# Patient Record
Sex: FEMALE | Race: BLACK OR AFRICAN AMERICAN | NOT HISPANIC OR LATINO | Employment: FULL TIME | URBAN - METROPOLITAN AREA
[De-identification: names, ages, dates, MRNs, and addresses within clinical notes are randomized per-mention and may not be internally consistent; named-entity substitution may affect disease eponyms.]

---

## 2017-01-09 ENCOUNTER — TELEPHONE (OUTPATIENT)
Dept: SLEEP MEDICINE | Facility: OTHER | Age: 52
End: 2017-01-09

## 2017-01-12 ENCOUNTER — TELEPHONE (OUTPATIENT)
Dept: SLEEP MEDICINE | Facility: OTHER | Age: 52
End: 2017-01-12

## 2017-01-16 ENCOUNTER — TELEPHONE (OUTPATIENT)
Dept: SLEEP MEDICINE | Facility: OTHER | Age: 52
End: 2017-01-16

## 2017-01-19 ENCOUNTER — TELEPHONE (OUTPATIENT)
Dept: SLEEP MEDICINE | Facility: OTHER | Age: 52
End: 2017-01-19

## 2017-01-23 ENCOUNTER — TELEPHONE (OUTPATIENT)
Dept: SLEEP MEDICINE | Facility: OTHER | Age: 52
End: 2017-01-23

## 2017-01-30 ENCOUNTER — TELEPHONE (OUTPATIENT)
Dept: SLEEP MEDICINE | Facility: OTHER | Age: 52
End: 2017-01-30

## 2017-02-02 ENCOUNTER — TELEPHONE (OUTPATIENT)
Dept: SLEEP MEDICINE | Facility: OTHER | Age: 52
End: 2017-02-02

## 2017-02-06 ENCOUNTER — TELEPHONE (OUTPATIENT)
Dept: SLEEP MEDICINE | Facility: OTHER | Age: 52
End: 2017-02-06

## 2017-02-06 NOTE — TELEPHONE ENCOUNTER
Left several messages to reschedule her sleep study.  No response,sending out a letter to schedule.

## 2017-03-28 ENCOUNTER — HOSPITAL ENCOUNTER (EMERGENCY)
Facility: HOSPITAL | Age: 52
Discharge: HOME | End: 2017-03-28
Attending: EMERGENCY MEDICINE
Payer: COMMERCIAL

## 2017-03-28 VITALS
TEMPERATURE: 99 F | OXYGEN SATURATION: 98 % | BODY MASS INDEX: 51.91 KG/M2 | SYSTOLIC BLOOD PRESSURE: 114 MMHG | HEART RATE: 89 BPM | HEIGHT: 63 IN | WEIGHT: 293 LBS | RESPIRATION RATE: 20 BRPM | DIASTOLIC BLOOD PRESSURE: 66 MMHG

## 2017-03-28 DIAGNOSIS — J06.9 VIRAL UPPER RESPIRATORY TRACT INFECTION: Primary | ICD-10-CM

## 2017-03-28 LAB
ALBUMIN SERPL BCP-MCNC: 4.1 G/DL
ALP SERPL-CCNC: 111 IU/L
ALT SERPL W/O P-5'-P-CCNC: 32 IU/L
ANION GAP SERPL CALC-SCNC: 12 MMOL/L
AST SERPL-CCNC: 41 IU/L
BASOPHILS # BLD AUTO: 0.04 K/UL
BASOPHILS NFR BLD: 0.4 %
BILIRUB SERPL-MCNC: 0.4 MG/DL
BUN SERPL-MCNC: 9 MG/DL
CALCIUM SERPL-MCNC: 8.8 MG/DL
CHLORIDE SERPL-SCNC: 102 MMOL/L
CO2 SERPL-SCNC: 28 MMOL/L
CREAT SERPL-MCNC: 0.46 MG/DL
DIFFERENTIAL METHOD: ABNORMAL
EOSINOPHIL # BLD AUTO: 0.4 K/UL
EOSINOPHIL NFR BLD: 3.9 %
ERYTHROCYTE [DISTWIDTH] IN BLOOD BY AUTOMATED COUNT: 13.8 %
EST. GFR  (AFRICAN AMERICAN): >60 ML/MIN/1.73 M^2
EST. GFR  (NON AFRICAN AMERICAN): >60 ML/MIN/1.73 M^2
GLUCOSE SERPL-MCNC: 101 MG/DL
HCT VFR BLD AUTO: 33.9 %
HGB BLD-MCNC: 10.9 G/DL
INR PPP: 1.1
LYMPHOCYTES # BLD AUTO: 4 K/UL
LYMPHOCYTES NFR BLD: 36.5 %
MCH RBC QN AUTO: 26.8 PG
MCHC RBC AUTO-ENTMCNC: 32.2 %
MCV RBC AUTO: 84 FL
MONOCYTES # BLD AUTO: 0.8 K/UL
MONOCYTES NFR BLD: 7.3 %
NEUTROPHILS # BLD AUTO: 5.6 K/UL
NEUTROPHILS NFR BLD: 51.7 %
NT-PROBNP: 27 PG/ML
PLATELET # BLD AUTO: 266 K/UL
PMV BLD AUTO: 10.8 FL
POTASSIUM SERPL-SCNC: 3.5 MMOL/L
PROT SERPL-MCNC: 7.8 G/DL
PROTHROMBIN TIME: 12.4 SEC
RBC # BLD AUTO: 4.06 M/UL
SODIUM SERPL-SCNC: 142 MMOL/L
TROPONIN I SERPL DL<=0.01 NG/ML-MCNC: <0.012 NG/ML
WBC # BLD AUTO: 10.89 K/UL

## 2017-03-28 PROCEDURE — 84484 ASSAY OF TROPONIN QUANT: CPT

## 2017-03-28 PROCEDURE — 85025 COMPLETE CBC W/AUTO DIFF WBC: CPT

## 2017-03-28 PROCEDURE — 63600175 PHARM REV CODE 636 W HCPCS: Performed by: EMERGENCY MEDICINE

## 2017-03-28 PROCEDURE — 96372 THER/PROPH/DIAG INJ SC/IM: CPT

## 2017-03-28 PROCEDURE — 85610 PROTHROMBIN TIME: CPT

## 2017-03-28 PROCEDURE — 27000221 HC OXYGEN, UP TO 24 HOURS

## 2017-03-28 PROCEDURE — 83880 ASSAY OF NATRIURETIC PEPTIDE: CPT

## 2017-03-28 PROCEDURE — 93005 ELECTROCARDIOGRAM TRACING: CPT

## 2017-03-28 PROCEDURE — 99284 EMERGENCY DEPT VISIT MOD MDM: CPT | Mod: 25

## 2017-03-28 PROCEDURE — 80053 COMPREHEN METABOLIC PANEL: CPT

## 2017-03-28 RX ORDER — HYDROCHLOROTHIAZIDE 25 MG/1
25 TABLET ORAL DAILY
Qty: 30 TABLET | Refills: 0 | Status: SHIPPED | OUTPATIENT
Start: 2017-03-28 | End: 2022-12-30

## 2017-03-28 RX ORDER — DEXAMETHASONE SODIUM PHOSPHATE 4 MG/ML
8 INJECTION, SOLUTION INTRA-ARTICULAR; INTRALESIONAL; INTRAMUSCULAR; INTRAVENOUS; SOFT TISSUE
Status: COMPLETED | OUTPATIENT
Start: 2017-03-28 | End: 2017-03-28

## 2017-03-28 RX ORDER — HYDROCODONE BITARTRATE AND HOMATROPINE METHYLBROMIDE ORAL SOLUTION 5; 1.5 MG/5ML; MG/5ML
5 LIQUID ORAL EVERY 4 HOURS PRN
Qty: 120 ML | Refills: 0 | Status: ON HOLD | OUTPATIENT
Start: 2017-03-28 | End: 2017-10-18 | Stop reason: ALTCHOICE

## 2017-03-28 RX ORDER — QUETIAPINE FUMARATE 100 MG/1
TABLET, FILM COATED ORAL
COMMUNITY
End: 2022-12-30

## 2017-03-28 RX ORDER — CLOPIDOGREL BISULFATE 75 MG/1
75 TABLET ORAL DAILY
COMMUNITY
End: 2020-11-21 | Stop reason: CLARIF

## 2017-03-28 RX ADMIN — DEXAMETHASONE SODIUM PHOSPHATE 8 MG: 4 INJECTION, SOLUTION INTRAMUSCULAR; INTRAVENOUS at 09:03

## 2017-03-29 NOTE — ED PROVIDER NOTES
"Encounter Date: 3/28/2017       History     Chief Complaint   Patient presents with    Shortness of Breath     Pt states has had SOB and chest pain x 1 hour.  States has had "fluid in legs" 3 days.  Called for EKG.     Review of patient's allergies indicates:  No Known Allergies  Patient is a 51 y.o. female presenting with the following complaint: shortness of breath. The history is provided by the patient.   Shortness of Breath   This is a new problem. The average episode lasts 1 hour. The problem occurs continuously.The current episode started 1 to 2 hours ago. Associated symptoms include cough, sputum production (white) and leg swelling. Pertinent negatives include no fever, no headaches, no rhinorrhea, no sore throat, no hemoptysis, no wheezing, no orthopnea, no chest pain, no abdominal pain and no rash. It is unknown what precipitated the problem. She has tried nothing for the symptoms. She has had no prior hospitalizations. She has had prior ED visits. She has had no prior ICU admissions.     Past Medical History:   Diagnosis Date    Depression     Diabetes mellitus     High cholesterol     Hypertension     TIA (transient ischemic attack)      Past Surgical History:   Procedure Laterality Date    CHOLECYSTECTOMY      KNEE SURGERY      TUBAL LIGATION      WRIST SURGERY       History reviewed. No pertinent family history.  Social History   Substance Use Topics    Smoking status: Former Smoker    Smokeless tobacco: None    Alcohol use No     Review of Systems   Constitutional: Negative for fever.   HENT: Negative for rhinorrhea and sore throat.    Respiratory: Positive for cough, sputum production (white) and shortness of breath. Negative for hemoptysis and wheezing.    Cardiovascular: Positive for leg swelling. Negative for chest pain and orthopnea.   Gastrointestinal: Negative for abdominal pain.   Skin: Negative for rash.   Neurological: Negative for headaches.   All other systems reviewed and are " negative.      Physical Exam   Initial Vitals   BP Pulse Resp Temp SpO2   03/28/17 1813 03/28/17 1813 03/28/17 1813 03/28/17 1813 03/28/17 1813   177/93 108 26 99 °F (37.2 °C) 98 %     Physical Exam    Nursing note and vitals reviewed.  Constitutional: She appears well-developed and well-nourished.   HENT:   Head: Normocephalic and atraumatic.   Eyes: EOM are normal.   Neck: Normal range of motion. Neck supple.   Cardiovascular: Normal rate, regular rhythm, normal heart sounds and intact distal pulses.   Pulmonary/Chest: Breath sounds normal.   Abdominal: Soft.   Musculoskeletal: Normal range of motion.   Neurological: She is alert and oriented to person, place, and time.   Skin: Skin is warm and dry.   Psychiatric: She has a normal mood and affect. Her behavior is normal. Judgment and thought content normal.         ED Course   Procedures  Labs Reviewed   CBC W/ AUTO DIFFERENTIAL - Abnormal; Notable for the following:        Result Value    Hemoglobin 10.9 (*)     Hematocrit 33.9 (*)     MCH 26.8 (*)     All other components within normal limits   COMPREHENSIVE METABOLIC PANEL - Abnormal; Notable for the following:     Creatinine 0.46 (*)     All other components within normal limits   TROPONIN I   PROTIME-INR   NT-PRO NATRIURETIC PEPTIDE     EKG Readings: (Independently Interpreted)   Rhythm: Normal Sinus Rhythm. Heart Rate: 100. Ectopy: No Ectopy. Conduction: Normal. ST Segments: Normal ST Segments. T Waves: Normal. Clinical Impression: Normal Sinus Rhythm       X-Rays:   Independently Interpreted Readings:   Chest X-Ray: Normal heart size.  No infiltrates.  No acute abnormalities.     Medical Decision Making:   Clinical Tests:   Lab Tests: Ordered and Reviewed  Radiological Study: Ordered and Reviewed  Medical Tests: Ordered and Reviewed                   ED Course     Clinical Impression:   The encounter diagnosis was Viral upper respiratory tract infection.    Disposition:   Disposition: Discharged  Condition:  Stable       Shayla Angulo MD  03/28/17 4219

## 2017-03-29 NOTE — ED NOTES
Pt updated on POC. Pt given pillwo for comfort. Rates chest pain 7/10 from 10/10. No other complaints.

## 2017-03-29 NOTE — ED TRIAGE NOTES
"Pt presents to ED c/o chest pain radiating to upper back x 1 day with cough that has been intermittent for a few months. Pt reports bilateral lower leg swelling x 2 days. Mild swelling noted. Pt denies pain to feet or calves. Pt states PTA she coughed up foamy white sputum. Pt able to complete full sentences. No resp distress noted. Appears slightly uncomfortable. Breath sounds clear. States she was taken off of her fluid pills a few months ago because it was making her cough. She went get OTC "water pills" on Sunday to attempt to decrease the swelling.   "

## 2017-03-29 NOTE — DISCHARGE INSTRUCTIONS
Viral Upper Respiratory Illness (Adult)  You have a viral upper respiratory illness (URI), which is another term for the common cold. This illness is contagious during the first few days. It is spread through the air by coughing and sneezing. It may also be spread by direct contact (touching the sick person and then touching your own eyes, nose, or mouth). Frequent handwashing will decrease risk of spread. Most viral illnesses go away within 7 to 10 days with rest and simple home remedies. Sometimes the illness may last for several weeks. Antibiotics will not kill a virus, and they are generally not prescribed for this condition.    Home care  · If symptoms are severe, rest at home for the first 2 to 3 days. When you resume activity, don't let yourself get too tired.  · Avoid being exposed to cigarette smoke (yours or others).  · You may use acetaminophen or ibuprofen to control pain and fever, unless another medicine was prescribed. (Note: If you have chronic liver or kidney disease, have ever had a stomach ulcer or gastrointestinal bleeding, or are taking blood-thinning medicines, talk with your healthcare provider before using these medicines.) Aspirin should never be given to anyone under 18 years of age who is ill with a viral infection or fever. It may cause severe liver or brain damage.  · Your appetite may be poor, so a light diet is fine. Avoid dehydration by drinking 6 to 8 glasses of fluids per day (water, soft drinks, juices, tea, or soup). Extra fluids will help loosen secretions in the nose and lungs.  · Over-the-counter cold medicines will not shorten the length of time youre sick, but they may be helpful for the following symptoms: cough, sore throat, and nasal and sinus congestion. (Note: Do not use decongestants if you have high blood pressure.)  Follow-up care  Follow up with your healthcare provider, or as advised.  When to seek medical advice  Call your healthcare provider right away if  any of these occur:  · Cough with lots of colored sputum (mucus)  · Severe headache; face, neck, or ear pain  · Difficulty swallowing due to throat pain  · Fever of 100.4°F (38°C)  Call 911, or get immediate medical care  Call emergency services right away if any of these occur:  · Chest pain, shortness of breath, wheezing, or difficulty breathing  · Coughing up blood  · Inability to swallow due to throat pain  Date Last Reviewed: 9/13/2015 © 2000-2016 [employer]. [address] [place] [address]. All rights reserved. This information is not intended as a substitute for professional medical care. Always follow your healthcare professional's instructions.

## 2017-09-28 ENCOUNTER — OFFICE VISIT (OUTPATIENT)
Dept: GASTROENTEROLOGY | Facility: CLINIC | Age: 52
End: 2017-09-28
Payer: COMMERCIAL

## 2017-09-28 VITALS
WEIGHT: 280.19 LBS | BODY MASS INDEX: 49.64 KG/M2 | SYSTOLIC BLOOD PRESSURE: 140 MMHG | HEART RATE: 91 BPM | DIASTOLIC BLOOD PRESSURE: 97 MMHG | HEIGHT: 63 IN

## 2017-09-28 DIAGNOSIS — K59.01 CONSTIPATION BY DELAYED COLONIC TRANSIT: ICD-10-CM

## 2017-09-28 DIAGNOSIS — R12 HEARTBURN SYMPTOM: ICD-10-CM

## 2017-09-28 DIAGNOSIS — K62.5 RECTAL BLEEDING: Primary | ICD-10-CM

## 2017-09-28 PROCEDURE — 99243 OFF/OP CNSLTJ NEW/EST LOW 30: CPT | Mod: S$GLB,,, | Performed by: INTERNAL MEDICINE

## 2017-09-28 PROCEDURE — 99999 PR PBB SHADOW E&M-EST. PATIENT-LVL III: CPT | Mod: PBBFAC,,, | Performed by: INTERNAL MEDICINE

## 2017-09-28 NOTE — LETTER
September 28, 2017      Court Garcia MD  [address]  [address]  [facility] [place] [address]           [place] [facility]  [address], [facility] [address],  [place] [address]  Phone: [phone]  Fax: [phone]          Patient: Barrera Siddiqui   MR Number: [id number]   YOB: 1965   Date of Visit: 9/28/2017       Dear Dr. Court Garcia:    Thank you for referring Barrera Siddiqui to me for evaluation. Attached you will find relevant portions of my assessment and plan of care.    If you have questions, please do not hesitate to call me. I look forward to following Barrera Siddiqui along with you.    Sincerely,    Donald Wright Jr., MD    Enclosure  CC:  No Recipients    If you would like to receive this communication electronically, please contact [email or web address] or [phone] to request more information on [facility] access.    For providers and/or their staff who would like to refer a patient to [facility], please contact us through our one-stop-shop provider referral line, [facility], at [phone].    If you feel you have received this communication in error or would no longer like to receive these types of communications, please e-mail [email or web address]

## 2017-09-28 NOTE — PATIENT INSTRUCTIONS
Constipation (Adult)  Constipation means that you have bowel movements that are less frequent than usual. Stools often become very hard and difficult to pass.  Constipation is very common. At some point in life it affects almost everyone. Since everyone's bowel habits are different, what is constipation to one person may not be to another. Your healthcare provider may do tests to diagnose constipation. It depends on what he or she finds when evaluating you.    Symptoms of constipation include:  · Abdominal pain  · Bloating  · Vomiting  · Painful bowel movements  · Itching, swelling, bleeding, or pain around the anus  Causes  Constipation can have many causes. These include:  · Diet low in fiber  · Too much dairy  · Not drinking enough liquids  · Lack of exercise or physical activity. This is especially true for older adults.  · Changes in lifestyle or daily routine, including pregnancy, aging, work, and travel  · Frequent use or misuse of laxatives  · Ignoring the urge to have a bowel movement or delaying it until later  · Medicines, such as certain prescription pain medicines, iron supplements, antacids, certain antidepressants, and calcium supplements  · Diseases like irritable bowel syndrome, bowel obstructions, stroke, diabetes, thyroid disease, Parkinson disease, hemorrhoids, and colon cancer  Complications  Potential complications of constipation can include:  · Hemorrhoids  · Rectal bleeding from hemorrhoids or anal fissures (skin tears)  · Hernias  · Dependency on laxatives  · Chronic constipation  · Fecal impaction  · Bowel obstruction or perforation  Home care  All treatment should be done after talking with your healthcare provider. This is especially true if you have another medical problems, are taking prescription medicines, or are an older adult. Treatment most often involves lifestyle changes. You may also need medicines. Your healthcare provider will tell you which will work best for you. Follow  the advice below to help avoid this problem in the future.  Lifestyle changes  These lifestyle changes can help prevent constipation:  · Diet. Eat a high-fiber diet, with fresh fruit and vegetables, and reduce dairy intake, meats, and processed foods  · Fluids. It's important to get enough fluids each day. Drink plenty of water when you eat more fiber. If you are on diet that limits the amount of fluid you can have, talk about this with your healthcare provider.  · Regular exercise. Check with your healthcare provider first.  Medications  Take any medicines as directed. Some laxatives are safe to use only every now and then. Others can be taken on a regular basis. Talk with your doctor or pharmacist if you have questions.  Prescription pain medicines can cause constipation. If you are taking this kind of medicine, ask your healthcare provider if you should also take a stool softener.  Medicines you may take to treat constipation include:  · Fiber supplements  · Stool softeners  · Laxatives  · Enemas  · Rectal suppositories  Follow-up care  Follow up with your healthcare provider if symptoms don't get better in the next few days. You may need to have more tests or see a specialist.  Call 911  Call 911 if any of these occur:  · Trouble breathing  · Stiff, rigid abdomen that is severely painful to touch  · Confusion  · Fainting or loss of consciousness  · Rapid heart rate  · Chest pain  When to seek medical advice  Call your healthcare provider right away if any of these occur:  · Fever over 100.4°F (38°C)  · Failure to resume normal bowel movements  · Pain in your abdomen or back gets worse  · Nausea or vomiting  · Swelling in your abdomen  · Blood in the stool  · Black, tarry stool  · Involuntary weight loss  · Weakness  Date Last Reviewed: 12/30/2015  © 8280-1785 [employer]. [address] [place] [address], [place] [address]. All rights reserved. This information is not intended as a substitute for  professional medical care. Always follow your healthcare professional's instructions.

## 2017-09-28 NOTE — PROGRESS NOTES
Subjective:      Patient ID: Barrera Siddiqui is a 52 y.o. female.    Chief Complaint: Rectal Bleeding; Gastroesophageal Reflux; Heartburn; and Nausea    HPI:   Patient a 52-year-old female referred for GI evaluation.  She gives a history of intermittent bright red blood per rectum.  She's been anxious about addressing this symptom.  Occasionally has blood dripped from the anus all urinating.  Describes constipation but not actually hard stool.  Takes MiraLAX occasionally.  GI systems review includes occasional heartburn.  She gives a history of prior cerebrovascular accident ×2.  This is managed on Plavix and 81 mg aspirin.  Further past medical history includes hypertension, diabetes on oral agents.  Prior cholecystectomy.  Nonsmoker.  Nondrinker.  I reviewed the indication risks for colonoscopy with her.  Reassured her that the test is a low risk procedure.  Advised her that she will need to hold her Plavix for 5 days prior to the procedure.  She does not need to interrupt her 81 mg aspirin daily    Review of patient's allergies indicates:  No Known Allergies  Past Medical History:   Diagnosis Date    Depression     Diabetes mellitus     High cholesterol     Hypertension     TIA (transient ischemic attack)      Past Surgical History:   Procedure Laterality Date    CHOLECYSTECTOMY      KNEE SURGERY      TUBAL LIGATION      WRIST SURGERY       Family History   Problem Relation Age of Onset    No Known Problems Mother     Liver disease Maternal Grandmother      Social History     Social History    Marital status: Single     Spouse name: N/A    Number of children: N/A    Years of education: N/A     Occupational History    Not on file.     Social History Main Topics    Smoking status: Former Smoker    Smokeless tobacco: Never Used    Alcohol use No    Drug use: No    Sexual activity: Not on file     Other Topics Concern    Not on file     Social History Narrative    No narrative on file       Review of  "Systems:  Constitutional: Negative for appetite change.  Fatigue.  HENT: Negative for trouble swallowing.   Eyes: Negative for photophobia.   Respiratory: Positive for cough .  No shortness of breath.   Cardiovascular: Negative for palpitations.   Gastrointestinal: See HPI for details.  Genitourinary: Negative for frequency and hematuria.   Skin: Negative for rash.   Musculoskeletal: Joint pains, back pain.  Neurological: Negative for weakness and headaches.   Hematological: Negative.   Psychiatric/Behavioral: Negative for suicidal ideas and behavioral problems.  Some anxiety and memory loss    Objective:     BP (!) 140/97 (BP Location: Right arm, Patient Position: Sitting)   Pulse 91   Ht 5' 3" (1.6 m)   Wt 127.1 kg (280 lb 3.2 oz)   BMI 49.64 kg/m²     Physical Exam:  Eyes: Pupils are equal, round, and reactive to light.   Neck: Supple. No mass  Cardiovascular: Regular rhythm . No murmur   Pulmonary/Chest: Lungs clear   Abdominal: Soft.  Obese. Nontender, no guarding. Positive bowel sounds   Musculoskeletal: No deformity. No edema.   Psychiatric: Alert and oriented    Assessment:     1. Rectal bleeding    2. Constipation by delayed colonic transit    3. Heartburn symptom      Plan:     Barrera was seen today for rectal bleeding, gastroesophageal reflux, heartburn and nausea.    Diagnoses and all orders for this visit:    Rectal bleeding  -     Case request GI: COLONOSCOPY    Constipation by delayed colonic transit    Heartburn symptom      Plan:  Colonoscopy with extra prep.  At [facility]  Hold Plavix for 5 days prior to the procedure.  Do not interrupt her 81 mg aspirin daily regimen  Daily MiraLAX  Constipation pamphlet    CC Dr. benson    "

## 2017-09-29 ENCOUNTER — TELEPHONE (OUTPATIENT)
Dept: GASTROENTEROLOGY | Facility: CLINIC | Age: 52
End: 2017-09-29

## 2017-09-29 NOTE — TELEPHONE ENCOUNTER
Patient is scheduled for Colonoscopy at [facility] on 10/18/17,case request was placed. Prep instructions was explained and given to patient.

## 2017-10-02 ENCOUNTER — TELEPHONE (OUTPATIENT)
Dept: GASTROENTEROLOGY | Facility: CLINIC | Age: 52
End: 2017-10-02

## 2017-10-02 NOTE — TELEPHONE ENCOUNTER
----- Message from Donald [place] Jr., MD sent at 10/2/2017  2:54 PM CDT -----  done  ----- Message -----  From: Diane Barlow MA  Sent: 9/29/2017  11:05 AM  To: Donald [place] Jr., MD    Patient would like to schedule Colonoscopy for 10/18/17 in [place],can place case request for that date.

## 2017-10-18 ENCOUNTER — ANESTHESIA EVENT (OUTPATIENT)
Dept: ENDOSCOPY | Facility: HOSPITAL | Age: 52
End: 2017-10-18
Payer: COMMERCIAL

## 2017-10-18 ENCOUNTER — HOSPITAL ENCOUNTER (OUTPATIENT)
Facility: HOSPITAL | Age: 52
Discharge: HOME | End: 2017-10-18
Attending: INTERNAL MEDICINE | Admitting: INTERNAL MEDICINE
Payer: COMMERCIAL

## 2017-10-18 ENCOUNTER — ANESTHESIA (OUTPATIENT)
Dept: ENDOSCOPY | Facility: HOSPITAL | Age: 52
End: 2017-10-18
Payer: COMMERCIAL

## 2017-10-18 VITALS
RESPIRATION RATE: 18 BRPM | SYSTOLIC BLOOD PRESSURE: 129 MMHG | BODY MASS INDEX: 50.85 KG/M2 | WEIGHT: 287 LBS | HEIGHT: 63 IN | TEMPERATURE: 98 F | HEART RATE: 82 BPM | OXYGEN SATURATION: 96 % | DIASTOLIC BLOOD PRESSURE: 79 MMHG

## 2017-10-18 DIAGNOSIS — Z12.12 SCREENING FOR COLORECTAL CANCER: ICD-10-CM

## 2017-10-18 DIAGNOSIS — K62.5 RECTAL BLEEDING: Primary | ICD-10-CM

## 2017-10-18 DIAGNOSIS — Z12.11 SCREENING FOR COLORECTAL CANCER: ICD-10-CM

## 2017-10-18 LAB
B-HCG UR QL: NEGATIVE
CTP QC/QA: YES
POCT GLUCOSE: 138 MG/DL (ref 70–110)

## 2017-10-18 PROCEDURE — 82962 GLUCOSE BLOOD TEST: CPT | Performed by: INTERNAL MEDICINE

## 2017-10-18 PROCEDURE — 37000008 HC ANESTHESIA 1ST 15 MINUTES: Performed by: INTERNAL MEDICINE

## 2017-10-18 PROCEDURE — 45378 DIAGNOSTIC COLONOSCOPY: CPT | Mod: ,,, | Performed by: INTERNAL MEDICINE

## 2017-10-18 PROCEDURE — 45378 DIAGNOSTIC COLONOSCOPY: CPT | Performed by: INTERNAL MEDICINE

## 2017-10-18 PROCEDURE — 63600175 PHARM REV CODE 636 W HCPCS: Performed by: NURSE ANESTHETIST, CERTIFIED REGISTERED

## 2017-10-18 PROCEDURE — 37000009 HC ANESTHESIA EA ADD 15 MINS: Performed by: INTERNAL MEDICINE

## 2017-10-18 PROCEDURE — 25000003 PHARM REV CODE 250: Performed by: INTERNAL MEDICINE

## 2017-10-18 PROCEDURE — 81025 URINE PREGNANCY TEST: CPT | Performed by: INTERNAL MEDICINE

## 2017-10-18 RX ORDER — SODIUM CHLORIDE 9 MG/ML
INJECTION, SOLUTION INTRAVENOUS CONTINUOUS
Status: DISCONTINUED | OUTPATIENT
Start: 2017-10-18 | End: 2017-10-18 | Stop reason: SDUPTHER

## 2017-10-18 RX ORDER — PROPOFOL 10 MG/ML
VIAL (ML) INTRAVENOUS
Status: DISCONTINUED | OUTPATIENT
Start: 2017-10-18 | End: 2017-10-18

## 2017-10-18 RX ORDER — LIDOCAINE HCL/PF 100 MG/5ML
SYRINGE (ML) INTRAVENOUS
Status: DISCONTINUED | OUTPATIENT
Start: 2017-10-18 | End: 2017-10-18

## 2017-10-18 RX ORDER — PROPOFOL 10 MG/ML
VIAL (ML) INTRAVENOUS CONTINUOUS PRN
Status: DISCONTINUED | OUTPATIENT
Start: 2017-10-18 | End: 2017-10-18

## 2017-10-18 RX ORDER — SODIUM CHLORIDE 9 MG/ML
INJECTION, SOLUTION INTRAVENOUS CONTINUOUS
Status: DISCONTINUED | OUTPATIENT
Start: 2017-10-18 | End: 2017-10-18 | Stop reason: HOSPADM

## 2017-10-18 RX ADMIN — LIDOCAINE HYDROCHLORIDE 80 MG: 20 INJECTION, SOLUTION INTRAVENOUS at 10:10

## 2017-10-18 RX ADMIN — PROPOFOL 150 MCG/KG/MIN: 10 INJECTION, EMULSION INTRAVENOUS at 10:10

## 2017-10-18 RX ADMIN — PROPOFOL 50 MG: 10 INJECTION, EMULSION INTRAVENOUS at 10:10

## 2017-10-18 RX ADMIN — SODIUM CHLORIDE: 0.9 INJECTION, SOLUTION INTRAVENOUS at 09:10

## 2017-10-18 NOTE — ANESTHESIA POSTPROCEDURE EVALUATION
"Anesthesia Post Evaluation    Patient: Barrera Siddiqui    Procedure(s) Performed: Procedure(s) (LRB):  COLONOSCOPY (N/A)    Final Anesthesia Type: MAC  Patient location during evaluation: GI PACU  Patient participation: Yes- Able to Participate  Level of consciousness: awake and alert and oriented  Post-procedure vital signs: reviewed and stable  Pain management: adequate  Airway patency: patent  PONV status at discharge: No PONV  Anesthetic complications: no      Cardiovascular status: blood pressure returned to baseline and hemodynamically stable  Respiratory status: unassisted  Hydration status: euvolemic  Follow-up not needed.        Visit Vitals  BP (!) 141/84 (BP Location: Left arm, Patient Position: Lying)   Pulse 108   Temp 37 °C (98.6 °F) (Oral)   Resp 16   Ht 5' 2.5" (1.588 m)   Wt 130.2 kg (287 lb)   SpO2 99%   Breastfeeding? No   BMI 51.66 kg/m²       Pain/Nola Score: No Data Recorded      "

## 2017-10-18 NOTE — TRANSFER OF CARE
"Anesthesia Transfer of Care Note    Patient: Barrera Siddiqui    Procedure(s) Performed: Procedure(s) (LRB):  COLONOSCOPY (N/A)    Patient location: GI    Anesthesia Type: MAC    Transport from OR: Transported from OR on room air with adequate spontaneous ventilation    Post pain: adequate analgesia    Post assessment: no apparent anesthetic complications and tolerated procedure well    Post vital signs: stable    Level of consciousness: awake, alert and oriented    Nausea/Vomiting: no nausea/vomiting    Complications: none          Last vitals:   Visit Vitals  BP (!) 141/84 (BP Location: Left arm, Patient Position: Lying)   Pulse 108   Temp 37 °C (98.6 °F) (Oral)   Resp 16   Ht 5' 2.5" (1.588 m)   Wt 130.2 kg (287 lb)   SpO2 99%   Breastfeeding? No   BMI 51.66 kg/m²     "

## 2017-10-18 NOTE — H&P (VIEW-ONLY)
Subjective:      Patient ID: Barrera Siddiqui is a 52 y.o. female.    Chief Complaint: Rectal Bleeding; Gastroesophageal Reflux; Heartburn; and Nausea    HPI:   Patient a 52-year-old female referred for GI evaluation.  She gives a history of intermittent bright red blood per rectum.  She's been anxious about addressing this symptom.  Occasionally has blood dripped from the anus all urinating.  Describes constipation but not actually hard stool.  Takes MiraLAX occasionally.  GI systems review includes occasional heartburn.  She gives a history of prior cerebrovascular accident ×2.  This is managed on Plavix and 81 mg aspirin.  Further past medical history includes hypertension, diabetes on oral agents.  Prior cholecystectomy.  Nonsmoker.  Nondrinker.  I reviewed the indication risks for colonoscopy with her.  Reassured her that the test is a low risk procedure.  Advised her that she will need to hold her Plavix for 5 days prior to the procedure.  She does not need to interrupt her 81 mg aspirin daily    Review of patient's allergies indicates:  No Known Allergies  Past Medical History:   Diagnosis Date    Depression     Diabetes mellitus     High cholesterol     Hypertension     TIA (transient ischemic attack)      Past Surgical History:   Procedure Laterality Date    CHOLECYSTECTOMY      KNEE SURGERY      TUBAL LIGATION      WRIST SURGERY       Family History   Problem Relation Age of Onset    No Known Problems Mother     Liver disease Maternal Grandmother      Social History     Social History    Marital status: Single     Spouse name: N/A    Number of children: N/A    Years of education: N/A     Occupational History    Not on file.     Social History Main Topics    Smoking status: Former Smoker    Smokeless tobacco: Never Used    Alcohol use No    Drug use: No    Sexual activity: Not on file     Other Topics Concern    Not on file     Social History Narrative    No narrative on file       Review of  "Systems:  Constitutional: Negative for appetite change.  Fatigue.  HENT: Negative for trouble swallowing.   Eyes: Negative for photophobia.   Respiratory: Positive for cough .  No shortness of breath.   Cardiovascular: Negative for palpitations.   Gastrointestinal: See HPI for details.  Genitourinary: Negative for frequency and hematuria.   Skin: Negative for rash.   Musculoskeletal: Joint pains, back pain.  Neurological: Negative for weakness and headaches.   Hematological: Negative.   Psychiatric/Behavioral: Negative for suicidal ideas and behavioral problems.  Some anxiety and memory loss    Objective:     BP (!) 140/97 (BP Location: Right arm, Patient Position: Sitting)   Pulse 91   Ht 5' 3" (1.6 m)   Wt 127.1 kg (280 lb 3.2 oz)   BMI 49.64 kg/m²     Physical Exam:  Eyes: Pupils are equal, round, and reactive to light.   Neck: Supple. No mass  Cardiovascular: Regular rhythm . No murmur   Pulmonary/Chest: Lungs clear   Abdominal: Soft.  Obese. Nontender, no guarding. Positive bowel sounds   Musculoskeletal: No deformity. No edema.   Psychiatric: Alert and oriented    Assessment:     1. Rectal bleeding    2. Constipation by delayed colonic transit    3. Heartburn symptom      Plan:     Barrera was seen today for rectal bleeding, gastroesophageal reflux, heartburn and nausea.    Diagnoses and all orders for this visit:    Rectal bleeding  -     Case request GI: COLONOSCOPY    Constipation by delayed colonic transit    Heartburn symptom      Plan:  Colonoscopy with extra prep.  At [facility]  Hold Plavix for 5 days prior to the procedure.  Do not interrupt her 81 mg aspirin daily regimen  Daily MiraLAX  Constipation pamphlet    CC Dr. benson    "

## 2017-10-18 NOTE — ANESTHESIA PREPROCEDURE EVALUATION
10/18/2017  Barrera Siddiqui is a 52 y.o., female.    Anesthesia Evaluation    I have reviewed the Patient Summary Reports.    I have reviewed the Nursing Notes.   I have reviewed the Medications.     Review of Systems  Anesthesia Hx:  No previous Anesthesia    Cardiovascular:   Hypertension    Pulmonary:   Sleep Apnea    Neurological:   TIA, CVA Headaches    Endocrine:   Diabetes    Psych:   Psychiatric History          Physical Exam  General:  Morbid Obesity    Airway/Jaw/Neck:  Airway Findings: Mouth Opening: Normal Tongue: Normal  General Airway Assessment: Adult  Mallampati: II     Eyes/Ears/Nose:  Eyes/Ears/Nose Findings:    Dental:  Dental Findings: In tact   Chest/Lungs:  Chest/Lungs Findings: Clear to auscultation     Heart/Vascular:  Heart Findings: Rate: Normal  Rhythm: Regular Rhythm        Mental Status:  Mental Status Findings:  Cooperative, Alert and Oriented         Anesthesia Plan  Type of Anesthesia, risks & benefits discussed:  Anesthesia Type:  MAC  Patient's Preference:   Intra-op Monitoring Plan: standard ASA monitors  Intra-op Monitoring Plan Comments:   Post Op Pain Control Plan: multimodal analgesia  Post Op Pain Control Plan Comments:   Induction:   IV  Beta Blocker:         Informed Consent: Patient understands risks and agrees with Anesthesia plan.  Questions answered. Anesthesia consent signed with patient.  ASA Score: 3     Day of Surgery Review of History & Physical:  There are no significant changes.          Ready For Surgery From Anesthesia Perspective.

## 2017-11-22 ENCOUNTER — HOSPITAL ENCOUNTER (EMERGENCY)
Facility: HOSPITAL | Age: 52
Discharge: HOME | End: 2017-11-22
Attending: EMERGENCY MEDICINE
Payer: COMMERCIAL

## 2017-11-22 VITALS
HEART RATE: 96 BPM | BODY MASS INDEX: 50.79 KG/M2 | DIASTOLIC BLOOD PRESSURE: 90 MMHG | TEMPERATURE: 98 F | HEIGHT: 62 IN | OXYGEN SATURATION: 97 % | WEIGHT: 276 LBS | SYSTOLIC BLOOD PRESSURE: 155 MMHG | RESPIRATION RATE: 18 BRPM

## 2017-11-22 DIAGNOSIS — S40.011A CONTUSION OF MULTIPLE SITES OF RIGHT SHOULDER, INITIAL ENCOUNTER: Primary | ICD-10-CM

## 2017-11-22 DIAGNOSIS — S49.91XA RIGHT SHOULDER INJURY: ICD-10-CM

## 2017-11-22 PROCEDURE — 25000003 PHARM REV CODE 250: Performed by: EMERGENCY MEDICINE

## 2017-11-22 PROCEDURE — 99283 EMERGENCY DEPT VISIT LOW MDM: CPT

## 2017-11-22 RX ORDER — TRAMADOL HYDROCHLORIDE 50 MG/1
50 TABLET ORAL EVERY 6 HOURS PRN
Qty: 15 TABLET | Refills: 0 | Status: SHIPPED | OUTPATIENT
Start: 2017-11-22 | End: 2017-12-02

## 2017-11-22 RX ORDER — HYDROCODONE BITARTRATE AND ACETAMINOPHEN 5; 325 MG/1; MG/1
1 TABLET ORAL
Status: COMPLETED | OUTPATIENT
Start: 2017-11-22 | End: 2017-11-22

## 2017-11-22 RX ADMIN — HYDROCODONE BITARTRATE AND ACETAMINOPHEN 1 TABLET: 5; 325 TABLET ORAL at 01:11

## 2017-12-01 NOTE — ED PROVIDER NOTES
Encounter Date: 11/22/2017       History     Chief Complaint   Patient presents with    Shoulder Injury     reports hit right shoulder on wall at home around 0500 this morning. Reports pain has become progressively worse. Pt states that she is currently on blood thinners from previous stroke, so wanted to be evaluated.      HPI   This is a 52 y.o. female who has a past medical history of Anticoagulant long-term use; Depression;   Diabetes mellitus; High cholesterol; Hypertension; Stroke; and TIA (transient ischemic attack).   The patient presents to the Emergency Department with right shoulder injury.  Patient had a mechanical trip and fall where she landed her right shoulder into a wall.  She denies any head injury, LOC, syncope, rib pain, other injury.   Symptoms are associated with nothing.   Symptoms are aggravated by movement, palpation.  Symptoms are relieved by nothing.   Patient has no prior history of similar symptoms.     Pt has a past surgical history that includes Tubal ligation; Knee surgery; Wrist surgery; Cholecystectomy;   and Colonoscopy (N/A, 10/18/2017).      Review of patient's allergies indicates:  No Known Allergies  Past Medical History:   Diagnosis Date    Anticoagulant long-term use     Depression     Diabetes mellitus     High cholesterol     Hypertension     Stroke     TIA (transient ischemic attack)      Past Surgical History:   Procedure Laterality Date    CHOLECYSTECTOMY      COLONOSCOPY N/A 10/18/2017    Procedure: COLONOSCOPY;  Surgeon: Donald Wright Jr., MD;  Location: [facility];  Service: Endoscopy;  Laterality: N/A;    KNEE SURGERY      TUBAL LIGATION      WRIST SURGERY       Family History   Problem Relation Age of Onset    No Known Problems Mother     Liver disease Maternal Grandmother      Social History   Substance Use Topics    Smoking status: Former Smoker    Smokeless tobacco: Never Used    Alcohol use Yes      Comment: rare     Review of Systems    Constitutional: Positive for activity change.   Gastrointestinal: Negative for nausea.   Musculoskeletal: Positive for arthralgias. Negative for back pain and joint swelling.   Skin: Negative for wound.   Neurological: Negative for weakness and numbness.   Hematological: Bruises/bleeds easily.       Physical Exam     Initial Vitals [11/22/17 1210]   BP Pulse Resp Temp SpO2   (!) 181/110 95 20 97.7 °F (36.5 °C) 99 %      MAP       133.67         Physical Exam    Nursing note and vitals reviewed.  Constitutional: She appears well-developed and well-nourished. She is not diaphoretic. No distress.   HENT:   Head: Normocephalic and atraumatic.   Mouth/Throat: Oropharynx is clear and moist.   Eyes: Conjunctivae are normal.   Cardiovascular: Normal rate, regular rhythm and intact distal pulses.   Pulmonary/Chest: No respiratory distress.   Musculoskeletal:   Decreased range of motion to the right shoulder secondary to pain.  There is no crepitus, deformity.  There is tenderness to the posterior lateral shoulder.  There is no swelling to the right shoulder.  No AC joint TTP or deformity.  Remainder or RUE has full ROM, nontender, no swelling or deformity.     Neurological: She is alert and oriented to person, place, and time.   Skin: Skin is warm and dry. Capillary refill takes less than 2 seconds. No rash noted. No erythema.   Psychiatric: She has a normal mood and affect.         ED Course   Procedures  Labs Reviewed - No data to display       X-Rays:   Independently Interpreted Readings:   Other Readings:  Right shoulder x-ray: No fracture or subluxation as read by me    Medical Decision Making:   Initial Assessment:   This is an emergent evaluation of a 52 y.o.female patient with presentation of right shoulder injury.   Initial differentials include but are not limited to: Contusion, fracture, dislocation doubtful.   Plan: Right shoulder x-ray, ice, pain medicine    Clinical Tests:   Radiological Study: Ordered and  Reviewed    X-ray negative.  Overall impression is contusion.  Recommend RICE, Rx tramadol.    Clinical impression and plan discussed with patient.    Pt is to call for follow up with PCP in 7 days.  Pt to return to the ED for any new or concerning symptoms.  Aftercare instructions and return precautions provided to patient.   Pt expressed understanding and agrees with plan.                     ED Course      Clinical Impression:   The primary encounter diagnosis was Contusion of multiple sites of right shoulder, initial encounter. A diagnosis of Right shoulder injury was also pertinent to this visit.                           Marco Mancini MD  11/30/17 1385

## 2018-03-28 DIAGNOSIS — Z12.31 SCREENING MAMMOGRAM, ENCOUNTER FOR: Primary | ICD-10-CM

## 2018-04-05 ENCOUNTER — HOSPITAL ENCOUNTER (OUTPATIENT)
Dept: RADIOLOGY | Facility: HOSPITAL | Age: 53
Discharge: HOME | End: 2018-04-05
Attending: INTERNAL MEDICINE
Payer: COMMERCIAL

## 2018-04-05 DIAGNOSIS — Z12.31 SCREENING MAMMOGRAM, ENCOUNTER FOR: ICD-10-CM

## 2018-04-05 PROCEDURE — 77067 SCR MAMMO BI INCL CAD: CPT | Mod: TC,PO

## 2018-07-20 ENCOUNTER — HOSPITAL ENCOUNTER (OUTPATIENT)
Dept: RADIOLOGY | Facility: HOSPITAL | Age: 53
Discharge: HOME | End: 2018-07-20
Attending: NURSE PRACTITIONER
Payer: COMMERCIAL

## 2018-07-20 DIAGNOSIS — R10.84 ABDOMINAL PAIN, GENERALIZED: Primary | ICD-10-CM

## 2018-07-20 DIAGNOSIS — R10.84 ABDOMINAL PAIN, GENERALIZED: ICD-10-CM

## 2018-07-20 PROCEDURE — 74176 CT ABD & PELVIS W/O CONTRAST: CPT | Mod: TC,PO

## 2018-07-30 ENCOUNTER — LAB VISIT (OUTPATIENT)
Dept: LAB | Facility: HOSPITAL | Age: 53
End: 2018-07-30
Attending: NURSE PRACTITIONER
Payer: COMMERCIAL

## 2018-07-30 DIAGNOSIS — R94.5 ABNORMAL RESULTS OF LIVER FUNCTION STUDIES: Primary | ICD-10-CM

## 2018-07-30 LAB
ALBUMIN SERPL BCP-MCNC: 4.4 G/DL
ALP SERPL-CCNC: 118 U/L
ALT SERPL W/O P-5'-P-CCNC: 54 U/L
ANION GAP SERPL CALC-SCNC: 10 MMOL/L
AST SERPL-CCNC: 88 U/L
BASOPHILS # BLD AUTO: 0.05 K/UL
BASOPHILS NFR BLD: 0.5 %
BILIRUB SERPL-MCNC: 0.6 MG/DL
BUN SERPL-MCNC: 8 MG/DL
CALCIUM SERPL-MCNC: 8.8 MG/DL
CHLORIDE SERPL-SCNC: 99 MMOL/L
CO2 SERPL-SCNC: 32 MMOL/L
CREAT SERPL-MCNC: 0.47 MG/DL
DIFFERENTIAL METHOD: ABNORMAL
EOSINOPHIL # BLD AUTO: 0.2 K/UL
EOSINOPHIL NFR BLD: 2.3 %
ERYTHROCYTE [DISTWIDTH] IN BLOOD BY AUTOMATED COUNT: 14.7 %
EST. GFR  (AFRICAN AMERICAN): >60 ML/MIN/1.73 M^2
EST. GFR  (NON AFRICAN AMERICAN): >60 ML/MIN/1.73 M^2
GLUCOSE SERPL-MCNC: 142 MG/DL
HCT VFR BLD AUTO: 38.6 %
HGB BLD-MCNC: 12.1 G/DL
LYMPHOCYTES # BLD AUTO: 3.9 K/UL
LYMPHOCYTES NFR BLD: 38.4 %
MCH RBC QN AUTO: 25.8 PG
MCHC RBC AUTO-ENTMCNC: 31.3 G/DL
MCV RBC AUTO: 82 FL
MONOCYTES # BLD AUTO: 0.7 K/UL
MONOCYTES NFR BLD: 6.7 %
NEUTROPHILS # BLD AUTO: 5.3 K/UL
NEUTROPHILS NFR BLD: 52 %
PLATELET # BLD AUTO: 291 K/UL
PMV BLD AUTO: 11.1 FL
POTASSIUM SERPL-SCNC: 3.6 MMOL/L
PROT SERPL-MCNC: 8.9 G/DL
RBC # BLD AUTO: 4.69 M/UL
SODIUM SERPL-SCNC: 141 MMOL/L
WBC # BLD AUTO: 10.23 K/UL

## 2018-07-30 PROCEDURE — 80074 ACUTE HEPATITIS PANEL: CPT | Mod: PO

## 2018-07-30 PROCEDURE — 36415 COLL VENOUS BLD VENIPUNCTURE: CPT | Mod: PO

## 2018-07-30 PROCEDURE — 85025 COMPLETE CBC W/AUTO DIFF WBC: CPT | Mod: PO

## 2018-07-30 PROCEDURE — 80053 COMPREHEN METABOLIC PANEL: CPT | Mod: PO

## 2018-07-31 LAB
HAV IGM SERPL QL IA: NEGATIVE
HBV CORE IGM SERPL QL IA: NEGATIVE
HBV SURFACE AG SERPL QL IA: NEGATIVE
HCV AB SERPL QL IA: NEGATIVE

## 2018-10-29 ENCOUNTER — HOSPITAL ENCOUNTER (OUTPATIENT)
Facility: HOSPITAL | Age: 53
Discharge: HOME | End: 2018-10-30
Attending: EMERGENCY MEDICINE | Admitting: HOSPITALIST
Payer: COMMERCIAL

## 2018-10-29 DIAGNOSIS — R07.9 CHEST PAIN: Primary | ICD-10-CM

## 2018-10-29 DIAGNOSIS — R73.9 HYPERGLYCEMIA: ICD-10-CM

## 2018-10-29 PROBLEM — G45.9 TIA (TRANSIENT ISCHEMIC ATTACK): Status: ACTIVE | Noted: 2018-10-29

## 2018-10-29 LAB
ALBUMIN SERPL BCP-MCNC: 3.8 G/DL
ALP SERPL-CCNC: 132 U/L
ALT SERPL W/O P-5'-P-CCNC: 47 U/L
AMPHET+METHAMPHET UR QL: NEGATIVE
ANION GAP SERPL CALC-SCNC: 12 MMOL/L
APTT BLDCRRT: 26.2 SEC
AST SERPL-CCNC: 59 U/L
BARBITURATES UR QL SCN>200 NG/ML: NEGATIVE
BASOPHILS # BLD AUTO: 0.04 K/UL
BASOPHILS NFR BLD: 0.5 %
BENZODIAZ UR QL SCN>200 NG/ML: NEGATIVE
BILIRUB SERPL-MCNC: 0.3 MG/DL
BILIRUB UR QL STRIP: NEGATIVE
BNP SERPL-MCNC: <10 PG/ML
BUN SERPL-MCNC: 10 MG/DL
BZE UR QL SCN: NEGATIVE
CALCIUM SERPL-MCNC: 9.4 MG/DL
CANNABINOIDS UR QL SCN: NEGATIVE
CHLORIDE SERPL-SCNC: 99 MMOL/L
CHOLEST SERPL-MCNC: 133 MG/DL
CHOLEST/HDLC SERPL: 2.8 {RATIO}
CK MB SERPL-MCNC: 0.7 NG/ML
CK MB SERPL-RTO: 0.9 %
CK SERPL-CCNC: 81 U/L
CLARITY UR: CLEAR
CO2 SERPL-SCNC: 25 MMOL/L
COLOR UR: YELLOW
CREAT SERPL-MCNC: 0.7 MG/DL
CREAT UR-MCNC: 84.9 MG/DL
DIFFERENTIAL METHOD: ABNORMAL
EOSINOPHIL # BLD AUTO: 0.2 K/UL
EOSINOPHIL NFR BLD: 2.3 %
ERYTHROCYTE [DISTWIDTH] IN BLOOD BY AUTOMATED COUNT: 14.4 %
EST. GFR  (AFRICAN AMERICAN): >60 ML/MIN/1.73 M^2
EST. GFR  (NON AFRICAN AMERICAN): >60 ML/MIN/1.73 M^2
ESTIMATED AVG GLUCOSE: 200 MG/DL
GLUCOSE SERPL-MCNC: 229 MG/DL
GLUCOSE UR QL STRIP: NEGATIVE
HBA1C MFR BLD HPLC: 8.6 %
HCT VFR BLD AUTO: 36.6 %
HDLC SERPL-MCNC: 47 MG/DL
HDLC SERPL: 35.3 %
HGB BLD-MCNC: 11.5 G/DL
HGB UR QL STRIP: NEGATIVE
INR PPP: 1
KETONES UR QL STRIP: NEGATIVE
LDLC SERPL CALC-MCNC: 58.6 MG/DL
LEUKOCYTE ESTERASE UR QL STRIP: NEGATIVE
LYMPHOCYTES # BLD AUTO: 3.4 K/UL
LYMPHOCYTES NFR BLD: 41.5 %
MAGNESIUM SERPL-MCNC: 1.7 MG/DL
MCH RBC QN AUTO: 26.4 PG
MCHC RBC AUTO-ENTMCNC: 31.4 G/DL
MCV RBC AUTO: 84 FL
METHADONE UR QL SCN>300 NG/ML: NEGATIVE
MONOCYTES # BLD AUTO: 0.5 K/UL
MONOCYTES NFR BLD: 5.8 %
NEUTROPHILS # BLD AUTO: 4.1 K/UL
NEUTROPHILS NFR BLD: 49.5 %
NITRITE UR QL STRIP: NEGATIVE
NONHDLC SERPL-MCNC: 86 MG/DL
OPIATES UR QL SCN: NEGATIVE
PCP UR QL SCN>25 NG/ML: NEGATIVE
PH UR STRIP: 7 [PH] (ref 5–8)
PLATELET # BLD AUTO: 250 K/UL
PMV BLD AUTO: 10.9 FL
POCT GLUCOSE: 195 MG/DL (ref 70–110)
POCT GLUCOSE: 201 MG/DL (ref 70–110)
POTASSIUM SERPL-SCNC: 3.6 MMOL/L
PROT SERPL-MCNC: 8.9 G/DL
PROT UR QL STRIP: NEGATIVE
PROTHROMBIN TIME: 10.6 SEC
RBC # BLD AUTO: 4.36 M/UL
SODIUM SERPL-SCNC: 136 MMOL/L
SP GR UR STRIP: 1.01 (ref 1–1.03)
TOXICOLOGY INFORMATION: NORMAL
TRIGL SERPL-MCNC: 137 MG/DL
TROPONIN I SERPL DL<=0.01 NG/ML-MCNC: <0.006 NG/ML
URN SPEC COLLECT METH UR: NORMAL
UROBILINOGEN UR STRIP-ACNC: 1 EU/DL
WBC # BLD AUTO: 8.22 K/UL

## 2018-10-29 PROCEDURE — 80307 DRUG TEST PRSMV CHEM ANLYZR: CPT

## 2018-10-29 PROCEDURE — 96372 THER/PROPH/DIAG INJ SC/IM: CPT | Performed by: EMERGENCY MEDICINE

## 2018-10-29 PROCEDURE — 93010 ELECTROCARDIOGRAM REPORT: CPT | Mod: ,,, | Performed by: INTERNAL MEDICINE

## 2018-10-29 PROCEDURE — 85730 THROMBOPLASTIN TIME PARTIAL: CPT

## 2018-10-29 PROCEDURE — 81003 URINALYSIS AUTO W/O SCOPE: CPT | Mod: 59

## 2018-10-29 PROCEDURE — 96372 THER/PROPH/DIAG INJ SC/IM: CPT | Mod: 59

## 2018-10-29 PROCEDURE — 96374 THER/PROPH/DIAG INJ IV PUSH: CPT

## 2018-10-29 PROCEDURE — 63600175 PHARM REV CODE 636 W HCPCS: Performed by: NURSE PRACTITIONER

## 2018-10-29 PROCEDURE — 82550 ASSAY OF CK (CPK): CPT

## 2018-10-29 PROCEDURE — 80053 COMPREHEN METABOLIC PANEL: CPT

## 2018-10-29 PROCEDURE — 80061 LIPID PANEL: CPT

## 2018-10-29 PROCEDURE — 93005 ELECTROCARDIOGRAM TRACING: CPT

## 2018-10-29 PROCEDURE — 63600175 PHARM REV CODE 636 W HCPCS: Performed by: EMERGENCY MEDICINE

## 2018-10-29 PROCEDURE — 83880 ASSAY OF NATRIURETIC PEPTIDE: CPT

## 2018-10-29 PROCEDURE — 82962 GLUCOSE BLOOD TEST: CPT

## 2018-10-29 PROCEDURE — 36415 COLL VENOUS BLD VENIPUNCTURE: CPT

## 2018-10-29 PROCEDURE — 83036 HEMOGLOBIN GLYCOSYLATED A1C: CPT

## 2018-10-29 PROCEDURE — 25000003 PHARM REV CODE 250: Performed by: NURSE PRACTITIONER

## 2018-10-29 PROCEDURE — 94761 N-INVAS EAR/PLS OXIMETRY MLT: CPT

## 2018-10-29 PROCEDURE — G0378 HOSPITAL OBSERVATION PER HR: HCPCS

## 2018-10-29 PROCEDURE — 25000003 PHARM REV CODE 250: Performed by: EMERGENCY MEDICINE

## 2018-10-29 PROCEDURE — 84484 ASSAY OF TROPONIN QUANT: CPT

## 2018-10-29 PROCEDURE — 85610 PROTHROMBIN TIME: CPT

## 2018-10-29 PROCEDURE — 82553 CREATINE MB FRACTION: CPT

## 2018-10-29 PROCEDURE — 85025 COMPLETE CBC W/AUTO DIFF WBC: CPT

## 2018-10-29 PROCEDURE — 83735 ASSAY OF MAGNESIUM: CPT

## 2018-10-29 PROCEDURE — 99285 EMERGENCY DEPT VISIT HI MDM: CPT | Mod: 25

## 2018-10-29 PROCEDURE — 84484 ASSAY OF TROPONIN QUANT: CPT | Mod: 91

## 2018-10-29 RX ORDER — ASPIRIN 325 MG
325 TABLET ORAL DAILY
Status: DISCONTINUED | OUTPATIENT
Start: 2018-10-29 | End: 2018-10-30 | Stop reason: HOSPADM

## 2018-10-29 RX ORDER — ACETAMINOPHEN 325 MG/1
650 TABLET ORAL EVERY 4 HOURS PRN
Status: DISCONTINUED | OUTPATIENT
Start: 2018-10-29 | End: 2018-10-30 | Stop reason: HOSPADM

## 2018-10-29 RX ORDER — TRIAMTERENE AND HYDROCHLOROTHIAZIDE 75; 50 MG/1; MG/1
1 TABLET ORAL DAILY
Status: ON HOLD | COMMUNITY
End: 2023-01-01 | Stop reason: HOSPADM

## 2018-10-29 RX ORDER — ASPIRIN 325 MG
325 TABLET, DELAYED RELEASE (ENTERIC COATED) ORAL
Status: COMPLETED | OUTPATIENT
Start: 2018-10-29 | End: 2018-10-29

## 2018-10-29 RX ORDER — LOSARTAN POTASSIUM 100 MG/1
100 TABLET ORAL DAILY
COMMUNITY
End: 2023-10-09 | Stop reason: SDUPTHER

## 2018-10-29 RX ORDER — ONDANSETRON 2 MG/ML
4 INJECTION INTRAMUSCULAR; INTRAVENOUS EVERY 8 HOURS PRN
Status: DISCONTINUED | OUTPATIENT
Start: 2018-10-29 | End: 2018-10-30 | Stop reason: HOSPADM

## 2018-10-29 RX ORDER — GLUCAGON 1 MG
1 KIT INJECTION
Status: DISCONTINUED | OUTPATIENT
Start: 2018-10-29 | End: 2018-10-30 | Stop reason: HOSPADM

## 2018-10-29 RX ORDER — INSULIN ASPART 100 [IU]/ML
0-5 INJECTION, SOLUTION INTRAVENOUS; SUBCUTANEOUS EVERY 6 HOURS PRN
Status: DISCONTINUED | OUTPATIENT
Start: 2018-10-29 | End: 2018-10-30

## 2018-10-29 RX ORDER — ENOXAPARIN SODIUM 100 MG/ML
40 INJECTION SUBCUTANEOUS EVERY 12 HOURS
Status: DISCONTINUED | OUTPATIENT
Start: 2018-10-29 | End: 2018-10-30 | Stop reason: HOSPADM

## 2018-10-29 RX ORDER — TRIAMTERENE AND HYDROCHLOROTHIAZIDE 37.5; 25 MG/1; MG/1
2 CAPSULE ORAL DAILY
Status: DISCONTINUED | OUTPATIENT
Start: 2018-10-30 | End: 2018-10-30 | Stop reason: HOSPADM

## 2018-10-29 RX ORDER — NITROGLYCERIN 0.4 MG/1
0.4 TABLET SUBLINGUAL EVERY 5 MIN PRN
Status: DISCONTINUED | OUTPATIENT
Start: 2018-10-29 | End: 2018-10-30 | Stop reason: HOSPADM

## 2018-10-29 RX ORDER — LOSARTAN POTASSIUM 50 MG/1
100 TABLET ORAL DAILY
Status: DISCONTINUED | OUTPATIENT
Start: 2018-10-30 | End: 2018-10-30 | Stop reason: HOSPADM

## 2018-10-29 RX ORDER — QUETIAPINE FUMARATE 25 MG/1
50 TABLET, FILM COATED ORAL NIGHTLY PRN
Status: DISCONTINUED | OUTPATIENT
Start: 2018-10-29 | End: 2018-10-30 | Stop reason: HOSPADM

## 2018-10-29 RX ORDER — KETOROLAC TROMETHAMINE 30 MG/ML
15 INJECTION, SOLUTION INTRAMUSCULAR; INTRAVENOUS
Status: COMPLETED | OUTPATIENT
Start: 2018-10-29 | End: 2018-10-29

## 2018-10-29 RX ORDER — SODIUM CHLORIDE 0.9 % (FLUSH) 0.9 %
5 SYRINGE (ML) INJECTION
Status: DISCONTINUED | OUTPATIENT
Start: 2018-10-29 | End: 2018-10-30 | Stop reason: HOSPADM

## 2018-10-29 RX ORDER — ATORVASTATIN CALCIUM 20 MG/1
40 TABLET, FILM COATED ORAL DAILY
Status: DISCONTINUED | OUTPATIENT
Start: 2018-10-29 | End: 2018-10-30 | Stop reason: HOSPADM

## 2018-10-29 RX ADMIN — ENOXAPARIN SODIUM 40 MG: 100 INJECTION SUBCUTANEOUS at 08:10

## 2018-10-29 RX ADMIN — ASPIRIN 325 MG ORAL TABLET 325 MG: 325 PILL ORAL at 03:10

## 2018-10-29 RX ADMIN — ATORVASTATIN CALCIUM 40 MG: 20 TABLET, FILM COATED ORAL at 03:10

## 2018-10-29 RX ADMIN — LIDOCAINE HYDROCHLORIDE: 20 SOLUTION ORAL; TOPICAL at 02:10

## 2018-10-29 RX ADMIN — SODIUM CHLORIDE 1000 ML: 0.9 INJECTION, SOLUTION INTRAVENOUS at 02:10

## 2018-10-29 RX ADMIN — ASPIRIN 325 MG: 325 TABLET, DELAYED RELEASE ORAL at 01:10

## 2018-10-29 RX ADMIN — KETOROLAC TROMETHAMINE 15 MG: 30 INJECTION, SOLUTION INTRAMUSCULAR at 02:10

## 2018-10-29 NOTE — ED PROVIDER NOTES
"Encounter Date: 10/29/2018    SCRIBE #1 NOTE: I, Wilber Guzmán, am scribing for, and in the presence of,  Dr. Benton. I have scribed the entire note.       History     Chief Complaint   Patient presents with    Chest Pain     pt reports left sided chest pain since friday. pt reports radiation of chest to left shoulder. describes chest pain as tightness. reports diaphoresis and sob at the onset of chest pain.     54 y/o F presents to the ED c/o CP. Onset a few days ago, she reports a sharp and aching pain to her left chest that radiates to her left shoulder/back. Reports it became acutely worse today around 11AM. Reports no eliciting or exacerbating factors. States she felt somewhat "sweaty" and nauseous at the onset of the pain. Denies cough/congestion, SOB, lightheadedness/dizziness.       The history is provided by the patient.     Review of patient's allergies indicates:  No Known Allergies  Past Medical History:   Diagnosis Date    Anticoagulant long-term use     Depression     Diabetes mellitus     High cholesterol     Hypertension     Stroke     TIA (transient ischemic attack)      Past Surgical History:   Procedure Laterality Date    CHOLECYSTECTOMY      COLONOSCOPY N/A 10/18/2017    Procedure: COLONOSCOPY;  Surgeon: Donald Wright Jr., MD;  Location: [facility] ENDO;  Service: Endoscopy;  Laterality: N/A;    COLONOSCOPY N/A 10/18/2017    Performed by Donald Wright Jr., MD at [facility] ENDO    KNEE SURGERY      TUBAL LIGATION      WRIST SURGERY       Family History   Problem Relation Age of Onset    Breast cancer Mother     Liver disease Maternal Grandmother      Social History     Tobacco Use    Smoking status: Former Smoker    Smokeless tobacco: Never Used   Substance Use Topics    Alcohol use: Yes     Comment: rare    Drug use: No     Review of Systems   Constitutional: Negative for chills, fatigue and fever.   HENT: Negative for facial swelling, trouble swallowing and voice change.  "   Eyes: Negative for photophobia, pain and redness.   Respiratory: Negative for cough, choking and shortness of breath.    Cardiovascular: Positive for chest pain. Negative for palpitations and leg swelling.   Gastrointestinal: Positive for nausea. Negative for abdominal pain, diarrhea and vomiting.   Genitourinary: Negative for dysuria, frequency and urgency.   Musculoskeletal: Positive for back pain. Negative for neck pain and neck stiffness.   Neurological: Negative for seizures, speech difficulty, light-headedness, numbness and headaches.       Physical Exam     Initial Vitals [10/29/18 1302]   BP Pulse Resp Temp SpO2   (!) 146/82 98 20 98.4 °F (36.9 °C) 95 %      MAP       --         Physical Exam    Nursing note and vitals reviewed.  Constitutional: She appears well-developed and well-nourished. No distress.   Morbidly obese.   HENT:   Head: Normocephalic and atraumatic.   Mouth/Throat: Oropharynx is clear and moist.   Eyes: Conjunctivae and EOM are normal. Pupils are equal, round, and reactive to light.   Neck: Normal range of motion. Neck supple. No tracheal deviation present.   Cardiovascular: Normal rate, regular rhythm, normal heart sounds and intact distal pulses.   Pulmonary/Chest: Breath sounds normal. No respiratory distress. She has no wheezes. She has no rhonchi. She has no rales.   Abdominal: Soft. Bowel sounds are normal. She exhibits no distension. There is no tenderness.   Musculoskeletal: Normal range of motion. She exhibits no edema or tenderness.   Neurological: She is alert and oriented to person, place, and time. She has normal strength. No cranial nerve deficit or sensory deficit.   Skin: Skin is warm and dry. Capillary refill takes less than 2 seconds.         ED Course   Procedures  Labs Reviewed   CBC W/ AUTO DIFFERENTIAL - Abnormal; Notable for the following components:       Result Value    Hemoglobin 11.5 (*)     Hematocrit 36.6 (*)     MCH 26.4 (*)     MCHC 31.4 (*)     All other  components within normal limits   COMPREHENSIVE METABOLIC PANEL - Abnormal; Notable for the following components:    Glucose 229 (*)     Total Protein 8.9 (*)     AST 59 (*)     ALT 47 (*)     All other components within normal limits   POCT GLUCOSE - Abnormal; Notable for the following components:    POCT Glucose 195 (*)     All other components within normal limits   URINALYSIS   DRUG SCREEN PANEL, URINE EMERGENCY   TROPONIN I   POCT GLUCOSE MONITORING CONTINUOUS     EKG Readings: (Independently Interpreted)   Initial Reading: No STEMI. Previous EKG: Compared with most recent EKG Previous EKG Date: 3/28/17 (Nonspecific changes). Rhythm: Normal Sinus Rhythm. Heart Rate: 98. Ectopy: No Ectopy. Conduction: Normal. ST Segments: Normal ST Segments. T Waves: Normal. Axis: Normal. Other Impression: Nonspecific T Wave flattening         X-Rays:   Independently Interpreted Readings:   Chest X-Ray: CXR Interpreted by me pending radiology over read:      Imaging Results          X-Ray Chest PA And Lateral (Final result)  Result time 10/29/18 14:13:05    Final result by Cornel Byers MD (10/29/18 14:13:05)                 Impression:      No focal consolidation      Electronically signed by: Cornel Byers MD  Date:    10/29/2018  Time:    14:13             Narrative:    EXAMINATION:  XR CHEST PA AND LATERAL    CLINICAL HISTORY:  Chest Pain;    TECHNIQUE:  PA and lateral views of the chest were performed.    COMPARISON:  Chest radiograph 03/28/2017    FINDINGS:  There is no focal consolidation.  The cardiac silhouette is stable.  Osseous and soft tissue structures demonstrate no acute abnormality.                                Medical Decision Making:   Initial Assessment:   Barrera Siddiqui is a 53 y.o. female who presents to the ED due to chest pain.  Differential Diagnosis:   ACS, dissection, pneumonia, pneumothorax, musculoskeletal, GERD  Independently Interpreted Test(s):   I have ordered and independently interpreted  X-rays - see prior notes.  I have ordered and independently interpreted EKG Reading(s) - see prior notes  Clinical Tests:   Lab Tests: Reviewed       <> Summary of Lab: Hyperglycemia  ED Management:  Initial troponin negative. Patient given some IV fluids for her elevated blood sugar as well as Toradol and a GI cocktail for her chest pain. Discussed with Dr. sabina smith, who agrees with admission to the CDU for observation and repeat troponins.  Patient comfortable with plan, vital signs stable.                      Clinical Impression:     1. Hyperglycemia    2. Chest pain            Disposition:   Disposition: Placed in Observation  Condition: Stable       Scribe Attestation I, Dr. Javed Benton, personally performed the services described in this documentation. All medical record entries made by the scribe were at my direction and in my presence.  I have reviewed the chart and agree that the record reflects my personal performance and is accurate and complete. Javed Benton MD.  2:24 PM 10/29/2018                     Javed Benton MD  10/29/18 1424

## 2018-10-29 NOTE — HPI
52 y/o  female with pmhx of depression, DM, HLD, HTN, CVA, and TIA presented to [facility] ED via private vehicle for chest pain on 10/29/18.  Pt states that she has been having constant chest pain since 10/26/18, but today the pain acutely worsened around 11AM.  Pt states that she was sitting at work when the pain became worse.  She describes the pain as sharp with radiation to her left shoulder and left upper back.  She does report intermittent SOB and diaphoresis.  No abd pain, cough, n/v/d.  No exacerbating or relieving modifiers.  Pt reports compliance with her home medications.     Pt is a former smoker.  No illicit drug use.  She occasionally drinks alcohol.  Pt does report family history of CAD.

## 2018-10-29 NOTE — ASSESSMENT & PLAN NOTE
-Glucose 229 on arrival. History of DM. Pt not in DKA.   -Suspect dietary noncompliance.   -Encourage dietary modification. SSI as needed.

## 2018-10-29 NOTE — HOSPITAL COURSE
Initial troponin negative. Glucose elevated at 229.  CXR negative for acute changes. EKG (Independently Interpreted by ED physician as it has not yet been loaded in to Epic)  Initial Reading: No STEMI. Previous EKG: Compared with most recent EKG Previous EKG Date: 3/28/17 (Nonspecific changes). Rhythm: Normal Sinus Rhythm. Heart Rate: 98. Ectopy: No Ectopy. Conduction: Normal. ST Segments: Normal ST Segments. T Waves: Normal. Axis: Normal. Other Impression: Nonspecific T Wave flattening     Pt admitted to CDU for continuous telemetry monitoring and continued care.  Troponin negative X3, and CP has completely resolved.  Nuclear stress test normal. Echocardiogram:  1 - Normal left ventricular systolic function (EF 60-65%).     2 - Impaired LV relaxation, increased LVEDP.     3 - Normal right ventricular systolic function .     4 - The estimated PA systolic pressure is 17 mmHg.   HgbA1c elevated at 8.6.  Pt is currently prescribed Metformin.  Advised dietary modification and f/u with PCP for management.   Pt reports complete resolution of CP.  Non-cardiac origin of CP.  Pt states that she believes her CP was due to stress and anxiety.  She will be DC'd home with her  to follow up with her PCP.

## 2018-10-29 NOTE — ED NOTES
APPEARANCE: Alert, oriented and in no acute distress.  PERIPHERAL VASCULAR: peripheral pulses present. Normal cap refill. No edema. Warm to touch.      GASTRO: soft, bowel sounds normal, no tenderness, no abdominal distention.  MUSC: Full ROM. No bony tenderness or soft tissue tenderness. No obvious deformity.  SKIN: Skin is warm and dry, normal skin turgor, mucous membranes moist.  NEURO: 5/5 strength major flexors/extensors bilaterally. Sensory intact to light touch bilaterally. Michael coma scale: eyes open spontaneously-4, oriented & converses-5, obeys commands-6. No neurological abnormalities.   MENTAL STATUS: awake, alert and aware of environment.  EYE: PERRL, both eyes: pupils brisk and reactive to light. Normal size.  ENT: EARS: no obvious drainage. NOSE: no active bleeding.

## 2018-10-29 NOTE — ASSESSMENT & PLAN NOTE
-Constant left anterior chest pain since 10/26 with acute worsening today.  -Initial troponin negative. EKG NSR. CXR negative for acute changes.  -Plan: Continuous telemetry monitoring. Trend troponin. Nuclear stress test. Continue ASA and lovenox.  Control BP.  Will consult cardiology if troponin elevates or stress test is abnormal.

## 2018-10-29 NOTE — ASSESSMENT & PLAN NOTE
-Glucose 229 on admission. Pt not in DKA.  -On metformin 500mg BID.  -Will hold metformin while in CDU. SSI as needed. Obtain Hgb A1C. Diabetic diet.

## 2018-10-29 NOTE — ED PROVIDER NOTES
Encounter Date: 10/29/2018       History     Chief Complaint   Patient presents with    Chest Pain     pt reports left sided chest pain since friday. pt reports radiation of chest to left shoulder. describes chest pain as tightness. reports diaphoresis and sob at the onset of chest pain.     HPI  Review of patient's allergies indicates:  No Known Allergies  Past Medical History:   Diagnosis Date    Anticoagulant long-term use     Depression     Diabetes mellitus     High cholesterol     Hypertension     Stroke     TIA (transient ischemic attack)      Past Surgical History:   Procedure Laterality Date    CHOLECYSTECTOMY      COLONOSCOPY N/A 10/18/2017    Procedure: COLONOSCOPY;  Surgeon: Donald Wright Jr., MD;  Location: [facility] ENDO;  Service: Endoscopy;  Laterality: N/A;    COLONOSCOPY N/A 10/18/2017    Performed by Donald Wright Jr., MD at [facility] ENDO    KNEE SURGERY      TUBAL LIGATION      WRIST SURGERY       Family History   Problem Relation Age of Onset    Breast cancer Mother     Liver disease Maternal Grandmother      Social History     Tobacco Use    Smoking status: Former Smoker    Smokeless tobacco: Never Used   Substance Use Topics    Alcohol use: Yes     Comment: rare    Drug use: No     Review of Systems    Physical Exam     Initial Vitals [10/29/18 1302]   BP Pulse Resp Temp SpO2   (!) 146/82 98 20 98.4 °F (36.9 °C) 95 %      MAP       --         Physical Exam    ED Course   Procedures  Labs Reviewed   CBC W/ AUTO DIFFERENTIAL - Abnormal; Notable for the following components:       Result Value    Hemoglobin 11.5 (*)     Hematocrit 36.6 (*)     MCH 26.4 (*)     MCHC 31.4 (*)     All other components within normal limits   COMPREHENSIVE METABOLIC PANEL - Abnormal; Notable for the following components:    Glucose 229 (*)     Total Protein 8.9 (*)     AST 59 (*)     ALT 47 (*)     All other components within normal limits   POCT GLUCOSE - Abnormal; Notable for the following  components:    POCT Glucose 195 (*)     All other components within normal limits   URINALYSIS   DRUG SCREEN PANEL, URINE EMERGENCY   TROPONIN I   POCT GLUCOSE MONITORING CONTINUOUS          Imaging Results          X-Ray Chest PA And Lateral (Final result)  Result time 10/29/18 14:13:05    Final result by Cronel Byers MD (10/29/18 14:13:05)                 Impression:      No focal consolidation      Electronically signed by: Cornel Byers MD  Date:    10/29/2018  Time:    14:13             Narrative:    EXAMINATION:  XR CHEST PA AND LATERAL    CLINICAL HISTORY:  Chest Pain;    TECHNIQUE:  PA and lateral views of the chest were performed.    COMPARISON:  Chest radiograph 03/28/2017    FINDINGS:  There is no focal consolidation.  The cardiac silhouette is stable.  Osseous and soft tissue structures demonstrate no acute abnormality.                                                      Clinical Impression:   {Add your Clinical Impression here. If you haven't documented one yet, please pend the note, finalize a Clinical Impression, and refresh your note before signing.:[id number]}

## 2018-10-29 NOTE — ED NOTES
53 year old female presents to ed cc of left sided chest pain with radiation to left back x 3 days. Patient states sob. Patient placed on cardiac monitor bp cuff and pulse ox call light at bedside nurse will continue to monitor

## 2018-10-29 NOTE — NURSING
1515 received report from Cherry SANCHEZ, Patient transferred to [facility], resumed care of patient

## 2018-10-29 NOTE — H&P
[facility] Medicine  History & Physical    Patient Name: Barrera Siddiqui  MRN: [id number]  Admission Date: 10/29/2018  Attending Physician: Rogers Ferrell MD   Primary Care Provider: Evelin Guevara NP         Patient information was obtained from patient, past medical records and ER records.     Subjective:     Principal Problem:Chest pain    Chief Complaint:   Chief Complaint   Patient presents with    Chest Pain     pt reports left sided chest pain since friday. pt reports radiation of chest to left shoulder. describes chest pain as tightness. reports diaphoresis and sob at the onset of chest pain.        HPI: 52 y/o  female with pmhx of depression, DM, HLD, HTN, CVA, and TIA presented to [facility] ED via private vehicle for chest pain on 10/29/18.  Pt states that she has been having constant chest pain since 10/26/18, but today the pain acutely worsened around 11AM.  Pt states that she was sitting at work when the pain became worse.  She describes the pain as sharp with radiation to her left shoulder and left upper back.  She does report intermittent SOB and diaphoresis.  No abd pain, cough, n/v/d.  No exacerbating or relieving modifiers.  Pt reports compliance with her home medications.     Pt is a former smoker.  No illicit drug use.  She occasionally drinks alcohol.  Pt does report family history of CAD.         Past Medical History:   Diagnosis Date    Anticoagulant long-term use     Depression     Diabetes mellitus     High cholesterol     Hypertension     Stroke     TIA (transient ischemic attack)        Past Surgical History:   Procedure Laterality Date    CHOLECYSTECTOMY      COLONOSCOPY N/A 10/18/2017    Procedure: COLONOSCOPY;  Surgeon: Donald Wright Jr., MD;  Location: [facility] ENDO;  Service: Endoscopy;  Laterality: N/A;    COLONOSCOPY N/A 10/18/2017    Performed by Donald Wright Jr., MD at [facility] ENDO    KNEE SURGERY      TUBAL LIGATION       WRIST SURGERY         Review of patient's allergies indicates:  No Known Allergies    No current facility-administered medications on file prior to encounter.      Current Outpatient Medications on File Prior to Encounter   Medication Sig    losartan (COZAAR) 100 MG tablet Take 100 mg by mouth once daily.    metformin (GLUCOPHAGE) 500 MG tablet Take 1,000 mg by mouth 2 (two) times daily with meals.    quetiapine (SEROQUEL) 100 MG Tab Take by mouth.    triamterene-hydrochlorothiazide 75-50 mg (MAXZIDE) 75-50 mg per tablet Take 1 tablet by mouth once daily.    ALPRAZOLAM (XANAX ORAL) Take by mouth.    aspirin 81 MG Chew Take 1 tablet (81 mg total) by mouth once daily.    atorvastatin (LIPITOR) 40 MG tablet Take 1 tablet (40 mg total) by mouth once daily.    BUPROPION HCL (WELLBUTRIN ORAL) Take by mouth.    clopidogrel (PLAVIX) 75 mg tablet Take 75 mg by mouth once daily.    ESCITALOPRAM OXALATE (LEXAPRO ORAL) Take by mouth.    hydrochlorothiazide (HYDRODIURIL) 25 MG tablet Take 1 tablet (25 mg total) by mouth once daily.     Family History     Problem Relation (Age of Onset)    Breast cancer Mother    Liver disease Maternal Grandmother        Tobacco Use    Smoking status: Former Smoker    Smokeless tobacco: Never Used   Substance and Sexual Activity    Alcohol use: Yes     Comment: rare    Drug use: No    Sexual activity: No     Review of Systems   Constitutional: Positive for activity change and diaphoresis. Negative for appetite change, chills, fatigue and fever.   HENT: Negative for congestion, rhinorrhea and sore throat.    Respiratory: Positive for shortness of breath. Negative for cough.    Cardiovascular: Positive for chest pain. Negative for palpitations and leg swelling.   Gastrointestinal: Negative for abdominal pain, diarrhea, nausea and vomiting.   Musculoskeletal: Positive for back pain. Negative for neck pain.   Skin: Negative for rash.   Neurological: Negative for weakness and  headaches.   Psychiatric/Behavioral: Negative for confusion.   All other systems reviewed and are negative.    Objective:     Vital Signs (Most Recent):  Temp: 98.4 °F (36.9 °C) (10/29/18 1302)  Pulse: 93 (10/29/18 1408)  Resp: 17 (10/29/18 1408)  BP: 109/68 (10/29/18 1408)  SpO2: 99 % (10/29/18 1408) Vital Signs (24h Range):  Temp:  [98.4 °F (36.9 °C)] 98.4 °F (36.9 °C)  Pulse:  [93-98] 93  Resp:  [17-20] 17  SpO2:  [95 %-99 %] 99 %  BP: (109-146)/(68-82) 109/68     Weight: 131.1 kg (289 lb)  Body mass index is 52.02 kg/m².    Physical Exam   Constitutional: Vital signs are normal. She appears well-developed and well-nourished. She is active and cooperative. She is easily aroused.  Non-toxic appearance. She does not have a sickly appearance. She does not appear ill. No distress.   Morbidly obese   HENT:   Head: Normocephalic and atraumatic.   Right Ear: External ear normal.   Left Ear: External ear normal.   Nose: Nose normal.   Mouth/Throat: Uvula is midline, oropharynx is clear and moist and mucous membranes are normal.   Neck: Normal range of motion and phonation normal.   Cardiovascular: Normal rate, regular rhythm, normal heart sounds and normal pulses.   Pulses:       Radial pulses are 2+ on the right side, and 2+ on the left side.        Dorsalis pedis pulses are 2+ on the right side, and 2+ on the left side.   Pulmonary/Chest: Effort normal and breath sounds normal. She exhibits no tenderness.       Abdominal: Normal appearance and bowel sounds are normal. She exhibits no distension. There is no tenderness. There is no rigidity and no guarding.   Musculoskeletal:        Right lower leg: Normal.        Left lower leg: Normal.   Neurological: She is easily aroused.   Skin: Skin is warm and dry. She is not diaphoretic. No pallor.   Nursing note and vitals reviewed.          Significant Labs:   A1C: No results for input(s): HGBA1C in the last 4320 hours.  CBC:   Recent Labs   Lab 10/29/18  1319   WBC 8.22   HGB  11.5*   HCT 36.6*        CMP:   Recent Labs   Lab 10/29/18  1319      K 3.6   CL 99   CO2 25   *   BUN 10   CREATININE 0.7   CALCIUM 9.4   PROT 8.9*   ALBUMIN 3.8   BILITOT 0.3   ALKPHOS 132   AST 59*   ALT 47*   ANIONGAP 12   EGFRNONAA >60     Cardiac Markers: No results for input(s): CKMB, MYOGLOBIN, BNP, TROPISTAT in the last 48 hours.  Coagulation:   Recent Labs   Lab 10/29/18  1319   INR 1.0   APTT 26.2     Lipid Panel:   Recent Labs   Lab 10/29/18  1319   CHOL 133   HDL 47   LDLCALC 58.6*   TRIG 137   CHOLHDL 35.3     Troponin:   Recent Labs   Lab 10/29/18  1319   TROPONINI <0.006     All pertinent labs within the past 24 hours have been reviewed.    Significant Imaging:  Imaging Results          X-Ray Chest PA And Lateral (Final result)  Result time 10/29/18 14:13:05    Final result by Cornel Byers MD (10/29/18 14:13:05)                 Impression:      No focal consolidation      Electronically signed by: Cornel Byers MD  Date:    10/29/2018  Time:    14:13             Narrative:    EXAMINATION:  XR CHEST PA AND LATERAL    CLINICAL HISTORY:  Chest Pain;    TECHNIQUE:  PA and lateral views of the chest were performed.    COMPARISON:  Chest radiograph 03/28/2017    FINDINGS:  There is no focal consolidation.  The cardiac silhouette is stable.  Osseous and soft tissue structures demonstrate no acute abnormality.                                  Assessment/Plan:     * Chest pain    -Constant left anterior chest pain since 10/26 with acute worsening today.  -Initial troponin negative. EKG NSR. CXR negative for acute changes.  -Plan: Continuous telemetry monitoring. Trend troponin. Nuclear stress test. Continue ASA and lovenox.  Control BP.  Will consult cardiology if troponin elevates or stress test is abnormal.        Essential (primary) hypertension    -109  -Prescribed triamterene-HCTZ 75-50 daily and losartan 100mg daily.  -Monitor.      Type 2 diabetes mellitus     -Glucose 229 on admission. Pt not in DKA.  -On metformin 500mg BID.  -Will hold metformin while in CDU. SSI as needed. Obtain Hgb A1C. Diabetic diet.        Hyperlipidemia    -On atorvastatin 40mg daily.  -Obtain lipid panel. Continue home medication.      Diastolic heart failure    -Echo 4/9/16: 1 - Normal left ventricular systolic function (EF 60-65%).        2 - Left ventricular diastolic dysfunction.      3 - Normal right ventricular systolic function   -No signs of volume overload on CXR.  No peripheral edema.   -Plan: Obtain echo and BMP. Continuous cardiac monitoring.      Hyperglycemia    -Glucose 229 on arrival. History of DM. Pt not in DKA.   -Suspect dietary noncompliance.   -Encourage dietary modification. SSI as needed.        Morbid obesity    -Encourage weight loss.  -Diabetic diet 1500 calorie.        TIA (transient ischemic attack)    -History of TIA. No complaints currently.  -Continue home ASA and atorvastatin.          VTE Risk Mitigation (From admission, onward)        Ordered     enoxaparin injection 40 mg  Every 12 hours      10/29/18 1544     Place RAGHAV hose  Until discontinued      10/29/18 1544     Place sequential compression device  Until discontinued      10/29/18 1544     IP VTE HIGH RISK PATIENT  Once      10/29/18 1544             Sofiya Brewer, HARJINDER  Department of Hospital Medicine   [facility]

## 2018-10-29 NOTE — ASSESSMENT & PLAN NOTE
-Echo 4/9/16: 1 - Normal left ventricular systolic function (EF 60-65%).        2 - Left ventricular diastolic dysfunction.      3 - Normal right ventricular systolic function   -No signs of volume overload on CXR.  No peripheral edema.   -Plan: Obtain echo and BMP. Continuous cardiac monitoring.

## 2018-10-29 NOTE — SUBJECTIVE & OBJECTIVE
Past Medical History:   Diagnosis Date    Anticoagulant long-term use     Depression     Diabetes mellitus     High cholesterol     Hypertension     Stroke     TIA (transient ischemic attack)        Past Surgical History:   Procedure Laterality Date    CHOLECYSTECTOMY      COLONOSCOPY N/A 10/18/2017    Procedure: COLONOSCOPY;  Surgeon: Donald Wright Jr., MD;  Location: [facility] ENDO;  Service: Endoscopy;  Laterality: N/A;    COLONOSCOPY N/A 10/18/2017    Performed by Donald Wright Jr., MD at [facility] ENDO    KNEE SURGERY      TUBAL LIGATION      WRIST SURGERY         Review of patient's allergies indicates:  No Known Allergies    No current facility-administered medications on file prior to encounter.      Current Outpatient Medications on File Prior to Encounter   Medication Sig    losartan (COZAAR) 100 MG tablet Take 100 mg by mouth once daily.    metformin (GLUCOPHAGE) 500 MG tablet Take 1,000 mg by mouth 2 (two) times daily with meals.    quetiapine (SEROQUEL) 100 MG Tab Take by mouth.    triamterene-hydrochlorothiazide 75-50 mg (MAXZIDE) 75-50 mg per tablet Take 1 tablet by mouth once daily.    ALPRAZOLAM (XANAX ORAL) Take by mouth.    aspirin 81 MG Chew Take 1 tablet (81 mg total) by mouth once daily.    atorvastatin (LIPITOR) 40 MG tablet Take 1 tablet (40 mg total) by mouth once daily.    BUPROPION HCL (WELLBUTRIN ORAL) Take by mouth.    clopidogrel (PLAVIX) 75 mg tablet Take 75 mg by mouth once daily.    ESCITALOPRAM OXALATE (LEXAPRO ORAL) Take by mouth.    hydrochlorothiazide (HYDRODIURIL) 25 MG tablet Take 1 tablet (25 mg total) by mouth once daily.     Family History     Problem Relation (Age of Onset)    Breast cancer Mother    Liver disease Maternal Grandmother        Tobacco Use    Smoking status: Former Smoker    Smokeless tobacco: Never Used   Substance and Sexual Activity    Alcohol use: Yes     Comment: rare    Drug use: No    Sexual activity: No     Review of  Systems   Constitutional: Positive for activity change and diaphoresis. Negative for appetite change, chills, fatigue and fever.   HENT: Negative for congestion, rhinorrhea and sore throat.    Respiratory: Positive for shortness of breath. Negative for cough.    Cardiovascular: Positive for chest pain. Negative for palpitations and leg swelling.   Gastrointestinal: Negative for abdominal pain, diarrhea, nausea and vomiting.   Musculoskeletal: Positive for back pain. Negative for neck pain.   Skin: Negative for rash.   Neurological: Negative for weakness and headaches.   Psychiatric/Behavioral: Negative for confusion.   All other systems reviewed and are negative.    Objective:     Vital Signs (Most Recent):  Temp: 98.4 °F (36.9 °C) (10/29/18 1302)  Pulse: 93 (10/29/18 1408)  Resp: 17 (10/29/18 1408)  BP: 109/68 (10/29/18 1408)  SpO2: 99 % (10/29/18 1408) Vital Signs (24h Range):  Temp:  [98.4 °F (36.9 °C)] 98.4 °F (36.9 °C)  Pulse:  [93-98] 93  Resp:  [17-20] 17  SpO2:  [95 %-99 %] 99 %  BP: (109-146)/(68-82) 109/68     Weight: 131.1 kg (289 lb)  Body mass index is 52.02 kg/m².    Physical Exam   Constitutional: Vital signs are normal. She appears well-developed and well-nourished. She is active and cooperative. She is easily aroused.  Non-toxic appearance. She does not have a sickly appearance. She does not appear ill. No distress.   Morbidly obese   HENT:   Head: Normocephalic and atraumatic.   Right Ear: External ear normal.   Left Ear: External ear normal.   Nose: Nose normal.   Mouth/Throat: Uvula is midline, oropharynx is clear and moist and mucous membranes are normal.   Neck: Normal range of motion and phonation normal.   Cardiovascular: Normal rate, regular rhythm, normal heart sounds and normal pulses.   Pulses:       Radial pulses are 2+ on the right side, and 2+ on the left side.        Dorsalis pedis pulses are 2+ on the right side, and 2+ on the left side.   Pulmonary/Chest: Effort normal and breath  sounds normal. She exhibits no tenderness.       Abdominal: Normal appearance and bowel sounds are normal. She exhibits no distension. There is no tenderness. There is no rigidity and no guarding.   Musculoskeletal:        Right lower leg: Normal.        Left lower leg: Normal.   Neurological: She is easily aroused.   Skin: Skin is warm and dry. She is not diaphoretic. No pallor.   Nursing note and vitals reviewed.          Significant Labs:   A1C: No results for input(s): HGBA1C in the last 4320 hours.  CBC:   Recent Labs   Lab 10/29/18  1319   WBC 8.22   HGB 11.5*   HCT 36.6*        CMP:   Recent Labs   Lab 10/29/18  1319      K 3.6   CL 99   CO2 25   *   BUN 10   CREATININE 0.7   CALCIUM 9.4   PROT 8.9*   ALBUMIN 3.8   BILITOT 0.3   ALKPHOS 132   AST 59*   ALT 47*   ANIONGAP 12   EGFRNONAA >60     Cardiac Markers: No results for input(s): CKMB, MYOGLOBIN, BNP, TROPISTAT in the last 48 hours.  Coagulation:   Recent Labs   Lab 10/29/18  1319   INR 1.0   APTT 26.2     Lipid Panel:   Recent Labs   Lab 10/29/18  1319   CHOL 133   HDL 47   LDLCALC 58.6*   TRIG 137   CHOLHDL 35.3     Troponin:   Recent Labs   Lab 10/29/18  1319   TROPONINI <0.006     All pertinent labs within the past 24 hours have been reviewed.    Significant Imaging:  Imaging Results          X-Ray Chest PA And Lateral (Final result)  Result time 10/29/18 14:13:05    Final result by Cornel Byers MD (10/29/18 14:13:05)                 Impression:      No focal consolidation      Electronically signed by: Cornel Byers MD  Date:    10/29/2018  Time:    14:13             Narrative:    EXAMINATION:  XR CHEST PA AND LATERAL    CLINICAL HISTORY:  Chest Pain;    TECHNIQUE:  PA and lateral views of the chest were performed.    COMPARISON:  Chest radiograph 03/28/2017    FINDINGS:  There is no focal consolidation.  The cardiac silhouette is stable.  Osseous and soft tissue structures demonstrate no acute abnormality.

## 2018-10-30 VITALS
DIASTOLIC BLOOD PRESSURE: 83 MMHG | WEIGHT: 288.81 LBS | OXYGEN SATURATION: 97 % | HEIGHT: 63 IN | HEART RATE: 87 BPM | BODY MASS INDEX: 51.17 KG/M2 | SYSTOLIC BLOOD PRESSURE: 141 MMHG | TEMPERATURE: 98 F | RESPIRATION RATE: 18 BRPM

## 2018-10-30 PROBLEM — R07.9 CHEST PAIN: Status: RESOLVED | Noted: 2018-10-29 | Resolved: 2018-10-30

## 2018-10-30 LAB
ALBUMIN SERPL BCP-MCNC: 3.3 G/DL
ALP SERPL-CCNC: 95 U/L
ALT SERPL W/O P-5'-P-CCNC: 43 U/L
ANION GAP SERPL CALC-SCNC: 10 MMOL/L
AST SERPL-CCNC: 63 U/L
BASOPHILS # BLD AUTO: 0.03 K/UL
BASOPHILS NFR BLD: 0.4 %
BILIRUB SERPL-MCNC: 0.6 MG/DL
BUN SERPL-MCNC: 9 MG/DL
CALCIUM SERPL-MCNC: 9 MG/DL
CHLORIDE SERPL-SCNC: 102 MMOL/L
CO2 SERPL-SCNC: 26 MMOL/L
CREAT SERPL-MCNC: 0.7 MG/DL
DIASTOLIC DYSFUNCTION: NO
DIASTOLIC DYSFUNCTION: YES
DIFFERENTIAL METHOD: ABNORMAL
EOSINOPHIL # BLD AUTO: 0.2 K/UL
EOSINOPHIL NFR BLD: 3 %
ERYTHROCYTE [DISTWIDTH] IN BLOOD BY AUTOMATED COUNT: 14.3 %
EST. GFR  (AFRICAN AMERICAN): >60 ML/MIN/1.73 M^2
EST. GFR  (NON AFRICAN AMERICAN): >60 ML/MIN/1.73 M^2
ESTIMATED PA SYSTOLIC PRESSURE: 17.14
GLUCOSE SERPL-MCNC: 197 MG/DL
HCT VFR BLD AUTO: 34.3 %
HGB BLD-MCNC: 10.7 G/DL
LYMPHOCYTES # BLD AUTO: 3 K/UL
LYMPHOCYTES NFR BLD: 41.5 %
MCH RBC QN AUTO: 26.2 PG
MCHC RBC AUTO-ENTMCNC: 31.2 G/DL
MCV RBC AUTO: 84 FL
MITRAL VALVE MOBILITY: NORMAL
MONOCYTES # BLD AUTO: 0.4 K/UL
MONOCYTES NFR BLD: 5.9 %
NEUTROPHILS # BLD AUTO: 3.5 K/UL
NEUTROPHILS NFR BLD: 48.9 %
PLATELET # BLD AUTO: 244 K/UL
PMV BLD AUTO: 11.1 FL
POCT GLUCOSE: 148 MG/DL (ref 70–110)
POCT GLUCOSE: 186 MG/DL (ref 70–110)
POTASSIUM SERPL-SCNC: 3.6 MMOL/L
PROT SERPL-MCNC: 7.7 G/DL
RBC # BLD AUTO: 4.08 M/UL
RETIRED EF AND QEF - SEE NOTES: 65 (ref 55–65)
SODIUM SERPL-SCNC: 138 MMOL/L
TRICUSPID VALVE REGURGITATION: ABNORMAL
WBC # BLD AUTO: 7.11 K/UL

## 2018-10-30 PROCEDURE — 25000003 PHARM REV CODE 250: Performed by: NURSE PRACTITIONER

## 2018-10-30 PROCEDURE — 93016 CV STRESS TEST SUPVJ ONLY: CPT | Mod: ,,, | Performed by: INTERNAL MEDICINE

## 2018-10-30 PROCEDURE — 63600175 PHARM REV CODE 636 W HCPCS: Performed by: NURSE PRACTITIONER

## 2018-10-30 PROCEDURE — 93306 TTE W/DOPPLER COMPLETE: CPT | Mod: 26,,, | Performed by: INTERNAL MEDICINE

## 2018-10-30 PROCEDURE — 93018 CV STRESS TEST I&R ONLY: CPT | Mod: ,,, | Performed by: INTERNAL MEDICINE

## 2018-10-30 PROCEDURE — 96372 THER/PROPH/DIAG INJ SC/IM: CPT | Mod: 59

## 2018-10-30 PROCEDURE — 80053 COMPREHEN METABOLIC PANEL: CPT

## 2018-10-30 PROCEDURE — 36415 COLL VENOUS BLD VENIPUNCTURE: CPT

## 2018-10-30 PROCEDURE — 85025 COMPLETE CBC W/AUTO DIFF WBC: CPT

## 2018-10-30 PROCEDURE — G0378 HOSPITAL OBSERVATION PER HR: HCPCS

## 2018-10-30 PROCEDURE — 96372 THER/PROPH/DIAG INJ SC/IM: CPT | Mod: 59 | Performed by: EMERGENCY MEDICINE

## 2018-10-30 PROCEDURE — 93017 CV STRESS TEST TRACING ONLY: CPT

## 2018-10-30 PROCEDURE — 93306 TTE W/DOPPLER COMPLETE: CPT

## 2018-10-30 PROCEDURE — 94761 N-INVAS EAR/PLS OXIMETRY MLT: CPT

## 2018-10-30 RX ORDER — INSULIN ASPART 100 [IU]/ML
1-10 INJECTION, SOLUTION INTRAVENOUS; SUBCUTANEOUS EVERY 6 HOURS PRN
Status: DISCONTINUED | OUTPATIENT
Start: 2018-10-30 | End: 2018-10-30 | Stop reason: HOSPADM

## 2018-10-30 RX ADMIN — ACETAMINOPHEN 650 MG: 325 TABLET ORAL at 12:10

## 2018-10-30 RX ADMIN — LOSARTAN POTASSIUM 100 MG: 50 TABLET, FILM COATED ORAL at 08:10

## 2018-10-30 RX ADMIN — ENOXAPARIN SODIUM 40 MG: 100 INJECTION SUBCUTANEOUS at 08:10

## 2018-10-30 RX ADMIN — ATORVASTATIN CALCIUM 40 MG: 20 TABLET, FILM COATED ORAL at 08:10

## 2018-10-30 RX ADMIN — TRIAMTERENE AND HYDROCHLOROTHIAZIDE 2 CAPSULE: 25; 37.5 CAPSULE ORAL at 08:10

## 2018-10-30 RX ADMIN — ASPIRIN 325 MG ORAL TABLET 325 MG: 325 PILL ORAL at 08:10

## 2018-10-30 NOTE — ASSESSMENT & PLAN NOTE
-A1c 8.6.  -On metformin 500mg BID.  -Will hold metformin while in CDU. SSI as needed. Encourage dietary modification. Diabetic diet.

## 2018-10-30 NOTE — ASSESSMENT & PLAN NOTE
-109, appears controlled.  -Prescribed triamterene-HCTZ 75-50 daily and losartan 100mg daily.  -Monitor. Continue home meds.

## 2018-10-30 NOTE — ASSESSMENT & PLAN NOTE
-A1c 8.6.  -On metformin 1000mg BID.  -Held metformin while in CDU. Resume upon DC.  F/u with PCP for management.

## 2018-10-30 NOTE — ASSESSMENT & PLAN NOTE
-Constant left anterior chest pain since 10/26. Now resolved  -Troponin negative X3.  EKG NSR. CXR negative for acute changes.  -BP controlled.  -Plan: Continuous telemetry monitoring. Awaiting Nuclear stress test. Continue ASA.  Control BP.  Will consult cardiology if troponin elevates or stress test is abnormal.

## 2018-10-30 NOTE — DISCHARGE SUMMARY
[facility] Hospital Medicine  Discharge Summary      Patient Name: Barrera Siddiqui  MRN: [id number]  Admission Date: 10/29/2018  Hospital Length of Stay: 0 days  Discharge Date and Time:  10/30/2018 5:21 PM  Attending Physician: Niko Shea MD   Discharging Provider: HARJINDER Silva  Primary Care Provider: Evelin Guevara NP      HPI:   54 y/o  female with pmhx of depression, DM, HLD, HTN, CVA, and TIA presented to [facility] ED via private vehicle for chest pain on 10/29/18.  Pt states that she has been having constant chest pain since 10/26/18, but today the pain acutely worsened around 11AM.  Pt states that she was sitting at work when the pain became worse.  She describes the pain as sharp with radiation to her left shoulder and left upper back.  She does report intermittent SOB and diaphoresis.  No abd pain, cough, n/v/d.  No exacerbating or relieving modifiers.  Pt reports compliance with her home medications.     Pt is a former smoker.  No illicit drug use.  She occasionally drinks alcohol.  Pt does report family history of CAD.         * No surgery found *      Hospital Course:   Initial troponin negative. Glucose elevated at 229.  CXR negative for acute changes. EKG (Independently Interpreted by ED physician as it has not yet been loaded in to Epic)  Initial Reading: No STEMI. Previous EKG: Compared with most recent EKG Previous EKG Date: 3/28/17 (Nonspecific changes). Rhythm: Normal Sinus Rhythm. Heart Rate: 98. Ectopy: No Ectopy. Conduction: Normal. ST Segments: Normal ST Segments. T Waves: Normal. Axis: Normal. Other Impression: Nonspecific T Wave flattening     Pt admitted to CDU for continuous telemetry monitoring and continued care.  Troponin negative X3, and CP has completely resolved.  Nuclear stress test normal. Echocardiogram:  1 - Normal left ventricular systolic function (EF 60-65%).     2 - Impaired LV relaxation, increased LVEDP.     3 - Normal  right ventricular systolic function .     4 - The estimated PA systolic pressure is 17 mmHg.   HgbA1c elevated at 8.6.  Pt is currently prescribed Metformin.  Advised dietary modification and f/u with PCP for management.   Pt reports complete resolution of CP.  Non-cardiac origin of CP.  Pt states that she believes her CP was due to stress and anxiety.  She will be DC'd home with her  to follow up with her PCP.      Consults:     Essential (primary) hypertension    -109, appears controlled.  -Prescribed triamterene-HCTZ 75-50 daily and losartan 100mg daily.  - Continue home meds. F/u with PCP     Type 2 diabetes mellitus    -A1c 8.6.  -On metformin 1000mg BID.  -Held metformin while in CDU. Resume upon DC.  F/u with PCP for management.        Hyperlipidemia    -On atorvastatin 40mg daily.  -LDL58.6  -. Continue home medication. F/u with PCP     Diastolic heart failure    -Echo 4/9/16: 1 - Normal left ventricular systolic function (EF 60-65%).        2 - Left ventricular diastolic dysfunction.      3 - Normal right ventricular systolic function   -No signs of volume overload on CXR.  No peripheral edema.   -  Echo: 1 - Normal left ventricular systolic function (EF 60-65%).     2 - Impaired LV relaxation, increased LVEDP.     3 - Normal right ventricular systolic function .     4 - The estimated PA systolic pressure is 17 mmHg.   -F/u with PCP     Hyperglycemia    -Glucose 229 on arrival. History of DM. Pt not in DKA.   -Suspect dietary noncompliance.   -Encourage dietary modification.  F/u with PCP       Morbid obesity    -Encourage weight loss.  -F/u with PCP     TIA (transient ischemic attack)    -History of TIA. No complaints currently.  -Continue home ASA and atorvastatin.   -F/u with PCP       Final Active Diagnoses:    Diagnosis Date Noted POA    Essential (primary) hypertension [I10] 04/09/2016 Yes    Type 2 diabetes mellitus [E11.9] 04/11/2016 Yes    Hyperlipidemia [E78.5] 04/11/2016 Yes     Diastolic heart failure [I50.30] 04/29/2016 Yes    Hyperglycemia [R73.9] 10/29/2018 Yes    Morbid obesity [E66.01] 04/11/2016 Yes    TIA (transient ischemic attack) [G45.9] 10/29/2018 Unknown      Problems Resolved During this Admission:    Diagnosis Date Noted Date Resolved POA    PRINCIPAL PROBLEM:  Chest pain [R07.9] 10/29/2018 10/30/2018 Yes       Discharged Condition: good    Disposition: Home or Self Care    Follow Up:  Follow-up Information     Evelin Guevara NP.    Specialty:  Family Medicine  Contact information:  [address]  [address]  [place] [address] [phone]                 Patient Instructions:      Diet Cardiac     Notify your health care provider if you experience any of the following:  severe uncontrolled pain     Notify your health care provider if you experience any of the following:  difficulty breathing or increased cough     Notify your health care provider if you experience any of the following:  persistent dizziness, light-headedness, or visual disturbances     Activity as tolerated       Significant Diagnostic Studies: Labs:   BMP:   Recent Labs   Lab 10/29/18  1319 10/30/18  0451   * 197*    138   K 3.6 3.6   CL 99 102   CO2 25 26   BUN 10 9   CREATININE 0.7 0.7   CALCIUM 9.4 9.0   MG 1.7  --    , CMP   Recent Labs   Lab 10/29/18  1319 10/30/18  0451    138   K 3.6 3.6   CL 99 102   CO2 25 26   * 197*   BUN 10 9   CREATININE 0.7 0.7   CALCIUM 9.4 9.0   PROT 8.9* 7.7   ALBUMIN 3.8 3.3*   BILITOT 0.3 0.6   ALKPHOS 132 95   AST 59* 63*   ALT 47* 43   ANIONGAP 12 10   ESTGFRAFRICA >60 >60   EGFRNONAA >60 >60   , CBC   Recent Labs   Lab 10/29/18  1319 10/30/18  0451   WBC 8.22 7.11   HGB 11.5* 10.7*   HCT 36.6* 34.3*    244   , Lipid Panel   Lab Results   Component Value Date    CHOL 133 10/29/2018    HDL 47 10/29/2018    LDLCALC 58.6 (L) 10/29/2018    TRIG 137 10/29/2018    CHOLHDL 35.3 10/29/2018   , Troponin   Recent Labs    Lab 10/29/18  2221   TROPONINI <0.006   , A1C:   Recent Labs   Lab 10/29/18  1319   HGBA1C 8.6*    and All labs within the past 24 hours have been reviewed  Radiology:  Nuclear Medicine:   Cardiac Graphics: Echocardiogram:   1 - Normal left ventricular systolic function (EF 60-65%).     2 - Impaired LV relaxation, increased LVEDP.     3 - Normal right ventricular systolic function .     4 - The estimated PA systolic pressure is 17 mmHg.     NM Myocardial perfusion pharmacologic:  Scintigraphically negative for ischemia or infarct.  2. the global left ventricular systolic function is normal with an LV ejection fraction of 65 % and no evidence of LV dilatation. Wall motion is normal.   1 - Normal left ventricular systolic function (EF 60-65%).     2 - Impaired LV relaxation, increased LVEDP.     3 - Normal right ventricular systolic function .     4 - The estimated PA systolic pressure is 17 mmHg.        Pending Diagnostic Studies:     None         Medications:  Reconciled Home Medications:      Medication List      CONTINUE taking these medications    aspirin 81 MG Chew  Take 1 tablet (81 mg total) by mouth once daily.     atorvastatin 40 MG tablet  Commonly known as:  LIPITOR  Take 1 tablet (40 mg total) by mouth once daily.     clopidogrel 75 mg tablet  Commonly known as:  PLAVIX  Take 75 mg by mouth once daily.     hydroCHLOROthiazide 25 MG tablet  Commonly known as:  HYDRODIURIL  Take 1 tablet (25 mg total) by mouth once daily.     LEXAPRO ORAL  Take by mouth.     losartan 100 MG tablet  Commonly known as:  COZAAR  Take 100 mg by mouth once daily.     metFORMIN 500 MG tablet  Commonly known as:  GLUCOPHAGE  Take 1,000 mg by mouth 2 (two) times daily with meals.     QUEtiapine 100 MG Tab  Commonly known as:  SEROQUEL  Take by mouth.     triamterene-hydrochlorothiazide 75-50 mg 75-50 mg per tablet  Commonly known as:  MAXZIDE  Take 1 tablet by mouth once daily.     WELLBUTRIN ORAL  Take by mouth.     XANAX  ORAL  Take by mouth.            Indwelling Lines/Drains at time of discharge:   Lines/Drains/Airways          None          Time spent on the discharge of patient: 25 minutes  Patient was seen and examined on the date of discharge and determined to be suitable for discharge.         HARJINDER Silva  Department of Hospital Medicine  [facility]

## 2018-10-30 NOTE — ASSESSMENT & PLAN NOTE
-Echo 4/9/16: 1 - Normal left ventricular systolic function (EF 60-65%).        2 - Left ventricular diastolic dysfunction.      3 - Normal right ventricular systolic function   -No signs of volume overload on CXR.  No peripheral edema.   -Plan: Obtain echo. Continuous cardiac monitoring.

## 2018-10-30 NOTE — ASSESSMENT & PLAN NOTE
-Echo 4/9/16: 1 - Normal left ventricular systolic function (EF 60-65%).        2 - Left ventricular diastolic dysfunction.      3 - Normal right ventricular systolic function   -No signs of volume overload on CXR.  No peripheral edema.   -  Echo: 1 - Normal left ventricular systolic function (EF 60-65%).     2 - Impaired LV relaxation, increased LVEDP.     3 - Normal right ventricular systolic function .     4 - The estimated PA systolic pressure is 17 mmHg.   -F/u with PCP

## 2018-10-30 NOTE — PLAN OF CARE
10/30/18 1708   Final Note   Assessment Type Final Discharge Note   Anticipated Discharge Disposition Home   What phone number can be called within the next 1-3 days to see how you are doing after discharge? [phone]   Hospital Follow Up  Appt(s) scheduled? No  (pt will schedule)   Discharge plans and expectations educations in teach back method with documentation complete? Yes   Right Care Referral Info   Post Acute Recommendation No Care

## 2018-10-30 NOTE — DISCHARGE INSTRUCTIONS
Follow up with your doctor within one week.  Return to the ED if your condition changes, progresses, or if you have any concerns.

## 2018-10-30 NOTE — PLAN OF CARE
10/30/18 1531   Discharge Assessment   Assessment Type Discharge Planning Assessment   Confirmed/corrected address and phone number on facesheet? Yes   Assessment information obtained from? Patient   Expected Length of Stay (days) 1   Communicated expected length of stay with patient/caregiver yes   Prior to hospitilization cognitive status: Alert/Oriented   Prior to hospitalization functional status: Independent   Current cognitive status: Alert/Oriented   Current Functional Status: Independent   Lives With child(zena), adult;grandchild(zena)   Able to Return to Prior Arrangements yes   Is patient able to care for self after discharge? Yes   Who are your caregiver(s) and their phone number(s)? Cherry Koroma (aunt) [phone]   Patient's perception of discharge disposition home or selfcare   Readmission Within The Last 30 Days no previous admission in last 30 days   Patient currently being followed by outpatient case management? No   Patient currently receives any other outside agency services? No   Equipment Currently Used at Home glucometer   Do you have any problems affording any of your prescribed medications? No   Is the patient taking medications as prescribed? yes   Does the patient have transportation home? Yes   Transportation Available family or friend will provide;car   Does the patient receive services at the [facility]mad[facility] Clinic? No   Discharge Plan A Home with family   Discharge Plan B Home with family   Patient/Family In Agreement With Plan yes

## 2018-10-30 NOTE — NURSING
Telemetry monitor removed. D/C instructions given. Verbalized understanding. Pt ambulated to elevator with family without difficulty. NAD noted.

## 2018-10-30 NOTE — PROGRESS NOTES
[facility] Hospital Medicine  Progress Note    Patient Name: Barrera Siddiqui  MRN: [id number]  Patient Class: OP- Observation   Admission Date: 10/29/2018  Length of Stay: 0 days  Attending Physician: Niko Shea MD  Primary Care Provider: Evelin Guevara NP        Subjective:     Principal Problem:Chest pain    HPI:  52 y/o  female with pmhx of depression, DM, HLD, HTN, CVA, and TIA presented to [facility] ED via private vehicle for chest pain on 10/29/18.  Pt states that she has been having constant chest pain since 10/26/18, but today the pain acutely worsened around 11AM.  Pt states that she was sitting at work when the pain became worse.  She describes the pain as sharp with radiation to her left shoulder and left upper back.  She does report intermittent SOB and diaphoresis.  No abd pain, cough, n/v/d.  No exacerbating or relieving modifiers.  Pt reports compliance with her home medications.     Pt is a former smoker.  No illicit drug use.  She occasionally drinks alcohol.  Pt does report family history of CAD.         Hospital Course:  Initial troponin negative. Glucose elevated at 229.  CXR negative for acute changes. EKG (Independently Interpreted by ED physician as it has not yet been loaded in to Epic)  Initial Reading: No STEMI. Previous EKG: Compared with most recent EKG Previous EKG Date: 3/28/17 (Nonspecific changes). Rhythm: Normal Sinus Rhythm. Heart Rate: 98. Ectopy: No Ectopy. Conduction: Normal. ST Segments: Normal ST Segments. T Waves: Normal. Axis: Normal. Other Impression: Nonspecific T Wave flattening     Pt admitted to CDU for continuous telemetry monitoring and continued care.  Troponin negative X3, and CP has completely resolved.  Pt awaiting nuclear stress test (due to body habitus).  Will consult cardiology if troponin elevates or if pt's stress test abnormal.     Interval History: Pt reports her CP has completely resolved and she slept well overnight.   No complaints.  Troponin negative X3.  Pt awaiting stress test this AM. Currently NPO.     Review of Systems   Constitutional: Negative for appetite change, chills, fatigue and fever.   HENT: Negative for congestion.    Respiratory: Negative for cough and shortness of breath.    Cardiovascular: Negative for chest pain.   Gastrointestinal: Negative for abdominal pain and vomiting.   Musculoskeletal: Negative for back pain.   Neurological: Negative for weakness and headaches.   Psychiatric/Behavioral: Negative for confusion.   All other systems reviewed and are negative.    Objective:     Vital Signs (Most Recent):  Temp: 98.1 °F (36.7 °C) (10/30/18 0723)  Pulse: 82 (10/30/18 0800)  Resp: 16 (10/30/18 0723)  BP: 129/85 (10/30/18 0723)  SpO2: 98 % (10/30/18 0723) Vital Signs (24h Range):  Temp:  [96.6 °F (35.9 °C)-98.4 °F (36.9 °C)] 98.1 °F (36.7 °C)  Pulse:  [76-98] 82  Resp:  [16-20] 16  SpO2:  [95 %-99 %] 98 %  BP: (109-146)/(64-85) 129/85     Weight: 131 kg (288 lb 12.8 oz)  Body mass index is 51.16 kg/m².  No intake or output data in the 24 hours ending 10/30/18 1022   Physical Exam   Constitutional: Vital signs are normal. She appears well-developed and well-nourished. She is active and cooperative. She is easily aroused.  Non-toxic appearance. She does not have a sickly appearance. She does not appear ill. No distress.   Morbidly obese   HENT:   Head: Normocephalic and atraumatic.   Right Ear: External ear normal.   Left Ear: External ear normal.   Nose: Nose normal.   Mouth/Throat: Uvula is midline, oropharynx is clear and moist and mucous membranes are normal.   Neck: Normal range of motion and phonation normal.   Cardiovascular: Normal rate, regular rhythm and normal heart sounds.   Pulses:       Radial pulses are 2+ on the right side, and 2+ on the left side.   Pulmonary/Chest: Effort normal and breath sounds normal.   Abdominal: Normal appearance and bowel sounds are normal. There is no tenderness. There is  no rigidity and no guarding.   Musculoskeletal:        Right lower leg: Normal.        Left lower leg: Normal.   Neurological: She is easily aroused.   Skin: Skin is warm and dry. She is not diaphoretic. No pallor.   Nursing note and vitals reviewed.      Significant Labs:   A1C:   Recent Labs   Lab 10/29/18  1319   HGBA1C 8.6*     CBC:   Recent Labs   Lab 10/29/18  1319 10/30/18  0451   WBC 8.22 7.11   HGB 11.5* 10.7*   HCT 36.6* 34.3*    244     CMP:   Recent Labs   Lab 10/29/18  1319 10/30/18  0451    138   K 3.6 3.6   CL 99 102   CO2 25 26   * 197*   BUN 10 9   CREATININE 0.7 0.7   CALCIUM 9.4 9.0   PROT 8.9* 7.7   ALBUMIN 3.8 3.3*   BILITOT 0.3 0.6   ALKPHOS 132 95   AST 59* 63*   ALT 47* 43   ANIONGAP 12 10   EGFRNONAA >60 >60     Cardiac Markers:   Recent Labs   Lab 10/29/18  1319   BNP <10     Lipid Panel:   Recent Labs   Lab 10/29/18  1319   CHOL 133   HDL 47   LDLCALC 58.6*   TRIG 137   CHOLHDL 35.3     Troponin:   Recent Labs   Lab 10/29/18  1319 10/29/18  1734 10/29/18  2221   TROPONINI <0.006 <0.006 <0.006       Significant Imaging:   Imaging Results          X-Ray Chest PA And Lateral (Final result)  Result time 10/29/18 14:13:05    Final result by Cornel Byers MD (10/29/18 14:13:05)                 Impression:      No focal consolidation      Electronically signed by: Cornel Byers MD  Date:    10/29/2018  Time:    14:13             Narrative:    EXAMINATION:  XR CHEST PA AND LATERAL    CLINICAL HISTORY:  Chest Pain;    TECHNIQUE:  PA and lateral views of the chest were performed.    COMPARISON:  Chest radiograph 03/28/2017    FINDINGS:  There is no focal consolidation.  The cardiac silhouette is stable.  Osseous and soft tissue structures demonstrate no acute abnormality.                                  Assessment/Plan:      * Chest pain    -Constant left anterior chest pain since 10/26. Now resolved  -Troponin negative X3.  EKG NSR. CXR negative for acute changes.  -BP  controlled.  -Plan: Continuous telemetry monitoring. Awaiting Nuclear stress test. Continue ASA.  Control BP.  Will consult cardiology if troponin elevates or stress test is abnormal.        Essential (primary) hypertension    -109, appears controlled.  -Prescribed triamterene-HCTZ 75-50 daily and losartan 100mg daily.  -Monitor. Continue home meds.      Type 2 diabetes mellitus    -A1c 8.6.  -On metformin 500mg BID.  -Will hold metformin while in CDU. SSI as needed. Encourage dietary modification. Diabetic diet.        Hyperlipidemia    -On atorvastatin 40mg daily.  -LDL58.6  -. Continue home medication.      Diastolic heart failure    -Echo 4/9/16: 1 - Normal left ventricular systolic function (EF 60-65%).        2 - Left ventricular diastolic dysfunction.      3 - Normal right ventricular systolic function   -No signs of volume overload on CXR.  No peripheral edema.   -Plan: Obtain echo. Continuous cardiac monitoring.      Hyperglycemia    -Glucose 229 on arrival. History of DM. Pt not in DKA.   -Suspect dietary noncompliance.   -Encourage dietary modification. SSI as needed.        Morbid obesity    -Encourage weight loss.  -Diabetic diet 1500 calorie.        TIA (transient ischemic attack)    -History of TIA. No complaints currently.  -Continue home ASA and atorvastatin.          VTE Risk Mitigation (From admission, onward)        Ordered     enoxaparin injection 40 mg  Every 12 hours      10/29/18 1544     Place RAGHAV hose  Until discontinued      10/29/18 1544     Place sequential compression device  Until discontinued      10/29/18 1544     IP VTE HIGH RISK PATIENT  Once      10/29/18 1544              HARJINDER Silva  Department of Hospital Medicine   [facility]

## 2018-10-30 NOTE — ASSESSMENT & PLAN NOTE
-109, appears controlled.  -Prescribed triamterene-HCTZ 75-50 daily and losartan 100mg daily.  - Continue home meds. F/u with PCP

## 2018-10-30 NOTE — SUBJECTIVE & OBJECTIVE
Interval History: Pt reports her CP has completely resolved and she slept well overnight.  No complaints.  Troponin negative X3.  Pt awaiting stress test this AM. Currently NPO.     Review of Systems   Constitutional: Negative for appetite change, chills, fatigue and fever.   HENT: Negative for congestion.    Respiratory: Negative for cough and shortness of breath.    Cardiovascular: Negative for chest pain.   Gastrointestinal: Negative for abdominal pain and vomiting.   Musculoskeletal: Negative for back pain.   Neurological: Negative for weakness and headaches.   Psychiatric/Behavioral: Negative for confusion.   All other systems reviewed and are negative.    Objective:     Vital Signs (Most Recent):  Temp: 98.1 °F (36.7 °C) (10/30/18 0723)  Pulse: 82 (10/30/18 0800)  Resp: 16 (10/30/18 0723)  BP: 129/85 (10/30/18 0723)  SpO2: 98 % (10/30/18 0723) Vital Signs (24h Range):  Temp:  [96.6 °F (35.9 °C)-98.4 °F (36.9 °C)] 98.1 °F (36.7 °C)  Pulse:  [76-98] 82  Resp:  [16-20] 16  SpO2:  [95 %-99 %] 98 %  BP: (109-146)/(64-85) 129/85     Weight: 131 kg (288 lb 12.8 oz)  Body mass index is 51.16 kg/m².  No intake or output data in the 24 hours ending 10/30/18 1022   Physical Exam   Constitutional: Vital signs are normal. She appears well-developed and well-nourished. She is active and cooperative. She is easily aroused.  Non-toxic appearance. She does not have a sickly appearance. She does not appear ill. No distress.   Morbidly obese   HENT:   Head: Normocephalic and atraumatic.   Right Ear: External ear normal.   Left Ear: External ear normal.   Nose: Nose normal.   Mouth/Throat: Uvula is midline, oropharynx is clear and moist and mucous membranes are normal.   Neck: Normal range of motion and phonation normal.   Cardiovascular: Normal rate, regular rhythm and normal heart sounds.   Pulses:       Radial pulses are 2+ on the right side, and 2+ on the left side.   Pulmonary/Chest: Effort normal and breath sounds normal.    Abdominal: Normal appearance and bowel sounds are normal. There is no tenderness. There is no rigidity and no guarding.   Musculoskeletal:        Right lower leg: Normal.        Left lower leg: Normal.   Neurological: She is easily aroused.   Skin: Skin is warm and dry. She is not diaphoretic. No pallor.   Nursing note and vitals reviewed.      Significant Labs:   A1C:   Recent Labs   Lab 10/29/18  1319   HGBA1C 8.6*     CBC:   Recent Labs   Lab 10/29/18  1319 10/30/18  0451   WBC 8.22 7.11   HGB 11.5* 10.7*   HCT 36.6* 34.3*    244     CMP:   Recent Labs   Lab 10/29/18  1319 10/30/18  0451    138   K 3.6 3.6   CL 99 102   CO2 25 26   * 197*   BUN 10 9   CREATININE 0.7 0.7   CALCIUM 9.4 9.0   PROT 8.9* 7.7   ALBUMIN 3.8 3.3*   BILITOT 0.3 0.6   ALKPHOS 132 95   AST 59* 63*   ALT 47* 43   ANIONGAP 12 10   EGFRNONAA >60 >60     Cardiac Markers:   Recent Labs   Lab 10/29/18  1319   BNP <10     Lipid Panel:   Recent Labs   Lab 10/29/18  1319   CHOL 133   HDL 47   LDLCALC 58.6*   TRIG 137   CHOLHDL 35.3     Troponin:   Recent Labs   Lab 10/29/18  1319 10/29/18  1734 10/29/18  2221   TROPONINI <0.006 <0.006 <0.006       Significant Imaging:   Imaging Results          X-Ray Chest PA And Lateral (Final result)  Result time 10/29/18 14:13:05    Final result by Cornel Byers MD (10/29/18 14:13:05)                 Impression:      No focal consolidation      Electronically signed by: Cornel Byers MD  Date:    10/29/2018  Time:    14:13             Narrative:    EXAMINATION:  XR CHEST PA AND LATERAL    CLINICAL HISTORY:  Chest Pain;    TECHNIQUE:  PA and lateral views of the chest were performed.    COMPARISON:  Chest radiograph 03/28/2017    FINDINGS:  There is no focal consolidation.  The cardiac silhouette is stable.  Osseous and soft tissue structures demonstrate no acute abnormality.

## 2018-10-30 NOTE — ASSESSMENT & PLAN NOTE
-Glucose 229 on arrival. History of DM. Pt not in DKA.   -Suspect dietary noncompliance.   -Encourage dietary modification.  F/u with PCP

## 2019-02-11 ENCOUNTER — HOSPITAL ENCOUNTER (EMERGENCY)
Facility: HOSPITAL | Age: 54
Discharge: HOME | End: 2019-02-11
Attending: EMERGENCY MEDICINE
Payer: COMMERCIAL

## 2019-02-11 VITALS
DIASTOLIC BLOOD PRESSURE: 83 MMHG | HEART RATE: 113 BPM | RESPIRATION RATE: 20 BRPM | BODY MASS INDEX: 51.21 KG/M2 | SYSTOLIC BLOOD PRESSURE: 119 MMHG | OXYGEN SATURATION: 97 % | WEIGHT: 289 LBS | TEMPERATURE: 101 F | HEIGHT: 63 IN

## 2019-02-11 DIAGNOSIS — J10.1 INFLUENZA A: Primary | ICD-10-CM

## 2019-02-11 LAB
DEPRECATED S PYO AG THROAT QL EIA: NEGATIVE
FLUAV AG SPEC QL IA: POSITIVE
FLUBV AG SPEC QL IA: NEGATIVE
SPECIMEN SOURCE: ABNORMAL

## 2019-02-11 PROCEDURE — 87880 STREP A ASSAY W/OPTIC: CPT | Mod: ER

## 2019-02-11 PROCEDURE — 25000003 PHARM REV CODE 250: Mod: ER | Performed by: PHYSICIAN ASSISTANT

## 2019-02-11 PROCEDURE — 87081 CULTURE SCREEN ONLY: CPT | Mod: ER

## 2019-02-11 PROCEDURE — 87400 INFLUENZA A/B EACH AG IA: CPT | Mod: ER

## 2019-02-11 PROCEDURE — 99284 EMERGENCY DEPT VISIT MOD MDM: CPT | Mod: ER

## 2019-02-11 RX ORDER — ACETAMINOPHEN 500 MG
1000 TABLET ORAL
Status: COMPLETED | OUTPATIENT
Start: 2019-02-11 | End: 2019-02-11

## 2019-02-11 RX ORDER — PROMETHAZINE HYDROCHLORIDE AND DEXTROMETHORPHAN HYDROBROMIDE 6.25; 15 MG/5ML; MG/5ML
5 SYRUP ORAL 3 TIMES DAILY PRN
Qty: 118 ML | Refills: 0 | Status: SHIPPED | OUTPATIENT
Start: 2019-02-11 | End: 2019-02-21

## 2019-02-11 RX ORDER — OSELTAMIVIR PHOSPHATE 75 MG/1
75 CAPSULE ORAL 2 TIMES DAILY
Qty: 10 CAPSULE | Refills: 0 | Status: SHIPPED | OUTPATIENT
Start: 2019-02-11 | End: 2019-02-16

## 2019-02-11 RX ADMIN — ACETAMINOPHEN 1000 MG: 500 TABLET ORAL at 08:02

## 2019-02-12 NOTE — DISCHARGE INSTRUCTIONS
Alternate Tylenol and ibuprofen every 4 hr for fever and pain control.  Return to the ED for chest pain, shortness of breath or worse in any way

## 2019-02-12 NOTE — ED PROVIDER NOTES
Encounter Date: 2/11/2019       History     Chief Complaint   Patient presents with    Generalized Body Aches     Patient stated she started with body aches today at 11am. Sore throat, ear pain, and headache associacted with body aches.    Cough     Coughing started at 11am today. Cough medication taken at 12pm.      Patient is a 53-year-old female presenting with complaint of fever, body aches, congestion, sore throat and cough that started today.  She has a constant mild frontal headache.  The headache was gradual in onset.  No neuro deficits.  No known exposure to illness.  She did not have the flu vaccine this year.  No treatment prior to arrival.          Review of patient's allergies indicates:  No Known Allergies  Past Medical History:   Diagnosis Date    Depression     Diabetes mellitus     High cholesterol     Hypertension     Stroke     TIA (transient ischemic attack)      Past Surgical History:   Procedure Laterality Date    CHOLECYSTECTOMY      COLONOSCOPY N/A 10/18/2017    Performed by Donald Wright Jr., MD at [facility] END[facility]    KNEE SURGERY      TUBAL LIGATION      WRIST SURGERY       Family History   Problem Relation Age of Onset    Breast cancer Mother     Liver disease Maternal Grandmother      Social History     Tobacco Use    Smoking status: Former Smoker    Smokeless tobacco: Never Used   Substance Use Topics    Alcohol use: Yes     Comment: rare    Drug use: No     Review of Systems   Constitutional: Positive for appetite change, fatigue and fever. Negative for activity change.   HENT: Positive for congestion, ear pain and sore throat. Negative for sinus pain, trouble swallowing and voice change.    Respiratory: Positive for cough. Negative for shortness of breath and wheezing.    Cardiovascular: Negative for chest pain, palpitations and leg swelling.   Gastrointestinal: Negative for abdominal pain, diarrhea, nausea and vomiting.   Genitourinary: Negative for dysuria, hematuria  and urgency.   Musculoskeletal: Positive for myalgias. Negative for neck pain and neck stiffness.   Skin: Negative for rash.   Neurological: Negative for dizziness, weakness, numbness and headaches.   All other systems reviewed and are negative.      Physical Exam     Initial Vitals [02/11/19 1857]   BP Pulse Resp Temp SpO2   119/83 (!) 113 20 (!) 100.5 °F (38.1 °C) 97 %      MAP       --         Physical Exam    Nursing note and vitals reviewed.  Constitutional: She appears well-developed and well-nourished. She appears distressed (Malaise).   HENT:   Head: Normocephalic and atraumatic.   Mouth/Throat: Oropharynx is clear and moist.   Nasal mucosa inflamed.  Clear rhinorrhea.  No sinus tenderness.   Eyes: Conjunctivae and EOM are normal. Pupils are equal, round, and reactive to light.   Neck: Normal range of motion. Neck supple.   Cardiovascular: Normal rate, regular rhythm, normal heart sounds and intact distal pulses.   Pulmonary/Chest: Breath sounds normal. No respiratory distress. She has no wheezes. She has no rhonchi. She has no rales.   Abdominal: Soft. Bowel sounds are normal. There is no tenderness.   Musculoskeletal: She exhibits no edema.   Lymphadenopathy:     She has no cervical adenopathy.   Neurological: She is alert and oriented to person, place, and time.   Skin: Skin is warm and dry. No rash noted.   Psychiatric: She has a normal mood and affect. Her behavior is normal. Judgment and thought content normal.         ED Course   Procedures  Labs Reviewed   INFLUENZA A AND B ANTIGEN - Abnormal; Notable for the following components:       Result Value    Influenza A Ag, EIA Positive (*)     All other components within normal limits   THROAT SCREEN, RAPID   CULTURE, STREP A,  THROAT          Imaging Results    None          Medical Decision Making:   Clinical Tests:   Lab Tests: Ordered and Reviewed       <> Summary of Lab: Influenza A positive  Patient was given a prescription for Tamiflu and advised  on supportive care.  Follow-up with PCP.  Return to the ED if worsen anyway                      Clinical Impression:   The encounter diagnosis was Influenza A.      Disposition:   Disposition: Discharged                        JEN Hammond  02/11/19 2002

## 2019-02-14 LAB — BACTERIA THROAT CULT: NORMAL

## 2019-03-28 ENCOUNTER — HOSPITAL ENCOUNTER (OUTPATIENT)
Dept: CARDIOLOGY | Facility: HOSPITAL | Age: 54
Discharge: HOME | End: 2019-03-28
Attending: NURSE PRACTITIONER
Payer: COMMERCIAL

## 2019-03-28 DIAGNOSIS — Z79.899 ENCOUNTER FOR LONG-TERM (CURRENT) USE OF HIGH-RISK MEDICATION: ICD-10-CM

## 2019-03-28 DIAGNOSIS — Z79.899 ENCOUNTER FOR LONG-TERM (CURRENT) USE OF HIGH-RISK MEDICATION: Primary | ICD-10-CM

## 2019-03-28 PROCEDURE — 93010 ELECTROCARDIOGRAM REPORT: CPT | Mod: ,,, | Performed by: STUDENT IN AN ORGANIZED HEALTH CARE EDUCATION/TRAINING PROGRAM

## 2019-03-28 PROCEDURE — 93010 EKG 12-LEAD: ICD-10-PCS | Mod: ,,, | Performed by: STUDENT IN AN ORGANIZED HEALTH CARE EDUCATION/TRAINING PROGRAM

## 2019-03-28 PROCEDURE — 93005 ELECTROCARDIOGRAM TRACING: CPT | Mod: PO

## 2019-07-25 ENCOUNTER — LAB VISIT (OUTPATIENT)
Dept: LAB | Facility: HOSPITAL | Age: 54
End: 2019-07-25
Attending: NURSE PRACTITIONER
Payer: COMMERCIAL

## 2019-07-25 DIAGNOSIS — Z79.899 OTHER LONG TERM (CURRENT) DRUG THERAPY: ICD-10-CM

## 2019-07-25 DIAGNOSIS — F25.1 SCHIZOAFFECTIVE DISORDER, DEPRESSIVE TYPE: Primary | ICD-10-CM

## 2019-07-25 LAB
ALT SERPL W/O P-5'-P-CCNC: 66 U/L (ref 10–44)
AST SERPL-CCNC: 111 U/L (ref 15–46)
ESTIMATED AVG GLUCOSE: 237 MG/DL (ref 68–131)
HBA1C MFR BLD HPLC: 9.9 % (ref 4–5.6)

## 2019-07-25 PROCEDURE — 83036 HEMOGLOBIN GLYCOSYLATED A1C: CPT

## 2019-07-25 PROCEDURE — 80074 ACUTE HEPATITIS PANEL: CPT | Mod: PO

## 2019-07-25 PROCEDURE — 84460 ALANINE AMINO (ALT) (SGPT): CPT | Mod: PO

## 2019-07-25 PROCEDURE — 84450 TRANSFERASE (AST) (SGOT): CPT | Mod: PO

## 2019-07-25 PROCEDURE — 36415 COLL VENOUS BLD VENIPUNCTURE: CPT | Mod: PO

## 2019-07-25 PROCEDURE — 82306 VITAMIN D 25 HYDROXY: CPT | Mod: PO

## 2019-07-26 LAB
25(OH)D3+25(OH)D2 SERPL-MCNC: 17 NG/ML (ref 30–96)
HAV IGM SERPL QL IA: NEGATIVE
HBV CORE IGM SERPL QL IA: NEGATIVE
HBV SURFACE AG SERPL QL IA: NEGATIVE
HCV AB SERPL QL IA: NEGATIVE

## 2020-02-08 ENCOUNTER — HOSPITAL ENCOUNTER (OUTPATIENT)
Facility: HOSPITAL | Age: 55
Discharge: HOME | End: 2020-02-09
Attending: EMERGENCY MEDICINE | Admitting: FAMILY MEDICINE
Payer: COMMERCIAL

## 2020-02-08 DIAGNOSIS — G45.9 TIA (TRANSIENT ISCHEMIC ATTACK): ICD-10-CM

## 2020-02-08 DIAGNOSIS — R47.01 APHASIA: ICD-10-CM

## 2020-02-08 LAB
ALBUMIN SERPL BCP-MCNC: 3.7 G/DL (ref 3.5–5.2)
ALP SERPL-CCNC: 122 U/L (ref 55–135)
ALT SERPL W/O P-5'-P-CCNC: 44 U/L (ref 10–44)
ANION GAP SERPL CALC-SCNC: 15 MMOL/L (ref 8–16)
AST SERPL-CCNC: 51 U/L (ref 10–40)
B-HCG UR QL: NEGATIVE
BACTERIA #/AREA URNS HPF: NORMAL /HPF
BASOPHILS # BLD AUTO: 0.06 K/UL (ref 0–0.2)
BASOPHILS NFR BLD: 0.6 % (ref 0–1.9)
BILIRUB SERPL-MCNC: 0.4 MG/DL (ref 0.1–1)
BILIRUB UR QL STRIP: NEGATIVE
BUN SERPL-MCNC: 17 MG/DL (ref 6–20)
CALCIUM SERPL-MCNC: 9.9 MG/DL (ref 8.7–10.5)
CHLORIDE SERPL-SCNC: 98 MMOL/L (ref 95–110)
CHOLEST SERPL-MCNC: 109 MG/DL (ref 120–199)
CHOLEST/HDLC SERPL: 3.2 {RATIO} (ref 2–5)
CLARITY UR: CLEAR
CO2 SERPL-SCNC: 23 MMOL/L (ref 23–29)
COLOR UR: YELLOW
CREAT SERPL-MCNC: 0.9 MG/DL (ref 0.5–1.4)
CTP QC/QA: YES
DIFFERENTIAL METHOD: ABNORMAL
EOSINOPHIL # BLD AUTO: 0.3 K/UL (ref 0–0.5)
EOSINOPHIL NFR BLD: 2.8 % (ref 0–8)
ERYTHROCYTE [DISTWIDTH] IN BLOOD BY AUTOMATED COUNT: 13.7 % (ref 11.5–14.5)
EST. GFR  (AFRICAN AMERICAN): >60 ML/MIN/1.73 M^2
EST. GFR  (NON AFRICAN AMERICAN): >60 ML/MIN/1.73 M^2
ESTIMATED AVG GLUCOSE: 206 MG/DL (ref 68–131)
GLUCOSE SERPL-MCNC: 257 MG/DL (ref 70–110)
GLUCOSE UR QL STRIP: NEGATIVE
HBA1C MFR BLD HPLC: 8.8 % (ref 4–5.6)
HCT VFR BLD AUTO: 33 % (ref 37–48.5)
HDLC SERPL-MCNC: 34 MG/DL (ref 40–75)
HDLC SERPL: 31.2 % (ref 20–50)
HGB BLD-MCNC: 10.3 G/DL (ref 12–16)
HGB UR QL STRIP: NEGATIVE
IMM GRANULOCYTES # BLD AUTO: 0.05 K/UL (ref 0–0.04)
IMM GRANULOCYTES NFR BLD AUTO: 0.5 % (ref 0–0.5)
INR PPP: 1 (ref 0.8–1.2)
KETONES UR QL STRIP: NEGATIVE
LDLC SERPL CALC-MCNC: 34 MG/DL (ref 63–159)
LEUKOCYTE ESTERASE UR QL STRIP: ABNORMAL
LYMPHOCYTES # BLD AUTO: 4 K/UL (ref 1–4.8)
LYMPHOCYTES NFR BLD: 41.2 % (ref 18–48)
MCH RBC QN AUTO: 26 PG (ref 27–31)
MCHC RBC AUTO-ENTMCNC: 31.2 G/DL (ref 32–36)
MCV RBC AUTO: 83 FL (ref 82–98)
MICROSCOPIC COMMENT: NORMAL
MONOCYTES # BLD AUTO: 0.5 K/UL (ref 0.3–1)
MONOCYTES NFR BLD: 4.9 % (ref 4–15)
NEUTROPHILS # BLD AUTO: 4.9 K/UL (ref 1.8–7.7)
NEUTROPHILS NFR BLD: 50 % (ref 38–73)
NITRITE UR QL STRIP: NEGATIVE
NONHDLC SERPL-MCNC: 75 MG/DL
NRBC BLD-RTO: 0 /100 WBC
PH UR STRIP: 7 [PH] (ref 5–8)
PLATELET # BLD AUTO: 306 K/UL (ref 150–350)
PMV BLD AUTO: 11.5 FL (ref 9.2–12.9)
POCT GLUCOSE: 221 MG/DL (ref 70–110)
POTASSIUM SERPL-SCNC: 3.6 MMOL/L (ref 3.5–5.1)
PROT SERPL-MCNC: 8.9 G/DL (ref 6–8.4)
PROT UR QL STRIP: NEGATIVE
PROTHROMBIN TIME: 11.1 SEC (ref 9–12.5)
RBC # BLD AUTO: 3.96 M/UL (ref 4–5.4)
SODIUM SERPL-SCNC: 136 MMOL/L (ref 136–145)
SP GR UR STRIP: 1.01 (ref 1–1.03)
SQUAMOUS #/AREA URNS HPF: 1 /HPF
TRIGL SERPL-MCNC: 205 MG/DL (ref 30–150)
TROPONIN I SERPL DL<=0.01 NG/ML-MCNC: <0.006 NG/ML (ref 0–0.03)
URN SPEC COLLECT METH UR: ABNORMAL
UROBILINOGEN UR STRIP-ACNC: NEGATIVE EU/DL
WBC # BLD AUTO: 9.73 K/UL (ref 3.9–12.7)
WBC #/AREA URNS HPF: 3 /HPF (ref 0–5)

## 2020-02-08 PROCEDURE — 63600175 PHARM REV CODE 636 W HCPCS: Performed by: STUDENT IN AN ORGANIZED HEALTH CARE EDUCATION/TRAINING PROGRAM

## 2020-02-08 PROCEDURE — 84484 ASSAY OF TROPONIN QUANT: CPT

## 2020-02-08 PROCEDURE — 81000 URINALYSIS NONAUTO W/SCOPE: CPT

## 2020-02-08 PROCEDURE — 93005 ELECTROCARDIOGRAM TRACING: CPT

## 2020-02-08 PROCEDURE — 85610 PROTHROMBIN TIME: CPT

## 2020-02-08 PROCEDURE — 80061 LIPID PANEL: CPT

## 2020-02-08 PROCEDURE — 25500020 PHARM REV CODE 255: Performed by: FAMILY MEDICINE

## 2020-02-08 PROCEDURE — 99285 EMERGENCY DEPT VISIT HI MDM: CPT | Mod: 25

## 2020-02-08 PROCEDURE — 81025 URINE PREGNANCY TEST: CPT | Performed by: EMERGENCY MEDICINE

## 2020-02-08 PROCEDURE — 83036 HEMOGLOBIN GLYCOSYLATED A1C: CPT

## 2020-02-08 PROCEDURE — 85025 COMPLETE CBC W/AUTO DIFF WBC: CPT

## 2020-02-08 PROCEDURE — G0378 HOSPITAL OBSERVATION PER HR: HCPCS

## 2020-02-08 PROCEDURE — 80053 COMPREHEN METABOLIC PANEL: CPT

## 2020-02-08 PROCEDURE — 96372 THER/PROPH/DIAG INJ SC/IM: CPT | Mod: 59

## 2020-02-08 RX ORDER — IBUPROFEN 200 MG
16 TABLET ORAL
Status: DISCONTINUED | OUTPATIENT
Start: 2020-02-08 | End: 2020-02-09 | Stop reason: HOSPADM

## 2020-02-08 RX ORDER — LOSARTAN POTASSIUM 50 MG/1
100 TABLET ORAL DAILY
Status: DISCONTINUED | OUTPATIENT
Start: 2020-02-09 | End: 2020-02-09 | Stop reason: HOSPADM

## 2020-02-08 RX ORDER — SODIUM CHLORIDE 0.9 % (FLUSH) 0.9 %
10 SYRINGE (ML) INJECTION
Status: DISCONTINUED | OUTPATIENT
Start: 2020-02-08 | End: 2020-02-09 | Stop reason: HOSPADM

## 2020-02-08 RX ORDER — IBUPROFEN 200 MG
24 TABLET ORAL
Status: DISCONTINUED | OUTPATIENT
Start: 2020-02-08 | End: 2020-02-09 | Stop reason: HOSPADM

## 2020-02-08 RX ORDER — GLUCAGON 1 MG
1 KIT INJECTION
Status: DISCONTINUED | OUTPATIENT
Start: 2020-02-08 | End: 2020-02-09 | Stop reason: HOSPADM

## 2020-02-08 RX ORDER — HYDROCHLOROTHIAZIDE 25 MG/1
25 TABLET ORAL DAILY
Status: DISCONTINUED | OUTPATIENT
Start: 2020-02-09 | End: 2020-02-09 | Stop reason: HOSPADM

## 2020-02-08 RX ORDER — NAPROXEN SODIUM 220 MG/1
81 TABLET, FILM COATED ORAL DAILY
Status: DISCONTINUED | OUTPATIENT
Start: 2020-02-09 | End: 2020-02-09 | Stop reason: HOSPADM

## 2020-02-08 RX ORDER — ENOXAPARIN SODIUM 100 MG/ML
40 INJECTION SUBCUTANEOUS EVERY 24 HOURS
Status: DISCONTINUED | OUTPATIENT
Start: 2020-02-08 | End: 2020-02-09 | Stop reason: HOSPADM

## 2020-02-08 RX ORDER — METFORMIN HYDROCHLORIDE 500 MG/1
1000 TABLET ORAL 2 TIMES DAILY WITH MEALS
Status: DISCONTINUED | OUTPATIENT
Start: 2020-02-09 | End: 2020-02-09 | Stop reason: HOSPADM

## 2020-02-08 RX ORDER — QUETIAPINE FUMARATE 25 MG/1
100 TABLET, FILM COATED ORAL NIGHTLY
Status: DISCONTINUED | OUTPATIENT
Start: 2020-02-08 | End: 2020-02-09 | Stop reason: HOSPADM

## 2020-02-08 RX ORDER — HYDRALAZINE HYDROCHLORIDE 20 MG/ML
10 INJECTION INTRAMUSCULAR; INTRAVENOUS EVERY 6 HOURS PRN
Status: DISCONTINUED | OUTPATIENT
Start: 2020-02-08 | End: 2020-02-09 | Stop reason: HOSPADM

## 2020-02-08 RX ORDER — ATORVASTATIN CALCIUM 20 MG/1
40 TABLET, FILM COATED ORAL DAILY
Status: DISCONTINUED | OUTPATIENT
Start: 2020-02-09 | End: 2020-02-09 | Stop reason: HOSPADM

## 2020-02-08 RX ORDER — CLOPIDOGREL BISULFATE 75 MG/1
75 TABLET ORAL DAILY
Status: DISCONTINUED | OUTPATIENT
Start: 2020-02-09 | End: 2020-02-09 | Stop reason: HOSPADM

## 2020-02-08 RX ORDER — DEXTROSE 50 % IN WATER (D50W) INTRAVENOUS SYRINGE
12.5
Status: DISCONTINUED | OUTPATIENT
Start: 2020-02-08 | End: 2020-02-09 | Stop reason: HOSPADM

## 2020-02-08 RX ORDER — INSULIN ASPART 100 [IU]/ML
0-5 INJECTION, SOLUTION INTRAVENOUS; SUBCUTANEOUS EVERY 6 HOURS PRN
Status: DISCONTINUED | OUTPATIENT
Start: 2020-02-08 | End: 2020-02-09 | Stop reason: HOSPADM

## 2020-02-08 RX ADMIN — IOHEXOL 100 ML: 350 INJECTION, SOLUTION INTRAVENOUS at 10:02

## 2020-02-08 RX ADMIN — INSULIN ASPART 1 UNITS: 100 INJECTION, SOLUTION INTRAVENOUS; SUBCUTANEOUS at 09:02

## 2020-02-08 NOTE — ED NOTES
For 2 days pt has been having trouble finding words, forgetting what she is doing. Right hand fell off of the steering wheel while driving and had tremors in left hand. Pt spouse states that this is similar to when pt had a stroke 3 years ago. Pt denies chest pain and shortness of breath

## 2020-02-08 NOTE — ED PROVIDER NOTES
"Encounter Date: 2/8/2020    SCRIBE #1 NOTE: I, Maricarmen Valdez, am scribing for, and in the presence of,  Dr. Menendez. I have scribed the entire note.       History     Chief Complaint   Patient presents with    Aphasia     54 year old female presents to ed cc of aphasia since thursday patient reports has difficulty with getting words/ thoughts out.      This is a 54 y.o. female who  has a past medical history of Depression, Diabetes mellitus, High cholesterol, Hypertension, Stroke, and TIA (transient ischemic attack). presents with chief complaint of aphasia with initial onset three days ago and becoming more frequent. Patient describes it as " searching for words but can't get them out". She also notes gradually becoming more confused. She notes she was at work this morning at 0900, attempting to complete a task, when she forgot what she was supposed to do and could not remember. Associated symptoms include trouble swallowing which began today and intermittent pain to the back of head. Patient denies any fever, chills, chest pain, shortness of breath, headaches, dizziness urinary incontinence or slurred speech. She denies any changes in gait, numbness, tingling or weakness to extremities. Furthermore, patient denies any recent illnesses or travel.     The history is provided by the patient.     Review of patient's allergies indicates:  No Known Allergies  Past Medical History:   Diagnosis Date    Depression     Diabetes mellitus     High cholesterol     Hypertension     Stroke     TIA (transient ischemic attack)      Past Surgical History:   Procedure Laterality Date    CHOLECYSTECTOMY      COLONOSCOPY N/A 10/18/2017    Procedure: COLONOSCOPY;  Surgeon: Donald Wright Jr., MD;  Location: [facility];  Service: Endoscopy;  Laterality: N/A;    KNEE SURGERY      TUBAL LIGATION      WRIST SURGERY       Family History   Problem Relation Age of Onset    Breast cancer Mother     Liver disease Maternal " Grandmother      Social History     Tobacco Use    Smoking status: Former Smoker    Smokeless tobacco: Never Used   Substance Use Topics    Alcohol use: Yes     Comment: rare    Drug use: No     Review of Systems   Constitutional: Negative for chills, fatigue and fever.   HENT: Negative for facial swelling, trouble swallowing and voice change.    Eyes: Negative for photophobia, pain and redness.   Respiratory: Negative for cough, choking and shortness of breath.    Cardiovascular: Negative for chest pain, palpitations and leg swelling.   Gastrointestinal: Negative for abdominal pain, diarrhea, nausea and vomiting.   Genitourinary: Negative for difficulty urinating, frequency and urgency.   Musculoskeletal: Negative for back pain, neck pain and neck stiffness.   Neurological: Positive for speech difficulty. Negative for seizures, light-headedness, numbness and headaches.   Psychiatric/Behavioral: Positive for confusion.   All other systems reviewed and are negative.      Physical Exam     Initial Vitals [02/08/20 1658]   BP Pulse Resp Temp SpO2   (!) 183/101 100 20 98.2 °F (36.8 °C) 100 %      MAP       --         Physical Exam    Nursing note and vitals reviewed.  Constitutional: She appears well-developed and well-nourished. No distress.   HENT:   Head: Normocephalic and atraumatic.   Mouth/Throat: Oropharynx is clear and moist.   Eyes: Conjunctivae and EOM are normal. Pupils are equal, round, and reactive to light.   EOM intact. No nystagmus.    Neck: Normal range of motion. Neck supple.   Cardiovascular: Normal rate, regular rhythm, normal heart sounds and intact distal pulses.   Pulmonary/Chest: Breath sounds normal. No respiratory distress. She has no wheezes. She has no rhonchi. She has no rales.   Abdominal: Soft. Bowel sounds are normal. She exhibits no distension. There is no tenderness.   Musculoskeletal: Normal range of motion. She exhibits no edema or tenderness.   Weakness to left upper extremity  with 4/5 strength. All other extremities are 5/5.   Neurological: She is alert and oriented to person, place, and time. No cranial nerve deficit. GCS eye subscore is 4. GCS verbal subscore is 5. GCS motor subscore is 6.   Expressive aphasia. No slurred speech. No pronator drift.  Strength 4/5 to LUE, LLE  No focal neurological deficit appreciated    Skin: Skin is warm and dry. Capillary refill takes less than 2 seconds.         ED Course   Procedures  Labs Reviewed   CBC W/ AUTO DIFFERENTIAL - Abnormal; Notable for the following components:       Result Value    RBC 3.96 (*)     Hemoglobin 10.3 (*)     Hematocrit 33.0 (*)     Mean Corpuscular Hemoglobin 26.0 (*)     Mean Corpuscular Hemoglobin Conc 31.2 (*)     Immature Grans (Abs) 0.05 (*)     All other components within normal limits   COMPREHENSIVE METABOLIC PANEL - Abnormal; Notable for the following components:    Glucose 257 (*)     Total Protein 8.9 (*)     AST 51 (*)     All other components within normal limits   URINALYSIS, REFLEX TO URINE CULTURE - Abnormal; Notable for the following components:    Leukocytes, UA Trace (*)     All other components within normal limits    Narrative:     Preferred Collection Type->Urine, Clean Catch   LIPID PANEL - Abnormal; Notable for the following components:    Cholesterol 109 (*)     Triglycerides 205 (*)     HDL 34 (*)     LDL Cholesterol 34.0 (*)     All other components within normal limits   HEMOGLOBIN A1C - Abnormal; Notable for the following components:    Hemoglobin A1C 8.8 (*)     Estimated Avg Glucose 206 (*)     All other components within normal limits   POCT GLUCOSE - Abnormal; Notable for the following components:    POCT Glucose 221 (*)     All other components within normal limits   TROPONIN I   PROTIME-INR   URINALYSIS MICROSCOPIC    Narrative:     Preferred Collection Type->Urine, Clean Catch   HEMOGLOBIN A1C   HEMOGLOBIN A1C   POCT URINE PREGNANCY   POCT GLUCOSE MONITORING CONTINUOUS     EKG Readings:  (Independently Interpreted)   Sinus Rhythm. Rate of 92. Prolonged QT. No T wave inversion. No signs of ischemia. No STEMI.       X-Rays:   Independently Interpreted Readings:   Other Readings:  Reviewed by myself, read by radiology.     Imaging Results          CTA Head and Neck (xpd) (Final result)  Result time 02/08/20 22:48:19    Final result by Pricila Iraheta MD (02/08/20 22:48:19)                 Impression:      No acute abnormality. No high-grade stenosis or major vessel occlusion.      Electronically signed by: Pricila Iraheta  Date:    02/08/2020  Time:    22:48             Narrative:    EXAMINATION:  CTA HEAD AND NECK (XPD)    CLINICAL HISTORY:  Decreased alertness;aphagia;    TECHNIQUE:  Non contrast low dose axial images were obtained through the head. CT angiogram was performed from the level of the [place] to the top of the head following the IV administration of 100mL of Omnipaque 350.   Sagittal and coronal reconstructions and maximum intensity projection reconstructions were performed. Arterial stenosis percentages are based on NASCET measurement criteria.    COMPARISON:  None    FINDINGS:  Intracranial Compartment:    Ventricles and sulci are normal in size for age without evidence of hydrocephalus. No extra-axial blood or fluid collections.    The brain parenchyma appears normal. No parenchymal mass, hemorrhage, edema, or major vascular distribution infarct.    Skull/Extracranial Contents (limited evaluation): No fracture. Mastoid air cells and paranasal sinuses are essentially clear.    Non-Vascular Structures of the Neck/Thoracic Inlet (limited evaluation): Normal.    Aorta: Normal 3 vessel arch.    Extracranial carotid circulation: No hemodynamically significant stenosis, aneurysmal dilatation, or dissection.    Extracranial vertebral circulation: No hemodynamically significant stenosis, aneurysmal dilatation, or dissection.    Intracranial Arteries: No focal high-grade stenosis, occlusion,  or aneurysm.    Venous structures (limited evaluation): Normal.                               CT Head Without Contrast (Final result)  Result time 02/08/20 18:19:12    Final result by Pricila Iraheta MD (02/08/20 18:19:12)                 Impression:      No acute intracranial abnormality detected.      Electronically signed by: Pricila Iraheta  Date:    02/08/2020  Time:    18:19             Narrative:    EXAMINATION:  CT OF THE HEAD WITHOUT    CLINICAL HISTORY:  Confusion/delirium, altered LOC, unexplained;Dizziness;    TECHNIQUE:  5 mm unenhanced axial images were obtained from the skull base to the vertex.    COMPARISON:  None.    FINDINGS:  The ventricles, basal cisterns, and cortical sulci are within normal limits for patient's stated age. There is no acute intracranial hemorrhage, territorial infarct or mass effect, or midline shift. The visualized paranasal sinuses and mastoid air cells are clear.  There is a empty sella.                              Medical Decision Making:   Clinical Tests:   Lab Tests: Ordered and Reviewed  Radiological Study: Ordered and Reviewed  Medical Tests: Ordered and Reviewed  ED Management:  - CT head without contrast negative for acute abnormality per final radiology read  - Lipid panel notable for elevated triglycerides  - CMP without significant electrolyte abnormality; renal function within normal limits  - Troponin I within normal limits  - Protime- INR within normal limits  - Urinalysis without findings suggestive of infectious process  - UPT negative   - CBC w/diff WNL; H/H stable; no significant leukocytosis   - EKG; Sinus Rhythm. Rate of 92. Prolonged QT. No T wave inversion. No signs of ischemia. No STEMI per my interpretation  - Discussed case with ally [facility] neurologist who recommended admission for CTA/MRI  - pt to be admitted to [facility] FM service for further evaluation and management  - pt in agreement with plan for admission                                   Clinical Impression:       ICD-10-CM ICD-9-CM   1. Aphasia R47.01 784.3            I, Rupert Menendez,  personally performed the services described in this documentation. All medical record entries made by the scribe were at my direction and in my presence.  I have reviewed the chart and agree that the record reflects my personal performance and is accurate and complete. Rupert Menendez M.D. 1:52 AM02/09/2020                 Rupert Menendez MD  02/09/20 0152

## 2020-02-09 VITALS
HEIGHT: 62 IN | TEMPERATURE: 98 F | WEIGHT: 287 LBS | DIASTOLIC BLOOD PRESSURE: 78 MMHG | RESPIRATION RATE: 18 BRPM | SYSTOLIC BLOOD PRESSURE: 115 MMHG | HEART RATE: 85 BPM | OXYGEN SATURATION: 98 % | BODY MASS INDEX: 52.81 KG/M2

## 2020-02-09 PROBLEM — R47.01 APHASIA: Status: RESOLVED | Noted: 2020-02-08 | Resolved: 2020-02-09

## 2020-02-09 LAB
ALBUMIN SERPL BCP-MCNC: 3.6 G/DL (ref 3.5–5.2)
ALP SERPL-CCNC: 103 U/L (ref 55–135)
ALT SERPL W/O P-5'-P-CCNC: 42 U/L (ref 10–44)
ANION GAP SERPL CALC-SCNC: 12 MMOL/L (ref 8–16)
AST SERPL-CCNC: 54 U/L (ref 10–40)
BASOPHILS # BLD AUTO: 0.05 K/UL (ref 0–0.2)
BASOPHILS NFR BLD: 0.7 % (ref 0–1.9)
BILIRUB SERPL-MCNC: 0.5 MG/DL (ref 0.1–1)
BUN SERPL-MCNC: 11 MG/DL (ref 6–20)
CALCIUM SERPL-MCNC: 9.7 MG/DL (ref 8.7–10.5)
CHLORIDE SERPL-SCNC: 100 MMOL/L (ref 95–110)
CO2 SERPL-SCNC: 26 MMOL/L (ref 23–29)
CREAT SERPL-MCNC: 0.8 MG/DL (ref 0.5–1.4)
DIFFERENTIAL METHOD: ABNORMAL
EOSINOPHIL # BLD AUTO: 0.3 K/UL (ref 0–0.5)
EOSINOPHIL NFR BLD: 4.3 % (ref 0–8)
ERYTHROCYTE [DISTWIDTH] IN BLOOD BY AUTOMATED COUNT: 13.9 % (ref 11.5–14.5)
EST. GFR  (AFRICAN AMERICAN): >60 ML/MIN/1.73 M^2
EST. GFR  (NON AFRICAN AMERICAN): >60 ML/MIN/1.73 M^2
ESTIMATED AVG GLUCOSE: 203 MG/DL (ref 68–131)
GLUCOSE SERPL-MCNC: 210 MG/DL (ref 70–110)
HBA1C MFR BLD HPLC: 8.7 % (ref 4–5.6)
HCT VFR BLD AUTO: 32 % (ref 37–48.5)
HGB BLD-MCNC: 10 G/DL (ref 12–16)
IMM GRANULOCYTES # BLD AUTO: 0.02 K/UL (ref 0–0.04)
IMM GRANULOCYTES NFR BLD AUTO: 0.3 % (ref 0–0.5)
LYMPHOCYTES # BLD AUTO: 2.6 K/UL (ref 1–4.8)
LYMPHOCYTES NFR BLD: 38.9 % (ref 18–48)
MAGNESIUM SERPL-MCNC: 1.5 MG/DL (ref 1.6–2.6)
MCH RBC QN AUTO: 26.1 PG (ref 27–31)
MCHC RBC AUTO-ENTMCNC: 31.3 G/DL (ref 32–36)
MCV RBC AUTO: 84 FL (ref 82–98)
MONOCYTES # BLD AUTO: 0.4 K/UL (ref 0.3–1)
MONOCYTES NFR BLD: 5.5 % (ref 4–15)
NEUTROPHILS # BLD AUTO: 3.4 K/UL (ref 1.8–7.7)
NEUTROPHILS NFR BLD: 50.3 % (ref 38–73)
NRBC BLD-RTO: 0 /100 WBC
PHOSPHATE SERPL-MCNC: 4.1 MG/DL (ref 2.7–4.5)
PLATELET # BLD AUTO: 272 K/UL (ref 150–350)
PMV BLD AUTO: 11.2 FL (ref 9.2–12.9)
POCT GLUCOSE: 135 MG/DL (ref 70–110)
POCT GLUCOSE: 168 MG/DL (ref 70–110)
POCT GLUCOSE: 207 MG/DL (ref 70–110)
POCT GLUCOSE: 222 MG/DL (ref 70–110)
POTASSIUM SERPL-SCNC: 3.4 MMOL/L (ref 3.5–5.1)
PROT SERPL-MCNC: 8.3 G/DL (ref 6–8.4)
RBC # BLD AUTO: 3.83 M/UL (ref 4–5.4)
SODIUM SERPL-SCNC: 138 MMOL/L (ref 136–145)
WBC # BLD AUTO: 6.68 K/UL (ref 3.9–12.7)

## 2020-02-09 PROCEDURE — G0378 HOSPITAL OBSERVATION PER HR: HCPCS

## 2020-02-09 PROCEDURE — 84100 ASSAY OF PHOSPHORUS: CPT

## 2020-02-09 PROCEDURE — 96376 TX/PRO/DX INJ SAME DRUG ADON: CPT

## 2020-02-09 PROCEDURE — 63600175 PHARM REV CODE 636 W HCPCS: Performed by: STUDENT IN AN ORGANIZED HEALTH CARE EDUCATION/TRAINING PROGRAM

## 2020-02-09 PROCEDURE — 85025 COMPLETE CBC W/AUTO DIFF WBC: CPT

## 2020-02-09 PROCEDURE — 83036 HEMOGLOBIN GLYCOSYLATED A1C: CPT

## 2020-02-09 PROCEDURE — 25000003 PHARM REV CODE 250: Performed by: STUDENT IN AN ORGANIZED HEALTH CARE EDUCATION/TRAINING PROGRAM

## 2020-02-09 PROCEDURE — 80053 COMPREHEN METABOLIC PANEL: CPT

## 2020-02-09 PROCEDURE — 83735 ASSAY OF MAGNESIUM: CPT

## 2020-02-09 PROCEDURE — 92610 EVALUATE SWALLOWING FUNCTION: CPT

## 2020-02-09 PROCEDURE — 96375 TX/PRO/DX INJ NEW DRUG ADDON: CPT

## 2020-02-09 PROCEDURE — A9585 GADOBUTROL INJECTION: HCPCS | Performed by: FAMILY MEDICINE

## 2020-02-09 PROCEDURE — 36415 COLL VENOUS BLD VENIPUNCTURE: CPT

## 2020-02-09 PROCEDURE — 96374 THER/PROPH/DIAG INJ IV PUSH: CPT | Mod: 59

## 2020-02-09 PROCEDURE — 25500020 PHARM REV CODE 255: Performed by: FAMILY MEDICINE

## 2020-02-09 RX ORDER — METOCLOPRAMIDE HYDROCHLORIDE 5 MG/ML
10 INJECTION INTRAMUSCULAR; INTRAVENOUS ONCE
Status: COMPLETED | OUTPATIENT
Start: 2020-02-09 | End: 2020-02-09

## 2020-02-09 RX ORDER — GADOBUTROL 604.72 MG/ML
10 INJECTION INTRAVENOUS
Status: COMPLETED | OUTPATIENT
Start: 2020-02-09 | End: 2020-02-09

## 2020-02-09 RX ORDER — ACETAMINOPHEN 650 MG/1
650 SUPPOSITORY RECTAL EVERY 6 HOURS PRN
Status: DISCONTINUED | OUTPATIENT
Start: 2020-02-09 | End: 2020-02-09 | Stop reason: HOSPADM

## 2020-02-09 RX ORDER — KETOROLAC TROMETHAMINE 30 MG/ML
30 INJECTION, SOLUTION INTRAMUSCULAR; INTRAVENOUS ONCE
Status: COMPLETED | OUTPATIENT
Start: 2020-02-09 | End: 2020-02-09

## 2020-02-09 RX ORDER — KETOROLAC TROMETHAMINE 30 MG/ML
15 INJECTION, SOLUTION INTRAMUSCULAR; INTRAVENOUS ONCE AS NEEDED
Status: COMPLETED | OUTPATIENT
Start: 2020-02-09 | End: 2020-02-09

## 2020-02-09 RX ADMIN — ACETAMINOPHEN 650 MG: 650 SUPPOSITORY RECTAL at 06:02

## 2020-02-09 RX ADMIN — METOCLOPRAMIDE 10 MG: 5 INJECTION, SOLUTION INTRAMUSCULAR; INTRAVENOUS at 03:02

## 2020-02-09 RX ADMIN — ATORVASTATIN CALCIUM 40 MG: 20 TABLET, FILM COATED ORAL at 11:02

## 2020-02-09 RX ADMIN — GADOBUTROL 10 ML: 604.72 INJECTION INTRAVENOUS at 01:02

## 2020-02-09 RX ADMIN — HYDROCHLOROTHIAZIDE 25 MG: 25 TABLET ORAL at 11:02

## 2020-02-09 RX ADMIN — ENOXAPARIN SODIUM 40 MG: 100 INJECTION SUBCUTANEOUS at 12:02

## 2020-02-09 RX ADMIN — METFORMIN HYDROCHLORIDE 1000 MG: 500 TABLET ORAL at 11:02

## 2020-02-09 RX ADMIN — CLOPIDOGREL BISULFATE 75 MG: 75 TABLET, FILM COATED ORAL at 11:02

## 2020-02-09 RX ADMIN — LOSARTAN POTASSIUM 100 MG: 50 TABLET ORAL at 11:02

## 2020-02-09 RX ADMIN — ASPIRIN 81 MG 81 MG: 81 TABLET ORAL at 11:02

## 2020-02-09 RX ADMIN — KETOROLAC TROMETHAMINE 15 MG: 30 INJECTION, SOLUTION INTRAMUSCULAR at 07:02

## 2020-02-09 RX ADMIN — KETOROLAC TROMETHAMINE 30 MG: 30 INJECTION, SOLUTION INTRAMUSCULAR at 11:02

## 2020-02-09 NOTE — ASSESSMENT & PLAN NOTE
-CT head: negative for acute abnormalities  -failed LISA evaluation  -NPO until speech evaluation  -CTA head and neck: negative  -neuro consult today

## 2020-02-09 NOTE — HPI
54 year old female with T2DM, HTN, TIAx2 and schizoaffective disorder presenting with expressive aphasia for the past few days. States she has difficulty when trying to say words but they do eventually come out.  States she has also become more forgetful. Works at [employer] and has been working up until coming in. Reports a mild frontal headache that began today. Denies any focal weakness. For her previous two TIAs, she had a facial droop that resolved. Denies chest pain, shortness of breath, visual changes. Family has not noticed any changes with her speech.  Reports compliance with all medications. Has a family history of diabetes and hypertension. Denies any recent weight changes. Denies history of afib. Denies loss of consciousness.

## 2020-02-09 NOTE — PLAN OF CARE
Pt AAO x 4.  VSS.  Pt remained afebrile throughout this shift.   Pt remained free of falls this shift.   Pt c/o pain this shift.  PRN analgesics administered as ordered.   Plan of care reviewed. Patient verbalizes understanding.   Pt moving/turing independently. Frequent weight shifting encouraged.  Patient  SR on monitor.   Bed low, side rails up x 3, wheels locked, call light in reach.   Bed alarm maintained for safety.   Patient instructed to call for assistance.   Hourly rounding completed. Patient  is being discharged to home. IV out and tele monitor off. MRI and SLP eval was done today. Patient is now on cardiac diet and in stable condition.  Will continue to monitor.

## 2020-02-09 NOTE — ED NOTES
Let patient know I was just waiting for a bed assignment. Patient stated she felt warm. Temp taken. No fever. Belongings placed in bag. Non slip socks at bedside incase pt needs to ambulate.

## 2020-02-09 NOTE — ASSESSMENT & PLAN NOTE
-CT head: negative for acute abnormalities  -failed LISA evaluation  -NPO until speech evaluation  -CTA head and neck

## 2020-02-09 NOTE — SUBJECTIVE & OBJECTIVE
Interval History: Patient reports a frontal headache and is very hungry this morning.     Review of Systems   Constitutional: Negative for chills, diaphoresis and fever.   Respiratory: Negative for cough, choking, chest tightness and shortness of breath.    Cardiovascular: Negative for chest pain, palpitations and leg swelling.   Gastrointestinal: Positive for constipation. Negative for abdominal distention, abdominal pain, diarrhea and nausea.   Genitourinary: Negative for difficulty urinating and dysuria.   Musculoskeletal: Negative for arthralgias and back pain.   Skin: Negative for color change and pallor.   Neurological: Positive for headaches. Negative for dizziness, facial asymmetry, light-headedness and numbness.   Psychiatric/Behavioral: Negative for agitation and behavioral problems.     Objective:     Vital Signs (Most Recent):  Temp: 98.1 °F (36.7 °C) (02/09/20 0612)  Pulse: 88 (02/09/20 0720)  Resp: 20 (02/09/20 0612)  BP: 121/77 (02/09/20 0612)  SpO2: 97 % (02/09/20 0612) Vital Signs (24h Range):  Temp:  [97.9 °F (36.6 °C)-98.3 °F (36.8 °C)] 98.1 °F (36.7 °C)  Pulse:  [] 88  Resp:  [17-22] 20  SpO2:  [95 %-100 %] 97 %  BP: (121-183)/() 121/77     Weight: 130.2 kg (287 lb)  Body mass index is 52.49 kg/m².    Intake/Output Summary (Last 24 hours) at 2/9/2020 0844  Last data filed at 2/9/2020 0531  Gross per 24 hour   Intake --   Output 900 ml   Net -900 ml      Physical Exam   Constitutional: She is oriented to person, place, and time. She appears well-developed and well-nourished. No distress.   HENT:   Head: Normocephalic and atraumatic.   Cardiovascular: Normal rate, regular rhythm, normal heart sounds and intact distal pulses. Exam reveals no gallop and no friction rub.   No murmur heard.  Pulmonary/Chest: Effort normal and breath sounds normal. No stridor. No respiratory distress. She has no wheezes. She has no rales. She exhibits no tenderness.   Abdominal: Soft. Bowel sounds are  normal. She exhibits no distension.   Neurological: She is alert and oriented to person, place, and time.   No focal deficits. Patient speaking in complete sentences without difficulty.    Skin: Skin is warm and dry. Capillary refill takes less than 2 seconds. No rash noted. She is not diaphoretic. No erythema. No pallor.   Psychiatric: She has a normal mood and affect.   Nursing note and vitals reviewed.      Significant Labs:   A1C:   Recent Labs   Lab 02/08/20  1742   HGBA1C 8.8*     CBC:   Recent Labs   Lab 02/08/20  1742   WBC 9.73   HGB 10.3*   HCT 33.0*        CMP:   Recent Labs   Lab 02/08/20  1742      K 3.6   CL 98   CO2 23   *   BUN 17   CREATININE 0.9   CALCIUM 9.9   PROT 8.9*   ALBUMIN 3.7   BILITOT 0.4   ALKPHOS 122   AST 51*   ALT 44   ANIONGAP 15   EGFRNONAA >60     Lipid Panel:   Recent Labs   Lab 02/08/20  1742   CHOL 109*   HDL 34*   LDLCALC 34.0*   TRIG 205*   CHOLHDL 31.2     All pertinent labs within the past 24 hours have been reviewed.    Significant Imaging: I have reviewed all pertinent imaging results/findings within the past 24 hours.

## 2020-02-09 NOTE — CONSULTS
[facility] NEUROLOGY CONSULT  EVALUATION    Reason for consult: Aphasia    Other sources of information : Patient    CC:  Aphasia (54 year old female presents to ed cc of aphasia since thursday patient reports has difficulty with getting words/ thoughts out. )       HPI: Barrera Siddiqui is a 54 y.o. female with T2DM, HTN, prior TIA x2 and Schizoaffective D/o who presented to the ED on 2/8 with complaints of word finding difficulty and confusion. The patient states that over the past several days she noticed having more difficulty finding the specific words she was looking to use. She reports intact fluency and comprehension but would struggle every once in a while looking for a specific word. 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 She denies ever having symptoms like the previously. Her last TIA was associated with facial weakness. She denies any significant social stressors currently but is tearful on exam. She denies any weakness, numbness, dysarthria, visual field disturbances or nausea/vomiting.     Today, she reports that she is feeling better, that her speech isn't giving her the same issues. She is not endorsing a bifrontal headache that has been treated with Tylenol and Toradol with little relief.     ROS:   12pt ROS negative other then mentioned above    Histories:     Allergies:  Patient has no known allergies.    Current Medications:    Current Facility-Administered Medications   Medication Dose Route Frequency Provider Last Rate Last Dose    acetaminophen suppository 650 mg  650 mg Rectal Q6H PRN Santos Rueda MD   650 mg at 02/09/20 0637    aspirin chewable tablet 81 mg  81 mg Oral Daily Swati Baig MD        atorvastatin tablet 40 mg  40 mg Oral Daily Swati Villasenor  MD Safia        clopidogreL tablet 75 mg  75 mg Oral Daily Swati Baig MD        dextrose 10% (D10W) Bolus  12.5 g Intravenous PRN Salas Rutherford MD        dextrose 10% (D10W) Bolus  25 g Intravenous PRN Salas Rutherford MD        dextrose 50 % in water (D50W) injection 12.5 g  12.5 g Intravenous PRN Swati Baig MD        enoxaparin injection 40 mg  40 mg Subcutaneous Daily Santos Rueda MD   40 mg at 02/09/20 0001    glucagon (human recombinant) injection 1 mg  1 mg Intramuscular PRN Swati Baig MD        glucagon (human recombinant) injection 1 mg  1 mg Intramuscular PRN Swati Baig MD        glucose chewable tablet 16 g  16 g Oral PRN Swati Baig MD        glucose chewable tablet 24 g  24 g Oral PRN Swati Baig MD        hydrALAZINE injection 10 mg  10 mg Intravenous Q6H PRN Santos Rueda MD        hydroCHLOROthiazide tablet 25 mg  25 mg Oral Daily Swati Baig MD        insulin aspart U-100 pen 0-5 Units  0-5 Units Subcutaneous Q6H PRN Swati Baig MD   1 Units at 02/08/20 2140    losartan tablet 100 mg  100 mg Oral Daily Swati Baig MD        metFORMIN tablet 1,000 mg  1,000 mg Oral BID WM Swati Baig MD        QUEtiapine tablet 100 mg  100 mg Oral QHS Swati Baig MD   Stopped at 02/08/20 2200    sodium chloride 0.9% flush 10 mL  10 mL Intravenous PRN Swati Baig MD             Past Medical/Surgical/Family/Social History:  PMHx:   Past Medical History:   Diagnosis Date    Depression     Diabetes mellitus     High cholesterol     Hypertension     Stroke     TIA (transient ischemic attack)       Surgeries:   Past Surgical History:   Procedure Laterality Date    CHOLECYSTECTOMY      COLONOSCOPY N/A 10/18/2017    Procedure: COLONOSCOPY;  Surgeon: Donald Wright Jr., MD;  Location: [facility];  Service: Endoscopy;  Laterality: N/A;    KNEE SURGERY       TUBAL LIGATION      WRIST SURGERY        Family  Hx:   Family History   Problem Relation Age of Onset    Breast cancer Mother     Liver disease Maternal Grandmother       Social Hx:   Social History     Tobacco Use    Smoking status: Former Smoker    Smokeless tobacco: Never Used   Substance Use Topics    Alcohol use: Yes     Comment: rare    Drug use: No         Current Evaluation:     Vital Signs:   Vitals:    02/09/20 0730   BP: 130/86   Pulse: 84   Resp: 18   Temp: 98 °F (36.7 °C)        General Exam  No apparent distress  Orientation  Alert, awake, oriented to self, place, time, and situation.  Memory  Recent and remote memory intact.  Language  Fluent speech. No dysarthria, No aphasia. (intact repetition, naming, comprehension and fluency)  Cranial Nerves  PERRL, VF intact, EOMI, V1-V3 intact, symmetric facial expression, hearing grossly intact, SCM & TPZ 5/5, tongue midline, symmetric palate elevation.  Motor  Normal Bulk, Normal Tone  Right Upper Extremity: Normal 5/5 strength  Left Upper Extremity: Normal 5/5 strength  Right Lower Extremity: Normal 5/5 strength  Left Lower Extremity: Normal 5/5 strength  Sensory  Normal to light touch throughout  Normal vibration and proprioception  DTR  Upper Extremities:  +2/4, symmetric  Lower Extremities: Patellar and Achilles +2/4 and symmetric  Cerebellar/Gait  Normal finger to nose and heel to shin.     LABORATORY STUDIES:  Labs:  Recent Labs   Lab 02/08/20 1742 02/09/20  0750   WBC 9.73 6.68   HGB 10.3* 10.0*   HCT 33.0* 32.0*    272   MCV 83 84       Recent Labs   Lab 02/08/20 1742 02/09/20  0749    138   K 3.6 3.4*   CL 98 100   CO2 23 26   BUN 17 11   * 210*   CALCIUM 9.9 9.7   PROT 8.9* 8.3   ALBUMIN 3.7 3.6   BILITOT 0.4 0.5   AST 51* 54*   ALKPHOS 122 103   ALT 44 42       Recent Labs   Lab 02/08/20 1742   INR 1.0       Recent Labs   Lab 02/08/20 1742 02/09/20  0749   * 210*       Urine:   Lab Results   Component Value  Date    SPECGRAV 1.015 02/08/2020    NITRITE Negative 02/08/2020    KETONESU Negative 02/08/2020    UROBILINOGEN Negative 02/08/2020    WBCUA 3 02/08/2020       Recent Labs   Lab 02/08/20  1742 02/09/20  0750   HGBA1C 8.8* 8.7*   LDLCALC 34.0*  --        Thyroid:   No results for input(s): TSH, FREET4, W8CTKGK, A3ETRRB, THYROIDAB in the last 168 hours.    FLP:   Recent Labs   Lab 02/08/20  1742   CHOL 109*   HDL 34*   LDLCALC 34.0*   TRIG 205*   CHOLHDL 31.2       Cardiac markers:  Recent Labs   Lab 02/08/20  1742   TROPONINI <0.006       RADIOLOGY STUDIES:  CT head w/o contrast (2/8/20)  No acute intracranial abnormality detected.    CTA Head and Neck  No acute abnormality. No high-grade stenosis or major vessel occlusion.      Assessment/Plan:   Ms. Siddiqui is a 53 y/o F with T2DM, HTN, prior TIA, and Schizoaffective d/o who was hospitalized on 2/8 with concerns for reported word-finding difficulty that had been intermittently present over the past 2 days. Workup thus far has been largely unremarkable with normal CT head and CTA head and neck.     Suspected TIA  -Stroke risk factors: HTN, DM2, prior TIA  -Neuro Checks q4 hours  Continuous Telemetry  -Blood pressure: goal SBP <160 agree with resuming home oral medications  -Glucose: SSI w/ accuchecks while hospitalized  A1c 8.8 will need further glucose control  -Cholesterol: Can consider decreasing Atorvastatin to 10 mg daily  LDL 34  -Imaging: Recommend MRI Brain w/o contrast to r/o any potential CVA  Prior Echo reviewed, no need for repeat at this time  -Continue ASA and Plavix x 21 days, can discontinue Plavix thereafter and continue ASA 81 daily  -Etiology: TIA vs psychogenic  Exam with fluent speech, comprehension, naming, and repetition  -Would recommend repeat bedside swallow study as patient without facial asymmetry, dysarthria or aphasia  -Please call neurology service if imaging is concerning for CVA    Differential diagnosis was explained to the  patient. All questions were answered. Patient understood and agreed to adhere to plan.     No further intervention indicated at this time from Neurology Service. Please call if any further questions or any changes in neurologic condition.    Case discussed with Dr. Nieslon    Thank you for your consult.    Electronically signed by: Roshan Back MD 2/9/2020 9:44 AM

## 2020-02-09 NOTE — PLAN OF CARE
Discharge orders noted, no HH or HME ordered.    Pt's nurse will go over medications/signs and symptoms prior to discharge       02/09/20 1651   Final Note   Assessment Type Final Discharge Note   Anticipated Discharge Disposition Home   What phone number can be called within the next 1-3 days to see how you are doing after discharge? [phone]   Hospital Follow Up  Appt(s) scheduled? No  (Offices closed for Weekend. Patient to schedule own follow up appointment.  )   Right Care Referral Info   Post Acute Recommendation No Care     Yajaira Chisholm RN Transitional Navigator  [phone]

## 2020-02-09 NOTE — PROGRESS NOTES
[facility] Hospital Medicine  Progress Note    Patient Name: Barrera Siddiqui  MRN: [id number]  Patient Class: OP- Observation   Admission Date: 2/8/2020  Length of Stay: 0 days  Attending Physician: Salas Rutherford MD  Primary Care Provider: Evelin Guevara NP        Subjective:     Principal Problem:Aphasia        HPI:  54 year old female with T2DM, HTN, TIAx2 and schizoaffective disorder presenting with expressive aphasia for the past few days. States she has difficulty when trying to say words but they do eventually come out.  States she has also become more forgetful. Works at [employer] and has been working up until coming in. Reports a mild frontal headache that began today. Denies any focal weakness. For her previous two TIAs, she had a facial droop that resolved. Denies chest pain, shortness of breath, visual changes. Family has not noticed any changes with her speech.  Reports compliance with all medications. Has a family history of diabetes and hypertension. Denies any recent weight changes. Denies history of afib. Denies loss of consciousness.     Overview/Hospital Course:  No notes on file    Interval History: Patient reports a frontal headache and is very hungry this morning.     Review of Systems   Constitutional: Negative for chills, diaphoresis and fever.   Respiratory: Negative for cough, choking, chest tightness and shortness of breath.    Cardiovascular: Negative for chest pain, palpitations and leg swelling.   Gastrointestinal: Positive for constipation. Negative for abdominal distention, abdominal pain, diarrhea and nausea.   Genitourinary: Negative for difficulty urinating and dysuria.   Musculoskeletal: Negative for arthralgias and back pain.   Skin: Negative for color change and pallor.   Neurological: Positive for headaches. Negative for dizziness, facial asymmetry, light-headedness and numbness.   Psychiatric/Behavioral: Negative for agitation and behavioral problems.      Objective:     Vital Signs (Most Recent):  Temp: 98.1 °F (36.7 °C) (02/09/20 0612)  Pulse: 88 (02/09/20 0720)  Resp: 20 (02/09/20 0612)  BP: 121/77 (02/09/20 0612)  SpO2: 97 % (02/09/20 0612) Vital Signs (24h Range):  Temp:  [97.9 °F (36.6 °C)-98.3 °F (36.8 °C)] 98.1 °F (36.7 °C)  Pulse:  [] 88  Resp:  [17-22] 20  SpO2:  [95 %-100 %] 97 %  BP: (121-183)/() 121/77     Weight: 130.2 kg (287 lb)  Body mass index is 52.49 kg/m².    Intake/Output Summary (Last 24 hours) at 2/9/2020 0825  Last data filed at 2/9/2020 0531  Gross per 24 hour   Intake --   Output 900 ml   Net -900 ml      Physical Exam   Constitutional: She is oriented to person, place, and time. She appears well-developed and well-nourished. No distress.   HENT:   Head: Normocephalic and atraumatic.   Cardiovascular: Normal rate, regular rhythm, normal heart sounds and intact distal pulses. Exam reveals no gallop and no friction rub.   No murmur heard.  Pulmonary/Chest: Effort normal and breath sounds normal. No stridor. No respiratory distress. She has no wheezes. She has no rales. She exhibits no tenderness.   Abdominal: Soft. Bowel sounds are normal. She exhibits no distension.   Neurological: She is alert and oriented to person, place, and time.   No focal deficits. Patient speaking in complete sentences without difficulty.    Skin: Skin is warm and dry. Capillary refill takes less than 2 seconds. No rash noted. She is not diaphoretic. No erythema. No pallor.   Psychiatric: She has a normal mood and affect.   Nursing note and vitals reviewed.      Significant Labs:   A1C:   Recent Labs   Lab 02/08/20  1742   HGBA1C 8.8*     CBC:   Recent Labs   Lab 02/08/20  1742   WBC 9.73   HGB 10.3*   HCT 33.0*        CMP:   Recent Labs   Lab 02/08/20  1742      K 3.6   CL 98   CO2 23   *   BUN 17   CREATININE 0.9   CALCIUM 9.9   PROT 8.9*   ALBUMIN 3.7   BILITOT 0.4   ALKPHOS 122   AST 51*   ALT 44   ANIONGAP 15   EGFRNONAA >60      Lipid Panel:   Recent Labs   Lab 02/08/20  1742   CHOL 109*   HDL 34*   LDLCALC 34.0*   TRIG 205*   CHOLHDL 31.2     All pertinent labs within the past 24 hours have been reviewed.    Significant Imaging: I have reviewed all pertinent imaging results/findings within the past 24 hours.      Assessment/Plan:      * Aphasia  -CT head: negative for acute abnormalities  -failed LISA evaluation  -NPO until speech evaluation  -CTA head and neck: negative  -neuro consult today        Hyperlipidemia  -hold atorvastatin until cleared by speech      Type 2 diabetes mellitus  -A1C 8.8  -NPO until cleared by speech  -Continue metformin once cleared by speech  -low dose SSI      Essential (primary) hypertension  -hold home antihypertensives  -PRN hydralazine for SBP>160        VTE Risk Mitigation (From admission, onward)         Ordered     enoxaparin injection 40 mg  Daily      02/08/20 2229     IP VTE HIGH RISK PATIENT  Once      02/08/20 2143     Reason for No Pharmacological VTE Prophylaxis  Once     Question:  Reasons:  Answer:  Physician Provided (leave comment)  Comment:  continue home meds    02/08/20 2143                      Santos Rueda MD HO-1  Department of Hospital Medicine   [facility]

## 2020-02-09 NOTE — PLAN OF CARE
Problem: SLP Goal  Goal: SLP Goal  Description  GOALS:  1. Patient will successfully participate in clinical swallow evaluation and tolerate po trials with no overt s/s of aspiration. --GOAL MET 2/9   Outcome: Met     2/9/2020: Bedside Swallow Study completed this PM. Pt is at baseline for speech, language, cognition, and swallowing. RECS: regular solids/thin liquids. No additional ST services warranted at this time.  HEIKE Spring. CCC-SLP  Speech-Language Pathologist

## 2020-02-09 NOTE — DISCHARGE SUMMARY
[facility] Hospital Medicine  Discharge Summary      Patient Name: Barrera Siddiqui  MRN: [id number]  Admission Date: 2/8/2020  Hospital Length of Stay: 0 days  Discharge Date and Time:  02/09/2020 5:26 PM  Attending Physician: Salas Rutherford MD   Discharging Provider: Hilary Ball MD  Primary Care Provider: Evelin Guevara NP      HPI:   54 year old female with T2DM, HTN, TIAx2 and schizoaffective disorder presenting with expressive aphasia for the past few days. States she has difficulty when trying to say words but they do eventually come out.  States she has also become more forgetful. Works at [employer] and has been working up until coming in. Reports a mild frontal headache that began today. Denies any focal weakness. For her previous two TIAs, she had a facial droop that resolved. Denies chest pain, shortness of breath, visual changes. Family has not noticed any changes with her speech.  Reports compliance with all medications. Has a family history of diabetes and hypertension. Denies any recent weight changes. Denies history of afib. Denies loss of consciousness.     Hospital Course:   Patient underwent CTA Head and Neck, CT Head, and MRI w and w/o Contrast due to presenting aphasia. CT Head and CTA Head and Neck without abnormality and MRI with likely chronic microvascular changes. Patient kept NPO due to failing bedside swallow study. Patient evaluated by Neurology who recommended continued medical management, previously mentioned imaging, and speech consult. Patient clinically improved to her baseline without aphasia or weakness during her hospitalization and passed an official speech therapy evaluation. Patient tolerated oral intake and ambulation before discharge. Patient with headache during stay that resolved with toradol, reglan, and oral intake. Patient discharged on home medications including statin and DAPT therapy in stable condition. She will follow-up with her PCP within the next  week for further evaluation and management.      Consults: Neurology, Speech      Final Active Diagnoses:    Diagnosis Date Noted POA    Type 2 diabetes mellitus [E11.9] 04/11/2016 Yes    Hyperlipidemia [E78.5] 04/11/2016 Yes    Essential (primary) hypertension [I10] 04/09/2016 Yes      Problems Resolved During this Admission:    Diagnosis Date Noted Date Resolved POA    PRINCIPAL PROBLEM:  Aphasia [R47.01] 02/08/2020 02/09/2020 Yes       Discharged Condition: good    Disposition: Home or Self Care    Follow Up:  Follow-up Information     Evelin Guevara NP. Schedule an appointment as soon as possible for a visit in 1 week.    Specialty:  Family Medicine  Why:  Hospital Discharge Follow-up / Offices closed for Weekend. Patient to schedule own follow up appointment.    Contact information:  [address]  [address]  [facility]  [place] [address] [phone]                 Patient Instructions:      Diet diabetic     Diet Cardiac     Notify your health care provider if you experience any of the following:  persistent nausea and vomiting or diarrhea     Notify your health care provider if you experience any of the following:  severe uncontrolled pain     Notify your health care provider if you experience any of the following:  difficulty breathing or increased cough     Notify your health care provider if you experience any of the following:  severe persistent headache     Notify your health care provider if you experience any of the following:  persistent dizziness, light-headedness, or visual disturbances     Activity as tolerated       Significant Diagnostic Studies: Labs:   CMP   Recent Labs   Lab 02/08/20  1742 02/09/20  0749    138   K 3.6 3.4*   CL 98 100   CO2 23 26   * 210*   BUN 17 11   CREATININE 0.9 0.8   CALCIUM 9.9 9.7   PROT 8.9* 8.3   ALBUMIN 3.7 3.6   BILITOT 0.4 0.5   ALKPHOS 122 103   AST 51* 54*   ALT 44 42   ANIONGAP 15 12   ESTGFRAFRICA >60 >60   EGFRNONAA >60 >60     and CBC   Recent Labs   Lab 02/08/20  1742 02/09/20  0750   WBC 9.73 6.68   HGB 10.3* 10.0*   HCT 33.0* 32.0*    272     Imaging Results          CTA Head and Neck (xpd) (Final result)  Result time 02/08/20 22:48:19    Final result by Pricila Iraheta MD (02/08/20 22:48:19)                 Impression:      No acute abnormality. No high-grade stenosis or major vessel occlusion.      Electronically signed by: Pricila Iraheta  Date:    02/08/2020  Time:    22:48             Narrative:    EXAMINATION:  CTA HEAD AND NECK (XPD)    CLINICAL HISTORY:  Decreased alertness;aphagia;    TECHNIQUE:  Non contrast low dose axial images were obtained through the head. CT angiogram was performed from the level of the winston to the top of the head following the IV administration of 100mL of Omnipaque 350.   Sagittal and coronal reconstructions and maximum intensity projection reconstructions were performed. Arterial stenosis percentages are based on NASCET measurement criteria.    COMPARISON:  None    FINDINGS:  Intracranial Compartment:    Ventricles and sulci are normal in size for age without evidence of hydrocephalus. No extra-axial blood or fluid collections.    The brain parenchyma appears normal. No parenchymal mass, hemorrhage, edema, or major vascular distribution infarct.    Skull/Extracranial Contents (limited evaluation): No fracture. Mastoid air cells and paranasal sinuses are essentially clear.    Non-Vascular Structures of the Neck/Thoracic Inlet (limited evaluation): Normal.    Aorta: Normal 3 vessel arch.    Extracranial carotid circulation: No hemodynamically significant stenosis, aneurysmal dilatation, or dissection.    Extracranial vertebral circulation: No hemodynamically significant stenosis, aneurysmal dilatation, or dissection.    Intracranial Arteries: No focal high-grade stenosis, occlusion, or aneurysm.    Venous structures (limited evaluation): Normal.                               CT Head  Without Contrast (Final result)  Result time 02/08/20 18:19:12    Final result by Pricila Iraheta MD (02/08/20 18:19:12)                 Impression:      No acute intracranial abnormality detected.      Electronically signed by: Pricila Iraheta  Date:    02/08/2020  Time:    18:19             Narrative:    EXAMINATION:  CT OF THE HEAD WITHOUT    CLINICAL HISTORY:  Confusion/delirium, altered LOC, unexplained;Dizziness;    TECHNIQUE:  5 mm unenhanced axial images were obtained from the skull base to the vertex.    COMPARISON:  None.    FINDINGS:  The ventricles, basal cisterns, and cortical sulci are within normal limits for patient's stated age. There is no acute intracranial hemorrhage, territorial infarct or mass effect, or midline shift. The visualized paranasal sinuses and mastoid air cells are clear.  There is a empty sella.                                Medications:  Reconciled Home Medications:      Medication List      CONTINUE taking these medications    aspirin 81 MG Chew  Take 1 tablet (81 mg total) by mouth once daily.     atorvastatin 40 MG tablet  Commonly known as:  LIPITOR  Take 1 tablet (40 mg total) by mouth once daily.     clopidogreL 75 mg tablet  Commonly known as:  PLAVIX  Take 75 mg by mouth once daily.     hydroCHLOROthiazide 25 MG tablet  Commonly known as:  HYDRODIURIL  Take 1 tablet (25 mg total) by mouth once daily.     LEXAPRO ORAL  Take by mouth.     losartan 100 MG tablet  Commonly known as:  COZAAR  Take 100 mg by mouth once daily.     metFORMIN 500 MG tablet  Commonly known as:  GLUCOPHAGE  Take 1,000 mg by mouth 2 (two) times daily with meals.     QUEtiapine 100 MG Tab  Commonly known as:  SEROQUEL  Take by mouth.     triamterene-hydrochlorothiazide 75-50 mg 75-50 mg per tablet  Commonly known as:  MAXZIDE  Take 1 tablet by mouth once daily.     WELLBUTRIN ORAL  Take by mouth.     XANAX ORAL  Take by mouth.          Time spent on the discharge of patient: Greater than 30  minutes  Patient was seen and examined on the date of discharge and determined to be suitable for discharge.    Hilary Ball MD   PGY-2  U Family Medicine  [facility]

## 2020-02-09 NOTE — ED NOTES
Patient stated she was having trouble swallowing water at work. States the water feels like it will get stuck in throat. Delayed swallow when nurse gave water. She does not cough after. Able to eat solids with no problem. LISA screen fail. Let patient know admit team will be made aware. Possible speech consult in morning.

## 2020-02-09 NOTE — NURSING
Patient arrived to unit from ED via wheelchair.   Patient in room 477.  Transferred into bed with 1 person assist.   Bedside report given by Alexandra SANCHEZ.  Charge nurse advised of patient arrival.   VS currently stable.   Tele monitor [id number] applied.   Patient oriented to room, rounding sheet and call bell.   Bed in lowest position, call light in reach.  Encouraged to notify of all needs.   Will continue to monitor.

## 2020-02-09 NOTE — NURSING
Bedside handoff report received from Chey. Patient awake and alert in bed with c/o headache 10/10. Patient given tylenol with no relief from previous nurse. Call Dr. Rutherford team to notify them of patient headache. New orders to give Toradol.

## 2020-02-09 NOTE — NURSING
Complain of headache 10/10 Dr. Graham notified order given for Tylenol supp. Tylenol supp given via rectum.

## 2020-02-09 NOTE — PLAN OF CARE
Called by ED about the patient 54 year old female with T2DM, HTN, TIAx2 and schizoaffective disorder presenting with 3 days of difficulty saying words, concerning for expressive aphasia and possible slight Lt sided weakness. CTH with no acute findings. CTH no LVO    - ASA 81 mg & plavix 75 mg daily  - Lipitor 80 mg daily  - MRI brain without contrast  - Echo with bubble  - A1C, Lipid panel, TSH, B12, Folate, ESR  - Normotension, normoglycemia  - Further recommendations to follow after patient is seen in AM.    Salas Tran  [facility]

## 2020-02-09 NOTE — H&P
[facility] Medicine  History & Physical    Patient Name: Barrera Siddiqui  MRN: [id number]  Admission Date: 2/8/2020  Attending Physician: Salas Rutherford MD   Primary Care Provider: Evelin Guevara NP         Patient information was obtained from patient and ER records .     Subjective:     Principal Problem:Aphasia    Chief Complaint:   Chief Complaint   Patient presents with    Aphasia     54 year old female presents to ed cc of aphasia since thursday patient reports has difficulty with getting words/ thoughts out.         HPI: 54 year old female with T2DM, HTN, TIAx2 and schizoaffective disorder presenting with expressive aphasia for the past few days. States she has difficulty when trying to say words but they do eventually come out.  States she has also become more forgetful. Works at [employer] and has been working up until coming in. Reports a mild frontal headache that began today. Denies any focal weakness. For her previous two TIAs, she had a facial droop that resolved. Denies chest pain, shortness of breath, visual changes. Family has not noticed any changes with her speech.  Reports compliance with all medications. Has a family history of diabetes and hypertension. Denies any recent weight changes. Denies history of afib. Denies loss of consciousness.     Past Medical History:   Diagnosis Date    Depression     Diabetes mellitus     High cholesterol     Hypertension     Stroke     TIA (transient ischemic attack)        Past Surgical History:   Procedure Laterality Date    CHOLECYSTECTOMY      COLONOSCOPY N/A 10/18/2017    Procedure: COLONOSCOPY;  Surgeon: Donald Wright Jr., MD;  Location: [facility];  Service: Endoscopy;  Laterality: N/A;    KNEE SURGERY      TUBAL LIGATION      WRIST SURGERY         Review of patient's allergies indicates:  No Known Allergies    No current facility-administered medications on file prior to encounter.      Current Outpatient Medications  on File Prior to Encounter   Medication Sig    ALPRAZOLAM (XANAX ORAL) Take by mouth.    aspirin 81 MG Chew Take 1 tablet (81 mg total) by mouth once daily.    atorvastatin (LIPITOR) 40 MG tablet Take 1 tablet (40 mg total) by mouth once daily.    BUPROPION HCL (WELLBUTRIN ORAL) Take by mouth.    clopidogrel (PLAVIX) 75 mg tablet Take 75 mg by mouth once daily.    ESCITALOPRAM OXALATE (LEXAPRO ORAL) Take by mouth.    hydrochlorothiazide (HYDRODIURIL) 25 MG tablet Take 1 tablet (25 mg total) by mouth once daily.    losartan (COZAAR) 100 MG tablet Take 100 mg by mouth once daily.    metformin (GLUCOPHAGE) 500 MG tablet Take 1,000 mg by mouth 2 (two) times daily with meals.    quetiapine (SEROQUEL) 100 MG Tab Take by mouth.    triamterene-hydrochlorothiazide 75-50 mg (MAXZIDE) 75-50 mg per tablet Take 1 tablet by mouth once daily.     Family History     Problem Relation (Age of Onset)    Breast cancer Mother    Liver disease Maternal Grandmother        Tobacco Use    Smoking status: Former Smoker    Smokeless tobacco: Never Used   Substance and Sexual Activity    Alcohol use: Yes     Comment: rare    Drug use: No    Sexual activity: Never     Review of Systems   Constitutional: Negative for chills, diaphoresis, fatigue and fever.   Respiratory: Negative for apnea, cough, choking, chest tightness, shortness of breath and wheezing.    Cardiovascular: Negative for chest pain, palpitations and leg swelling.   Gastrointestinal: Positive for abdominal pain (left sided, chronic pain) and constipation. Negative for abdominal distention, diarrhea, nausea and vomiting.   Genitourinary: Negative for difficulty urinating, dysuria, flank pain and hematuria.   Musculoskeletal: Negative for back pain, gait problem, neck pain and neck stiffness.   Skin: Negative for color change and rash.   Neurological: Positive for speech difficulty and headaches. Negative for dizziness, tremors, seizures, syncope, facial asymmetry,  weakness, light-headedness and numbness.   Psychiatric/Behavioral: Negative for agitation and behavioral problems.     Objective:     Vital Signs (Most Recent):  Temp: 98 °F (36.7 °C) (02/08/20 2119)  Pulse: 100 (02/08/20 2116)  Resp: (!) 22 (02/08/20 2114)  BP: 134/74 (02/08/20 2102)  SpO2: 97 % (02/08/20 2102) Vital Signs (24h Range):  Temp:  [97.9 °F (36.6 °C)-98.2 °F (36.8 °C)] 98 °F (36.7 °C)  Pulse:  [] 100  Resp:  [17-22] 22  SpO2:  [95 %-100 %] 97 %  BP: (130-183)/() 134/74     Weight: 130.2 kg (287 lb)  Body mass index is 50.84 kg/m².    Physical Exam   Constitutional: She is oriented to person, place, and time. She appears well-developed and well-nourished. No distress.   Patient very tearful on exam.      HENT:   Head: Normocephalic and atraumatic.   Eyes: Pupils are equal, round, and reactive to light. EOM are normal.   Neck: Normal range of motion. Neck supple.   Cardiovascular: Normal rate, regular rhythm, normal heart sounds and intact distal pulses. Exam reveals no gallop and no friction rub.   No murmur heard.  Pulmonary/Chest: Effort normal and breath sounds normal. No stridor. No respiratory distress. She has no wheezes. She has no rales. She exhibits no tenderness.   Abdominal: Soft. Bowel sounds are normal. She exhibits no distension and no mass. There is no tenderness. There is no rebound and no guarding.   Musculoskeletal: Normal range of motion. She exhibits no edema.   Neurological: She is alert and oriented to person, place, and time. She has normal strength. She displays no atrophy and no tremor. No cranial nerve deficit or sensory deficit. She exhibits normal muscle tone. She displays no seizure activity.   Patient was speaking in full sentences without difficulty on exam. At the very end of the exam, patient was took a few moments to finish her sentence when stating that she has pain in her ankle.    Skin: Skin is warm and dry. Capillary refill takes less than 2 seconds. No  rash noted. She is not diaphoretic. No erythema. No pallor.   Psychiatric: She has a normal mood and affect. Her speech is normal and behavior is normal. Judgment and thought content normal. She is not actively hallucinating. Cognition and memory are normal. She is attentive.   Nursing note and vitals reviewed.        CRANIAL NERVES     CN III, IV, VI   Pupils are equal, round, and reactive to light.  Extraocular motions are normal.        Significant Labs:   A1C:   Recent Labs   Lab 02/08/20  1742   HGBA1C 8.8*     CBC:   Recent Labs   Lab 02/08/20  1742   WBC 9.73   HGB 10.3*   HCT 33.0*        CMP:   Recent Labs   Lab 02/08/20  1742      K 3.6   CL 98   CO2 23   *   BUN 17   CREATININE 0.9   CALCIUM 9.9   PROT 8.9*   ALBUMIN 3.7   BILITOT 0.4   ALKPHOS 122   AST 51*   ALT 44   ANIONGAP 15   EGFRNONAA >60     Lipid Panel:   Recent Labs   Lab 02/08/20  1742   CHOL 109*   HDL 34*   LDLCALC 34.0*   TRIG 205*   CHOLHDL 31.2     POCT Glucose:   Recent Labs   Lab 02/08/20  2127   POCTGLUCOSE 221*       Significant Imaging: I have reviewed all pertinent imaging results/findings within the past 24 hours.    Assessment/Plan:     * Aphasia  -CT head: negative for acute abnormalities  -failed LISA evaluation  -NPO until speech evaluation  -CTA head and neck        Hyperlipidemia  -hold atorvastatin until cleared by speech      Type 2 diabetes mellitus  -A1C 8.8  -NPO until cleared by speech  -Continue metformin once cleared by speech  -low dose SSI      Essential (primary) hypertension  -hold home antihypertensives  -PRN hydralazine for SBP>160        VTE Risk Mitigation (From admission, onward)         Ordered     IP VTE HIGH RISK PATIENT  Once      02/08/20 2143     Reason for No Pharmacological VTE Prophylaxis  Once     Question:  Reasons:  Answer:  Physician Provided (leave comment)  Comment:  continue home meds    02/08/20 2143                   Santos Rueda MD HO-1  Department of Hospital  Medicine   [facility]

## 2020-02-09 NOTE — ASSESSMENT & PLAN NOTE
-A1C 8.8  -NPO until cleared by speech  -Continue metformin once cleared by speech  -low dose SSI

## 2020-02-09 NOTE — DISCHARGE INSTRUCTIONS
Aphasia, Treating (English) View Edit Remove   Aphasia, What Is (English) View Edit Remove   BLOOD PRESSURE, DISCHARGE INSTRUCTIONS: TAKING YOUR (ENGLISH) View Edit Remove

## 2020-02-09 NOTE — PT/OT/SLP EVAL
Speech Language Pathology Evaluation  Bedside Swallow    Patient Name:  Barrera Siddiqui   MRN:  [id number]  Admitting Diagnosis: Aphasia    Recommendations:                 General Recommendations:  Follow-up not indicated  Diet recommendations:  Regular, Thin   Aspiration Precautions: Standard aspiration precautions   General Precautions: Standard,    Communication strategies:  none    History:     HPI: 54 year old female with T2DM, HTN, TIAx2 and schizoaffective disorder presenting with expressive aphasia for the past few days. States she has difficulty when trying to say words but they do eventually come out.  States she has also become more forgetful. Works at [employer] and has been working up until coming in. Reports a mild frontal headache that began today. Denies any focal weakness. For her previous two TIAs, she had a facial droop that resolved. Denies chest pain, shortness of breath, visual changes. Family has not noticed any changes with her speech.  Reports compliance with all medications. Has a family history of diabetes and hypertension. Denies any recent weight changes. Denies history of afib. Denies loss of consciousness.     Past Medical History:   Diagnosis Date    Depression     Diabetes mellitus     High cholesterol     Hypertension     Stroke     TIA (transient ischemic attack)      Past Surgical History:   Procedure Laterality Date    CHOLECYSTECTOMY      COLONOSCOPY N/A 10/18/2017    Procedure: COLONOSCOPY;  Surgeon: Donald Wright Jr., MD;  Location: [facility];  Service: Endoscopy;  Laterality: N/A;    KNEE SURGERY      TUBAL LIGATION      WRIST SURGERY       Modified Barium Swallow: None on file or reported    Prior diet: regular solids/thin liquids    Occupation: Pt works at [employer]    Subjective     Pt awake, alert, agreeable to Bedside Swallow Study. Pt's dtr and grandchildren present at bedside. No dysarthria, dysfluency, or cognitive-linguistic deficits noted during informal  observation. Pt reports all symptoms resolved. Pt's family confirms pt is at baseline for speech, language, and cognition.    Pain/Comfort:  · Pain Rating 1: 0/10    Objective:     Oral Musculature Evaluation  · Oral Musculature: WNL  · Dentition: present and adequate  · Secretion Management: adequate  · Mucosal Quality: adequate  · Mandibular Strength and Mobility: WNL  · Oral Labial Strength and Mobility: WNL  · Lingual Strength and Mobility: WNL  · Buccal Strength and Mobility: WNL  · Volitional Cough: WNL  · Volitional Swallow: WNL  · Voice Prior to PO Intake: Strong, clear, dry    Bedside Swallow Eval:   Consistencies Assessed:  · Thin liquids, puree consistencies, regular solids    Oral Phase:   · WNL    Pharyngeal Phase:   · no overt clinical signs/symptoms of aspiration  · no overt clinical signs/symptoms of pharyngeal dysphagia    Treatment: SLP provided skilled education to pt re: rationale for BSE, role of SLP in POC, swallow study results, diet recs, and swallow precautions. Pt verbalized understanding and agreement of all information provided. Discussed swallow study results/recommendations with pt's RN.    Assessment:     Barrera Siddiqui is a 54 y.o. female is at baseline for speech, language, cognition, and swallowing. RECS: regular solids/thin liquids. No additional ST services warranted at this time.    Goals:   Multidisciplinary Problems     SLP Goals     Not on file          Multidisciplinary Problems (Resolved)        Problem: SLP Goal    Goal Priority Disciplines Outcome   SLP Goal   (Resolved)     SLP Met   Description:  GOALS:  1. Patient will successfully participate in clinical swallow evaluation and tolerate po trials with no overt s/s of aspiration. --GOAL MET 2/9                  Plan:     · Plan of Care reviewed with:  patient, daughter, other (see comments)(RN, Aislinn)   · SLP Follow-Up:  No       Discharge recommendations:  other (see comments)(No post-acute ST services warranted)     Time  Tracking:     SLP Treatment Date:   02/09/20  Speech Start Time:  1537  Speech Stop Time:  1553     Speech Total Time (min):  16 min    Billable Minutes: Eval Swallow and Oral Function 16 minutes    Faith Frazier CCC-SLP  02/09/2020

## 2020-02-09 NOTE — SUBJECTIVE & OBJECTIVE
Past Medical History:   Diagnosis Date    Depression     Diabetes mellitus     High cholesterol     Hypertension     Stroke     TIA (transient ischemic attack)        Past Surgical History:   Procedure Laterality Date    CHOLECYSTECTOMY      COLONOSCOPY N/A 10/18/2017    Procedure: COLONOSCOPY;  Surgeon: Donald Wright Jr., MD;  Location: [facility];  Service: Endoscopy;  Laterality: N/A;    KNEE SURGERY      TUBAL LIGATION      WRIST SURGERY         Review of patient's allergies indicates:  No Known Allergies    No current facility-administered medications on file prior to encounter.      Current Outpatient Medications on File Prior to Encounter   Medication Sig    ALPRAZOLAM (XANAX ORAL) Take by mouth.    aspirin 81 MG Chew Take 1 tablet (81 mg total) by mouth once daily.    atorvastatin (LIPITOR) 40 MG tablet Take 1 tablet (40 mg total) by mouth once daily.    BUPROPION HCL (WELLBUTRIN ORAL) Take by mouth.    clopidogrel (PLAVIX) 75 mg tablet Take 75 mg by mouth once daily.    ESCITALOPRAM OXALATE (LEXAPRO ORAL) Take by mouth.    hydrochlorothiazide (HYDRODIURIL) 25 MG tablet Take 1 tablet (25 mg total) by mouth once daily.    losartan (COZAAR) 100 MG tablet Take 100 mg by mouth once daily.    metformin (GLUCOPHAGE) 500 MG tablet Take 1,000 mg by mouth 2 (two) times daily with meals.    quetiapine (SEROQUEL) 100 MG Tab Take by mouth.    triamterene-hydrochlorothiazide 75-50 mg (MAXZIDE) 75-50 mg per tablet Take 1 tablet by mouth once daily.     Family History     Problem Relation (Age of Onset)    Breast cancer Mother    Liver disease Maternal Grandmother        Tobacco Use    Smoking status: Former Smoker    Smokeless tobacco: Never Used   Substance and Sexual Activity    Alcohol use: Yes     Comment: rare    Drug use: No    Sexual activity: Never     Review of Systems   Constitutional: Negative for chills, diaphoresis, fatigue and fever.   Respiratory: Negative for apnea, cough,  choking, chest tightness, shortness of breath and wheezing.    Cardiovascular: Negative for chest pain, palpitations and leg swelling.   Gastrointestinal: Positive for abdominal pain (left sided, chronic pain) and constipation. Negative for abdominal distention, diarrhea, nausea and vomiting.   Genitourinary: Negative for difficulty urinating, dysuria, flank pain and hematuria.   Musculoskeletal: Negative for back pain, gait problem, neck pain and neck stiffness.   Skin: Negative for color change and rash.   Neurological: Positive for speech difficulty and headaches. Negative for dizziness, tremors, seizures, syncope, facial asymmetry, weakness, light-headedness and numbness.   Psychiatric/Behavioral: Negative for agitation and behavioral problems.     Objective:     Vital Signs (Most Recent):  Temp: 98 °F (36.7 °C) (02/08/20 2119)  Pulse: 100 (02/08/20 2116)  Resp: (!) 22 (02/08/20 2114)  BP: 134/74 (02/08/20 2102)  SpO2: 97 % (02/08/20 2102) Vital Signs (24h Range):  Temp:  [97.9 °F (36.6 °C)-98.2 °F (36.8 °C)] 98 °F (36.7 °C)  Pulse:  [] 100  Resp:  [17-22] 22  SpO2:  [95 %-100 %] 97 %  BP: (130-183)/() 134/74     Weight: 130.2 kg (287 lb)  Body mass index is 50.84 kg/m².    Physical Exam   Constitutional: She is oriented to person, place, and time. She appears well-developed and well-nourished. No distress.   Patient very tearful on exam.      HENT:   Head: Normocephalic and atraumatic.   Eyes: Pupils are equal, round, and reactive to light. EOM are normal.   Neck: Normal range of motion. Neck supple.   Cardiovascular: Normal rate, regular rhythm, normal heart sounds and intact distal pulses. Exam reveals no gallop and no friction rub.   No murmur heard.  Pulmonary/Chest: Effort normal and breath sounds normal. No stridor. No respiratory distress. She has no wheezes. She has no rales. She exhibits no tenderness.   Abdominal: Soft. Bowel sounds are normal. She exhibits no distension and no mass. There  is no tenderness. There is no rebound and no guarding.   Musculoskeletal: Normal range of motion. She exhibits no edema.   Neurological: She is alert and oriented to person, place, and time. She has normal strength. She displays no atrophy and no tremor. No cranial nerve deficit or sensory deficit. She exhibits normal muscle tone. She displays no seizure activity.   Patient was speaking in full sentences without difficulty on exam. At the very end of the exam, patient was took a few moments to finish her sentence when stating that she has pain in her ankle.    Skin: Skin is warm and dry. Capillary refill takes less than 2 seconds. No rash noted. She is not diaphoretic. No erythema. No pallor.   Psychiatric: She has a normal mood and affect. Her speech is normal and behavior is normal. Judgment and thought content normal. She is not actively hallucinating. Cognition and memory are normal. She is attentive.   Nursing note and vitals reviewed.        CRANIAL NERVES     CN III, IV, VI   Pupils are equal, round, and reactive to light.  Extraocular motions are normal.        Significant Labs:   A1C:   Recent Labs   Lab 02/08/20  1742   HGBA1C 8.8*     CBC:   Recent Labs   Lab 02/08/20  1742   WBC 9.73   HGB 10.3*   HCT 33.0*        CMP:   Recent Labs   Lab 02/08/20  1742      K 3.6   CL 98   CO2 23   *   BUN 17   CREATININE 0.9   CALCIUM 9.9   PROT 8.9*   ALBUMIN 3.7   BILITOT 0.4   ALKPHOS 122   AST 51*   ALT 44   ANIONGAP 15   EGFRNONAA >60     Lipid Panel:   Recent Labs   Lab 02/08/20  1742   CHOL 109*   HDL 34*   LDLCALC 34.0*   TRIG 205*   CHOLHDL 31.2     POCT Glucose:   Recent Labs   Lab 02/08/20  2127   POCTGLUCOSE 221*       Significant Imaging: I have reviewed all pertinent imaging results/findings within the past 24 hours.

## 2020-02-09 NOTE — ED NOTES
Introduced self to patient. Let her know labs and scans have resulted and MD should be in soon for reassessment. VSS. Neuro assessment done. No expressive aphasia noted with this assessment. Patient was able to answer questions appropriately. No delay in speech. Left left and arm has note able weakness compared to right. She was able to hold up but you can see the arm and leg have slight bouncing while holding.

## 2020-02-09 NOTE — HOSPITAL COURSE
Patient underwent CTA Head and Neck, CT Head, and MRI w and w/o Contrast due to presenting aphasia. CT Head and CTA Head and Neck without abnormality and MRI with likely chronic microvascular changes. Patient kept NPO due to failing bedside swallow study. Patient evaluated by Neurology who recommended continued medical management, previously mentioned imaging, and speech consult. Patient clinically improved during her hospitalization and passed official speech therapy evaluation. Patient tolerated oral intake and ambulation before discharge. Patient with headache during stay that resolved with toradol, reglan, and oral intake. Patient discharged on home medications in stable condition. She will follow-up with her PCP within the next week for further evaluation and management.

## 2020-02-10 NOTE — ED AVS SNAPSHOT
[facility]  [address] [place] [address]               Barrera Siddiqui   3/28/2017  6:22 PM   ED    Description:  Female : 1965   Department:  [facility]           Your Care was Coordinated By:     Provider Role From To    Shayla Angulo MD Attending Provider [phone] --      Reason for Visit     Shortness of Breath           Diagnoses this Visit        Comments    Viral upper respiratory tract infection    -  Primary       ED Disposition     ED Disposition Condition Comment    Discharge             To Do List           Follow-up Information     Follow up with Evelin Guevara NP In 1 week(s).    Specialty:  Family Medicine    Contact information:    [address]  [address]  [facility]  [place] [address]  [phone]         These Medications        Disp Refills Start End    hydrocodone-homatropine 5-1.5 mg/5 ml (HYCODAN) 5-1.5 mg/5 mL Syrp 120 mL 0 3/28/2017     Take 5 mLs by mouth every 4 (four) hours as needed. - Oral    hydrochlorothiazide (HYDRODIURIL) 25 MG tablet 30 tablet 0 3/28/2017 3/28/2018    Take 1 tablet (25 mg total) by mouth once daily. - Oral      [facility]     [facility] -  Assistance  Registered nurses in the [facility] provide clinical advisement, health education, appointment booking, and other advisory services.  Call for this free service at [phone].             Medications           START taking these NEW medications        Refills    hydrocodone-homatropine 5-1.5 mg/5 ml (HYCODAN) 5-1.5 mg/5 mL Syrp 0    Sig: Take 5 mLs by mouth every 4 (four) hours as needed.    Class: Print    Route: Oral    hydrochlorothiazide (HYDRODIURIL) 25 MG tablet 0    Sig: Take 1 tablet (25 mg total) by mouth once daily.    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    Class: Normal    Route: Intramuscular           Verify that the below list of medications is an accurate representation of the medications you are currently taking.  If none reported, the list may be blank. If incorrect, please contact your healthcare provider. Carry this list with you in case of emergency.           Current Medications     atorvastatin (LIPITOR) 40 MG tablet Take 1 tablet (40 mg total) by mouth once daily.    clopidogrel (PLAVIX) 75 mg tablet Take 75 mg by mouth once daily.    metformin (GLUCOPHAGE) 500 MG tablet Take 1,000 mg by mouth 2 (two) times daily with meals.    quetiapine (SEROQUEL) 100 MG Tab Take by mouth.    ALPRAZOLAM (XANAX ORAL) Take by mouth.    aspirin 81 MG Chew Take 1 tablet (81 mg total) by mouth once daily.    BUPROPION HCL (WELLBUTRIN ORAL) Take by mouth.    cetirizine (ZYRTEC) 10 MG tablet Take 1 tablet (10 mg total) by mouth once daily.    ESCITALOPRAM OXALATE (LEXAPRO ORAL) Take by mouth.    hydrochlorothiazide (HYDRODIURIL) 25 MG tablet Take 1 tablet (25 mg total) by mouth once daily.    hydrocodone-homatropine 5-1.5 mg/5 ml (HYCODAN) 5-1.5 mg/5 mL Syrp Take 5 mLs by mouth every 4 (four) hours as needed.    trazodone (DESYREL) 300 MG tablet Take 300 mg by mouth every evening.           Clinical Reference Information           Your Vitals Were     BP Pulse Temp Resp Height Weight    114/66 89 99 °F (37.2 °C) (Oral) 20 5' 3" (1.6 m) 133.4 kg (294 lb)    Last Period SpO2 BMI          03/01/2017 (Approximate) 98% 52.08 kg/m2        Allergies as of 3/28/2017     No Known Allergies      Immunizations Administered on Date of Encounter - 3/28/2017     None      ED Micro, Lab, POCT     Start Ordered       Status Ordering Provider    03/28/17 1940 03/28/17 1940  CBC auto differential  STAT      Final result     03/28/17 1940 03/28/17 1940  Comprehensive metabolic panel  STAT      Final result     03/28/17 1940 03/28/17 1940  Troponin I  " STAT      Final result     03/28/17 1940 03/28/17 1940    STAT,   Status:  Canceled      Canceled     03/28/17 1940 03/28/17 1940  Protime-INR  Once      Final result     03/28/17 1940 03/28/17 1940  NT-Pro Natriuretic Peptide  Once      Final result       ED Imaging Orders     Start Ordered       Status Ordering Provider    03/28/17 1940 03/28/17 1940  X-Ray Chest PA And Lateral  1 time imaging      Final result         Discharge Instructions           Viral Upper Respiratory Illness (Adult)  You have a viral upper respiratory illness (URI), which is another term for the common cold. This illness is contagious during the first few days. It is spread through the air by coughing and sneezing. It may also be spread by direct contact (touching the sick person and then touching your own eyes, nose, or mouth). Frequent handwashing will decrease risk of spread. Most viral illnesses go away within 7 to 10 days with rest and simple home remedies. Sometimes the illness may last for several weeks. Antibiotics will not kill a virus, and they are generally not prescribed for this condition.    Home care  · If symptoms are severe, rest at home for the first 2 to 3 days. When you resume activity, don't let yourself get too tired.  · Avoid being exposed to cigarette smoke (yours or others).  · You may use acetaminophen or ibuprofen to control pain and fever, unless another medicine was prescribed. (Note: If you have chronic liver or kidney disease, have ever had a stomach ulcer or gastrointestinal bleeding, or are taking blood-thinning medicines, talk with your healthcare provider before using these medicines.) Aspirin should never be given to anyone under 18 years of age who is ill with a viral infection or fever. It may cause severe liver or brain damage.  · Your appetite may be poor, so a light diet is fine. Avoid dehydration by drinking 6 to 8 glasses of fluids per day (water, soft drinks, juices, tea, or soup). Extra fluids  will help loosen secretions in the nose and lungs.  · Over-the-counter cold medicines will not shorten the length of time youre sick, but they may be helpful for the following symptoms: cough, sore throat, and nasal and sinus congestion. (Note: Do not use decongestants if you have high blood pressure.)  Follow-up care  Follow up with your healthcare provider, or as advised.  When to seek medical advice  Call your healthcare provider right away if any of these occur:  · Cough with lots of colored sputum (mucus)  · Severe headache; face, neck, or ear pain  · Difficulty swallowing due to throat pain  · Fever of 100.4°F (38°C)  Call 911, or get immediate medical care  Call emergency services right away if any of these occur:  · Chest pain, shortness of breath, wheezing, or difficulty breathing  · Coughing up blood  · Inability to swallow due to throat pain  Date Last Reviewed: 9/13/2015  © 8548-2108 [employer]. [address] [place] [address]. All rights reserved. This information is not intended as a substitute for professional medical care. Always follow your healthcare professional's instructions.          [facility] Sign-Up     Activating your [facility] account is as easy as 1-2-3!     1) Visit [email or web address], select Sign Up Now, enter this activation code and your date of birth, then select Next.  [email or web address]  Expires: 5/12/2017  9:25 PM      2) Create a username and password to use when you visit [facility] in the future and select a security question in case you lose your password and select Next.    3) Enter your e-mail address and click Sign Up!    Additional Information  If you have questions, please e-mail [email or web address] or call [phone] to talk to our [facility] staff. Remember, [facility] is NOT to be used for urgent needs. For medical emergencies, dial 911.         Smoking Cessation     If you would like to quit smoking:   You may be eligible for free services if you  are a [place] resident and started smoking cigarettes before September 1, 1988.  Call the Smoking Cessation Trust (SCT) toll free at (178) 769-3598 or (920) 352-4409.   Call 1-800-QUIT-NOW if you do not meet the above criteria.             [facility] complies with applicable Federal civil rights laws and does not discriminate on the basis of race, color, national origin, age, disability, or sex.        Language Assistance Services     ATTENTION: Language assistance services are available, free of charge. Please call [phone].      ATENCIÓN: Si habla español, tiene a pimentel disposición servicios gratuitos de asistencia lingüística. Llame al [phone].     CHÚ Ý: N?u b?n nói Ti?ng Vi?t, có các d?ch v? h? tr? ngôn ng? mi?n phí dành cho b?n. G?i s? [phone].

## 2020-03-02 DIAGNOSIS — M54.15 RADICULOPATHY OF THORACOLUMBAR REGION: ICD-10-CM

## 2020-03-02 DIAGNOSIS — M54.50 LUMBAGO: Primary | ICD-10-CM

## 2020-03-11 ENCOUNTER — CLINICAL SUPPORT (OUTPATIENT)
Dept: REHABILITATION | Facility: HOSPITAL | Age: 55
End: 2020-03-11
Payer: COMMERCIAL

## 2020-03-11 DIAGNOSIS — R29.898 WEAKNESS OF LEFT UPPER EXTREMITY: ICD-10-CM

## 2020-03-11 DIAGNOSIS — R29.898 DECREASED RANGE OF MOTION OF NECK: ICD-10-CM

## 2020-03-11 DIAGNOSIS — M54.50 LUMBAGO: ICD-10-CM

## 2020-03-11 DIAGNOSIS — M54.15 RADICULOPATHY OF THORACOLUMBAR REGION: ICD-10-CM

## 2020-03-11 DIAGNOSIS — R29.3 POOR POSTURE: ICD-10-CM

## 2020-03-11 PROCEDURE — 97110 THERAPEUTIC EXERCISES: CPT | Mod: PO

## 2020-03-11 PROCEDURE — 97162 PT EVAL MOD COMPLEX 30 MIN: CPT | Mod: PO

## 2020-03-11 NOTE — PATIENT INSTRUCTIONS
RECIPE FOR ICE PACK    Flexible homemade alcohol water ice pack    2 cups water  1 cup rubbing alcohol  Food coloring for the blue tint (optional)  2 zip-top bags - quart or gallon-size or vacuum sealer bags  Mix the water and alcohol together in one of your zip-top bags and add food coloring, if desired, until you get that perfect blue tint. Release as much air as possible and seal the bag. I recommend double bagging for strength. (If you have a vacuum sealer use a vacuum seal bag for the outer layer to further prevent accidents.) My trusty Foodsaver works like a charm.     Stick your new flexible homemade alcohol ice pack in the freezer for about 12 hours before using it for the first time. It will be icy, a little slushy, and perfectly flexible for any body injury that needs the cold treatment.    Cervical Towel for Sleeping Posture    Place a rolled up towel inside of pillowcase to maintain neck support at night.    Use a larger roll if side sleeping.        TENNIS BALL OS RELEASE    Lying on your back take the rolled up towel and place behind your neck, then place the tennis balls at the base of your skull. Set a timer for no more than 5 minutes.    A. rest on tennis balls   B. retract neck into the tennis balls and tilt head forward and backward as if nodding your head yes (while doing this motion you may turn your head left and right slowly to release the entire occipital ridge)    Side Bend, Sitting        Sit, hand over top of head. Gently pull head to one side. Hold 5 seconds.  Repeat 5 times per session. Do 2 sessions per day.    Copyright © [employer]. All rights reserved.   Flexors, Sitting / Standing        Stand or sit, head in comfortable, centered position. Draw chin in, pulling head straight back, keeping jaw and eyes level. Hold 3 seconds.  Repeat 10 times per session. Do 2 sessions per day.    Copyright © [employer]. All rights reserved.   Scalene Stretch, Sitting        Sit, one hand tucked under hip on  side to be stretched, other hand over top of head. Gently pull head to side and backwards. Hold 5 seconds.  For more stretch, lean body in direction of head pull.  Repeat 5 times per session. Do 2 sessions per day.    Copyright © [employer]. All rights reserved.   Levator Scapula Stretch, Sitting        Sit, one hand tucked under hip on side to be stretched, other hand over top of head. Turn head toward other side and look down. Use hand on head to gently stretch neck in that position. Hold 5 seconds.  Repeat 5 times per session. Do 2 sessions per day.    Copyright © [employer]. All rights reserved.   Scapular Retraction: Bilateral        Facing anchor, pull arms back, bringing shoulder blades together.  Repeat 10 times per set. Do 3 sets per session. Do 2 sessions per day.     [email or web address]     Copyright © [employer]. All rights reserved.   Rowing: Resisted (Sitting)        Long-sit with resistive band around feet, hands firmly holding ends. Pull elbows back.  Repeat 10 times per set. Do 3 sets per session. Do 2 sessions per day.     [email or web address]     Copyright © [employer]. All rights reserved.

## 2020-03-11 NOTE — PLAN OF CARE
[facility]  Physical Therapy Initial Evaluation    Date: 3/11/2020   Name: Barrera Siddiqui  Clinic Number: [id number]    Therapy Diagnosis:   Encounter Diagnoses   Name Primary?    Lumbago     Radiculopathy of thoracolumbar region      Physician: Evelin Guevara, NP    Physician Orders: PT Eval and Treat   Medical Diagnosis from Referral:   M54.5 (ICD-10-CM) - Lumbago   M54.15 (ICD-10-CM) - Radiculopathy of thoracolumbar region   Evaluation Date: 3/11/2020  Authorization Period Expiration: 12/31/2020  Plan of Care Expiration: 5/11/2020  Visit # / Visits authorized: 1/ 20    Time In: 2:00 pm  Time Out: 3:00 pm  Total Appointment Time (timed & untimed codes): 60 minutes    Precautions: Standard and Diabetes    Subjective   Date of onset: 2/28/2020  History of current condition - Barrera reports: she had a TIA on 2/8 and was in the hospital for 2 days. Since coming home she has been hurting on the left side of her back and neck. She has had two other TIAs, the first was four years ago. Currently she can't sit up straight in a chair as it hurts on the left side of her back, so she usually lays on the sofa on her right side when she is at home. She has increased pain with standing long enough to take a shower so she sits on the side of the tub and lowers herself down to the bottom to bathe. She also gets increased pain when standing longer than 10 minutes to do dishes. She is right handed and the Ipad was too heavy to hold while filling out her paperwork. She denies any difficulty with dressing just that she takes it slow, no difficulty with bed mobility. She is currently off work, but normally works in [employer].     Medical History:   Past Medical History:   Diagnosis Date    Depression     Diabetes mellitus     High cholesterol     Hypertension     Stroke     TIA (transient ischemic attack)        Surgical History:   Barrera Siddiqui  has a past surgical history that  includes Tubal ligation; Knee surgery; Wrist surgery; Cholecystectomy; and Colonoscopy (N/A, 10/18/2017).    Medications:   Barrera has a current medication list which includes the following prescription(s): alprazolam, aspirin, atorvastatin, bupropion hcl, clopidogrel, escitalopram oxalate, hydrochlorothiazide, losartan, metformin, quetiapine, and triamterene-hydrochlorothiazide 75-50 mg.    Allergies:   Review of patient's allergies indicates:  No Known Allergies     Imaging, none: no recent imaging of lumbar region    Prior Therapy: HH PT  Social History: single story home, no steps to enter,  lives with their family  Occupation: [employer]  Prior Level of Function: independent  Current Level of Function: Mod I    Pain:  Current 5/10, worst 10/10, best 5/10   Location: left neck, upper back  Description: it's just pain, I don't know how to drescribe it  Aggravating Factors: Sitting, Standing and Lifting  Easing Factors: pain medication, ice and rest    Pts goals: to regain strength in my left side    Objective     Observation: Patient is a 54 year old female, who presents to the clinic in no apparent distress. She is ambulating with a normal gait pattern without an AD.    Posture:  Forward head and shoulders    Cervical Range of Motion:    Degrees Pain   Flexion 60    Extension 20 In vein   Right Rotation 70    Left Rotation 60    Right Side Bending 35    Left Side Bending 40       Active Range of Motion: Measured in degrees    Shoulder Range of Motion:   Shoulder Left Right   Flexion 160 165   Abduction 170 170   ER T1 T1   IR T7 T7     Upper Extremity Strength  (R) UE  (L) UE    Shoulder flexion: 3/5 Shoulder flexion: 4/5   Shoulder Abduction: 4/5 Shoulder abduction: 5/5   Shoulder ER 5/5 Shoulder ER 5/5   Shoulder IR 4/5 Shoulder IR 5/5   Elbow flexion: 5/5 Elbow flexion: 5/5   Elbow extension: 5/5 Elbow extension: 5/5   Wrist flexion: 5/5 Wrist flexion: 5/5   Wrist extension: 5/5 Wrist extension: 5/5    Lower Trap 3/5 Lower Trap 3/5   Middle Trap 3/5 Middle Trap 3/5   Rhomboids 3/5 Rhomboids 3/5     Special Tests:  Distraction No change   Compression No change   Spurlings negative   Sharp-Nupur negative       Joint Mobility: cervical mobility WNL    Thoracic mobility: hypomobile spring over T5-9    Palpation: tenderness to palpation over L upper trap, levator scap, B suboccipital region, L SCM      Sensation: light touch intact    Flexibility: B AC joints 3 fingers from the table        Limitation/Restriction for FOTO Lumbar Spine Survey    Therapist reviewed FOTO scores for Barrera Siddiqui on 3/11/2020.   FOTO documents entered into [employer] - see Media section.    Limitation Score: 66%         TREATMENT   Treatment Time In: 2:45 pm  Treatment Time Out: 3:00 pm  Total Treatment time (time-based codes) separate from Evaluation: 15 minutes    Barrera received therapeutic exercises to develop strength, ROM and flexibility for 15 minutes including:  Upper trap stretch, chin tucks, SCM stretch, levator stretch, scap squeeze, rows    Home Exercises and Patient Education Provided    Education provided:   - compliance with HEP  - role of PT and goals for PT     Written Home Exercises Provided: Patient instructed to cont prior HEP.  Exercises were reviewed and Barrera was able to demonstrate them prior to the end of the session.  Barrera demonstrated good  understanding of the education provided.     See EMR under Patient Instructions for exercises provided 3/11/2020.    Assessment   Barrera is a 54 y.o. female referred to outpatient Physical Therapy with a medical diagnosis of Lumbago, Radiculopathy of thoracolumbar region. Pt presents with mild weakness of left upper extremity, decreased range of motion of cervical spine, poor posture, and B pec major tightness. Due to her weakness, decreased range of motion, and muscle tightness she has difficulty sitting or standing for a prolonged period of time, performing her usual household duties,  driving, and performing her usual work duties.    Pt prognosis is Good.   Pt will benefit from skilled outpatient Physical Therapy to address the deficits stated above and in the chart below, provide pt/family education, and to maximize pt's level of independence.     Plan of care discussed with patient: Yes  Pt's spiritual, cultural and educational needs considered and patient is agreeable to the plan of care and goals as stated below:     Anticipated Barriers for therapy: patient's co-pay    Medical Necessity is demonstrated by the following  History  Co-morbidities and personal factors that may impact the plan of care Co-morbidities:   depression, diabetes, high BMI, history of CVA, HTN and level of undertstanding of current condition    Personal Factors:   no deficits     high   Examination  Body Structures and Functions, activity limitations and participation restrictions that may impact the plan of care Body Regions:   neck  upper extremities    Body Systems:    ROM  strength  flexibility    Participation Restrictions:   Difficulty with prolonged standing, prolonged sitting, driving, usual household duties, work duties.    Activity limitations:   Learning and applying knowledge  no deficits    General Tasks and Commands  no deficits    Communication  no deficits    Mobility  lifting and carrying objects  using transportation (bus, train, plane, car)  driving (bike, car, motorcycle)    Self care  washing oneself (bathing, drying, washing hands)    Domestic Life  shopping  cooking  doing house work (cleaning house, washing dishes, laundry)    Interactions/Relationships  no deficits    Life Areas  employment    Community and Social Life  community life  recreation and leisure         moderate   Clinical Presentation evolving clinical presentation with changing clinical characteristics moderate   Decision Making/ Complexity Score: moderate     Goals:  Short Term Goals: 4 weeks   1. This patient will be independent  with a basic HEP.  2. This patient will have cervical AROM WNL and pain free in order to drive safely.  3. This patient will increase B UE strength by 1 grade in order to be able to wash dishes with no increase in symptoms.  4. This patient will have a pain rating of 5/10 at worst with ADLs.    Long Term Goals: 8 weeks   1. This patient will be independent with an updated HEP.  2. This patient will increase B UE strength to 5/5 in order to be able to perform her usual work duties with no increase in symptoms.  3. This patient will have a pain rating of 2/10 at worst with ADLs.      Plan   Plan of care Certification: 3/11/2020 to 5/11/2020.    Outpatient Physical Therapy 2 times weekly for 8 weeks to include the following interventions: Manual Therapy, Moist Heat/ Ice, Neuromuscular Re-ed, Patient Education, Therapeutic Activites, Therapeutic Exercise and IASTM. Dry needling with manual therapy techniques to decrease pain, inflammation and swelling, increase circulation and promote healing process.    Ileana Silver, PT

## 2020-03-12 PROBLEM — R29.898 DECREASED RANGE OF MOTION OF NECK: Status: ACTIVE | Noted: 2020-03-12

## 2020-03-12 PROBLEM — R29.898 WEAKNESS OF LEFT UPPER EXTREMITY: Status: ACTIVE | Noted: 2020-03-12

## 2020-03-12 PROBLEM — R29.3 POOR POSTURE: Status: ACTIVE | Noted: 2020-03-12

## 2020-04-21 ENCOUNTER — TELEPHONE (OUTPATIENT)
Dept: REHABILITATION | Facility: HOSPITAL | Age: 55
End: 2020-04-21

## 2020-04-21 NOTE — TELEPHONE ENCOUNTER
Called patient to check on back and neck pain and if she would be interested in rescheduling therapy at this time.  Left message for patient to call clinic if she is interested in rescheduling or if she had any questions with HEP.    Osmany Mccoy, PT

## 2020-06-26 ENCOUNTER — HOSPITAL ENCOUNTER (OUTPATIENT)
Dept: RADIOLOGY | Facility: HOSPITAL | Age: 55
Discharge: HOME | End: 2020-06-26
Attending: NURSE PRACTITIONER
Payer: COMMERCIAL

## 2020-06-26 DIAGNOSIS — M25.512 LEFT SHOULDER PAIN: ICD-10-CM

## 2020-06-26 PROCEDURE — 73030 X-RAY EXAM OF SHOULDER: CPT | Mod: TC,FY,PO,LT

## 2020-06-30 ENCOUNTER — HOSPITAL ENCOUNTER (EMERGENCY)
Facility: HOSPITAL | Age: 55
Discharge: HOME | End: 2020-06-30
Attending: EMERGENCY MEDICINE
Payer: COMMERCIAL

## 2020-06-30 VITALS
WEIGHT: 270 LBS | BODY MASS INDEX: 47.84 KG/M2 | HEIGHT: 63 IN | RESPIRATION RATE: 17 BRPM | TEMPERATURE: 98 F | HEART RATE: 92 BPM | SYSTOLIC BLOOD PRESSURE: 148 MMHG | DIASTOLIC BLOOD PRESSURE: 73 MMHG | OXYGEN SATURATION: 98 %

## 2020-06-30 DIAGNOSIS — B34.9 VIRAL SYNDROME: Primary | ICD-10-CM

## 2020-06-30 LAB
ALBUMIN SERPL BCP-MCNC: 4.5 G/DL (ref 3.5–5.2)
ALP SERPL-CCNC: 149 U/L (ref 38–126)
ALT SERPL W/O P-5'-P-CCNC: 85 U/L (ref 10–44)
ANION GAP SERPL CALC-SCNC: 13 MMOL/L (ref 8–16)
AST SERPL-CCNC: 114 U/L (ref 15–46)
B-HCG UR QL: NEGATIVE
BACTERIA #/AREA URNS AUTO: ABNORMAL /HPF
BASOPHILS # BLD AUTO: 0.05 K/UL (ref 0–0.2)
BASOPHILS NFR BLD: 0.5 % (ref 0–1.9)
BILIRUB SERPL-MCNC: 0.3 MG/DL (ref 0.1–1)
BILIRUB UR QL STRIP: NEGATIVE
BUN SERPL-MCNC: 20 MG/DL (ref 7–17)
CALCIUM SERPL-MCNC: 9.3 MG/DL (ref 8.7–10.5)
CHLORIDE SERPL-SCNC: 102 MMOL/L (ref 95–110)
CLARITY UR REFRACT.AUTO: ABNORMAL
CO2 SERPL-SCNC: 23 MMOL/L (ref 23–29)
COLOR UR AUTO: YELLOW
CREAT SERPL-MCNC: 0.72 MG/DL (ref 0.5–1.4)
DIFFERENTIAL METHOD: ABNORMAL
EOSINOPHIL # BLD AUTO: 0.3 K/UL (ref 0–0.5)
EOSINOPHIL NFR BLD: 2.6 % (ref 0–8)
ERYTHROCYTE [DISTWIDTH] IN BLOOD BY AUTOMATED COUNT: 13.8 % (ref 11.5–14.5)
EST. GFR  (AFRICAN AMERICAN): >60 ML/MIN/1.73 M^2
EST. GFR  (NON AFRICAN AMERICAN): >60 ML/MIN/1.73 M^2
GLUCOSE SERPL-MCNC: 214 MG/DL (ref 70–110)
GLUCOSE UR QL STRIP: ABNORMAL
HCT VFR BLD AUTO: 36.3 % (ref 37–48.5)
HGB BLD-MCNC: 11.4 G/DL (ref 12–16)
HGB UR QL STRIP: NEGATIVE
IMM GRANULOCYTES # BLD AUTO: 0.02 K/UL (ref 0–0.04)
IMM GRANULOCYTES NFR BLD AUTO: 0.2 % (ref 0–0.5)
INFLUENZA A, MOLECULAR: NEGATIVE
INFLUENZA B, MOLECULAR: NEGATIVE
KETONES UR QL STRIP: NEGATIVE
LEUKOCYTE ESTERASE UR QL STRIP: ABNORMAL
LIPASE SERPL-CCNC: 122 U/L (ref 23–300)
LYMPHOCYTES # BLD AUTO: 3.9 K/UL (ref 1–4.8)
LYMPHOCYTES NFR BLD: 38.1 % (ref 18–48)
MCH RBC QN AUTO: 26.8 PG (ref 27–31)
MCHC RBC AUTO-ENTMCNC: 31.4 G/DL (ref 32–36)
MCV RBC AUTO: 85 FL (ref 82–98)
MICROSCOPIC COMMENT: ABNORMAL
MONOCYTES # BLD AUTO: 0.5 K/UL (ref 0.3–1)
MONOCYTES NFR BLD: 5.3 % (ref 4–15)
NEUTROPHILS # BLD AUTO: 5.5 K/UL (ref 1.8–7.7)
NEUTROPHILS NFR BLD: 53.3 % (ref 38–73)
NITRITE UR QL STRIP: NEGATIVE
NRBC BLD-RTO: 0 /100 WBC
PH UR STRIP: 6 [PH] (ref 5–8)
PLATELET # BLD AUTO: 282 K/UL (ref 150–350)
PMV BLD AUTO: 11.4 FL (ref 9.2–12.9)
POTASSIUM SERPL-SCNC: 4 MMOL/L (ref 3.5–5.1)
PROT SERPL-MCNC: 9.1 G/DL (ref 6–8.4)
PROT UR QL STRIP: ABNORMAL
RBC # BLD AUTO: 4.26 M/UL (ref 4–5.4)
SARS-COV-2 RDRP RESP QL NAA+PROBE: NEGATIVE
SODIUM SERPL-SCNC: 138 MMOL/L (ref 136–145)
SP GR UR STRIP: 1.01 (ref 1–1.03)
SPECIMEN SOURCE: NORMAL
URN SPEC COLLECT METH UR: ABNORMAL
UROBILINOGEN UR STRIP-ACNC: 1 EU/DL
WBC # BLD AUTO: 10.28 K/UL (ref 3.9–12.7)
WBC #/AREA URNS AUTO: 4 /HPF (ref 0–5)
YEAST UR QL AUTO: ABNORMAL

## 2020-06-30 PROCEDURE — U0002 COVID-19 LAB TEST NON-CDC: HCPCS | Mod: ER

## 2020-06-30 PROCEDURE — 81000 URINALYSIS NONAUTO W/SCOPE: CPT | Mod: ER

## 2020-06-30 PROCEDURE — 25000003 PHARM REV CODE 250: Mod: ER | Performed by: PHYSICIAN ASSISTANT

## 2020-06-30 PROCEDURE — 83690 ASSAY OF LIPASE: CPT | Mod: ER

## 2020-06-30 PROCEDURE — 87502 INFLUENZA DNA AMP PROBE: CPT | Mod: ER

## 2020-06-30 PROCEDURE — 99284 EMERGENCY DEPT VISIT MOD MDM: CPT | Mod: 25,ER

## 2020-06-30 PROCEDURE — 80053 COMPREHEN METABOLIC PANEL: CPT | Mod: ER

## 2020-06-30 PROCEDURE — 96374 THER/PROPH/DIAG INJ IV PUSH: CPT | Mod: ER

## 2020-06-30 PROCEDURE — 85025 COMPLETE CBC W/AUTO DIFF WBC: CPT | Mod: ER

## 2020-06-30 PROCEDURE — 81025 URINE PREGNANCY TEST: CPT | Mod: ER

## 2020-06-30 PROCEDURE — 63600175 PHARM REV CODE 636 W HCPCS: Mod: ER | Performed by: PHYSICIAN ASSISTANT

## 2020-06-30 RX ORDER — ONDANSETRON 2 MG/ML
4 INJECTION INTRAMUSCULAR; INTRAVENOUS
Status: COMPLETED | OUTPATIENT
Start: 2020-06-30 | End: 2020-06-30

## 2020-06-30 RX ORDER — ONDANSETRON 4 MG/1
4 TABLET, FILM COATED ORAL EVERY 12 HOURS PRN
Qty: 12 TABLET | Refills: 0 | Status: SHIPPED | OUTPATIENT
Start: 2020-06-30 | End: 2022-06-18 | Stop reason: SDUPTHER

## 2020-06-30 RX ADMIN — ONDANSETRON 4 MG: 2 INJECTION INTRAMUSCULAR; INTRAVENOUS at 06:06

## 2020-06-30 RX ADMIN — SODIUM CHLORIDE 1000 ML: 0.9 INJECTION, SOLUTION INTRAVENOUS at 06:06

## 2020-07-01 NOTE — ED PROVIDER NOTES
Encounter Date: 6/30/2020       History     Chief Complaint   Patient presents with    Vomiting     c/o diarrhea x 2 days with N/V/D that started today; denies fever or SOB; reports woking at [employer]    Nausea    Generalized Body Aches    Diarrhea     55-year-old female presents to the emergency department for evaluation of 2 day history of generalized abdominal pain, nausea, vomiting, diarrhea and generalized body aches.  She reports that the symptoms began gradually yesterday morning and have been intermittent since onset.  She denies any fever, headache, dizziness, vision changes, neck pain, chest pain, shortness of breath, palpitations, cough, flank pain or dysuria.  No treatment was attempted prior to arrival.        Review of patient's allergies indicates:  No Known Allergies  Past Medical History:   Diagnosis Date    Depression     Diabetes mellitus     High cholesterol     Hypertension     Stroke     TIA (transient ischemic attack)      Past Surgical History:   Procedure Laterality Date    CHOLECYSTECTOMY      COLONOSCOPY N/A 10/18/2017    Procedure: COLONOSCOPY;  Surgeon: Donald Wright Jr., MD;  Location: [facility];  Service: Endoscopy;  Laterality: N/A;    KNEE SURGERY      TUBAL LIGATION      WRIST SURGERY       Family History   Problem Relation Age of Onset    Breast cancer Mother     Liver disease Maternal Grandmother      Social History     Tobacco Use    Smoking status: Former Smoker    Smokeless tobacco: Never Used   Substance Use Topics    Alcohol use: Yes     Comment: rare    Drug use: No     Review of Systems   Constitutional: Negative for activity change, appetite change and fever.   HENT: Negative for congestion, ear pain, rhinorrhea, sinus pressure, sore throat and trouble swallowing.    Eyes: Negative for photophobia and visual disturbance.   Respiratory: Negative for choking and shortness of breath.    Cardiovascular: Negative for chest pain.   Gastrointestinal:  Positive for abdominal pain, diarrhea, nausea and vomiting. Negative for blood in stool and constipation.   Genitourinary: Negative for decreased urine volume and dysuria.   Musculoskeletal: Negative for back pain, joint swelling, neck pain and neck stiffness.   Skin: Negative for rash.   Neurological: Negative for dizziness, syncope, weakness, light-headedness, numbness and headaches.       Physical Exam     Initial Vitals [06/30/20 1724]   BP Pulse Resp Temp SpO2   134/76 100 20 -- 97 %      MAP       --         Physical Exam    Nursing note and vitals reviewed.  Constitutional: She appears well-developed and well-nourished. She is not diaphoretic. No distress.   HENT:   Head: Normocephalic and atraumatic.   Right Ear: External ear normal.   Left Ear: External ear normal.   Nose: Nose normal.   Mouth/Throat: Oropharynx is clear and moist.   Eyes: Conjunctivae and EOM are normal. Pupils are equal, round, and reactive to light.   Neck: Normal range of motion. Neck supple.   Cardiovascular: Normal rate, regular rhythm and normal heart sounds.   Pulmonary/Chest: Breath sounds normal. No respiratory distress. She has no wheezes. She has no rhonchi. She has no rales. She exhibits no tenderness.   Abdominal: Soft. Bowel sounds are normal. She exhibits no distension. There is no abdominal tenderness. There is no rebound.   Lymphadenopathy:     She has no cervical adenopathy.   Neurological: She is alert and oriented to person, place, and time.   Skin: Skin is warm and dry.   Psychiatric: She has a normal mood and affect.         ED Course   Procedures  Labs Reviewed   URINALYSIS, REFLEX TO URINE CULTURE - Abnormal; Notable for the following components:       Result Value    Appearance, UA Hazy (*)     Protein, UA Trace (*)     Glucose, UA 4+ (*)     Leukocytes, UA 3+ (*)     All other components within normal limits    Narrative:     Preferred Collection Type->Urine, Clean Catch  Specimen Source->Urine   CBC W/ AUTO  DIFFERENTIAL - Abnormal; Notable for the following components:    Hemoglobin 11.4 (*)     Hematocrit 36.3 (*)     Mean Corpuscular Hemoglobin 26.8 (*)     Mean Corpuscular Hemoglobin Conc 31.4 (*)     All other components within normal limits   COMPREHENSIVE METABOLIC PANEL - Abnormal; Notable for the following components:    Glucose 214 (*)     BUN, Bld 20 (*)     Total Protein 9.1 (*)     Alkaline Phosphatase 149 (*)      (*)     ALT 85 (*)     All other components within normal limits   URINALYSIS MICROSCOPIC - Abnormal; Notable for the following components:    Bacteria Few (*)     All other components within normal limits    Narrative:     Preferred Collection Type->Urine, Clean Catch  Specimen Source->Urine   INFLUENZA A & B BY MOLECULAR   SARS-COV-2 RNA AMPLIFICATION, QUAL   PREGNANCY TEST, URINE RAPID    Narrative:     Specimen Source->Urine   LIPASE          Imaging Results    None          Medical Decision Making:   Initial Assessment:   55-year-old female presents for evaluation of generalized abdominal cramping, nausea, vomiting, diarrhea and generalized body aches.  Physical exam reveals a nontoxic-appearing female in no acute distress.  Patient is afebrile vital signs within normal limits.  Neurological exam reveals an alert and oriented patient.  TMs reveal no erythema.  Posterior pharynx reveals no erythema, edema or tonsillar exudate.  Lungs clear to auscultation bilaterally.  Abdominal exam reveals soft abdomen, no tenderness to palpation noted. No peritoneal signs noted.  No CVA tenderness noted.  Differential Diagnosis:   I carefully considered but doubt serious intra-abdominal etiology including acute appendicitis, acute cholecystitis or bowel obstruction.  No imaging indicated at this time.  COVID-19  Influenza  Viral syndrome  ED Management:  CBC reveals no acute leukocytosis and mild anemia.  Hemoglobin 11.4 and hematocrit 36.3.  CMP reveals glucose 214, alkaline phosphatase 149, AST  114, ALT 85, all other results within normal limits.  Lipase negative.  UPT negative.  Urinalysis reveals no evidence of urinary tract infection.  Discussed these findings at length with the patient verbalizes understanding and agreement course of treatment.  Upon re-evaluation patient reports symptoms have resolved.  Patient tolerating p.o. fluids in the emergency department.  Instructed patient to follow up with her primary care provider for re-evaluation and to return to the emergency department immediately for any new or worsening symptoms.                                  Clinical Impression:       ICD-10-CM ICD-9-CM   1. Viral syndrome  B34.9 079.99                                Charline Reis PA-C  06/30/20 1911       Charline Reis PA-C  06/30/20 1912

## 2020-07-01 NOTE — DISCHARGE INSTRUCTIONS
Your COVID test is negative, however secondary to your positive exposure and symptoms you are advised to self quarantine.  You are instructed to use symptomatic treatment including plenty of clear fluid as well as Tylenol  for any fever or body aches.  You are instructed to return to the emergency department immediately for any new or worsening symptoms.

## 2020-08-03 ENCOUNTER — LAB VISIT (OUTPATIENT)
Dept: PRIMARY CARE CLINIC | Facility: OTHER | Age: 55
End: 2020-08-03
Attending: INTERNAL MEDICINE
Payer: COMMERCIAL

## 2020-08-03 DIAGNOSIS — Z03.818 ENCOUNTER FOR OBSERVATION FOR SUSPECTED EXPOSURE TO OTHER BIOLOGICAL AGENTS RULED OUT: ICD-10-CM

## 2020-08-03 PROCEDURE — U0003 INFECTIOUS AGENT DETECTION BY NUCLEIC ACID (DNA OR RNA); SEVERE ACUTE RESPIRATORY SYNDROME CORONAVIRUS 2 (SARS-COV-2) (CORONAVIRUS DISEASE [COVID-19]), AMPLIFIED PROBE TECHNIQUE, MAKING USE OF HIGH THROUGHPUT TECHNOLOGIES AS DESCRIBED BY CMS-2020-01-R: HCPCS

## 2020-08-05 LAB — SARS-COV-2 RNA RESP QL NAA+PROBE: NOT DETECTED

## 2020-09-11 ENCOUNTER — HOSPITAL ENCOUNTER (OUTPATIENT)
Dept: RADIOLOGY | Facility: HOSPITAL | Age: 55
Discharge: HOME | End: 2020-09-11
Attending: NURSE PRACTITIONER
Payer: COMMERCIAL

## 2020-09-11 DIAGNOSIS — Z12.31 ENCOUNTER FOR SCREENING MAMMOGRAM FOR MALIGNANT NEOPLASM OF BREAST: ICD-10-CM

## 2020-09-11 PROCEDURE — 77067 SCR MAMMO BI INCL CAD: CPT | Mod: TC,PO

## 2020-11-21 ENCOUNTER — HOSPITAL ENCOUNTER (EMERGENCY)
Facility: HOSPITAL | Age: 55
Discharge: HOME | End: 2020-11-21
Attending: EMERGENCY MEDICINE
Payer: COMMERCIAL

## 2020-11-21 VITALS
HEART RATE: 84 BPM | DIASTOLIC BLOOD PRESSURE: 70 MMHG | TEMPERATURE: 98 F | RESPIRATION RATE: 20 BRPM | BODY MASS INDEX: 49.26 KG/M2 | OXYGEN SATURATION: 99 % | HEIGHT: 63 IN | SYSTOLIC BLOOD PRESSURE: 134 MMHG | WEIGHT: 278 LBS

## 2020-11-21 DIAGNOSIS — R51.9 INTRACTABLE EPISODIC HEADACHE, UNSPECIFIED HEADACHE TYPE: Primary | ICD-10-CM

## 2020-11-21 DIAGNOSIS — R47.01 EXPRESSIVE APHASIA: ICD-10-CM

## 2020-11-21 LAB
ALBUMIN SERPL BCP-MCNC: 3.7 G/DL (ref 3.5–5.2)
ALP SERPL-CCNC: 119 U/L (ref 55–135)
ALT SERPL W/O P-5'-P-CCNC: 49 U/L (ref 10–44)
ANION GAP SERPL CALC-SCNC: 13 MMOL/L (ref 8–16)
AST SERPL-CCNC: 55 U/L (ref 10–40)
BASOPHILS # BLD AUTO: 0.05 K/UL (ref 0–0.2)
BASOPHILS NFR BLD: 0.6 % (ref 0–1.9)
BILIRUB SERPL-MCNC: 0.3 MG/DL (ref 0.1–1)
BUN SERPL-MCNC: 18 MG/DL (ref 6–20)
CALCIUM SERPL-MCNC: 9 MG/DL (ref 8.7–10.5)
CHLORIDE SERPL-SCNC: 105 MMOL/L (ref 95–110)
CHOLEST SERPL-MCNC: 123 MG/DL (ref 120–199)
CHOLEST/HDLC SERPL: 2.9 {RATIO} (ref 2–5)
CO2 SERPL-SCNC: 20 MMOL/L (ref 23–29)
CREAT SERPL-MCNC: 0.7 MG/DL (ref 0.5–1.4)
CREAT SERPL-MCNC: 0.8 MG/DL (ref 0.5–1.4)
DELSYS: NORMAL
DELSYS: NORMAL
DIFFERENTIAL METHOD: ABNORMAL
EOSINOPHIL # BLD AUTO: 0.2 K/UL (ref 0–0.5)
EOSINOPHIL NFR BLD: 2.7 % (ref 0–8)
ERYTHROCYTE [DISTWIDTH] IN BLOOD BY AUTOMATED COUNT: 14 % (ref 11.5–14.5)
EST. GFR  (AFRICAN AMERICAN): >60 ML/MIN/1.73 M^2
EST. GFR  (NON AFRICAN AMERICAN): >60 ML/MIN/1.73 M^2
GLUCOSE SERPL-MCNC: 247 MG/DL (ref 70–110)
HCT VFR BLD AUTO: 35.3 % (ref 37–48.5)
HDLC SERPL-MCNC: 43 MG/DL (ref 40–75)
HDLC SERPL: 35 % (ref 20–50)
HGB BLD-MCNC: 11 G/DL (ref 12–16)
IMM GRANULOCYTES # BLD AUTO: 0.02 K/UL (ref 0–0.04)
IMM GRANULOCYTES NFR BLD AUTO: 0.2 % (ref 0–0.5)
INR PPP: 1.1 (ref 0.8–1.2)
LDLC SERPL CALC-MCNC: 42 MG/DL (ref 63–159)
LYMPHOCYTES # BLD AUTO: 3.2 K/UL (ref 1–4.8)
LYMPHOCYTES NFR BLD: 36.2 % (ref 18–48)
MCH RBC QN AUTO: 26.6 PG (ref 27–31)
MCHC RBC AUTO-ENTMCNC: 31.2 G/DL (ref 32–36)
MCV RBC AUTO: 86 FL (ref 82–98)
MONOCYTES # BLD AUTO: 0.5 K/UL (ref 0.3–1)
MONOCYTES NFR BLD: 5.3 % (ref 4–15)
NEUTROPHILS # BLD AUTO: 4.8 K/UL (ref 1.8–7.7)
NEUTROPHILS NFR BLD: 55 % (ref 38–73)
NONHDLC SERPL-MCNC: 80 MG/DL
NRBC BLD-RTO: 0 /100 WBC
PLATELET # BLD AUTO: 235 K/UL (ref 150–350)
PMV BLD AUTO: 11.8 FL (ref 9.2–12.9)
POC PTINR: 1.2 (ref 0.9–1.2)
POC PTWBT: 14.1 SEC (ref 9.7–14.3)
POTASSIUM SERPL-SCNC: 3.6 MMOL/L (ref 3.5–5.1)
PROT SERPL-MCNC: 8.3 G/DL (ref 6–8.4)
PROTHROMBIN TIME: 11.4 SEC (ref 9–12.5)
RBC # BLD AUTO: 4.13 M/UL (ref 4–5.4)
SAMPLE: NORMAL
SAMPLE: NORMAL
SODIUM SERPL-SCNC: 138 MMOL/L (ref 136–145)
T4 FREE SERPL-MCNC: 0.82 NG/DL (ref 0.71–1.51)
TRIGL SERPL-MCNC: 190 MG/DL (ref 30–150)
TSH SERPL DL<=0.005 MIU/L-ACNC: 0.22 UIU/ML (ref 0.4–4)
WBC # BLD AUTO: 8.73 K/UL (ref 3.9–12.7)

## 2020-11-21 PROCEDURE — 80053 COMPREHEN METABOLIC PANEL: CPT

## 2020-11-21 PROCEDURE — 85025 COMPLETE CBC W/AUTO DIFF WBC: CPT

## 2020-11-21 PROCEDURE — 85610 PROTHROMBIN TIME: CPT

## 2020-11-21 PROCEDURE — 84443 ASSAY THYROID STIM HORMONE: CPT

## 2020-11-21 PROCEDURE — 99214 OFFICE O/P EST MOD 30 MIN: CPT | Mod: GT,,, | Performed by: PSYCHIATRY & NEUROLOGY

## 2020-11-21 PROCEDURE — 99214 PR OFFICE/OUTPT VISIT, EST, LEVL IV, 30-39 MIN: ICD-10-PCS | Mod: GT,,, | Performed by: PSYCHIATRY & NEUROLOGY

## 2020-11-21 PROCEDURE — 82565 ASSAY OF CREATININE: CPT

## 2020-11-21 PROCEDURE — 99291 CRITICAL CARE FIRST HOUR: CPT

## 2020-11-21 PROCEDURE — 84439 ASSAY OF FREE THYROXINE: CPT

## 2020-11-21 PROCEDURE — 25000003 PHARM REV CODE 250: Performed by: EMERGENCY MEDICINE

## 2020-11-21 PROCEDURE — 25500020 PHARM REV CODE 255: Performed by: EMERGENCY MEDICINE

## 2020-11-21 PROCEDURE — 93010 ELECTROCARDIOGRAM REPORT: CPT | Mod: ,,, | Performed by: INTERNAL MEDICINE

## 2020-11-21 PROCEDURE — 93005 ELECTROCARDIOGRAM TRACING: CPT

## 2020-11-21 PROCEDURE — 93010 EKG 12-LEAD: ICD-10-PCS | Mod: ,,, | Performed by: INTERNAL MEDICINE

## 2020-11-21 PROCEDURE — 80061 LIPID PANEL: CPT

## 2020-11-21 PROCEDURE — 99900035 HC TECH TIME PER 15 MIN (STAT)

## 2020-11-21 RX ORDER — ASPIRIN 325 MG
325 TABLET, DELAYED RELEASE (ENTERIC COATED) ORAL
Status: COMPLETED | OUTPATIENT
Start: 2020-11-21 | End: 2020-11-21

## 2020-11-21 RX ORDER — ASPIRIN 325 MG
325 TABLET ORAL DAILY
Status: ON HOLD | COMMUNITY
End: 2023-01-01 | Stop reason: HOSPADM

## 2020-11-21 RX ADMIN — ASPIRIN 325 MG: 325 TABLET, COATED ORAL at 01:11

## 2020-11-21 RX ADMIN — IOHEXOL 100 ML: 350 INJECTION, SOLUTION INTRAVENOUS at 12:11

## 2020-11-21 NOTE — ED PROVIDER NOTES
Encounter Date: 11/21/2020       COVID Statement  The [place] of Health and Human Services and Donald Villarreal, Governor of the [facility], have declared a State of Public Health Emergency due to the spread of a novel coronavirus and disease (COVID-19).  There is no currently accepted treatment except conservative measures and respiratory support if appropriate.  This has lead to significant resource capacity and potential delays in care.      SCRIBE #1 NOTE: I, Ana Jackson, am scribing for, and in the presence of,  Dr. Figueredo . I have scribed the entire note.       History     Chief Complaint   Patient presents with    Speech Problem     pt reports at about 0945 this morning she began feeling like she was having trouble getting words out and coworkers felt her speech was slurred. reports left posterior headache since earlier this morning.     Time seen by provider: 11:25 AM    54 y/o female with a past medical history of HTN,DM, and TIA who presents with complaint of left sided headache and expressive aphasia since 9:45 am.  Pt was at work when co-workers noticed the patient was having difficulty speaking.   Pt reports symptoms have progressively worsened since onset.   She is currently takes a daily aspirin.   Pt denies any focal weakness or numbness at this time.   There are no alleviating or aggravating factors    The history is provided by the patient.     Review of patient's allergies indicates:  No Known Allergies  Past Medical History:   Diagnosis Date    Depression     Diabetes mellitus     High cholesterol     Hypertension     Stroke     TIA (transient ischemic attack)      Past Surgical History:   Procedure Laterality Date    CHOLECYSTECTOMY      COLONOSCOPY N/A 10/18/2017    Procedure: COLONOSCOPY;  Surgeon: Donald Wright Jr., MD;  Location: [facility];  Service: Endoscopy;  Laterality: N/A;    KNEE SURGERY      TUBAL LIGATION      WRIST SURGERY       Family History    Problem Relation Age of Onset    Breast cancer Mother     Liver disease Maternal Grandmother      Social History     Tobacco Use    Smoking status: Former Smoker    Smokeless tobacco: Never Used   Substance Use Topics    Alcohol use: Yes     Comment: rare    Drug use: No     Review of Systems   Constitutional: Negative for chills and fever.   HENT: Negative for congestion, rhinorrhea and sore throat.    Eyes: Negative for redness and visual disturbance.   Respiratory: Negative for cough, shortness of breath and wheezing.    Cardiovascular: Negative for chest pain and palpitations.   Gastrointestinal: Negative for abdominal pain, diarrhea, nausea and vomiting.   Genitourinary: Negative for dysuria and hematuria.   Musculoskeletal: Negative for back pain, myalgias and neck pain.   Skin: Negative for rash.   Neurological: Positive for speech difficulty and headaches. Negative for dizziness, weakness and light-headedness.   Psychiatric/Behavioral: Negative for confusion.       Physical Exam     Initial Vitals [11/21/20 1127]   BP Pulse Resp Temp SpO2   (!) 177/82 92 (!) 25 -- 100 %      MAP       --         Physical Exam    Nursing note and vitals reviewed.  Constitutional: She appears well-developed and well-nourished. She is not diaphoretic. No distress.   HENT:   Head: Normocephalic and atraumatic.   Right Ear: External ear normal.   Left Ear: External ear normal.   Eyes: EOM are normal. Pupils are equal, round, and reactive to light.   Neck: Neck supple. No tracheal deviation present.   Cardiovascular: Normal rate, regular rhythm, normal heart sounds and intact distal pulses.   No murmur heard.  Pulmonary/Chest: Breath sounds normal. No respiratory distress. She has no wheezes.   Abdominal: Soft. Bowel sounds are normal. She exhibits no distension and no mass. There is no abdominal tenderness.   Musculoskeletal: Normal range of motion. No edema.   Neurological: She is alert and oriented to person, place, and  time. She has normal strength. No cranial nerve deficit or sensory deficit. GCS score is 15. GCS eye subscore is 4. GCS verbal subscore is 5. GCS motor subscore is 6.   Expressive aphasia   Normal finger to nose testing bilaterally  No pronator drift.   Skin: Skin is warm and dry. Capillary refill takes less than 2 seconds. No pallor.   Psychiatric: She has a normal mood and affect. Thought content normal.         ED Course   Critical Care    Date/Time: 11/21/2020 1:14 PM  Performed by: Nancy Figueredo MD  Authorized by: Nancy Figueredo MD   Total critical care time (exclusive of procedural time) : 34 minutes  Critical care was necessary to treat or prevent imminent or life-threatening deterioration of the following conditions: CNS failure or compromise.  Critical care was time spent personally by me on the following activities: development of treatment plan with patient or surrogate, discussions with consultants, interpretation of cardiac output measurements, evaluation of patient's response to treatment, examination of patient, obtaining history from patient or surrogate, ordering and performing treatments and interventions, ordering and review of laboratory studies, ordering and review of radiographic studies, re-evaluation of patient's condition and review of old charts.        Labs Reviewed   CBC W/ AUTO DIFFERENTIAL - Abnormal; Notable for the following components:       Result Value    Hemoglobin 11.0 (*)     Hematocrit 35.3 (*)     MCH 26.6 (*)     MCHC 31.2 (*)     All other components within normal limits   COMPREHENSIVE METABOLIC PANEL - Abnormal; Notable for the following components:    CO2 20 (*)     Glucose 247 (*)     AST 55 (*)     ALT 49 (*)     All other components within normal limits   TSH - Abnormal; Notable for the following components:    TSH 0.222 (*)     All other components within normal limits   LIPID PANEL - Abnormal; Notable for the following components:    Triglycerides 190 (*)      LDL Cholesterol 42.0 (*)     All other components within normal limits   PROTIME-INR   T4, FREE   POCT GLUCOSE, HAND-HELD DEVICE   ISTAT PROCEDURE   ISTAT CREATININE     EKG Readings: (Independently Interpreted)   NSR, rate of 92. Normal axis. Normal intervals. Normal conduction. No STEMI.        Imaging Results          CTA Head and Neck (xpd) (Final result)  Result time 11/21/20 13:10:12    Final result by Juan Antonio Panchal DO (11/21/20 13:10:12)                 Impression:      No acute intracranial abnormality.  No high-grade stenosis or occlusion.  No intracranial aneurysm.      Electronically signed by: Juan Antonio Panchal  Date:    11/21/2020  Time:    13:10             Narrative:    EXAMINATION:  CTA HEAD AND NECK (XPD)    CLINICAL HISTORY:  Neuro deficit, acute, stroke suspected.    TECHNIQUE:  Non contrast low dose axial images were obtained thought the head. CT angiogram was performed from the level of the [place] to the top of the head following the IV administration of 100mL of Omnipaque 350.   Sagittal and coronal reconstructions and maximum intensity projection reconstructions were performed. Arterial stenosis percentages are based on NASCET measurement criteria. An immediate post-contrast CT head was also performed.    COMPARISON:  CT of the head and MRI of the brain from earlier the same date.  CTA of the head and neck from 02/08/2020.    FINDINGS:  CT head: The ventricles are normal in size without evidence of hydrocephalus. The brain parenchyma is within normal limits. No parenchymal mass, hemorrhage, edema or major vascular distribution infarct. No extra-axial blood or fluid collection. The cranium is intact. Mastoid air cells and paranasal sinuses are clear.      CTA head: Vertebrobasilar system is within normal limits without focal abnormality. The anterior, middle, and posterior cerebral arteries are within normal limits, without evidence of significant stenosis, focal occlusion, or intracranial  aneurysm formation.    CTA neck: The aortic arch maintains a normal branching pattern.  The common and internal carotid arteries are normal in course and caliber. No significant stenosis in either carotid bifurcation. The vertebral origins are patent. The cervical vertebral arteries are normal in course and caliber. The soft tissues of the neck are unremarkable.  The visualized lung apices are clear.  There is no acute fracture or subluxation of the cervical spine.                                 MRI Brain Without Contrast (Final result)  Result time 11/21/20 12:59:48    Final result by Juan Antonio Panchal DO (11/21/20 12:59:48)                 Impression:      1. No acute intracranial abnormality.  2. Mild scattered hyperintensities within the supratentorial white matter, likely representing chronic microvascular ischemic changes.      Electronically signed by: Juan Antonio Panchal  Date:    11/21/2020  Time:    12:59             Narrative:    EXAMINATION:  MRI BRAIN WITHOUT CONTRAST    CLINICAL HISTORY:  TIA, initial exam;    TECHNIQUE:  Multiplanar multisequence MR imaging of the brain was performed without intravenous contrast.    COMPARISON:  None available.    FINDINGS:  Ventricles are normal in size for age without evidence of hydrocephalus.  There are several mild scattered T2/FLAIR hyperintensities within the supratentorial white matter compatible with chronic microvascular ischemic changes.  No mass, hemorrhage, or recent or remote major vascular distribution infarct.  No extra-axial blood or fluid collections. Normal vascular flow voids.    Bone marrow signal intensity is normal. Paranasal sinuses and mastoid air cells are clear.                               CT Head Without Contrast (Final result)  Result time 11/21/20 11:56:16    Final result by Alexandra Rojas MD (11/21/20 11:56:16)                 Impression:      No acute intracranial abnormality identified, noting limited evaluation of the posterior  fossa due to beam hardening artifact.      Electronically signed by: Alexandra Rojas MD  Date:    11/21/2020  Time:    11:56             Narrative:    EXAMINATION:  CT HEAD WITHOUT CONTRAST    CLINICAL HISTORY:  Neuro deficit, acute, stroke suspected;    TECHNIQUE:  Low dose axial images were obtained through the head.  Coronal and sagittal reformations were also performed. Contrast was not administered.    COMPARISON:  None.    FINDINGS:  Beam hardening artifact related to a rings limits evaluation of posterior fossa.  The brain parenchyma is of normal attenuation with no evidence of intracranial hemorrhage, major vascular distribution infarct or mass effect.  There are no extra-axial masses or fluid collections.  The ventricular system is of normal size for age and midline.    Visualized paranasal sinuses and mastoid air cells are clear.  There is no evidence of skull fracture.                                 Medical Decision Making:   Initial Assessment:   This is a 54 y/o female with a past medical history of hypertension, depression and diabetes who presents with complaint of left sided headache and aphasia since 9:45 am. Plan to obtain basic labs, CT head and EKG.   Differential Diagnosis:   Electrolyte abnormality, hypoglycemia, CVA, spinal cord abnormality, infectious causes, Guillain [place], neuromuscular junction disease, muscle disease, endocrine abnormalities, sepsis.    Independently Interpreted Test(s):   I have ordered and independently interpreted X-rays - see prior notes.  I have ordered and independently interpreted EKG Reading(s) - see prior notes  Clinical Tests:   Lab Tests: Ordered and Reviewed  Radiological Study: Ordered and Reviewed  Medical Tests: Ordered and Reviewed  ED Management:    On re-evaluation, the patient's status has improved.  After complete ED evaluation, clinical impression is most consistent with headache, expressive aphasia.  CT head, CTA head and neck and MRI brain  negative for acute infarct.  She has been evaluated by neurology and they recommended outpatient follow up.  PCP follow-up within 2-3 days was recommended.    After taking into careful account the patient's history, physical exam findings, as well as empirical and objective data obtained throughout ED workup, I feel no emergent medical condition has been identified. No further evaluation or admission was felt to be required, and the patient is stable for discharge from the ED. The patient and any additional family present were updated with test results, overall clinical impression, and recommended further plan of care, including discharge instructions as provided and outpatient follow-up for continued evaluation and management as needed. All questions were answered. The patient expressed understanding and agreed with current plan for discharge and follow-up plan of care. Strict ED return precautions were provided, including return/worsening of current symptoms, new symptoms, or any other concerns.                     ED Course as of Nov 21 1346   Sat Nov 21, 2020   1151 Case discussed with Dr Barron who has low suspicion of CVA.  States that patient has had multiple similar presentations with negative workups.  Her expressive aphasia has resolved on Dr. Barron's exam.  Recommended CTA head and neck as well as MRI brain.  If negative, patient can be discharged.     [LD]   1341 Patient states that she is feeling much better.   All symptoms have resolved.     [LD]      ED Course User Index  [LD] Nancy Figueredo MD            Clinical Impression:     ICD-10-CM ICD-9-CM   1. Intractable episodic headache, unspecified headache type  R51.9 784.0   2. Expressive aphasia  R47.01 784.3                      Disposition:   Disposition: Discharged  Condition: Stable     ED Disposition Condition    Discharge Stable        ED Prescriptions     None        Follow-up Information     Follow up With Specialties Details Why Contact  Info    Evelin Guevara, NP Family Medicine Call today to arrange outpatient follow up with your primary care physician [address]  [address]  [place] [address]  [phone]                          I, Nancy Figueredo,  personally performed the services described in this documentation. All medical record entries made by the scribe were at my direction and in my presence.  I have reviewed the chart and agree that the record reflects my personal performance and is accurate and complete. Nancy Figueredo M.D. 1:46 PM11/21/2020                 Nancy Figueredo MD  11/21/20 1349

## 2020-11-28 ENCOUNTER — HOSPITAL ENCOUNTER (EMERGENCY)
Facility: HOSPITAL | Age: 55
Discharge: HOME | End: 2020-11-28
Attending: EMERGENCY MEDICINE
Payer: COMMERCIAL

## 2020-11-28 VITALS
DIASTOLIC BLOOD PRESSURE: 78 MMHG | BODY MASS INDEX: 50.85 KG/M2 | TEMPERATURE: 99 F | HEIGHT: 63 IN | WEIGHT: 287 LBS | SYSTOLIC BLOOD PRESSURE: 146 MMHG | RESPIRATION RATE: 21 BRPM | OXYGEN SATURATION: 98 % | HEART RATE: 86 BPM

## 2020-11-28 DIAGNOSIS — R52 BODY ACHES: ICD-10-CM

## 2020-11-28 DIAGNOSIS — B34.9 VIRAL SYNDROME: Primary | ICD-10-CM

## 2020-11-28 LAB
B-HCG UR QL: NEGATIVE
BILIRUB UR QL STRIP: NEGATIVE
CLARITY UR REFRACT.AUTO: CLEAR
COLOR UR AUTO: YELLOW
GLUCOSE UR QL STRIP: NEGATIVE
HGB UR QL STRIP: NEGATIVE
INFLUENZA A, MOLECULAR: NEGATIVE
INFLUENZA B, MOLECULAR: NEGATIVE
KETONES UR QL STRIP: NEGATIVE
LEUKOCYTE ESTERASE UR QL STRIP: NEGATIVE
NITRITE UR QL STRIP: NEGATIVE
PH UR STRIP: 5 [PH] (ref 5–8)
POCT GLUCOSE: 209 MG/DL (ref 70–110)
PROT UR QL STRIP: NEGATIVE
SARS-COV-2 RDRP RESP QL NAA+PROBE: NEGATIVE
SP GR UR STRIP: 1.02 (ref 1–1.03)
SPECIMEN SOURCE: NORMAL
URN SPEC COLLECT METH UR: NORMAL
UROBILINOGEN UR STRIP-ACNC: NEGATIVE EU/DL

## 2020-11-28 PROCEDURE — 25000003 PHARM REV CODE 250: Mod: ER | Performed by: PHYSICIAN ASSISTANT

## 2020-11-28 PROCEDURE — 93010 EKG 12-LEAD: ICD-10-PCS | Mod: ,,, | Performed by: INTERNAL MEDICINE

## 2020-11-28 PROCEDURE — 99285 EMERGENCY DEPT VISIT HI MDM: CPT | Mod: 25,ER

## 2020-11-28 PROCEDURE — 93005 ELECTROCARDIOGRAM TRACING: CPT | Mod: ER

## 2020-11-28 PROCEDURE — 81025 URINE PREGNANCY TEST: CPT | Mod: ER

## 2020-11-28 PROCEDURE — U0002 COVID-19 LAB TEST NON-CDC: HCPCS | Mod: ER

## 2020-11-28 PROCEDURE — 81003 URINALYSIS AUTO W/O SCOPE: CPT | Mod: ER

## 2020-11-28 PROCEDURE — 93010 ELECTROCARDIOGRAM REPORT: CPT | Mod: ,,, | Performed by: INTERNAL MEDICINE

## 2020-11-28 PROCEDURE — 82962 GLUCOSE BLOOD TEST: CPT | Mod: ER

## 2020-11-28 PROCEDURE — 87502 INFLUENZA DNA AMP PROBE: CPT | Mod: ER

## 2020-11-28 RX ORDER — IBUPROFEN 600 MG/1
600 TABLET ORAL
Status: COMPLETED | OUTPATIENT
Start: 2020-11-28 | End: 2020-11-28

## 2020-11-28 RX ADMIN — IBUPROFEN 600 MG: 600 TABLET, FILM COATED ORAL at 04:11

## 2020-11-28 NOTE — Clinical Note
"Barrera"Job Siddiqui was seen and treated in our emergency department on 11/28/2020.     COVID-19 is present in our communities across the state. There is limited testing for COVID at this time, so not all patients can be tested. In this situation, your employee meets the following criteria:    Barrera Siddiqui has met the criteria for COVID-19 testing and has a NEGATIVE result. The employee can return to work once they are asymptomatic for 72 hours without the use of fever reducing medications (Tylenol, Motrin, etc).     If you have any questions or concerns, or if I can be of further assistance, please do not hesitate to contact me.    Sincerely,             Tino Mckeon MD"

## 2020-11-28 NOTE — ED PROVIDER NOTES
Encounter Date: 11/28/2020       History     Chief Complaint   Patient presents with    COVID-19 Concerns     Pt c/o body aches since this morning. Tylenol at 1115.     55-year-old female presents to the emergency department for evaluation of acute onset generalized body aches, fever and nasal congestion.  She reports that the symptoms began gradually this morning and have been constant since onset.  She reports that she had a fever of 101.5 approximately 11:00 a.m. this morning and she took 2 Tylenol for symptom control.  She denies any headache, dizziness, vision changes, chest pain, cough, shortness of breath, abdominal pain, nausea, vomiting, diarrhea or dysuria.  She reports that several of her coworkers recently tested positive for COVID-19.  She reports that she has not checked her blood glucose level yet today.        Review of patient's allergies indicates:  No Known Allergies  Past Medical History:   Diagnosis Date    Depression     Diabetes mellitus     High cholesterol     Hypertension     Stroke     TIA (transient ischemic attack)      Past Surgical History:   Procedure Laterality Date    CHOLECYSTECTOMY      COLONOSCOPY N/A 10/18/2017    Procedure: COLONOSCOPY;  Surgeon: Donald Wright Jr., MD;  Location: [facility];  Service: Endoscopy;  Laterality: N/A;    KNEE SURGERY      TUBAL LIGATION      WRIST SURGERY       Family History   Problem Relation Age of Onset    Breast cancer Mother     Liver disease Maternal Grandmother      Social History     Tobacco Use    Smoking status: Former Smoker    Smokeless tobacco: Never Used   Substance Use Topics    Alcohol use: Yes     Comment: rare    Drug use: No     Review of Systems   Constitutional: Positive for chills and fever. Negative for activity change and appetite change.   HENT: Positive for congestion and rhinorrhea. Negative for ear discharge, ear pain, mouth sores, sore throat, trouble swallowing and voice change.    Eyes: Negative  for photophobia, discharge and visual disturbance.   Respiratory: Negative for shortness of breath.    Cardiovascular: Negative for chest pain.   Gastrointestinal: Negative for abdominal pain, constipation, diarrhea, nausea and vomiting.   Genitourinary: Negative for decreased urine volume, dysuria and frequency.   Musculoskeletal: Negative for back pain and neck pain.   Skin: Negative for rash.   Neurological: Negative for dizziness, syncope, weakness, light-headedness, numbness and headaches.       Physical Exam     Initial Vitals [11/28/20 1520]   BP Pulse Resp Temp SpO2   (!) 162/85 99 20 98.5 °F (36.9 °C) 96 %      MAP       --         Physical Exam    Nursing note and vitals reviewed.  Constitutional: She appears well-developed and well-nourished. She is not diaphoretic. No distress.   HENT:   Head: Normocephalic and atraumatic.   Right Ear: Tympanic membrane, external ear and ear canal normal.   Left Ear: Tympanic membrane, external ear and ear canal normal.   Nose: Nose normal.   Mouth/Throat: Uvula is midline and oropharynx is clear and moist. No oropharyngeal exudate, posterior oropharyngeal edema or posterior oropharyngeal erythema.   Eyes: Conjunctivae and EOM are normal. Pupils are equal, round, and reactive to light.   Neck: Normal range of motion. Neck supple.   Cardiovascular: Normal rate, regular rhythm and normal heart sounds.   Pulmonary/Chest: Breath sounds normal. No respiratory distress. She has no wheezes. She has no rhonchi. She has no rales. She exhibits no tenderness.   Abdominal: Soft. Bowel sounds are normal. She exhibits no distension. There is no abdominal tenderness. There is no rebound.   Lymphadenopathy:     She has no cervical adenopathy.   Neurological: She is alert and oriented to person, place, and time.   Skin: Skin is warm and dry.   Psychiatric: She has a normal mood and affect.         ED Course   Procedures  Labs Reviewed   POCT GLUCOSE - Abnormal; Notable for the following  components:       Result Value    POCT Glucose 209 (*)     All other components within normal limits   INFLUENZA A & B BY MOLECULAR   SARS-COV-2 RNA AMPLIFICATION, QUAL   PREGNANCY TEST, URINE RAPID    Narrative:     Specimen Source->Urine   URINALYSIS, REFLEX TO URINE CULTURE    Narrative:     Specimen Source->Urine   POCT GLUCOSE MONITORING CONTINUOUS          Imaging Results          X-Ray Chest AP Portable (Final result)  Result time 11/28/20 16:56:08    Final result by Levi Ng MD (11/28/20 16:56:08)                 Impression:      Poor evaluation of the lung bases due to breast attenuation articulated left lung    No definite acute process seen    Mild cardiomegaly suspected      Electronically signed by: Hernandez Sims  Date:    11/28/2020  Time:    16:56             Narrative:    EXAMINATION:  XR CHEST AP PORTABLE    CLINICAL HISTORY:  Pain, unspecified    TECHNIQUE:  Single frontal view of the chest was performed.    COMPARISON:  Prior    FINDINGS:  Mild cardiomegaly.  Mild perihilar vascular crowding.  Lungs are otherwise clear.  Soft tissues limited evaluation in the lung bases                                 Medical Decision Making:   Initial Assessment:   55-year-old female presents to the emergency department for evaluation of fever, body aches and nasal congestion.  Physical exam reveals a nontoxic-appearing female in no acute distress.  Patient is afebrile vital signs within normal limits.  Neurological exam reveals an alert and oriented patient.  TMs reveal no erythema.  Posterior pharynx reveals no erythema, edema or tonsillar exudate.  Neck is supple, no meningeal signs noted.  Lungs clear to auscultation bilaterally.  No respiratory distress or accessory muscle use noted.  Differential Diagnosis:   Chest x-ray ordered to assess possible pneumonia or consolidation  Influenza  COVID-19  Viral URI  Urinary tract infection  Hyperglycemia  Pregnancy  ED Management:  EKG reveal no acute ST  changes.  Point of care glucose 209.  UPT negative.  Urinalysis reveals no evidence of urinary tract infection.  Influenza negative.  COVID negative.  Chest x-ray report reveals no definitive acute process noted.  Discussed these findings at length with the patient verbalizes understanding and agreement course of treatment.  Instructed the patient to follow up with her primary care provider and to self quarantine per CDC recommendations for post exposure.  I discussed this patient at length with Dr. Mckeon who is in agreement course of treatment.                             Clinical Impression:       ICD-10-CM ICD-9-CM   1. Viral syndrome  B34.9 079.99   2. Body aches  R52 780.96                          ED Disposition Condition    Discharge Stable        ED Prescriptions     None        Follow-up Information     Follow up With Specialties Details Why Contact Info    Evelin Guevara NP Family Medicine   [address] [place] [address]  [address]  [facility] PRIMARY CARE  [place] [address]  [phone]                                         Charline Reis PA-C  11/28/20 2075

## 2020-11-28 NOTE — Clinical Note
"Barrera"Job Siddiqui was seen and treated in our emergency department on 11/28/2020.     COVID-19 is present in our communities across the state. There is limited testing for COVID at this time, so not all patients can be tested. In this situation, your employee meets the following criteria:    Barrera Siddiqui has met the criteria for COVID-19 testing based upon symptoms, travel, and/or potential exposure. The test has been completed and is pending results at this time. During this time the employee is not able to work and should be quarantined per the Centers for Disease Control timelines.     If you have any questions or concerns, or if I can be of further assistance, please do not hesitate to contact me.    Sincerely,             Charline Reis PA-C"

## 2021-01-04 NOTE — SUBJECTIVE & OBJECTIVE
"  Woke up with symptoms?: no    Recent bleeding noted: no  Does the patient take any Blood Thinners? no  Medications: Antiplatelets:  aspirin      Past Medical History: hypertension, diabetes, hyperlipidemia and tia    Past Surgical History: no relevant surgical history    Family History: no relevant history    Social History: drinking and former smoker    Allergies: No Known Allergies     Review of Systems   Constitutional: Negative for chills and fever.   HENT: Negative for congestion and sore throat.    Eyes: Negative for visual disturbance.   Respiratory: Negative for shortness of breath.    Cardiovascular: Negative for chest pain and palpitations.   Gastrointestinal: Negative for blood in stool, diarrhea, nausea and vomiting.   Genitourinary: Negative for difficulty urinating and hematuria.   Musculoskeletal: Negative for back pain and neck pain.   Neurological: Positive for speech difficulty and headaches. Negative for dizziness.     Objective:   Vitals: Blood pressure 134/70, pulse 84, temperature 97.9 °F (36.6 °C), temperature source Oral, resp. rate 20, height 5' 3" (1.6 m), weight 126.1 kg (278 lb), last menstrual period 07/29/2018, SpO2 99 %.     CT READ: Yes  No hemmorhage. No mass effect. No early infarct signs.     Physical Exam  Vitals signs reviewed.   Constitutional:       Appearance: Normal appearance. She is well-developed.   HENT:      Head: Normocephalic and atraumatic.      Nose: Nose normal.   Eyes:      Pupils: Pupils are equal, round, and reactive to light.   Cardiovascular:      Rate and Rhythm: Normal rate and regular rhythm.   Pulmonary:      Effort: Pulmonary effort is normal.   Neurological:      Mental Status: She is alert and oriented to person, place, and time.      Cranial Nerves: No cranial nerve deficit.      Sensory: No sensory deficit.      Motor: No weakness.      Coordination: Coordination normal.      Comments: Speech pattern is halting.   Psychiatric:         Mood and " Affect: Mood normal.

## 2021-01-04 NOTE — HPI
56 y/o female with a past medical history of HTN,DM, and TIA who presents with complaint of left sided headache and expressive aphasia since 9:45 am.  Pt was at work when co-workers noticed the patient was having difficulty speaking.   Pt reports symptoms have progressively worsened since onset.

## 2021-01-04 NOTE — CONSULTS
[facility]  Vascular Neurology  Comprehensive Stroke Center  Tele-Consultation Note      Consults    Consulting Provider: JORGE CHOW  Current Providers  No providers found    Patient Location:  [facility] EMERGENCY DEPARTMENT Emergency Department  Spoke hospital nurse at bedside with patient assisting consultant.     Patient information was obtained from patient.         Assessment/Plan:     STROKE DOCUMENTATION     Acute Stroke Times:   Acute Stroke Times   Last Known Normal Date: 11/21/20  Last Known Normal Time: 0945  Symptom Onset Date: 11/21/20  Symptom Onset Time: 0945  Stroke Team Called Date: 11/21/20  Stroke Team Called Time: 1132  Stroke Team Arrival Date: 11/21/20  Stroke Team Arrival Time: 1135  CT Interpretation Time: 1135    NIH Scale:        Modified [place] Score: 0  Michael Coma Scale:    ABCD2 Score:    [id number] Score:   HAS -BLED Score:   ICH Score:   Hunt & Acevedo Classification:       Diagnoses:   TIA (transient ischemic attack)  TIA  Antithrombotics for secondary stroke prevention: Antiplatelets: Aspirin: 81 mg daily  Clopidogrel: 75 mg daily    Statins for secondary stroke prevention and hyperlipidemia, if present:   Statins: Atorvastatin- 80 mg daily    Aggressive risk factor modification: HTN, DM, HLD, Obesity     Rehab efforts: The patient has been evaluated by a stroke team provider and the therapy needs have been fully considered based off the presenting complaints and exam findings. The following therapy evaluations are needed: SLP evaluate and treat    Diagnostics ordered/pending: CTA Head to assess vasculature , CTA Neck/Arch to assess vasculature, HgbA1C to assess blood glucose levels, Lipid Profile to assess cholesterol levels, MRI head without contrast to assess brain parenchyma, TTE to assess cardiac function/status     VTE prophylaxis: None: Reason for No Pharmacological VTE Prophylaxis: Ambulating with or without assistance    BP  "parameters: TIA: SBP <220 until imaging confirmation of no infarct             Blood pressure 134/70, pulse 84, temperature 97.9 °F (36.6 °C), temperature source Oral, resp. rate 20, height 5' 3" (1.6 m), weight 126.1 kg (278 lb), last menstrual period 07/29/2018, SpO2 99 %.  Alteplase Eligible?: Yes  Alteplase Recommendation: Alteplase not recommended due to Suspected stroke mimic , Symptoms resolved  and Mild Non-Disabling Symptoms  Possible Interventional Revascularization Candidate? No; No ischemic penumbra and No; No significant neurological deficit    Disposition Recommendation: admit to inpatient  do not transfer    Subjective:     History of Present Illness:  54 y/o female with a past medical history of HTN,DM, and TIA who presents with complaint of left sided headache and expressive aphasia since 9:45 am.  Pt was at work when co-workers noticed the patient was having difficulty speaking.   Pt reports symptoms have progressively worsened since onset.      Woke up with symptoms?: no    Recent bleeding noted: no  Does the patient take any Blood Thinners? no  Medications: Antiplatelets:  aspirin      Past Medical History: hypertension, diabetes, hyperlipidemia and tia    Past Surgical History: no relevant surgical history    Family History: no relevant history    Social History: drinking and former smoker    Allergies: No Known Allergies     Review of Systems   Constitutional: Negative for chills and fever.   HENT: Negative for congestion and sore throat.    Eyes: Negative for visual disturbance.   Respiratory: Negative for shortness of breath.    Cardiovascular: Negative for chest pain and palpitations.   Gastrointestinal: Negative for blood in stool, diarrhea, nausea and vomiting.   Genitourinary: Negative for difficulty urinating and hematuria.   Musculoskeletal: Negative for back pain and neck pain.   Neurological: Positive for speech difficulty and headaches. Negative for dizziness.     Objective: " "  Vitals: Blood pressure 134/70, pulse 84, temperature 97.9 °F (36.6 °C), temperature source Oral, resp. rate 20, height 5' 3" (1.6 m), weight 126.1 kg (278 lb), last menstrual period 07/29/2018, SpO2 99 %.     CT READ: Yes  No hemmorhage. No mass effect. No early infarct signs.     Physical Exam  Vitals signs reviewed.   Constitutional:       Appearance: Normal appearance. She is well-developed.   HENT:      Head: Normocephalic and atraumatic.      Nose: Nose normal.   Eyes:      Pupils: Pupils are equal, round, and reactive to light.   Cardiovascular:      Rate and Rhythm: Normal rate and regular rhythm.   Pulmonary:      Effort: Pulmonary effort is normal.   Neurological:      Mental Status: She is alert and oriented to person, place, and time.      Cranial Nerves: No cranial nerve deficit.      Sensory: No sensory deficit.      Motor: No weakness.      Coordination: Coordination normal.      Comments: Speech pattern is halting.   Psychiatric:         Mood and Affect: Mood normal.               Recommended the emergency room physician to have a brief discussion with the patient and/or family if available regarding the risks and benefits of treatment, and to briefly document the occurrence of that discussion in his clinical encounter note.     The attending portion of this evaluation, treatment, and documentation was performed per Sherley Barron MD via audiovisual.    Billing code:  (non-intervention mild to moderate stroke, TIA, some mimics)    · This patient has a critical neurological condition/illness, with some potential for high morbidity and mortality.  · There is a moderate probability for acute neurological change leading to clinical and possibly life-threatening deterioration requiring highest level of physician preparedness for urgent intervention.  · Care was coordinated with other physicians involved in the patient's care.  · Radiologic studies and laboratory data were reviewed and " interpreted, and plan of care was re-assessed based on the results.  · Diagnosis, treatment options and prognosis may have been discussed with the patient and/or family members or caregiver.      In your opinion, this was a: Tier 1 Van Negative    Consult End Time: 1201     Sherley Barron MD  [facility]  Vascular Neurology   [facility]

## 2021-01-04 NOTE — ASSESSMENT & PLAN NOTE
TIA  Antithrombotics for secondary stroke prevention: Antiplatelets: Aspirin: 81 mg daily  Clopidogrel: 75 mg daily    Statins for secondary stroke prevention and hyperlipidemia, if present:   Statins: Atorvastatin- 80 mg daily    Aggressive risk factor modification: HTN, DM, HLD, Obesity     Rehab efforts: The patient has been evaluated by a stroke team provider and the therapy needs have been fully considered based off the presenting complaints and exam findings. The following therapy evaluations are needed: SLP evaluate and treat    Diagnostics ordered/pending: CTA Head to assess vasculature , CTA Neck/Arch to assess vasculature, HgbA1C to assess blood glucose levels, Lipid Profile to assess cholesterol levels, MRI head without contrast to assess brain parenchyma, TTE to assess cardiac function/status     VTE prophylaxis: None: Reason for No Pharmacological VTE Prophylaxis: Ambulating with or without assistance    BP parameters: TIA: SBP <220 until imaging confirmation of no infarct

## 2021-02-04 ENCOUNTER — HOSPITAL ENCOUNTER (EMERGENCY)
Facility: HOSPITAL | Age: 56
Discharge: HOME | End: 2021-02-04
Attending: EMERGENCY MEDICINE
Payer: COMMERCIAL

## 2021-02-04 VITALS
OXYGEN SATURATION: 96 % | HEIGHT: 63 IN | TEMPERATURE: 99 F | WEIGHT: 280 LBS | SYSTOLIC BLOOD PRESSURE: 121 MMHG | RESPIRATION RATE: 15 BRPM | DIASTOLIC BLOOD PRESSURE: 64 MMHG | BODY MASS INDEX: 49.61 KG/M2 | HEART RATE: 79 BPM

## 2021-02-04 DIAGNOSIS — K21.9 GASTROESOPHAGEAL REFLUX DISEASE, UNSPECIFIED WHETHER ESOPHAGITIS PRESENT: Primary | ICD-10-CM

## 2021-02-04 DIAGNOSIS — R07.9 CHEST PAIN: ICD-10-CM

## 2021-02-04 LAB
ALBUMIN SERPL BCP-MCNC: 4.7 G/DL (ref 3.5–5.2)
ALP SERPL-CCNC: 140 U/L (ref 38–126)
ALT SERPL W/O P-5'-P-CCNC: 48 U/L (ref 10–44)
ANION GAP SERPL CALC-SCNC: 14 MMOL/L (ref 8–16)
AST SERPL-CCNC: 94 U/L (ref 15–46)
BASOPHILS # BLD AUTO: 0.09 K/UL (ref 0–0.2)
BASOPHILS NFR BLD: 0.7 % (ref 0–1.9)
BILIRUB SERPL-MCNC: 0.6 MG/DL (ref 0.1–1)
BILIRUB UR QL STRIP: NEGATIVE
CALCIUM SERPL-MCNC: 9.9 MG/DL (ref 8.7–10.5)
CHLORIDE SERPL-SCNC: 105 MMOL/L (ref 95–110)
CLARITY UR REFRACT.AUTO: CLEAR
CO2 SERPL-SCNC: 23 MMOL/L (ref 23–29)
COLOR UR AUTO: YELLOW
CREAT SERPL-MCNC: 0.58 MG/DL (ref 0.5–1.4)
DIFFERENTIAL METHOD: ABNORMAL
EOSINOPHIL # BLD AUTO: 0.4 K/UL (ref 0–0.5)
EOSINOPHIL NFR BLD: 3 % (ref 0–8)
ERYTHROCYTE [DISTWIDTH] IN BLOOD BY AUTOMATED COUNT: 14.4 % (ref 11.5–14.5)
EST. GFR  (AFRICAN AMERICAN): >60 ML/MIN/1.73 M^2
EST. GFR  (NON AFRICAN AMERICAN): >60 ML/MIN/1.73 M^2
GLUCOSE SERPL-MCNC: 113 MG/DL (ref 70–110)
GLUCOSE UR QL STRIP: NEGATIVE
HCT VFR BLD AUTO: 36.6 % (ref 37–48.5)
HGB BLD-MCNC: 11.4 G/DL (ref 12–16)
HGB UR QL STRIP: NEGATIVE
IMM GRANULOCYTES # BLD AUTO: 0.05 K/UL (ref 0–0.04)
IMM GRANULOCYTES NFR BLD AUTO: 0.4 % (ref 0–0.5)
INFLUENZA A, MOLECULAR: NEGATIVE
INFLUENZA B, MOLECULAR: NEGATIVE
KETONES UR QL STRIP: NEGATIVE
LEUKOCYTE ESTERASE UR QL STRIP: NEGATIVE
LYMPHOCYTES # BLD AUTO: 7.2 K/UL (ref 1–4.8)
LYMPHOCYTES NFR BLD: 53.9 % (ref 18–48)
MCH RBC QN AUTO: 26.6 PG (ref 27–31)
MCHC RBC AUTO-ENTMCNC: 31.1 G/DL (ref 32–36)
MCV RBC AUTO: 85 FL (ref 82–98)
MONOCYTES # BLD AUTO: 0.8 K/UL (ref 0.3–1)
MONOCYTES NFR BLD: 5.6 % (ref 4–15)
NEUTROPHILS # BLD AUTO: 4.9 K/UL (ref 1.8–7.7)
NEUTROPHILS NFR BLD: 36.4 % (ref 38–73)
NITRITE UR QL STRIP: NEGATIVE
NRBC BLD-RTO: 0 /100 WBC
NT-PROBNP SERPL-MCNC: 40 PG/ML (ref 5–900)
PH UR STRIP: 7 [PH] (ref 5–8)
PLATELET # BLD AUTO: 307 K/UL (ref 150–350)
PMV BLD AUTO: 11.8 FL (ref 9.2–12.9)
POTASSIUM SERPL-SCNC: 3.5 MMOL/L (ref 3.5–5.1)
PROT SERPL-MCNC: 9 G/DL (ref 6–8.4)
PROT UR QL STRIP: NEGATIVE
RBC # BLD AUTO: 4.29 M/UL (ref 4–5.4)
SARS-COV-2 RDRP RESP QL NAA+PROBE: NEGATIVE
SODIUM SERPL-SCNC: 142 MMOL/L (ref 136–145)
SP GR UR STRIP: 1 (ref 1–1.03)
SPECIMEN SOURCE: NORMAL
TROPONIN I SERPL-MCNC: <0.012 NG/ML (ref 0.01–0.03)
TROPONIN I SERPL-MCNC: <0.012 NG/ML (ref 0.01–0.03)
URN SPEC COLLECT METH UR: NORMAL
UROBILINOGEN UR STRIP-ACNC: NEGATIVE EU/DL
UUN UR-MCNC: 14 MG/DL (ref 7–17)
WBC # BLD AUTO: 13.36 K/UL (ref 3.9–12.7)

## 2021-02-04 PROCEDURE — 84484 ASSAY OF TROPONIN QUANT: CPT | Mod: ER

## 2021-02-04 PROCEDURE — 93010 ELECTROCARDIOGRAM REPORT: CPT | Mod: ,,, | Performed by: INTERNAL MEDICINE

## 2021-02-04 PROCEDURE — 93010 EKG 12-LEAD: ICD-10-PCS | Mod: ,,, | Performed by: INTERNAL MEDICINE

## 2021-02-04 PROCEDURE — 87502 INFLUENZA DNA AMP PROBE: CPT | Mod: ER

## 2021-02-04 PROCEDURE — 80053 COMPREHEN METABOLIC PANEL: CPT | Mod: ER

## 2021-02-04 PROCEDURE — 94760 N-INVAS EAR/PLS OXIMETRY 1: CPT | Mod: ER

## 2021-02-04 PROCEDURE — 83880 ASSAY OF NATRIURETIC PEPTIDE: CPT | Mod: ER

## 2021-02-04 PROCEDURE — 96374 THER/PROPH/DIAG INJ IV PUSH: CPT | Mod: ER

## 2021-02-04 PROCEDURE — 99285 EMERGENCY DEPT VISIT HI MDM: CPT | Mod: 25,ER

## 2021-02-04 PROCEDURE — 93005 ELECTROCARDIOGRAM TRACING: CPT | Mod: ER

## 2021-02-04 PROCEDURE — 25000003 PHARM REV CODE 250: Mod: ER | Performed by: PHYSICIAN ASSISTANT

## 2021-02-04 PROCEDURE — 85025 COMPLETE CBC W/AUTO DIFF WBC: CPT | Mod: ER

## 2021-02-04 PROCEDURE — U0002 COVID-19 LAB TEST NON-CDC: HCPCS | Mod: ER

## 2021-02-04 PROCEDURE — 81003 URINALYSIS AUTO W/O SCOPE: CPT | Mod: ER

## 2021-02-04 PROCEDURE — 63600175 PHARM REV CODE 636 W HCPCS: Mod: ER | Performed by: PHYSICIAN ASSISTANT

## 2021-02-04 RX ORDER — MORPHINE SULFATE 4 MG/ML
2 INJECTION, SOLUTION INTRAMUSCULAR; INTRAVENOUS
Status: COMPLETED | OUTPATIENT
Start: 2021-02-04 | End: 2021-02-04

## 2021-02-04 RX ORDER — OMEPRAZOLE 20 MG/1
20 CAPSULE, DELAYED RELEASE ORAL DAILY
Qty: 30 CAPSULE | Refills: 0 | Status: SHIPPED | OUTPATIENT
Start: 2021-02-04 | End: 2022-12-30

## 2021-02-04 RX ORDER — ASPIRIN 325 MG
325 TABLET ORAL
Status: COMPLETED | OUTPATIENT
Start: 2021-02-04 | End: 2021-02-04

## 2021-02-04 RX ADMIN — ASPIRIN 325 MG ORAL TABLET 325 MG: 325 PILL ORAL at 01:02

## 2021-02-04 RX ADMIN — LIDOCAINE HYDROCHLORIDE: 20 SOLUTION ORAL; TOPICAL at 05:02

## 2021-02-04 RX ADMIN — MORPHINE SULFATE 2 MG: 4 INJECTION INTRAVENOUS at 03:02

## 2021-03-09 ENCOUNTER — HOSPITAL ENCOUNTER (EMERGENCY)
Facility: HOSPITAL | Age: 56
Discharge: HOME | End: 2021-03-09
Attending: EMERGENCY MEDICINE
Payer: COMMERCIAL

## 2021-03-09 VITALS
OXYGEN SATURATION: 99 % | WEIGHT: 287 LBS | TEMPERATURE: 99 F | DIASTOLIC BLOOD PRESSURE: 82 MMHG | HEART RATE: 95 BPM | BODY MASS INDEX: 50.85 KG/M2 | RESPIRATION RATE: 18 BRPM | SYSTOLIC BLOOD PRESSURE: 128 MMHG | HEIGHT: 63 IN

## 2021-03-09 DIAGNOSIS — L02.91 ABSCESS: Primary | ICD-10-CM

## 2021-03-09 LAB — POCT GLUCOSE: 209 MG/DL (ref 70–110)

## 2021-03-09 PROCEDURE — 99283 EMERGENCY DEPT VISIT LOW MDM: CPT | Mod: 25,ER

## 2021-03-09 PROCEDURE — 25000003 PHARM REV CODE 250: Mod: ER | Performed by: PHYSICIAN ASSISTANT

## 2021-03-09 PROCEDURE — 10060 I&D ABSCESS SIMPLE/SINGLE: CPT | Mod: ER

## 2021-03-09 PROCEDURE — 82962 GLUCOSE BLOOD TEST: CPT | Mod: ER

## 2021-03-09 RX ORDER — CLINDAMYCIN HYDROCHLORIDE 150 MG/1
450 CAPSULE ORAL EVERY 8 HOURS
Qty: 63 CAPSULE | Refills: 0 | Status: SHIPPED | OUTPATIENT
Start: 2021-03-09 | End: 2021-03-16

## 2021-03-09 RX ORDER — LIDOCAINE HYDROCHLORIDE 10 MG/ML
10 INJECTION, SOLUTION EPIDURAL; INFILTRATION; INTRACAUDAL; PERINEURAL
Status: COMPLETED | OUTPATIENT
Start: 2021-03-09 | End: 2021-03-09

## 2021-03-09 RX ADMIN — LIDOCAINE HYDROCHLORIDE 100 MG: 10 INJECTION, SOLUTION EPIDURAL; INFILTRATION; INTRACAUDAL at 06:03

## 2021-07-01 ENCOUNTER — PATIENT MESSAGE (OUTPATIENT)
Dept: ADMINISTRATIVE | Facility: OTHER | Age: 56
End: 2021-07-01

## 2022-01-08 ENCOUNTER — HOSPITAL ENCOUNTER (EMERGENCY)
Facility: HOSPITAL | Age: 57
Discharge: HOME | End: 2022-01-08
Attending: FAMILY MEDICINE
Payer: COMMERCIAL

## 2022-01-08 VITALS
SYSTOLIC BLOOD PRESSURE: 141 MMHG | DIASTOLIC BLOOD PRESSURE: 89 MMHG | HEART RATE: 95 BPM | BODY MASS INDEX: 50.85 KG/M2 | HEIGHT: 63 IN | WEIGHT: 287 LBS | OXYGEN SATURATION: 96 % | RESPIRATION RATE: 16 BRPM | TEMPERATURE: 100 F

## 2022-01-08 DIAGNOSIS — R51.9 NONINTRACTABLE HEADACHE, UNSPECIFIED CHRONICITY PATTERN, UNSPECIFIED HEADACHE TYPE: Primary | ICD-10-CM

## 2022-01-08 DIAGNOSIS — Z20.822 EXPOSURE TO COVID-19 VIRUS: ICD-10-CM

## 2022-01-08 PROBLEM — K59.00 CONSTIPATION: Status: ACTIVE | Noted: 2022-01-08

## 2022-01-08 PROBLEM — F33.1 MODERATE RECURRENT MAJOR DEPRESSION: Status: ACTIVE | Noted: 2022-01-08

## 2022-01-08 PROBLEM — E55.9 VITAMIN D DEFICIENCY: Status: ACTIVE | Noted: 2022-01-08

## 2022-01-08 PROBLEM — F41.9 ANXIETY DISORDER: Status: ACTIVE | Noted: 2022-01-08

## 2022-01-08 PROBLEM — F51.05 INSOMNIA RELATED TO ANOTHER MENTAL DISORDER: Status: ACTIVE | Noted: 2022-01-08

## 2022-01-08 LAB — POCT GLUCOSE: 319 MG/DL (ref 70–110)

## 2022-01-08 PROCEDURE — 96372 THER/PROPH/DIAG INJ SC/IM: CPT | Mod: 59,ER

## 2022-01-08 PROCEDURE — 96374 THER/PROPH/DIAG INJ IV PUSH: CPT | Mod: ER

## 2022-01-08 PROCEDURE — U0005 INFEC AGEN DETEC AMPLI PROBE: HCPCS | Performed by: PHYSICIAN ASSISTANT

## 2022-01-08 PROCEDURE — U0003 INFECTIOUS AGENT DETECTION BY NUCLEIC ACID (DNA OR RNA); SEVERE ACUTE RESPIRATORY SYNDROME CORONAVIRUS 2 (SARS-COV-2) (CORONAVIRUS DISEASE [COVID-19]), AMPLIFIED PROBE TECHNIQUE, MAKING USE OF HIGH THROUGHPUT TECHNOLOGIES AS DESCRIBED BY CMS-2020-01-R: HCPCS | Mod: ER | Performed by: PHYSICIAN ASSISTANT

## 2022-01-08 PROCEDURE — 96375 TX/PRO/DX INJ NEW DRUG ADDON: CPT | Mod: ER

## 2022-01-08 PROCEDURE — 82962 GLUCOSE BLOOD TEST: CPT | Mod: ER

## 2022-01-08 PROCEDURE — 63600175 PHARM REV CODE 636 W HCPCS: Mod: ER | Performed by: PHYSICIAN ASSISTANT

## 2022-01-08 PROCEDURE — 99284 EMERGENCY DEPT VISIT MOD MDM: CPT | Mod: 25,ER

## 2022-01-08 RX ORDER — BUTALBITAL, ACETAMINOPHEN AND CAFFEINE 50; 325; 40 MG/1; MG/1; MG/1
1 TABLET ORAL EVERY 4 HOURS PRN
Qty: 12 TABLET | Refills: 0 | Status: SHIPPED | OUTPATIENT
Start: 2022-01-08 | End: 2022-12-30

## 2022-01-08 RX ORDER — DIPHENHYDRAMINE HYDROCHLORIDE 50 MG/ML
25 INJECTION INTRAMUSCULAR; INTRAVENOUS
Status: COMPLETED | OUTPATIENT
Start: 2022-01-08 | End: 2022-01-08

## 2022-01-08 RX ORDER — KETOROLAC TROMETHAMINE 30 MG/ML
30 INJECTION, SOLUTION INTRAMUSCULAR; INTRAVENOUS
Status: COMPLETED | OUTPATIENT
Start: 2022-01-08 | End: 2022-01-08

## 2022-01-08 RX ORDER — METOCLOPRAMIDE HYDROCHLORIDE 5 MG/ML
10 INJECTION INTRAMUSCULAR; INTRAVENOUS
Status: COMPLETED | OUTPATIENT
Start: 2022-01-08 | End: 2022-01-08

## 2022-01-08 RX ADMIN — KETOROLAC TROMETHAMINE 30 MG: 30 INJECTION, SOLUTION INTRAMUSCULAR at 05:01

## 2022-01-08 RX ADMIN — DIPHENHYDRAMINE HYDROCHLORIDE 25 MG: 50 INJECTION INTRAMUSCULAR; INTRAVENOUS at 06:01

## 2022-01-08 RX ADMIN — METOCLOPRAMIDE 10 MG: 5 INJECTION, SOLUTION INTRAMUSCULAR; INTRAVENOUS at 06:01

## 2022-01-08 NOTE — ED PROVIDER NOTES
Encounter Date: 1/8/2022       History     Chief Complaint   Patient presents with    headache and cough     Pt states she has a headache and cough x 4 days and her grandchild has covid right now. C/O chills.      .HPI: Barrera Siddiqui, a 56 y.o. female  has a past medical history of Depression, Diabetes mellitus, High cholesterol, Hypertension, Stroke, and TIA (transient ischemic attack).     She presents to the ED for evaluation of HA and cough after exposure to covid.  Tried tylenol with little improvement.  + exposure to covid.  Vaccinated.          The history is provided by the patient.     Review of patient's allergies indicates:  No Known Allergies  Past Medical History:   Diagnosis Date    Depression     Diabetes mellitus     High cholesterol     Hypertension     Stroke     TIA (transient ischemic attack)      Past Surgical History:   Procedure Laterality Date    CHOLECYSTECTOMY      COLONOSCOPY N/A 10/18/2017    Procedure: COLONOSCOPY;  Surgeon: Donald Wright Jr., MD;  Location: [facility];  Service: Endoscopy;  Laterality: N/A;    KNEE SURGERY      TUBAL LIGATION      WRIST SURGERY       Family History   Problem Relation Age of Onset    Breast cancer Mother     Liver disease Maternal Grandmother      Social History     Tobacco Use    Smoking status: Former Smoker    Smokeless tobacco: Never Used   Substance Use Topics    Alcohol use: Yes     Comment: rare    Drug use: No     Review of Systems   Constitutional: Negative for fever.   Respiratory: Positive for cough. Negative for shortness of breath.    Cardiovascular: Negative for chest pain.   Gastrointestinal: Negative for abdominal distention, nausea and vomiting.   Skin: Negative for color change and rash.   Neurological: Positive for headaches.   Psychiatric/Behavioral: Negative for agitation.   All other systems reviewed and are negative.      Physical Exam     Initial Vitals [01/08/22 1740]   BP Pulse Resp Temp SpO2   (!) 141/89 95 16  "99.6 °F (37.6 °C) 96 %      MAP       --         Physical Exam    Nursing note and vitals reviewed.  Constitutional: She appears well-developed and well-nourished. She is not diaphoretic. No distress.   HENT:   Head: Normocephalic and atraumatic.   Right Ear: External ear normal.   Left Ear: External ear normal.   Nose: Nose normal.   Eyes: Conjunctivae and EOM are normal.   Neck:   Normal range of motion.  Cardiovascular: Normal rate and regular rhythm.   Pulmonary/Chest: No respiratory distress.   Musculoskeletal:         General: Normal range of motion.      Cervical back: Normal range of motion.     Neurological: She is alert and oriented to person, place, and time.   CN 2-12 grossly intact    Skin: No rash noted.   Psychiatric: She has a normal mood and affect. Thought content normal.         ED Course   Procedures  Labs Reviewed   POCT GLUCOSE - Abnormal; Notable for the following components:       Result Value    POCT Glucose 319 (*)     All other components within normal limits   SARS-COV-2 (COVID-19) QUALITATIVE PCR          Imaging Results    None          Medications   ketorolac injection 30 mg (30 mg Intramuscular Given 1/8/22 1752)   metoclopramide HCl injection 10 mg (10 mg Intravenous Given 1/8/22 1853)   diphenhydrAMINE injection 25 mg (25 mg Intravenous Given 1/8/22 1853)     Medical Decision Making:   Initial Assessment:   Covid exposure, HA   Differential Diagnosis:   Tension headache, migraine, cluster headache, sinus headache, covid   ED Management:  COVID pending.  HA improved with toradol, benadryl and compazine.  No neck stiffness, vision changes, fever, rash, meningismus/neck stiffness to suggest pseudotumor cerebri or meningitis.  No pain over temporal arteries or vision changes/loss to suggest temporal arteritis.  No "thunderclap onset" or neck stiffness to suggest spontaneous SAH/ICH.  No lancinated pain to eyes with tearing to suggest cluster headache.  No sinus pressure or nasal " "congestion to suggest sinus headache.  Patient's headache is not in a "band like" distrubution to suggest tension headache.  Vital signs do not indicate sepsis, hypoxia nor respiratory distress, and in my professional opinion the patient is well enough for discharge home. The patient was provided with discharge instructions on self-care and how to quarantine at home. I reinforced this advice and the dangers to family and public with failure to comply. We will proceed with symptomatic treatment. The patient was also given a return to work note, if applicable. Return precautions discussed with the patient. The patient expressed understanding to my instructions.                         Clinical Impression:   Final diagnoses:  [R51.9] Nonintractable headache, unspecified chronicity pattern, unspecified headache type (Primary)  [Z20.822] Exposure to COVID-19 virus          ED Disposition Condition    Discharge Stable        ED Prescriptions     Medication Sig Dispense Start Date End Date Auth. Provider    butalbital-acetaminophen-caffeine -40 mg (FIORICET, ESGIC) -40 mg per tablet Take 1 tablet by mouth every 4 (four) hours as needed for Pain. 12 tablet 1/8/2022  Ana Baer PA-C        Follow-up Information     Follow up With Specialties Details Why Contact Info    Evelin Guevara NP Family Medicine   [address] [place] [address]  [address]  [facility] CARE  [place] [address]  [phone]             Ana Baer PA-C  01/08/22 2148    "

## 2022-01-08 NOTE — Clinical Note
"Barrera"Job Siddiqui was seen and treated in our emergency department on 1/8/2022.     COVID-19 is present in our communities across the state. There is limited testing for COVID at this time, so not all patients can be tested. In this situation, your employee meets the following criteria:    Barrera Siddiqui has met the criteria for COVID-19 testing based upon symptoms, travel, and/or potential exposure. The test has been completed and is pending results at this time. During this time the employee is not able to work and should be quarantined per the Centers for Disease Control timelines.     If you have any questions or concerns, or if I can be of further assistance, please do not hesitate to contact me.    Sincerely,             Ana Baer PA-C"

## 2022-01-10 DIAGNOSIS — U07.1 COVID-19 VIRUS DETECTED: ICD-10-CM

## 2022-01-10 LAB
SARS-COV-2 RNA RESP QL NAA+PROBE: DETECTED
SARS-COV-2- CYCLE NUMBER: 23

## 2022-05-04 ENCOUNTER — HOSPITAL ENCOUNTER (EMERGENCY)
Facility: HOSPITAL | Age: 57
Discharge: HOME | End: 2022-05-05
Attending: EMERGENCY MEDICINE
Payer: COMMERCIAL

## 2022-05-04 VITALS
OXYGEN SATURATION: 99 % | DIASTOLIC BLOOD PRESSURE: 83 MMHG | TEMPERATURE: 98 F | SYSTOLIC BLOOD PRESSURE: 144 MMHG | HEIGHT: 63 IN | HEART RATE: 90 BPM | RESPIRATION RATE: 20 BRPM | BODY MASS INDEX: 49.61 KG/M2 | WEIGHT: 280 LBS

## 2022-05-04 DIAGNOSIS — E11.65 POORLY CONTROLLED DIABETES MELLITUS: ICD-10-CM

## 2022-05-04 DIAGNOSIS — A41.9 SEPSIS: ICD-10-CM

## 2022-05-04 DIAGNOSIS — Z59.86 PATIENT CANNOT AFFORD MEDICATIONS: ICD-10-CM

## 2022-05-04 DIAGNOSIS — L02.211 ABDOMINAL WALL ABSCESS: Primary | ICD-10-CM

## 2022-05-04 DIAGNOSIS — E66.01 MORBID OBESITY: ICD-10-CM

## 2022-05-04 LAB
ALBUMIN SERPL BCP-MCNC: 4.2 G/DL (ref 3.5–5.2)
ALP SERPL-CCNC: 177 U/L (ref 38–126)
ALT SERPL W/O P-5'-P-CCNC: 60 U/L (ref 10–44)
ANION GAP SERPL CALC-SCNC: 13 MMOL/L (ref 8–16)
AST SERPL-CCNC: 102 U/L (ref 15–46)
BACTERIA #/AREA URNS AUTO: NORMAL /HPF
BASOPHILS # BLD AUTO: 0.07 K/UL (ref 0–0.2)
BASOPHILS NFR BLD: 0.8 % (ref 0–1.9)
BILIRUB SERPL-MCNC: 0.5 MG/DL (ref 0.1–1)
BILIRUB UR QL STRIP: NEGATIVE
CALCIUM SERPL-MCNC: 8.7 MG/DL (ref 8.7–10.5)
CHLORIDE SERPL-SCNC: 103 MMOL/L (ref 95–110)
CLARITY UR REFRACT.AUTO: CLEAR
CO2 SERPL-SCNC: 22 MMOL/L (ref 23–29)
COLOR UR AUTO: ABNORMAL
CREAT SERPL-MCNC: 0.69 MG/DL (ref 0.5–1.4)
DIFFERENTIAL METHOD: ABNORMAL
EOSINOPHIL # BLD AUTO: 0.4 K/UL (ref 0–0.5)
EOSINOPHIL NFR BLD: 4 % (ref 0–8)
ERYTHROCYTE [DISTWIDTH] IN BLOOD BY AUTOMATED COUNT: 14.6 % (ref 11.5–14.5)
EST. GFR  (AFRICAN AMERICAN): >60 ML/MIN/1.73 M^2
EST. GFR  (NON AFRICAN AMERICAN): >60 ML/MIN/1.73 M^2
GLUCOSE SERPL-MCNC: 403 MG/DL (ref 70–110)
GLUCOSE UR QL STRIP: ABNORMAL
HCT VFR BLD AUTO: 34.8 % (ref 37–48.5)
HGB BLD-MCNC: 10.9 G/DL (ref 12–16)
HGB UR QL STRIP: ABNORMAL
IMM GRANULOCYTES # BLD AUTO: 0.03 K/UL (ref 0–0.04)
IMM GRANULOCYTES NFR BLD AUTO: 0.3 % (ref 0–0.5)
KETONES UR QL STRIP: NEGATIVE
LACTATE SERPL-SCNC: 2.1 MMOL/L (ref 0.5–2.2)
LEUKOCYTE ESTERASE UR QL STRIP: NEGATIVE
LYMPHOCYTES # BLD AUTO: 3.4 K/UL (ref 1–4.8)
LYMPHOCYTES NFR BLD: 36.5 % (ref 18–48)
MCH RBC QN AUTO: 26.5 PG (ref 27–31)
MCHC RBC AUTO-ENTMCNC: 31.3 G/DL (ref 32–36)
MCV RBC AUTO: 85 FL (ref 82–98)
MICROSCOPIC COMMENT: NORMAL
MONOCYTES # BLD AUTO: 0.6 K/UL (ref 0.3–1)
MONOCYTES NFR BLD: 6.5 % (ref 4–15)
NEUTROPHILS # BLD AUTO: 4.8 K/UL (ref 1.8–7.7)
NEUTROPHILS NFR BLD: 51.9 % (ref 38–73)
NITRITE UR QL STRIP: NEGATIVE
NRBC BLD-RTO: 0 /100 WBC
PH UR STRIP: 7 [PH] (ref 5–8)
PLATELET # BLD AUTO: 250 K/UL (ref 150–450)
PMV BLD AUTO: 12.3 FL (ref 9.2–12.9)
POCT GLUCOSE: 348 MG/DL (ref 70–110)
POCT GLUCOSE: 383 MG/DL (ref 70–110)
POCT GLUCOSE: 394 MG/DL (ref 70–110)
POTASSIUM SERPL-SCNC: 4 MMOL/L (ref 3.5–5.1)
PROT SERPL-MCNC: 9.1 G/DL (ref 6–8.4)
PROT UR QL STRIP: NEGATIVE
RBC # BLD AUTO: 4.11 M/UL (ref 4–5.4)
RBC #/AREA URNS AUTO: 1 /HPF (ref 0–4)
SODIUM SERPL-SCNC: 138 MMOL/L (ref 136–145)
SP GR UR STRIP: 1.01 (ref 1–1.03)
URN SPEC COLLECT METH UR: ABNORMAL
UROBILINOGEN UR STRIP-ACNC: NEGATIVE EU/DL
UUN UR-MCNC: 17 MG/DL (ref 7–17)
WBC # BLD AUTO: 9.19 K/UL (ref 3.9–12.7)
YEAST UR QL AUTO: NORMAL

## 2022-05-04 PROCEDURE — 10060 I&D ABSCESS SIMPLE/SINGLE: CPT | Mod: ER

## 2022-05-04 PROCEDURE — 93010 EKG 12-LEAD: ICD-10-PCS | Mod: ,,, | Performed by: INTERNAL MEDICINE

## 2022-05-04 PROCEDURE — 83605 ASSAY OF LACTIC ACID: CPT | Mod: ER | Performed by: EMERGENCY MEDICINE

## 2022-05-04 PROCEDURE — 93010 ELECTROCARDIOGRAM REPORT: CPT | Mod: ,,, | Performed by: INTERNAL MEDICINE

## 2022-05-04 PROCEDURE — 82962 GLUCOSE BLOOD TEST: CPT | Mod: ER

## 2022-05-04 PROCEDURE — 96376 TX/PRO/DX INJ SAME DRUG ADON: CPT | Mod: ER,59

## 2022-05-04 PROCEDURE — 96375 TX/PRO/DX INJ NEW DRUG ADDON: CPT | Mod: ER,59

## 2022-05-04 PROCEDURE — 25000003 PHARM REV CODE 250: Mod: ER | Performed by: EMERGENCY MEDICINE

## 2022-05-04 PROCEDURE — 93005 ELECTROCARDIOGRAM TRACING: CPT | Mod: ER

## 2022-05-04 PROCEDURE — 96366 THER/PROPH/DIAG IV INF ADDON: CPT | Mod: ER,59

## 2022-05-04 PROCEDURE — 99285 EMERGENCY DEPT VISIT HI MDM: CPT | Mod: 25,ER

## 2022-05-04 PROCEDURE — 87040 BLOOD CULTURE FOR BACTERIA: CPT | Mod: 59,ER | Performed by: EMERGENCY MEDICINE

## 2022-05-04 PROCEDURE — 85025 COMPLETE CBC W/AUTO DIFF WBC: CPT | Mod: ER | Performed by: EMERGENCY MEDICINE

## 2022-05-04 PROCEDURE — 63600175 PHARM REV CODE 636 W HCPCS: Mod: ER | Performed by: EMERGENCY MEDICINE

## 2022-05-04 PROCEDURE — 80053 COMPREHEN METABOLIC PANEL: CPT | Mod: ER | Performed by: EMERGENCY MEDICINE

## 2022-05-04 PROCEDURE — 96365 THER/PROPH/DIAG IV INF INIT: CPT | Mod: ER,59

## 2022-05-04 PROCEDURE — 81000 URINALYSIS NONAUTO W/SCOPE: CPT | Mod: ER | Performed by: EMERGENCY MEDICINE

## 2022-05-04 RX ORDER — LIDOCAINE HYDROCHLORIDE 10 MG/ML
10 INJECTION INFILTRATION; PERINEURAL
Status: COMPLETED | OUTPATIENT
Start: 2022-05-04 | End: 2022-05-04

## 2022-05-04 RX ORDER — CLINDAMYCIN PHOSPHATE 900 MG/50ML
900 INJECTION, SOLUTION INTRAVENOUS
Status: COMPLETED | OUTPATIENT
Start: 2022-05-04 | End: 2022-05-04

## 2022-05-04 RX ORDER — CLINDAMYCIN HYDROCHLORIDE 150 MG/1
300 CAPSULE ORAL 4 TIMES DAILY
Qty: 56 CAPSULE | Refills: 0 | Status: SHIPPED | OUTPATIENT
Start: 2022-05-04 | End: 2022-05-11

## 2022-05-04 RX ORDER — BUSPIRONE HYDROCHLORIDE 10 MG/1
10 TABLET ORAL 2 TIMES DAILY
COMMUNITY
Start: 2022-04-29 | End: 2022-12-30

## 2022-05-04 RX ORDER — QUETIAPINE FUMARATE 100 MG/1
TABLET, FILM COATED ORAL
COMMUNITY
End: 2022-12-30

## 2022-05-04 RX ORDER — ATORVASTATIN CALCIUM 20 MG/1
20 TABLET, FILM COATED ORAL DAILY
COMMUNITY
Start: 2022-03-14 | End: 2024-02-01 | Stop reason: SDUPTHER

## 2022-05-04 RX ORDER — GLIPIZIDE 10 MG/1
10 TABLET ORAL 2 TIMES DAILY
COMMUNITY
Start: 2022-03-14

## 2022-05-04 RX ORDER — FLUOXETINE HYDROCHLORIDE 40 MG/1
40 CAPSULE ORAL DAILY
COMMUNITY
Start: 2022-02-24 | End: 2023-08-15

## 2022-05-04 RX ADMIN — CLINDAMYCIN IN 5 PERCENT DEXTROSE 900 MG: 18 INJECTION, SOLUTION INTRAVENOUS at 08:05

## 2022-05-04 RX ADMIN — INSULIN HUMAN 8 UNITS: 100 INJECTION, SOLUTION PARENTERAL at 08:05

## 2022-05-04 RX ADMIN — INSULIN HUMAN 14 UNITS: 100 INJECTION, SOLUTION PARENTERAL at 11:05

## 2022-05-04 RX ADMIN — SODIUM CHLORIDE 3810 ML: 0.9 INJECTION, SOLUTION INTRAVENOUS at 08:05

## 2022-05-04 RX ADMIN — LIDOCAINE HYDROCHLORIDE 10 ML: 10 INJECTION, SOLUTION INFILTRATION; PERINEURAL at 08:05

## 2022-05-04 SDOH — SOCIAL DETERMINANTS OF HEALTH (SDOH): FINANCIAL INSECURITY: Z59.86

## 2022-05-04 NOTE — Clinical Note
"Barrera Cruz" Artur was seen and treated in our emergency department on 5/4/2022.  She may return to work on 05/08/2022.       If you have any questions or concerns, please don't hesitate to call.      Bita SANCHEZ    "

## 2022-05-05 LAB — POCT GLUCOSE: 323 MG/DL (ref 70–110)

## 2022-05-05 PROCEDURE — 82962 GLUCOSE BLOOD TEST: CPT | Mod: ER

## 2022-05-05 NOTE — DISCHARGE INSTRUCTIONS
Please have your wound rechecked and packing removed in 2-3 days if all is healing well.  You may remove the packing yourself if no signs of continued pus or drainage.  Return immediately to the ER if any symptoms change or worsen.  Please eat no more than 1800 calories per day if wanting to lose weight.  Please avoid white foods like white bread, white pasta, and white rice.  Brown versions of those foods are better.  Fresh vegetables and lean proteins should be included with most meals. Try to eat fresh natural foods with different colors as they contain different vitamins.  Please avoid sodas and other sugary foods and drinks.  Please contact your primary doctor for continued evaluation.  Please return to the ED immediately if symptoms return, change or worsen in any way.  Please call your primary doctor today for reevaluation appointment.

## 2022-05-09 ENCOUNTER — TELEPHONE (OUTPATIENT)
Dept: ADMINISTRATIVE | Facility: OTHER | Age: 57
End: 2022-05-09
Payer: COMMERCIAL

## 2022-05-10 LAB
BACTERIA BLD CULT: NORMAL
BACTERIA BLD CULT: NORMAL

## 2022-05-12 NOTE — ED PROVIDER NOTES
Chief Complaint  Chief Complaint   Patient presents with    Abscess     C/o abscess to left lower abd for the past 3 days that has become worse. No fever and no drainage per the patient.        HPI  Barrera Siddiqui is a 56 y.o. female who presents with abdominal wall abscess to the left lower abdomen.  No significant drainage.  Exacerbated by touch and relieved by nothing.  No fever vomiting or diarrhea.    Past medical history  Past Medical History:   Diagnosis Date    Depression     Diabetes mellitus     High cholesterol     Hypertension     Stroke     TIA (transient ischemic attack)        Current Medications  No current facility-administered medications for this encounter.    Current Outpatient Medications:     ALPRAZOLAM (XANAX ORAL), Take by mouth., Disp: , Rfl:     aspirin 325 MG tablet, Take 325 mg by mouth once daily., Disp: , Rfl:     atorvastatin (LIPITOR) 20 MG tablet, Take 20 mg by mouth once daily., Disp: , Rfl:     BUPROPION HCL (WELLBUTRIN ORAL), Take by mouth., Disp: , Rfl:     busPIRone (BUSPAR) 10 MG tablet, Take 10 mg by mouth 2 (two) times daily., Disp: , Rfl:     butalbital-acetaminophen-caffeine -40 mg (FIORICET, ESGIC) -40 mg per tablet, Take 1 tablet by mouth every 4 (four) hours as needed for Pain., Disp: 12 tablet, Rfl: 0    ESCITALOPRAM OXALATE (LEXAPRO ORAL), Take by mouth., Disp: , Rfl:     FLUoxetine 40 MG capsule, Take 40 mg by mouth once daily., Disp: , Rfl:     glipiZIDE (GLUCOTROL) 10 MG tablet, Take 10 mg by mouth 2 (two) times daily., Disp: , Rfl:     hydrochlorothiazide (HYDRODIURIL) 25 MG tablet, Take 1 tablet (25 mg total) by mouth once daily., Disp: 30 tablet, Rfl: 0    losartan (COZAAR) 100 MG tablet, Take 100 mg by mouth once daily., Disp: , Rfl:     metformin (GLUCOPHAGE) 500 MG tablet, Take 1,000 mg by mouth 2 (two) times daily with meals., Disp: , Rfl:     omeprazole (PRILOSEC) 20 MG capsule, Take 1 capsule (20 mg total) by mouth once daily.,  "Disp: 30 capsule, Rfl: 0    ondansetron (ZOFRAN) 4 MG tablet, Take 1 tablet (4 mg total) by mouth every 12 (twelve) hours as needed for Nausea., Disp: 12 tablet, Rfl: 0    quetiapine (SEROQUEL) 100 MG Tab, Take by mouth., Disp: , Rfl:     QUEtiapine (SEROQUEL) 100 MG Tab, 1 tablet at bedtime, Disp: , Rfl:     triamterene-hydrochlorothiazide 75-50 mg (MAXZIDE) 75-50 mg per tablet, Take 1 tablet by mouth once daily., Disp: , Rfl:     Allergies  Review of patient's allergies indicates:  No Known Allergies    Surgical history  Past Surgical History:   Procedure Laterality Date    CHOLECYSTECTOMY      COLONOSCOPY N/A 10/18/2017    Procedure: COLONOSCOPY;  Surgeon: Donald Wright Jr., MD;  Location: [facility];  Service: Endoscopy;  Laterality: N/A;    KNEE SURGERY      TUBAL LIGATION      WRIST SURGERY         Social history  Social History     Socioeconomic History    Marital status: Single   Tobacco Use    Smoking status: Former Smoker    Smokeless tobacco: Never Used   Substance and Sexual Activity    Alcohol use: Yes     Comment: rare    Drug use: No    Sexual activity: Never       Family History  Family History   Problem Relation Age of Onset    Breast cancer Mother     Liver disease Maternal Grandmother        Review of systems  Constitutional: No fever or weakness.  Neurologic: No new focal weakness or sensory changes.  All systems otherwise negative except as noted in ROS and HPI    Physical Exam  Vital signs: BP (!) 144/83 (BP Location: Right arm, Patient Position: Lying)   Pulse 90   Temp 98.4 °F (36.9 °C) (Oral)   Resp 20   Ht 5' 3" (1.6 m)   Wt 127 kg (280 lb)   LMP 07/29/2018 (Exact Date)   SpO2 99%   Breastfeeding No   BMI 49.60 kg/m²   Constitutional: No acute distress.  Well developed, alert, oriented and appropriate.  HENT: Normocephalic, atraumatic. Normal ear, nose, and throat.  Eyes: PERRL, EOMI, normal conjunctiva.  Neck: Normal range of motion, no tenderness; " supple.  Respiratory: Nonlabored breathing with normal breath sounds.  Cardiovascular: RRR with no pulse deficit.  GI: Soft, nontender, no rebound or guarding.  Musculoskeletal: Normal ROM, no tenderness, injury, or edema.  Skin:  Abscess noted to left lower abdomen, fluctuant and tender.  Neurologic: Normal motor, sensation with no new focal deficit.  Psychiatric: Affect normal, judgement normal, mood normal.  No SI, HI, and not gravely disabled.    Labs  Pertinent labs reviewed (see chart for details)  Labs Reviewed   CBC W/ AUTO DIFFERENTIAL - Abnormal; Notable for the following components:       Result Value    Hemoglobin 10.9 (*)     Hematocrit 34.8 (*)     MCH 26.5 (*)     MCHC 31.3 (*)     RDW 14.6 (*)     All other components within normal limits   COMPREHENSIVE METABOLIC PANEL - Abnormal; Notable for the following components:    CO2 22 (*)     Glucose 403 (*)     Total Protein 9.1 (*)     Alkaline Phosphatase 177 (*)      (*)     ALT 60 (*)     All other components within normal limits   URINALYSIS, REFLEX TO URINE CULTURE - Abnormal; Notable for the following components:    Glucose, UA 3+ (*)     Occult Blood UA Trace (*)     All other components within normal limits    Narrative:     Preferred Collection Type->Urine, Clean Catch                  Specimen Source->Urine                  Collection Type->Urine, Clean Catch   POCT GLUCOSE - Abnormal; Notable for the following components:    POCT Glucose 348 (*)     All other components within normal limits   POCT GLUCOSE - Abnormal; Notable for the following components:    POCT Glucose 394 (*)     All other components within normal limits   POCT GLUCOSE - Abnormal; Notable for the following components:    POCT Glucose 383 (*)     All other components within normal limits   POCT GLUCOSE - Abnormal; Notable for the following components:    POCT Glucose 323 (*)     All other components within normal limits   CULTURE, BLOOD    Narrative:     Aerobic and  anaerobic   CULTURE, BLOOD    Narrative:     Aerobic and anaerobic   LACTIC ACID, PLASMA   URINALYSIS MICROSCOPIC    Narrative:     Preferred Collection Type->Urine, Clean Catch                  Specimen Source->Urine                  Collection Type->Urine, Clean Catch       ECG  Results for orders placed or performed during the hospital encounter of 05/04/22   EKG 12-lead    Collection Time: 05/04/22  9:08 PM    Narrative    Test Reason : A41.9,    Vent. Rate : 097 BPM     Atrial Rate : 097 BPM     P-R Int : 188 ms          QRS Dur : 088 ms      QT Int : 384 ms       P-R-T Axes : 056 021 013 degrees     QTc Int : 487 ms    Normal sinus rhythm  Cannot rule out Anterior infarct ,age undetermined  Abnormal ECG  When compared with ECG of 04-FEB-2021 13:31,  Minimal criteria for Anterior infarct are now Present  Nonspecific T wave abnormality, improved in Anterior-lateral leads  QT has lengthened  Confirmed by Luly Edouard MD ([id number]) on 5/6/2022 11:55:00 AM    Referred By: [email or web address]   SELF           Confirmed By:Luly Edouard MD     ECG interpreted by ED MD    Radiology  X-Ray Chest AP Portable   Final Result      1.  Negative for acute process involving the chest.      2.  Stable findings as noted above.         Electronically signed by: Chevy Youngblood MD   Date:    05/04/2022   Time:    20:30          Procedures    Procedures    Medications   LIDOcaine HCL 10 mg/ml (1%) injection 10 mL (10 mLs Infiltration Given by Provider 5/4/22 2059)   sodium chloride 0.9% bolus 3,810 mL (0 mL/kg × 127 kg Intravenous Stopped 5/4/22 2300)   clindamycin in D5W 900 mg/50 mL IVPB 900 mg (0 mg Intravenous Stopped 5/4/22 2259)   insulin regular injection 8 Units (8 Units Intravenous Given 5/4/22 2055)   insulin regular injection 14 Units (14 Units Intravenous Given 5/4/22 2308)       ED course    ED Course as of 05/12/22 0739   Wed May 04, 2022   2110 EKG shows normal sinus rhythm rate 97 beats per minute with no ST elevation MI. Normal  QTC [MB]      ED Course User Index  [MB] Tino Mckeon MD         Incision and drainage note:  Abscess was prepped and draped in usual sterile protocol with Betadine.  Location:  Left lower abdomen  Anesthesia: 5 cc of lidocaine 1% without epinephrine  Incised with:  11 blade  Return after incision:  Purulent  Blood loss:  Less than 5 cc  Complications:  None  Patient tolerated well  Iodoform packing placed      ED management:  Patient appears safe and stable for discharge at this time.  She understands reasons to return emergency department      Disposition    Patient discharged in stable condition      Final impression  1. Abdominal wall abscess    2. Sepsis    3. Poorly controlled diabetes mellitus    4. Morbid obesity    5. Patient cannot afford medications        Critical care time spent with this patient was 0 minutes excluding the procedure time.              Tino Mckeon MD  05/12/22 0496

## 2022-06-18 ENCOUNTER — HOSPITAL ENCOUNTER (EMERGENCY)
Facility: HOSPITAL | Age: 57
Discharge: HOME | End: 2022-06-18
Attending: FAMILY MEDICINE
Payer: COMMERCIAL

## 2022-06-18 VITALS
HEART RATE: 108 BPM | SYSTOLIC BLOOD PRESSURE: 132 MMHG | TEMPERATURE: 100 F | HEIGHT: 63 IN | DIASTOLIC BLOOD PRESSURE: 82 MMHG | OXYGEN SATURATION: 97 % | BODY MASS INDEX: 49.96 KG/M2 | WEIGHT: 282 LBS | RESPIRATION RATE: 18 BRPM

## 2022-06-18 DIAGNOSIS — B34.9 VIRAL SYNDROME: Primary | ICD-10-CM

## 2022-06-18 DIAGNOSIS — Z20.822 CLOSE EXPOSURE TO COVID-19 VIRUS: ICD-10-CM

## 2022-06-18 LAB
ALBUMIN SERPL BCP-MCNC: 4.8 G/DL (ref 3.5–5.2)
ALP SERPL-CCNC: 158 U/L (ref 38–126)
ALT SERPL W/O P-5'-P-CCNC: 87 U/L (ref 10–44)
ANION GAP SERPL CALC-SCNC: 13 MMOL/L (ref 8–16)
AST SERPL-CCNC: 137 U/L (ref 15–46)
BACTERIA #/AREA URNS AUTO: NORMAL /HPF
BASOPHILS # BLD AUTO: 0.02 K/UL (ref 0–0.2)
BASOPHILS NFR BLD: 0.4 % (ref 0–1.9)
BILIRUB SERPL-MCNC: 0.8 MG/DL (ref 0.1–1)
BILIRUB UR QL STRIP: NEGATIVE
CALCIUM SERPL-MCNC: 9.4 MG/DL (ref 8.7–10.5)
CHLORIDE SERPL-SCNC: 99 MMOL/L (ref 95–110)
CLARITY UR REFRACT.AUTO: CLEAR
CO2 SERPL-SCNC: 23 MMOL/L (ref 23–29)
COLOR UR AUTO: YELLOW
CREAT SERPL-MCNC: 0.76 MG/DL (ref 0.5–1.4)
DIFFERENTIAL METHOD: ABNORMAL
EOSINOPHIL # BLD AUTO: 0.1 K/UL (ref 0–0.5)
EOSINOPHIL NFR BLD: 2 % (ref 0–8)
ERYTHROCYTE [DISTWIDTH] IN BLOOD BY AUTOMATED COUNT: 14.8 % (ref 11.5–14.5)
EST. GFR  (AFRICAN AMERICAN): >60 ML/MIN/1.73 M^2
EST. GFR  (NON AFRICAN AMERICAN): >60 ML/MIN/1.73 M^2
GLUCOSE SERPL-MCNC: 355 MG/DL (ref 70–110)
GLUCOSE UR QL STRIP: ABNORMAL
HCT VFR BLD AUTO: 38.3 % (ref 37–48.5)
HGB BLD-MCNC: 11.9 G/DL (ref 12–16)
HGB UR QL STRIP: ABNORMAL
HYALINE CASTS UR QL AUTO: 0 /LPF
IMM GRANULOCYTES # BLD AUTO: 0.02 K/UL (ref 0–0.04)
IMM GRANULOCYTES NFR BLD AUTO: 0.4 % (ref 0–0.5)
INFLUENZA A, MOLECULAR: NEGATIVE
INFLUENZA B, MOLECULAR: NEGATIVE
KETONES UR QL STRIP: NEGATIVE
LEUKOCYTE ESTERASE UR QL STRIP: NEGATIVE
LIPASE SERPL-CCNC: 119 U/L (ref 23–300)
LYMPHOCYTES # BLD AUTO: 0.9 K/UL (ref 1–4.8)
LYMPHOCYTES NFR BLD: 17.4 % (ref 18–48)
MCH RBC QN AUTO: 26.2 PG (ref 27–31)
MCHC RBC AUTO-ENTMCNC: 31.1 G/DL (ref 32–36)
MCV RBC AUTO: 84 FL (ref 82–98)
MICROSCOPIC COMMENT: NORMAL
MONOCYTES # BLD AUTO: 0.2 K/UL (ref 0.3–1)
MONOCYTES NFR BLD: 3.5 % (ref 4–15)
NEUTROPHILS # BLD AUTO: 3.9 K/UL (ref 1.8–7.7)
NEUTROPHILS NFR BLD: 76.3 % (ref 38–73)
NITRITE UR QL STRIP: NEGATIVE
NRBC BLD-RTO: 0 /100 WBC
PH UR STRIP: 6 [PH] (ref 5–8)
PLATELET # BLD AUTO: 225 K/UL (ref 150–450)
PMV BLD AUTO: 11.9 FL (ref 9.2–12.9)
POCT GLUCOSE: 356 MG/DL (ref 70–110)
POTASSIUM SERPL-SCNC: 4.3 MMOL/L (ref 3.5–5.1)
PROT SERPL-MCNC: 9.5 G/DL (ref 6–8.4)
PROT UR QL STRIP: ABNORMAL
RBC # BLD AUTO: 4.55 M/UL (ref 4–5.4)
RBC #/AREA URNS AUTO: 1 /HPF (ref 0–4)
SARS-COV-2 RDRP RESP QL NAA+PROBE: NEGATIVE
SODIUM SERPL-SCNC: 135 MMOL/L (ref 136–145)
SP GR UR STRIP: 1.01 (ref 1–1.03)
SPECIMEN SOURCE: NORMAL
URN SPEC COLLECT METH UR: ABNORMAL
UROBILINOGEN UR STRIP-ACNC: NEGATIVE EU/DL
UUN UR-MCNC: 18 MG/DL (ref 7–17)
WBC # BLD AUTO: 5.11 K/UL (ref 3.9–12.7)
WBC #/AREA URNS AUTO: 0 /HPF (ref 0–5)
YEAST UR QL AUTO: NORMAL

## 2022-06-18 PROCEDURE — 80053 COMPREHEN METABOLIC PANEL: CPT | Mod: ER | Performed by: PHYSICIAN ASSISTANT

## 2022-06-18 PROCEDURE — 25000003 PHARM REV CODE 250: Mod: ER | Performed by: PHYSICIAN ASSISTANT

## 2022-06-18 PROCEDURE — 99284 EMERGENCY DEPT VISIT MOD MDM: CPT | Mod: 25,ER

## 2022-06-18 PROCEDURE — 83690 ASSAY OF LIPASE: CPT | Mod: ER | Performed by: PHYSICIAN ASSISTANT

## 2022-06-18 PROCEDURE — 81000 URINALYSIS NONAUTO W/SCOPE: CPT | Mod: ER | Performed by: PHYSICIAN ASSISTANT

## 2022-06-18 PROCEDURE — 82962 GLUCOSE BLOOD TEST: CPT | Mod: ER

## 2022-06-18 PROCEDURE — 87502 INFLUENZA DNA AMP PROBE: CPT | Mod: ER | Performed by: FAMILY MEDICINE

## 2022-06-18 PROCEDURE — 96361 HYDRATE IV INFUSION ADD-ON: CPT | Mod: ER

## 2022-06-18 PROCEDURE — 85025 COMPLETE CBC W/AUTO DIFF WBC: CPT | Mod: ER | Performed by: PHYSICIAN ASSISTANT

## 2022-06-18 PROCEDURE — 96374 THER/PROPH/DIAG INJ IV PUSH: CPT | Mod: ER

## 2022-06-18 PROCEDURE — 63600175 PHARM REV CODE 636 W HCPCS: Mod: ER | Performed by: PHYSICIAN ASSISTANT

## 2022-06-18 PROCEDURE — U0002 COVID-19 LAB TEST NON-CDC: HCPCS | Mod: ER | Performed by: FAMILY MEDICINE

## 2022-06-18 RX ORDER — ONDANSETRON 4 MG/1
4 TABLET, ORALLY DISINTEGRATING ORAL EVERY 6 HOURS PRN
Qty: 30 TABLET | Refills: 0 | Status: SHIPPED | OUTPATIENT
Start: 2022-06-18 | End: 2022-12-30

## 2022-06-18 RX ORDER — ONDANSETRON 2 MG/ML
4 INJECTION INTRAMUSCULAR; INTRAVENOUS
Status: COMPLETED | OUTPATIENT
Start: 2022-06-18 | End: 2022-06-18

## 2022-06-18 RX ADMIN — ONDANSETRON 4 MG: 2 INJECTION INTRAMUSCULAR; INTRAVENOUS at 01:06

## 2022-06-18 RX ADMIN — SODIUM CHLORIDE 1000 ML: 0.9 INJECTION, SOLUTION INTRAVENOUS at 01:06

## 2022-06-18 NOTE — ED PROVIDER NOTES
Encounter Date: 2022       History     Chief Complaint   Patient presents with    Chills     Fever. Patient states that she's been exposed to COVID, 4 people are out of work at her job      57-year-old female presents to ED with concern of COVID after known exposure to multiple coworkers, reporting 1 day onset of subjective fevers, chills, intermittent headache and body aches, nausea and vomiting.  She reports today she began having mild upper abdominal pain described as sharp and nonradiating.  She has not taking medications for her symptoms.  No nasal congestion, sore throat, cough, chest pain, shortness of breath, dysuria, changes in urine color or frequency, constipation or diarrhea.  Able to tolerate p.o..  Previous abdominal surgery includes cholecystectomy, tubal ligation, .  No other acute complaints at this time.    The history is provided by the patient.     Review of patient's allergies indicates:  No Known Allergies  Past Medical History:   Diagnosis Date    Depression     Diabetes mellitus     High cholesterol     Hypertension     Stroke     TIA (transient ischemic attack)      Past Surgical History:   Procedure Laterality Date    CHOLECYSTECTOMY      COLONOSCOPY N/A 10/18/2017    Procedure: COLONOSCOPY;  Surgeon: Donald Wright Jr., MD;  Location: [facility];  Service: Endoscopy;  Laterality: N/A;    KNEE SURGERY      TUBAL LIGATION      WRIST SURGERY       Family History   Problem Relation Age of Onset    Breast cancer Mother     Liver disease Maternal Grandmother      Social History     Tobacco Use    Smoking status: Former Smoker    Smokeless tobacco: Never Used   Substance Use Topics    Alcohol use: Yes     Comment: rare    Drug use: No     Review of Systems   Constitutional: Positive for chills and fever (Subjective).   HENT: Negative for congestion and sore throat.    Respiratory: Negative for cough and shortness of breath.    Cardiovascular: Negative for chest  pain.   Gastrointestinal: Positive for abdominal pain, nausea and vomiting. Negative for constipation and diarrhea.   Genitourinary: Negative for dysuria and flank pain.   Musculoskeletal: Positive for myalgias. Negative for neck pain and neck stiffness.   Neurological: Positive for headaches.       Physical Exam     Initial Vitals [06/18/22 1037]   BP Pulse Resp Temp SpO2   132/82 108 18 99.7 °F (37.6 °C) 97 %      MAP       --         Physical Exam    Nursing note and vitals reviewed.  Constitutional: She appears well-developed and well-nourished. She is Obese . She is cooperative. She does not have a sickly appearance. She does not appear ill. No distress.   Resting comfortably in bed, no apparent distress   HENT:   Head: Normocephalic and atraumatic.   Right Ear: Tympanic membrane and ear canal normal.   Left Ear: Tympanic membrane and ear canal normal.   Nose: Nose normal.   Mouth/Throat: Uvula is midline and oropharynx is clear and moist.   Eyes: EOM are normal.   Neck:   Normal range of motion.  Cardiovascular: Normal rate, regular rhythm and normal heart sounds.   Pulmonary/Chest: Effort normal and breath sounds normal.   Abdominal: Abdomen is soft. There is abdominal tenderness in the epigastric area.   Obese abdomen.  Mildly reproducible tenderness epigastric region.  No guarding.  No overlying skin changes.  No rebound tenderness.  No reported tenderness to lower abdomen or quadrant.   Musculoskeletal:      Cervical back: Normal range of motion.     Neurological: She is alert and oriented to person, place, and time. GCS eye subscore is 4. GCS verbal subscore is 5. GCS motor subscore is 6.   Psychiatric: She has a normal mood and affect. Her speech is normal and behavior is normal.         ED Course   Procedures  Labs Reviewed   CBC W/ AUTO DIFFERENTIAL - Abnormal; Notable for the following components:       Result Value    Hemoglobin 11.9 (*)     MCH 26.2 (*)     MCHC 31.1 (*)     RDW 14.8 (*)     Lymph #  0.9 (*)     Mono # 0.2 (*)     Gran % 76.3 (*)     Lymph % 17.4 (*)     Mono % 3.5 (*)     All other components within normal limits   COMPREHENSIVE METABOLIC PANEL - Abnormal; Notable for the following components:    Sodium 135 (*)     Glucose 355 (*)     BUN 18 (*)     Total Protein 9.5 (*)     Alkaline Phosphatase 158 (*)      (*)     ALT 87 (*)     All other components within normal limits   URINALYSIS, REFLEX TO URINE CULTURE - Abnormal; Notable for the following components:    Protein, UA 3+ (*)     Glucose, UA 4+ (*)     Occult Blood UA 1+ (*)     All other components within normal limits    Narrative:     Preferred Collection Type->Urine, Clean Catch  Specimen Source->Urine  Collection Type->Urine, Clean Catch   POCT GLUCOSE - Abnormal; Notable for the following components:    POCT Glucose 356 (*)     All other components within normal limits   INFLUENZA A & B BY MOLECULAR   SARS-COV-2 RNA AMPLIFICATION, QUAL    Narrative:     Is the patient symptomatic?->Yes   LIPASE   URINALYSIS MICROSCOPIC    Narrative:     Preferred Collection Type->Urine, Clean Catch  Specimen Source->Urine  Collection Type->Urine, Clean Catch   POCT GLUCOSE MONITORING CONTINUOUS          Imaging Results    None          Medications   sodium chloride 0.9% bolus 1,000 mL (1,000 mLs Intravenous New Bag 6/18/22 1340)   ondansetron injection 4 mg (4 mg Intravenous Given 6/18/22 1341)     Medical Decision Making:   Initial Assessment:   Patient presents reporting concern for COVID after known exposure to multiple coworkers, reporting 1 day onset of subjective fevers, chills, intermittent headaches and body aches, nausea, vomiting and mild epigastric pain.  Afebrile arrival with vitals grossly unremarkable.  Patient otherwise well-appearing on exam and in no apparent distress.  Minimally reproducible epigastric tenderness.  Differential Diagnosis:   Including but not limited to COVID, influenza, viral, URI, allergy/irritant, GERD,  intestinal spasm, gastroenteritis, gastritis, ulcer, pancreatitis, intestinal gas pain    ED Management:  COVID, flu, CBC, CMP, lipase, UA    COVID and flu tests are negative.  CBC near baseline; no elevated WBC.  CMP noting glucose 355; near reported baseline.  Normal anion gap.  No ketonuria.  Lipase WNL.  UA grossly unremarkable with no significant findings for urinary tract infection.    Patient was given IV fluids and Zofran in ED with reported improvement of symptoms.  Given known exposure and close contact multiple coworkers with similar symptoms who recently tested positive for COVID, there is concern the patient may have COVID despite negative test today.  She is otherwise well-appearing with stable vitals and labs.  Will continue with conservative care.  Prescription for Zofran.  Encouraged Tylenol as needed, oral hydration, rest, continue social distance and monitoring symptoms closely with high ED return precautions.  Patient states her understanding and agrees with plan.                      Clinical Impression:   Final diagnoses:  [B34.9] Viral syndrome (Primary)  [Z20.822] Close exposure to COVID-19 virus          ED Disposition Condition    Discharge Stable        ED Prescriptions     Medication Sig Dispense Start Date End Date Auth. Provider    ondansetron (ZOFRAN-ODT) 4 MG TbDL Take 1 tablet (4 mg total) by mouth every 6 (six) hours as needed (nausea). 30 tablet 6/18/2022  Boogie Bernard PA-C        Follow-up Information     Follow up With Specialties Details Why Contact Info    Evelin Guevara NP Family Medicine   [address] [place] [address]  [address]  [facility]  [place] [address]  [phone]             Boogie Bernard PA-C  06/18/22 1497

## 2022-06-18 NOTE — DISCHARGE INSTRUCTIONS

## 2022-06-18 NOTE — ED NOTES
Pt presents to ED with 'C/O generalized body ache, chills, HA N/V and runny nose with onset last night. Pt states pain is 10/10 at this time and denies taking any medication for the pain.

## 2022-06-18 NOTE — Clinical Note
"Barrera"Job Siddiqui was seen and treated in our emergency department on 6/18/2022.     COVID-19 is present in our communities across the state. There is limited testing for COVID at this time, so not all patients can be tested. In this situation, your employee meets the following criteria:    Barrera Siddiqui has met the criteria for COVID-19 testing and has a NEGATIVE result. The employee can return to work once they are asymptomatic for 24 hours without the use of fever reducing medications (Tylenol, Motrin, etc).     If the employee is not fully vaccinated and had a close contact:  · Retest at 5 to 7 days post-exposure  · If possible, it is recommended that they quarantine for 5 days from the time of contact regardless of their test status.  · A mask should be worn post quarantine for 5 days.    If you have any questions or concerns, or if I can be of further assistance, please do not hesitate to contact me.    Sincerely,             Boogie Bernard PA-C"

## 2022-07-05 ENCOUNTER — HOSPITAL ENCOUNTER (EMERGENCY)
Facility: HOSPITAL | Age: 57
Discharge: HOME | End: 2022-07-05
Attending: EMERGENCY MEDICINE
Payer: COMMERCIAL

## 2022-07-05 VITALS
BODY MASS INDEX: 49.26 KG/M2 | WEIGHT: 278 LBS | RESPIRATION RATE: 20 BRPM | HEART RATE: 89 BPM | DIASTOLIC BLOOD PRESSURE: 71 MMHG | SYSTOLIC BLOOD PRESSURE: 124 MMHG | TEMPERATURE: 99 F | HEIGHT: 63 IN | OXYGEN SATURATION: 100 %

## 2022-07-05 DIAGNOSIS — L02.211 ABSCESS OF ABDOMINAL WALL: Primary | ICD-10-CM

## 2022-07-05 PROCEDURE — 87070 CULTURE OTHR SPECIMN AEROBIC: CPT | Mod: ER | Performed by: EMERGENCY MEDICINE

## 2022-07-05 PROCEDURE — 10060 I&D ABSCESS SIMPLE/SINGLE: CPT | Mod: ER

## 2022-07-05 PROCEDURE — 25000003 PHARM REV CODE 250: Mod: ER | Performed by: EMERGENCY MEDICINE

## 2022-07-05 PROCEDURE — 99283 EMERGENCY DEPT VISIT LOW MDM: CPT | Mod: 25,ER

## 2022-07-05 RX ORDER — SULFAMETHOXAZOLE AND TRIMETHOPRIM 800; 160 MG/1; MG/1
1 TABLET ORAL
Status: COMPLETED | OUTPATIENT
Start: 2022-07-05 | End: 2022-07-05

## 2022-07-05 RX ORDER — SULFAMETHOXAZOLE AND TRIMETHOPRIM 800; 160 MG/1; MG/1
1 TABLET ORAL 2 TIMES DAILY
Qty: 14 TABLET | Refills: 0 | Status: SHIPPED | OUTPATIENT
Start: 2022-07-05 | End: 2022-07-12

## 2022-07-05 RX ADMIN — SULFAMETHOXAZOLE AND TRIMETHOPRIM 1 TABLET: 800; 160 TABLET ORAL at 08:07

## 2022-07-06 NOTE — ED PROVIDER NOTES
Encounter Date: 7/5/2022       History     Chief Complaint   Patient presents with    Abscess     Abscess to right stomach. Started yesterday. No drainage. Tried to drain it herself.      Patient presents for evaluation of a cutaneous abscess. Lesion is located in the anterior abdominal wall. Onset was noted yesterday. Symptoms have gradually worsened. Abscess has associated symptoms of spontaneous drainage, pain. Patient does have diabetes.        Review of patient's allergies indicates:  No Known Allergies  Past Medical History:   Diagnosis Date    Depression     Diabetes mellitus     High cholesterol     Hypertension     Stroke     TIA (transient ischemic attack)      Past Surgical History:   Procedure Laterality Date    CHOLECYSTECTOMY      COLONOSCOPY N/A 10/18/2017    Procedure: COLONOSCOPY;  Surgeon: Donald Wright Jr., MD;  Location: [facility];  Service: Endoscopy;  Laterality: N/A;    KNEE SURGERY      TUBAL LIGATION      WRIST SURGERY       Family History   Problem Relation Age of Onset    Breast cancer Mother     Liver disease Maternal Grandmother      Social History     Tobacco Use    Smoking status: Former Smoker    Smokeless tobacco: Never Used   Substance Use Topics    Alcohol use: Yes     Comment: rare    Drug use: No     Review of Systems   Constitutional: Negative for chills and fever.   HENT: Negative for congestion and rhinorrhea.    Respiratory: Negative for cough and shortness of breath.    Cardiovascular: Negative for chest pain and palpitations.   Gastrointestinal: Negative for abdominal pain, diarrhea and vomiting.   Genitourinary: Negative for dysuria.   Skin: Positive for wound. Negative for color change and rash.   Allergic/Immunologic: Negative for immunocompromised state.   Neurological: Negative for dizziness and light-headedness.   Hematological: Negative for adenopathy. Does not bruise/bleed easily.       Physical Exam     Initial Vitals [07/05/22 2007]   BP  Pulse Resp Temp SpO2   (!) 160/92 97 19 98.5 °F (36.9 °C) 96 %      MAP       --         Physical Exam    Nursing note and vitals reviewed.  Constitutional: She appears well-developed and well-nourished. She is not diaphoretic. No distress.   HENT:   Head: Normocephalic and atraumatic.   Nose: Nose normal.   Mouth/Throat: Oropharynx is clear and moist.   Eyes: Conjunctivae are normal. No scleral icterus.   Neck: Neck supple. No JVD present.   Cardiovascular: Normal rate, regular rhythm, normal heart sounds and intact distal pulses.   Pulmonary/Chest: Breath sounds normal. No respiratory distress.   Abdominal: Abdomen is soft. There is no abdominal tenderness.   Musculoskeletal:      Cervical back: Neck supple.     Neurological: She is alert and oriented to person, place, and time.   Skin: Skin is warm and dry.            ED Course   I & D - Incision and Drainage    Date/Time: 7/5/2022 9:15 PM  Location procedure was performed: [facility] EMERGENCY DEPARTMENT  Performed by: Wily Chu MD  Authorized by: Wily Chu MD   Consent Done: Yes  Consent: Verbal consent obtained.  Risks and benefits: risks, benefits and alternatives were discussed  Consent given by: patient  Patient understanding: patient states understanding of the procedure being performed  Type: abscess  Body area: trunk  Location details: abdomen  Scalpel size: 11  Incision type: single straight  Complexity: simple  Drainage: purulent  Drainage amount: copious  Wound treatment: wound left open and  wound packed  Packing material: 1/4 in gauze  Complications: No  Patient tolerance: Patient tolerated the procedure well with no immediate complications        Labs Reviewed   CULTURE, AEROBIC  (SPECIFY SOURCE)          Imaging Results    None          Medications   sulfamethoxazole-trimethoprim 800-160mg per tablet 1 tablet (1 tablet Oral Given 7/5/22 2047)     Medical Decision Making:   ED Management:  All findings were reviewed with the  patient/family in detail.  I see no indication of an emergent process beyond that addressed during our encounter but have duly counseled the patient/family regarding the need for prompt follow-up as well as the indications that should prompt immediate return to the emergency room should new or worrisome developments occur.  The patient has additionally been provided with printed information regarding diagnosis as well as instructions regarding follow up and any medications intended to manage the patient's aforementioned conditions.  The patient/family communicates understanding of all this information and all remaining questions and concerns were addressed at this time.                            Clinical Impression:   Final diagnoses:  [L02.211] Abscess of abdominal wall (Primary)          ED Disposition Condition    Discharge Stable        ED Prescriptions     Medication Sig Dispense Start Date End Date Auth. Provider    sulfamethoxazole-trimethoprim 800-160mg (BACTRIM DS) 800-160 mg Tab Take 1 tablet by mouth 2 (two) times daily. for 7 days 14 tablet 7/5/2022 7/12/2022 Wily Chu MD        Follow-up Information     Follow up With Specialties Details Why Contact Info    Evelin Guevara NP Family Medicine Schedule an appointment as soon as possible for a visit  for reassessment [address] [place] [address]  [address]  [facility] [id number] [phone]      [facility] - Emergency Dept Emergency Medicine Go to  As needed, If symptoms worsen [address] [facility] [id number] [phone]           Wily Chu MD  07/06/22 075

## 2022-07-09 LAB — BACTERIA SPEC AEROBE CULT: NORMAL

## 2022-08-15 ENCOUNTER — HOSPITAL ENCOUNTER (OUTPATIENT)
Dept: CARDIOLOGY | Facility: HOSPITAL | Age: 57
Discharge: HOME | End: 2022-08-15
Attending: NURSE PRACTITIONER
Payer: COMMERCIAL

## 2022-08-15 DIAGNOSIS — I10 ESSENTIAL HYPERTENSION, MALIGNANT: ICD-10-CM

## 2022-08-15 PROCEDURE — 93005 ELECTROCARDIOGRAM TRACING: CPT | Mod: PO

## 2022-08-15 PROCEDURE — 93010 ELECTROCARDIOGRAM REPORT: CPT | Mod: ,,, | Performed by: INTERNAL MEDICINE

## 2022-08-15 PROCEDURE — 93010 EKG 12-LEAD: ICD-10-PCS | Mod: ,,, | Performed by: INTERNAL MEDICINE

## 2022-08-16 ENCOUNTER — HOSPITAL ENCOUNTER (OUTPATIENT)
Dept: RADIOLOGY | Facility: HOSPITAL | Age: 57
Discharge: HOME | End: 2022-08-16
Attending: NURSE PRACTITIONER
Payer: COMMERCIAL

## 2022-08-16 DIAGNOSIS — R09.89 OTHER SPECIFIED SYMPTOMS AND SIGNS INVOLVING THE CIRCULATORY AND RESPIRATORY SYSTEMS: ICD-10-CM

## 2022-08-16 DIAGNOSIS — I10 ESSENTIAL HYPERTENSION, MALIGNANT: Primary | ICD-10-CM

## 2022-08-16 PROCEDURE — 93880 EXTRACRANIAL BILAT STUDY: CPT | Mod: TC,PO

## 2022-12-30 ENCOUNTER — HOSPITAL ENCOUNTER (OUTPATIENT)
Facility: HOSPITAL | Age: 57
Discharge: HOME | End: 2023-01-01
Attending: EMERGENCY MEDICINE | Admitting: INTERNAL MEDICINE
Payer: COMMERCIAL

## 2022-12-30 DIAGNOSIS — H92.01 RIGHT EAR PAIN: ICD-10-CM

## 2022-12-30 DIAGNOSIS — F41.9 ANXIETY DISORDER, UNSPECIFIED TYPE: ICD-10-CM

## 2022-12-30 DIAGNOSIS — E11.65 TYPE 2 DIABETES MELLITUS WITH HYPERGLYCEMIA, WITHOUT LONG-TERM CURRENT USE OF INSULIN: ICD-10-CM

## 2022-12-30 DIAGNOSIS — N30.00 ACUTE CYSTITIS WITHOUT HEMATURIA: ICD-10-CM

## 2022-12-30 DIAGNOSIS — D64.9 NORMOCYTIC ANEMIA: ICD-10-CM

## 2022-12-30 DIAGNOSIS — E78.2 MIXED HYPERLIPIDEMIA: ICD-10-CM

## 2022-12-30 DIAGNOSIS — N30.01 ACUTE CYSTITIS WITH HEMATURIA: ICD-10-CM

## 2022-12-30 DIAGNOSIS — N10 ACUTE PYELONEPHRITIS: Primary | ICD-10-CM

## 2022-12-30 DIAGNOSIS — R00.2 PALPITATION: ICD-10-CM

## 2022-12-30 DIAGNOSIS — I10 ESSENTIAL HYPERTENSION: ICD-10-CM

## 2022-12-30 DIAGNOSIS — R73.9 HYPERGLYCEMIA: ICD-10-CM

## 2022-12-30 DIAGNOSIS — N30.00 ACUTE CYSTITIS: ICD-10-CM

## 2022-12-30 DIAGNOSIS — F32.A DEPRESSION, UNSPECIFIED DEPRESSION TYPE: ICD-10-CM

## 2022-12-30 DIAGNOSIS — N17.9 AKI (ACUTE KIDNEY INJURY): ICD-10-CM

## 2022-12-30 DIAGNOSIS — G45.9 TIA (TRANSIENT ISCHEMIC ATTACK): ICD-10-CM

## 2022-12-30 LAB
ALBUMIN SERPL BCP-MCNC: 3.3 G/DL (ref 3.5–5.2)
ALP SERPL-CCNC: 101 U/L (ref 55–135)
ALT SERPL W/O P-5'-P-CCNC: 51 U/L (ref 10–44)
ANION GAP SERPL CALC-SCNC: 14 MMOL/L (ref 8–16)
AST SERPL-CCNC: 45 U/L (ref 10–40)
BACTERIA #/AREA URNS HPF: ABNORMAL /HPF
BASOPHILS # BLD AUTO: 0.06 K/UL (ref 0–0.2)
BASOPHILS NFR BLD: 0.5 % (ref 0–1.9)
BILIRUB SERPL-MCNC: 0.9 MG/DL (ref 0.1–1)
BILIRUB UR QL STRIP: NEGATIVE
BUN SERPL-MCNC: 30 MG/DL (ref 6–20)
CALCIUM SERPL-MCNC: 9.6 MG/DL (ref 8.7–10.5)
CHLORIDE SERPL-SCNC: 98 MMOL/L (ref 95–110)
CLARITY UR: ABNORMAL
CO2 SERPL-SCNC: 23 MMOL/L (ref 23–29)
COLOR UR: YELLOW
CREAT SERPL-MCNC: 1.6 MG/DL (ref 0.5–1.4)
DIFFERENTIAL METHOD: ABNORMAL
EOSINOPHIL # BLD AUTO: 0.1 K/UL (ref 0–0.5)
EOSINOPHIL NFR BLD: 0.4 % (ref 0–8)
ERYTHROCYTE [DISTWIDTH] IN BLOOD BY AUTOMATED COUNT: 14 % (ref 11.5–14.5)
EST. GFR  (NO RACE VARIABLE): 38 ML/MIN/1.73 M^2
GLUCOSE SERPL-MCNC: 381 MG/DL (ref 70–110)
GLUCOSE UR QL STRIP: ABNORMAL
HCT VFR BLD AUTO: 35.8 % (ref 37–48.5)
HGB BLD-MCNC: 11.3 G/DL (ref 12–16)
HGB UR QL STRIP: ABNORMAL
HYALINE CASTS #/AREA URNS LPF: 0 /LPF
IMM GRANULOCYTES # BLD AUTO: 0.06 K/UL (ref 0–0.04)
IMM GRANULOCYTES NFR BLD AUTO: 0.5 % (ref 0–0.5)
INFLUENZA A, MOLECULAR: NEGATIVE
INFLUENZA B, MOLECULAR: NEGATIVE
KETONES UR QL STRIP: NEGATIVE
LEUKOCYTE ESTERASE UR QL STRIP: ABNORMAL
LYMPHOCYTES # BLD AUTO: 2.5 K/UL (ref 1–4.8)
LYMPHOCYTES NFR BLD: 22.3 % (ref 18–48)
MCH RBC QN AUTO: 26.5 PG (ref 27–31)
MCHC RBC AUTO-ENTMCNC: 31.6 G/DL (ref 32–36)
MCV RBC AUTO: 84 FL (ref 82–98)
MICROSCOPIC COMMENT: ABNORMAL
MONOCYTES # BLD AUTO: 1 K/UL (ref 0.3–1)
MONOCYTES NFR BLD: 8.7 % (ref 4–15)
NEUTROPHILS # BLD AUTO: 7.6 K/UL (ref 1.8–7.7)
NEUTROPHILS NFR BLD: 67.6 % (ref 38–73)
NITRITE UR QL STRIP: NEGATIVE
NRBC BLD-RTO: 0 /100 WBC
PH UR STRIP: 6 [PH] (ref 5–8)
PLATELET # BLD AUTO: 185 K/UL (ref 150–450)
PMV BLD AUTO: 12.2 FL (ref 9.2–12.9)
POCT GLUCOSE: 265 MG/DL (ref 70–110)
POCT GLUCOSE: 322 MG/DL (ref 70–110)
POTASSIUM SERPL-SCNC: 4.2 MMOL/L (ref 3.5–5.1)
PROT SERPL-MCNC: 9.4 G/DL (ref 6–8.4)
PROT UR QL STRIP: ABNORMAL
RBC # BLD AUTO: 4.27 M/UL (ref 4–5.4)
RBC #/AREA URNS HPF: >100 /HPF (ref 0–4)
SARS-COV-2 RDRP RESP QL NAA+PROBE: NEGATIVE
SODIUM SERPL-SCNC: 135 MMOL/L (ref 136–145)
SP GR UR STRIP: 1.02 (ref 1–1.03)
SPECIMEN SOURCE: NORMAL
SQUAMOUS #/AREA URNS HPF: 5 /HPF
TROPONIN I SERPL DL<=0.01 NG/ML-MCNC: 0.01 NG/ML (ref 0–0.03)
URN SPEC COLLECT METH UR: ABNORMAL
UROBILINOGEN UR STRIP-ACNC: ABNORMAL EU/DL
WBC # BLD AUTO: 11.3 K/UL (ref 3.9–12.7)
WBC #/AREA URNS HPF: >100 /HPF (ref 0–5)
WBC CLUMPS URNS QL MICRO: ABNORMAL

## 2022-12-30 PROCEDURE — G0378 HOSPITAL OBSERVATION PER HR: HCPCS

## 2022-12-30 PROCEDURE — 87502 INFLUENZA DNA AMP PROBE: CPT | Performed by: NURSE PRACTITIONER

## 2022-12-30 PROCEDURE — 25000003 PHARM REV CODE 250: Performed by: STUDENT IN AN ORGANIZED HEALTH CARE EDUCATION/TRAINING PROGRAM

## 2022-12-30 PROCEDURE — 96375 TX/PRO/DX INJ NEW DRUG ADDON: CPT

## 2022-12-30 PROCEDURE — 96365 THER/PROPH/DIAG IV INF INIT: CPT

## 2022-12-30 PROCEDURE — 82962 GLUCOSE BLOOD TEST: CPT

## 2022-12-30 PROCEDURE — 93005 ELECTROCARDIOGRAM TRACING: CPT

## 2022-12-30 PROCEDURE — 96361 HYDRATE IV INFUSION ADD-ON: CPT

## 2022-12-30 PROCEDURE — 63600175 PHARM REV CODE 636 W HCPCS: Performed by: NURSE PRACTITIONER

## 2022-12-30 PROCEDURE — 87077 CULTURE AEROBIC IDENTIFY: CPT | Performed by: NURSE PRACTITIONER

## 2022-12-30 PROCEDURE — 93010 ELECTROCARDIOGRAM REPORT: CPT | Mod: ,,, | Performed by: INTERNAL MEDICINE

## 2022-12-30 PROCEDURE — 96372 THER/PROPH/DIAG INJ SC/IM: CPT | Performed by: STUDENT IN AN ORGANIZED HEALTH CARE EDUCATION/TRAINING PROGRAM

## 2022-12-30 PROCEDURE — 93010 EKG 12-LEAD: ICD-10-PCS | Mod: ,,, | Performed by: INTERNAL MEDICINE

## 2022-12-30 PROCEDURE — 85025 COMPLETE CBC W/AUTO DIFF WBC: CPT | Performed by: NURSE PRACTITIONER

## 2022-12-30 PROCEDURE — 96372 THER/PROPH/DIAG INJ SC/IM: CPT | Mod: 59 | Performed by: STUDENT IN AN ORGANIZED HEALTH CARE EDUCATION/TRAINING PROGRAM

## 2022-12-30 PROCEDURE — 99285 EMERGENCY DEPT VISIT HI MDM: CPT | Mod: 25

## 2022-12-30 PROCEDURE — 94760 N-INVAS EAR/PLS OXIMETRY 1: CPT

## 2022-12-30 PROCEDURE — 87086 URINE CULTURE/COLONY COUNT: CPT | Performed by: NURSE PRACTITIONER

## 2022-12-30 PROCEDURE — 99900035 HC TECH TIME PER 15 MIN (STAT)

## 2022-12-30 PROCEDURE — U0002 COVID-19 LAB TEST NON-CDC: HCPCS | Performed by: NURSE PRACTITIONER

## 2022-12-30 PROCEDURE — 87088 URINE BACTERIA CULTURE: CPT | Performed by: NURSE PRACTITIONER

## 2022-12-30 PROCEDURE — 84484 ASSAY OF TROPONIN QUANT: CPT | Performed by: STUDENT IN AN ORGANIZED HEALTH CARE EDUCATION/TRAINING PROGRAM

## 2022-12-30 PROCEDURE — 63600175 PHARM REV CODE 636 W HCPCS: Performed by: STUDENT IN AN ORGANIZED HEALTH CARE EDUCATION/TRAINING PROGRAM

## 2022-12-30 PROCEDURE — 25000003 PHARM REV CODE 250: Performed by: NURSE PRACTITIONER

## 2022-12-30 PROCEDURE — 87186 SC STD MICRODIL/AGAR DIL: CPT | Performed by: NURSE PRACTITIONER

## 2022-12-30 PROCEDURE — 80053 COMPREHEN METABOLIC PANEL: CPT | Performed by: NURSE PRACTITIONER

## 2022-12-30 PROCEDURE — 81000 URINALYSIS NONAUTO W/SCOPE: CPT | Performed by: NURSE PRACTITIONER

## 2022-12-30 PROCEDURE — 87502 INFLUENZA DNA AMP PROBE: CPT

## 2022-12-30 RX ORDER — CIPROFLOXACIN 500 MG/1
500 TABLET ORAL 2 TIMES DAILY
Status: ON HOLD | COMMUNITY
Start: 2022-12-29 | End: 2023-01-01 | Stop reason: HOSPADM

## 2022-12-30 RX ORDER — COVID-19 MOLECULAR TEST ASSAY
KIT MISCELLANEOUS
COMMUNITY
Start: 2022-10-11 | End: 2023-08-15 | Stop reason: ALTCHOICE

## 2022-12-30 RX ORDER — GLUCAGON 1 MG
1 KIT INJECTION
Status: DISCONTINUED | OUTPATIENT
Start: 2022-12-30 | End: 2023-01-01 | Stop reason: HOSPADM

## 2022-12-30 RX ORDER — LINACLOTIDE 145 UG/1
145 CAPSULE, GELATIN COATED ORAL
Status: ON HOLD | COMMUNITY
Start: 2022-12-29 | End: 2023-01-01 | Stop reason: HOSPADM

## 2022-12-30 RX ORDER — ASPIRIN 325 MG
325 TABLET ORAL DAILY
Status: DISCONTINUED | OUTPATIENT
Start: 2022-12-30 | End: 2022-12-31

## 2022-12-30 RX ORDER — TRAZODONE HYDROCHLORIDE 300 MG/1
300 TABLET ORAL
COMMUNITY
End: 2022-12-30 | Stop reason: DRUGHIGH

## 2022-12-30 RX ORDER — KETOROLAC TROMETHAMINE 30 MG/ML
15 INJECTION, SOLUTION INTRAMUSCULAR; INTRAVENOUS
Status: COMPLETED | OUTPATIENT
Start: 2022-12-30 | End: 2022-12-30

## 2022-12-30 RX ORDER — BUPROPION HYDROCHLORIDE 150 MG/1
150 TABLET ORAL EVERY MORNING
COMMUNITY
End: 2022-12-30

## 2022-12-30 RX ORDER — BUSPIRONE HYDROCHLORIDE 15 MG/1
15 TABLET ORAL 2 TIMES DAILY
COMMUNITY
Start: 2022-07-10 | End: 2022-12-30

## 2022-12-30 RX ORDER — FLUCONAZOLE 150 MG/1
TABLET ORAL
COMMUNITY
Start: 2022-08-26 | End: 2022-12-30

## 2022-12-30 RX ORDER — ACETAMINOPHEN 325 MG/1
650 TABLET ORAL EVERY 6 HOURS PRN
Status: DISCONTINUED | OUTPATIENT
Start: 2022-12-30 | End: 2023-01-01 | Stop reason: HOSPADM

## 2022-12-30 RX ORDER — METFORMIN HYDROCHLORIDE 500 MG/1
500 TABLET, EXTENDED RELEASE ORAL 2 TIMES DAILY
COMMUNITY
Start: 2022-12-22

## 2022-12-30 RX ORDER — IBUPROFEN 200 MG
24 TABLET ORAL
Status: DISCONTINUED | OUTPATIENT
Start: 2022-12-30 | End: 2023-01-01 | Stop reason: HOSPADM

## 2022-12-30 RX ORDER — TRAZODONE HYDROCHLORIDE 50 MG/1
50-100 TABLET ORAL NIGHTLY PRN
COMMUNITY
Start: 2022-12-19 | End: 2023-08-15

## 2022-12-30 RX ORDER — IBUPROFEN 200 MG
16 TABLET ORAL
Status: DISCONTINUED | OUTPATIENT
Start: 2022-12-30 | End: 2023-01-01 | Stop reason: HOSPADM

## 2022-12-30 RX ORDER — SODIUM CHLORIDE 0.9 % (FLUSH) 0.9 %
10 SYRINGE (ML) INJECTION
Status: DISCONTINUED | OUTPATIENT
Start: 2022-12-30 | End: 2023-01-01 | Stop reason: HOSPADM

## 2022-12-30 RX ORDER — CEFTRIAXONE 2 G/50ML
2 INJECTION, SOLUTION INTRAVENOUS
Status: DISCONTINUED | OUTPATIENT
Start: 2022-12-31 | End: 2023-01-01 | Stop reason: HOSPADM

## 2022-12-30 RX ORDER — ONDANSETRON 2 MG/ML
4 INJECTION INTRAMUSCULAR; INTRAVENOUS
Status: COMPLETED | OUTPATIENT
Start: 2022-12-30 | End: 2022-12-30

## 2022-12-30 RX ORDER — ATORVASTATIN CALCIUM 20 MG/1
20 TABLET, FILM COATED ORAL DAILY
Status: DISCONTINUED | OUTPATIENT
Start: 2022-12-30 | End: 2023-01-01 | Stop reason: HOSPADM

## 2022-12-30 RX ORDER — QUETIAPINE FUMARATE 50 MG/1
50 TABLET, FILM COATED ORAL NIGHTLY
COMMUNITY
Start: 2022-07-10 | End: 2022-12-30

## 2022-12-30 RX ORDER — ENOXAPARIN SODIUM 100 MG/ML
40 INJECTION SUBCUTANEOUS EVERY 24 HOURS
Status: DISCONTINUED | OUTPATIENT
Start: 2022-12-30 | End: 2023-01-01 | Stop reason: HOSPADM

## 2022-12-30 RX ORDER — TALC
6 POWDER (GRAM) TOPICAL NIGHTLY PRN
Status: DISCONTINUED | OUTPATIENT
Start: 2022-12-30 | End: 2023-01-01 | Stop reason: HOSPADM

## 2022-12-30 RX ORDER — FLUOXETINE HYDROCHLORIDE 20 MG/1
40 CAPSULE ORAL DAILY
Status: DISCONTINUED | OUTPATIENT
Start: 2022-12-30 | End: 2023-01-01 | Stop reason: HOSPADM

## 2022-12-30 RX ORDER — INSULIN ASPART 100 [IU]/ML
1-10 INJECTION, SOLUTION INTRAVENOUS; SUBCUTANEOUS
Status: DISCONTINUED | OUTPATIENT
Start: 2022-12-30 | End: 2023-01-01 | Stop reason: HOSPADM

## 2022-12-30 RX ORDER — TRIAMCINOLONE ACETONIDE 0.25 MG/G
CREAM TOPICAL 2 TIMES DAILY
Status: DISCONTINUED | OUTPATIENT
Start: 2022-12-30 | End: 2022-12-30

## 2022-12-30 RX ADMIN — INSULIN ASPART 4 UNITS: 100 INJECTION, SOLUTION INTRAVENOUS; SUBCUTANEOUS at 07:12

## 2022-12-30 RX ADMIN — ENOXAPARIN SODIUM 40 MG: 40 INJECTION SUBCUTANEOUS at 04:12

## 2022-12-30 RX ADMIN — ACETAMINOPHEN 650 MG: 325 TABLET ORAL at 04:12

## 2022-12-30 RX ADMIN — SODIUM CHLORIDE, SODIUM LACTATE, POTASSIUM CHLORIDE, AND CALCIUM CHLORIDE 1000 ML: .6; .31; .03; .02 INJECTION, SOLUTION INTRAVENOUS at 07:12

## 2022-12-30 RX ADMIN — ONDANSETRON HYDROCHLORIDE 4 MG: 2 SOLUTION INTRAMUSCULAR; INTRAVENOUS at 09:12

## 2022-12-30 RX ADMIN — ASPIRIN 325 MG: 325 TABLET ORAL at 04:12

## 2022-12-30 RX ADMIN — CEFTRIAXONE 1 G: 1 INJECTION, SOLUTION INTRAVENOUS at 11:12

## 2022-12-30 RX ADMIN — ACETAMINOPHEN 650 MG: 325 TABLET ORAL at 10:12

## 2022-12-30 RX ADMIN — SODIUM CHLORIDE 1000 ML: 0.9 INJECTION, SOLUTION INTRAVENOUS at 09:12

## 2022-12-30 RX ADMIN — FLUOXETINE 40 MG: 20 CAPSULE ORAL at 04:12

## 2022-12-30 RX ADMIN — ATORVASTATIN CALCIUM 20 MG: 20 TABLET, FILM COATED ORAL at 04:12

## 2022-12-30 RX ADMIN — KETOROLAC TROMETHAMINE 15 MG: 30 INJECTION, SOLUTION INTRAMUSCULAR; INTRAVENOUS at 09:12

## 2022-12-30 NOTE — ED PROVIDER NOTES
Encounter Date: 12/30/2022    SCRIBE #1 NOTE: I, Marianne Restrepo, am scribing for, and in the presence of,  Zulma Roth NP. I have scribed the following portions of the note - Other sections scribed: HPI, ROS, PE.     History     Chief Complaint   Patient presents with    Headache    Back Pain     Pt presents to ED today c/o lower back pain, headache, and ear pain  Pt reports she was seen yesterday by her physician and prescribed antibiotics for UTI with no relief in sx      Barrera Siddiqui is a 57 y.o. female who  has a past medical history of Depression, Diabetes mellitus, High cholesterol, Hypertension, Stroke, and TIA (transient ischemic attack).    The patient presents to the ED due to lower back pain, onset yesterday. She was evaluated by her PCP yesterday and was noted to have hematuria in her urinalysis. She was prescribed Doxycycline. Her PCP advised her to come to the ED to be further evaluated for a possible kidney stone. The patient complains of associated body aches, chills, subjective fever, cough, nausea, vomiting, diarrhea, and ear pain (L>R). She also states her back pain is starting to radiate to her left lower abdomen. She denies blood in stool or hematemesis.        The history is provided by the patient. No  was used.   Review of patient's allergies indicates:  No Known Allergies  Past Medical History:   Diagnosis Date    Depression     Diabetes mellitus     High cholesterol     Hypertension     Stroke     TIA (transient ischemic attack)      Past Surgical History:   Procedure Laterality Date    CHOLECYSTECTOMY      COLONOSCOPY N/A 10/18/2017    Procedure: COLONOSCOPY;  Surgeon: Donald Wright Jr., MD;  Location: [facility];  Service: Endoscopy;  Laterality: N/A;    KNEE SURGERY      TUBAL LIGATION      WRIST SURGERY       Family History   Problem Relation Age of Onset    Breast cancer Mother     Liver disease Maternal Grandmother      Social History     Tobacco Use     Smoking status: Former    Smokeless tobacco: Never   Substance Use Topics    Alcohol use: Yes     Comment: rare    Drug use: No     Review of Systems   Constitutional:  Positive for chills and fever (subjective).        Body aches   HENT:  Positive for ear pain. Negative for sore throat.    Respiratory:  Positive for cough. Negative for shortness of breath.    Cardiovascular:  Negative for chest pain.   Gastrointestinal:  Positive for abdominal pain, diarrhea, nausea and vomiting. Negative for blood in stool.        No hematemesis    Genitourinary:  Negative for dysuria.   Musculoskeletal:  Positive for back pain.   Skin:  Negative for rash.   Neurological:  Negative for weakness.   Hematological:  Does not bruise/bleed easily.     Physical Exam     Initial Vitals [12/30/22 0738]   BP Pulse Resp Temp SpO2   120/74 98 18 98.2 °F (36.8 °C) 98 %      MAP       --         Physical Exam    Nursing note and vitals reviewed.  Constitutional: Vital signs are normal. She is cooperative.  Non-toxic appearance. No distress.   Morbid obesity    HENT:   Head: Normocephalic and atraumatic.   Nose: Rhinorrhea present.   Mouth/Throat: No oropharyngeal exudate or posterior oropharyngeal erythema.   Eyes: Conjunctivae and EOM are normal. Pupils are equal, round, and reactive to light.   cerumen impaction to the bilateral ear   Neck: Neck supple. No thyromegaly present.   Normal range of motion.   Full passive range of motion without pain.     Cardiovascular:  Normal rate, regular rhythm, normal heart sounds and normal pulses.           Pulmonary/Chest: Effort normal and breath sounds normal. No respiratory distress.   Abdominal: Abdomen is soft. Bowel sounds are normal. She exhibits no distension. There is abdominal tenderness in the left lower quadrant.   Musculoskeletal:         General: Normal range of motion.      Cervical back: Full passive range of motion without pain, normal range of motion and neck supple.      Comments:  Tenderness to palpation of the lower back to mid paraspinal      Neurological: She is alert and oriented to person, place, and time. She has normal strength. No cranial nerve deficit or sensory deficit.   Skin: Skin is warm, dry and intact. No rash noted.   Psychiatric: She has a normal mood and affect. Her speech is normal and behavior is normal. Judgment and thought content normal.       ED Course   Procedures  Labs Reviewed   URINALYSIS, REFLEX TO URINE CULTURE - Abnormal; Notable for the following components:       Result Value    Appearance, UA Hazy (*)     Protein, UA 1+ (*)     Glucose, UA Trace (*)     Occult Blood UA 3+ (*)     Urobilinogen, UA 2.0-3.0 (*)     Leukocytes, UA 3+ (*)     All other components within normal limits    Narrative:     Specimen Source->Urine   CBC W/ AUTO DIFFERENTIAL - Abnormal; Notable for the following components:    Hemoglobin 11.3 (*)     Hematocrit 35.8 (*)     MCH 26.5 (*)     MCHC 31.6 (*)     Immature Grans (Abs) 0.06 (*)     All other components within normal limits   COMPREHENSIVE METABOLIC PANEL - Abnormal; Notable for the following components:    Sodium 135 (*)     Glucose 381 (*)     BUN 30 (*)     Creatinine 1.6 (*)     Total Protein 9.4 (*)     Albumin 3.3 (*)     AST 45 (*)     ALT 51 (*)     eGFR 38 (*)     All other components within normal limits   URINALYSIS MICROSCOPIC - Abnormal; Notable for the following components:    RBC, UA >100 (*)     WBC, UA >100 (*)     WBC Clumps, UA Few (*)     Bacteria Many (*)     All other components within normal limits    Narrative:     Specimen Source->Urine   INFLUENZA A & B BY MOLECULAR   CULTURE, URINE   SARS-COV-2 RNA AMPLIFICATION, QUAL   POCT GLUCOSE MONITORING CONTINUOUS          Imaging Results              CT Renal Stone Study ABD Pelvis WO (Final result)  Result time 12/30/22 11:58:21      Final result by Alfredo Elaine MD (12/30/22 11:58:21)                   Impression:      1. No definite evidence of  radiopaque urolithiasis or obstructive uropathy, as questioned.  2. Nonspecific minor circumferential urinary bladder wall thickening, which may be seen the setting of cystitis.  3. Additional details of chronic and incidental findings, as provided in the body of report.      Electronically signed by: Alfredo Elaine  Date:    12/30/2022  Time:    11:58               Narrative:    EXAMINATION:  CT RENAL STONE STUDY ABD PELVIS WO    CLINICAL HISTORY:  Flank pain, kidney stone suspected;    TECHNIQUE:  Low dose axial images, sagittal and coronal reformations were obtained from the lung bases to the pubic symphysis.  Contrast was not administered.    COMPARISON:  CT of the abdomen and pelvis performed 07/20/2018.    FINDINGS:  Evaluation of the solid organs is limited due to lack of intravenous contrast.    Lower chest: Atelectasis.    URINARY COLLECTING SYSTEM: No calculi in the kidneys, ureters, or urinary bladder. No hydronephrosis or ureterectasis.    Nonspecific bilateral perinephric stranding.  Minor circumferential urinary bladder wall thickening.  Macroscopic fat containing lesion in the right kidney grossly similar compared to remote CT imaging of 07/20/2018, presumed benign, possibly on the basis of angiomyolipoma.    Liver: Normal contour.  Borderline hepatic steatosis.    Gallbladder and bile ducts: Status post cholecystectomy.  No evidence of progressive biliary dilatation since remote CT of 07/20/2018.    Pancreas: Normal contour.    Spleen: Normal contour.    Adrenals: Normal contour.    Lymph nodes: No abdominal or pelvic lymphadenopathy.    Bowel and mesentery: No definite evidence of acute bowel obstruction.  Colonic diverticulosis without definite evidence of acute diverticulitis.  Normal caliber appendix.  No focal pericecal inflammatory changes seen.    Abdominal aorta: Nonaneurysmal.    Inferior vena cava: Unremarkable.    Free fluid or free air: None.    Pelvis: Unremarkable.    Body wall: Remote  operative sequela suggested anterior abdominal wall.  Small fat containing umbilical hernia.    Bones: No definite acute change.  Degenerative findings are noted involving the spine, sacroiliac joints, hip joints..                                       Medications   aspirin tablet 325 mg (has no administration in time range)   atorvastatin tablet 20 mg (has no administration in time range)   FLUoxetine capsule 40 mg (has no administration in time range)   sodium chloride 0.9% flush 10 mL (has no administration in time range)   melatonin tablet 6 mg (has no administration in time range)   enoxaparin injection 40 mg (has no administration in time range)   sodium chloride 0.9% bolus 1,000 mL 1,000 mL (0 mLs Intravenous Stopped 12/30/22 1113)   ondansetron injection 4 mg (4 mg Intravenous Given 12/30/22 0955)   ketorolac injection 15 mg (15 mg Intravenous Given 12/30/22 0955)   cefTRIAXone (ROCEPHIN) 1 g/50 mL D5W IVPB (0 g Intravenous Stopped 12/30/22 1144)     Medical Decision Making:   Initial Assessment:   A 57-year-old female presents to the ED for lower back pain, onset yesterday. Her physical exam was noted for tenderness to palpation of the lower back to the mid paraspinal, tenderness of the LLQ, nasal congestion, cerumen impaction to the bilateral ear and morbid obesity.  Differential Diagnosis:   Differential Diagnosis includes, but is not limited to:  AAA, aortic dissection, mesenteric ischemia, perforated viscous, MI/ACS, SBO/volvulus, incarcerated/strangulated hernia, intussusception, ileus, appendicitis, cholecystitis, cholangitis, diverticulitis, esophagitis, hepatitis, nephrolithiasis, pancreatitis, gastroenteritis, colitis, IBD/IBS, biliary colic, GERD, PUD, constipation, UTI/pyelonephritis,  disorder.             Clinical Tests:   Lab Tests: Ordered and Reviewed  Radiological Study: Ordered and Reviewed        Scribe Attestation:   Scribe #1: I performed the above scribed service and the  documentation accurately describes the services I performed. I attest to the accuracy of the note.      ED Course as of 12/30/22 1532   Fri Dec 30, 2022   1152 CT pending.  [DT]   1239 CBC, chem, ua, flu and covid reviewed. CT reviewed. All findings seem to be consistent with UTI however there is also an LAUREN noted. Pt was given dose of Rocephin and fluids here in the ER. Plan for admission.  [DT]   1247 [facility] paged for admission.  [DT]   1329 On line with [facility] for admission.  [DT]      ED Course User Index  [DT] Zulma Roth NP                 I, Zulma Roth NP, personally performed the services described in this documentation.All medical record entries made by the scribe were at my direction and in my presence.I have reviewed the chart and agree that the record reflects my personal performance and is accurate and complete.    Clinical Impression:   Final diagnoses:  [N30.00] Acute cystitis        ED Disposition Condition    Observation Stable                Zulma Roth NP  12/30/22 1532

## 2022-12-30 NOTE — PHARMACY MED REC
"[facility]           Pharmacy  Admission Medication History     The home medication history was taken by Jackie Dior.      Medication history obtained from Medications listed below were obtained from: Patient/family    Based on information gathered for medication list, you may go to "Admission" then "Reconcile Home Medications" tabs to review and/or act upon those items.     The home medication list has been updated by the Pharmacy department.   Please read ALL comments highlighted in yellow.   Please address this information as you see fit.    Feel free to contact us if you have any questions or require assistance.    The medications listed below were removed from the home medication list.  Please reorder if appropriate:    Patient reports NOT TAKING the following medication(s):  Xanax  Wellbutrin 150mg  Buspar 10mg  Buspar 15mg  Fioricet  Lexapro  Hctz 25mg  Prilosec 20mg  Zofran-odt 4mg  Seroquel 100mg  Seroquel 50mg  Diflucan 150mg      No current facility-administered medications on file prior to encounter.     Current Outpatient Medications on File Prior to Encounter   Medication Sig Dispense Refill    aspirin 325 MG tablet Take 325 mg by mouth once daily.      atorvastatin (LIPITOR) 20 MG tablet Take 20 mg by mouth once daily.      ciprofloxacin HCl (CIPRO) 500 MG tablet Take 500 mg by mouth 2 (two) times daily.      FLUoxetine 40 MG capsule Take 40 mg by mouth once daily.      glipiZIDE (GLUCOTROL) 10 MG tablet Take 10 mg by mouth 2 (two) times daily.      losartan (COZAAR) 100 MG tablet Take 100 mg by mouth once daily.      metFORMIN (GLUCOPHAGE-XR) 500 MG ER 24hr tablet Take 500 mg by mouth 2 (two) times daily.      traZODone (DESYREL) 50 MG tablet Take  mg by mouth nightly as needed.      triamterene-hydrochlorothiazide 75-50 mg (MAXZIDE) 75-50 mg per tablet Take 1 tablet by mouth once daily.      BINAXNOW COVID-19 AG SELF TEST Kit Use as Directed on the Package      LINZESS " 145 mcg Cap capsule Take 145 mcg by mouth.         Please address this information as you see fit.  Feel free to contact us if you have any questions or require assistance.    Jackie Dior  [phone]                  .

## 2022-12-30 NOTE — ED NOTES
Generalized body aches, dry cough, congestion, sore throat and ear pain since Tuesday was seen at PCP yesterday tested negative, but reports feeling worse. +lower back pain right side and UA at PCP revealed +blood in urine. Patient stated that she is hurting all over.

## 2022-12-30 NOTE — H&P
[facility] Medicine H&P Note     Admitting Team: [facility] Hospitalist Team A  Attending Physician: Rosemarie Brizuela MD  Resident: Yoel  Intern: Marvin     Date of Admit: 12/30/2022    Chief Complaint     Urinary frequency and back pain x 2 days     Subjective:      History of Present Illness:  Barrera Siddiqui is a 57 y.o. female with pmhx of DM2, HTN, HLD, Depression and TIA who presented to [facility] on 12/30/2022 with a primary complaint of back pain and urinary frequency x 2 days.     Patients states she has worsening urinary frequency with associated back pain. She went to her PCP yesterday and was prescribed doxy of which she took 1 pill before presenting to the ED today. She denies a history of frequent UTIs. On ROS she endorses subjective fevers, chills, headache. She denies CP, SOB, abdominal pain, dysuria, malodorous urine, visual changes, weight loss.     Past Medical History:  DM2  HTN  HLD  Depression   TIA 2017    Past Surgical History:  Past Surgical History:   Procedure Laterality Date    CHOLECYSTECTOMY      COLONOSCOPY N/A 10/18/2017    Procedure: COLONOSCOPY;  Surgeon: Donald Wright Jr., MD;  Location: [facility];  Service: Endoscopy;  Laterality: N/A;    KNEE SURGERY      TUBAL LIGATION      WRIST SURGERY       Allergies:  Review of patient's allergies indicates:  No Known Allergies    Home Medications:  Trazodone 50 mg PRN  Metformin 500 mg BID  Glipizide 10 mg BID   Lipitor 20 mg daily   Fluoxetine 40 mg daily    mg daily   Triamterene - HCTZ 75-50 daily  Losartan 100 mg daily     Family History:  Family History   Problem Relation Age of Onset    Breast cancer Mother     Liver disease Maternal Grandmother        Social History:  Tobacco: Never  Alcohol: None  Drugs: None  Living situation: Lives with Son, Can complete all ADLs independently     Review of Systems:  All other systems are reviewed and are negative.    Health Maintaince :   Primary Care Physician:    Evelin  ADORE Guevara NP       Immunizations:   TDap UTD    Flu Not UTD   Pna UTD  Covid x 2     Cancer Screening:  MMG: past due, last done 2020  Colonoscopy: UTD, last done 2017, repeat in 10 years      Objective:   Last 24 Hour Vital Signs:  BP  Min: 120/74  Max: 120/74  Temp  Av.2 °F (36.8 °C)  Min: 98.2 °F (36.8 °C)  Max: 98.2 °F (36.8 °C)  Pulse  Av  Min: 98  Max: 98  Resp  Av  Min: 18  Max: 18  SpO2  Av %  Min: 98 %  Max: 98 %  There is no height or weight on file to calculate BMI.  No intake/output data recorded.    Physical Examination:  General Appearance: alert and oriented, no acute distress.  Head: Normocephalic, atraumatic   Eyes: conjunctivae/corneas clear and non-icteric. PERRL, EOMI  Mouth: lips, tongue and mucosa normal, oropharynx clear  Neck: supple, trachea midline, thyroid not enlarged, no LAD  Lungs: CTAB; no crackles or wheezes, no increased work of breathing  Heart: regular rate and rhythm, S1, S2 normal, no murmurs  Abdomen: soft, distended, suprapubic TTP; bowel sounds normal  Extremities: extremities normal, atraumatic, no cyanosis or edema. Lumbar paraspinal TTP, no CVA TTP appreciated   Pulses: 2+ and symmetric   Skin: No obvious ecchymosis, erythema, or rash  Neurologic: AAOx3, no focal neurological deficits noted.      Laboratory:  Most Recent Data:  CBC:   Lab Results   Component Value Date    WBC 11.30 2022    HGB 11.3 (L) 2022    HCT 35.8 (L) 2022     2022    MCV 84 2022    RDW 14.0 2022     BMP:   Lab Results   Component Value Date     (L) 2022    K 4.2 2022    CL 98 2022    CO2 23 2022    BUN 30 (H) 2022    CREATININE 1.6 (H) 2022     (H) 2022    CALCIUM 9.6 2022    MG 1.5 (L) 2020    PHOS 4.1 2020     LFTs:   Lab Results   Component Value Date    PROT 9.4 (H) 2022    ALBUMIN 3.3 (L) 2022    BILITOT 0.9 2022    AST 45 (H) 2022     ALKPHOS 101 12/30/2022    ALT 51 (H) 12/30/2022     Coags:   Lab Results   Component Value Date    INR 1.2 11/21/2020     FLP:   Lab Results   Component Value Date    CHOL 123 11/21/2020    HDL 43 11/21/2020    LDLCALC 42.0 (L) 11/21/2020    TRIG 190 (H) 11/21/2020    CHOLHDL 35.0 11/21/2020     DM:   Lab Results   Component Value Date    HGBA1C 8.7 (H) 02/09/2020    HGBA1C 8.8 (H) 02/08/2020    HGBA1C 9.9 (H) 07/25/2019    LDLCALC 42.0 (L) 11/21/2020    CREATININE 1.6 (H) 12/30/2022     Thyroid:   Lab Results   Component Value Date    TSH 0.222 (L) 11/21/2020    FREET4 0.82 11/21/2020     Anemia:   Lab Results   Component Value Date    FOLATE 12.7 03/28/2019     Cardiac:   Lab Results   Component Value Date    TROPONINI <0.012 02/04/2021    BNP <10 10/29/2018     Urinalysis:   Lab Results   Component Value Date    COLORU Yellow 12/30/2022    SPECGRAV 1.020 12/30/2022    NITRITE Negative 12/30/2022    KETONESU Negative 12/30/2022    UROBILINOGEN 2.0-3.0 (A) 12/30/2022    WBCUA >100 (H) 12/30/2022       Trended Lab Data:  Recent Labs   Lab 12/30/22  0936   WBC 11.30   HGB 11.3*   HCT 35.8*      MCV 84   RDW 14.0   *   K 4.2   CL 98   CO2 23   BUN 30*   CREATININE 1.6*   *   PROT 9.4*   ALBUMIN 3.3*   BILITOT 0.9   AST 45*   ALKPHOS 101   ALT 51*     Microbiology Data:  Urine Cx ordered     Other Results:  EKG (my interpretation): Ordered     Radiology:  CT Renal stone study  1. No definite evidence of radiopaque urolithiasis or obstructive uropathy, as questioned.  2. Nonspecific minor circumferential urinary bladder wall thickening, which may be seen the setting of cystitis.       Assessment:     Barrera Siddiqui is a 57 y.o. female with pmhx of DM2, HTN, HLD, Depression and TIA who presented with back pain and urinary frequency x 2 days. Afebrile, vitals stable on RA. WBC 11, Hb 11, Cr 1.6, BG 380s, UA with leuk/wbc, culture pending. CT renal stone study with no kidney stones or obstructive uropathy.  Patient admitted for acute cystitis and LAUREN.     Plan:     Acute Urinary Tract Infection   - Two day history of urinary frequency and back pain   - Afebrile, vitals stable, WBC 11  - UA with >100 wbc, 3+ leukocytes, >100 rbcs  - CT renal study with no stones or obstructive uropathy   - Received 1L IVF and Rocephin in ED   - Follow up Urine Cx data  - Continue IV Rocephin    LAUREN   - Bun/Cr 30/1.6 on admit   - Baseline Cr ~0.7   - S/p 1L IVF  - Will consider urine studies if no improvement     DM2  -  on admit  - A1c 8.7 02/2022, repeat ordered   - Home regimen metformin 500 mg BID and Glipizide 10mg BID  - BG likely elevated in setting of acute infection   - SSI while inpatient     HTN  - /74 on admit  - Home regimen Triamterene - HCTZ 75-50 daily, Losartan 100 mg daily   - Hold home HTCZ and losartan in setting of LAUREN    HLD  - Continue home statin     Depression   - No SI/HI  - Continue home Fluoxetine     TIA (2017)  - Occurred at [facility], transient left sided weakness and dysarthria   - No current residual deficits   - Continue home ASA and statin     Code status: Full  DVT ppx: Lovenox  Diet: Diabetic   Dispo: Pending symptomatic improvement and urine culture data     Bill Diallo MD  [facility] Internal Medicine HO-II    [facility] Medicine Hospitalist Pager numbers:   [facility] Hospitalist Medicine Team A (Geoff/Gelacio): [phone]  [facility] Hospitalist Medicine Team B (Uriah/Ame):  [phone]

## 2022-12-31 LAB
ALBUMIN SERPL BCP-MCNC: 2.8 G/DL (ref 3.5–5.2)
ALP SERPL-CCNC: 93 U/L (ref 55–135)
ALT SERPL W/O P-5'-P-CCNC: 37 U/L (ref 10–44)
ANION GAP SERPL CALC-SCNC: 11 MMOL/L (ref 8–16)
AST SERPL-CCNC: 30 U/L (ref 10–40)
BASO STIPL BLD QL SMEAR: ABNORMAL
BASOPHILS NFR BLD: 1 % (ref 0–1.9)
BILIRUB SERPL-MCNC: 1.1 MG/DL (ref 0.1–1)
BUN SERPL-MCNC: 33 MG/DL (ref 6–20)
CALCIUM SERPL-MCNC: 9 MG/DL (ref 8.7–10.5)
CHLORIDE SERPL-SCNC: 102 MMOL/L (ref 95–110)
CO2 SERPL-SCNC: 22 MMOL/L (ref 23–29)
CREAT SERPL-MCNC: 1.3 MG/DL (ref 0.5–1.4)
DIFFERENTIAL METHOD: ABNORMAL
EOSINOPHIL NFR BLD: 1 % (ref 0–8)
ERYTHROCYTE [DISTWIDTH] IN BLOOD BY AUTOMATED COUNT: 13.5 % (ref 11.5–14.5)
EST. GFR  (NO RACE VARIABLE): 49 ML/MIN/1.73 M^2
ESTIMATED AVG GLUCOSE: 240 MG/DL (ref 68–131)
FERRITIN SERPL-MCNC: 123 NG/ML (ref 20–300)
FOLATE SERPL-MCNC: 8.9 NG/ML (ref 4–24)
GIANT PLATELETS BLD QL SMEAR: PRESENT
GLUCOSE SERPL-MCNC: 285 MG/DL (ref 70–110)
HBA1C MFR BLD: 10 % (ref 4–5.6)
HCT VFR BLD AUTO: 30.3 % (ref 37–48.5)
HGB BLD-MCNC: 9.5 G/DL (ref 12–16)
IMM GRANULOCYTES # BLD AUTO: ABNORMAL K/UL (ref 0–0.04)
IMM GRANULOCYTES NFR BLD AUTO: ABNORMAL % (ref 0–0.5)
IRON SERPL-MCNC: 19 UG/DL (ref 30–160)
LYMPHOCYTES NFR BLD: 19 % (ref 18–48)
MCH RBC QN AUTO: 26.5 PG (ref 27–31)
MCHC RBC AUTO-ENTMCNC: 31.4 G/DL (ref 32–36)
MCV RBC AUTO: 84 FL (ref 82–98)
MONOCYTES NFR BLD: 6 % (ref 4–15)
NEUTROPHILS NFR BLD: 73 % (ref 38–73)
NRBC BLD-RTO: 0 /100 WBC
OVALOCYTES BLD QL SMEAR: ABNORMAL
PLATELET # BLD AUTO: 150 K/UL (ref 150–450)
PLATELET BLD QL SMEAR: ABNORMAL
PMV BLD AUTO: 12.2 FL (ref 9.2–12.9)
POCT GLUCOSE: 238 MG/DL (ref 70–110)
POCT GLUCOSE: 280 MG/DL (ref 70–110)
POCT GLUCOSE: 281 MG/DL (ref 70–110)
POCT GLUCOSE: 341 MG/DL (ref 70–110)
POLYCHROMASIA BLD QL SMEAR: ABNORMAL
POTASSIUM SERPL-SCNC: 3.9 MMOL/L (ref 3.5–5.1)
PROT SERPL-MCNC: 8.1 G/DL (ref 6–8.4)
RBC # BLD AUTO: 3.59 M/UL (ref 4–5.4)
SATURATED IRON: 7 % (ref 20–50)
SODIUM SERPL-SCNC: 135 MMOL/L (ref 136–145)
TOTAL IRON BINDING CAPACITY: 286 UG/DL (ref 250–450)
TRANSFERRIN SERPL-MCNC: 193 MG/DL (ref 200–375)
VIT B12 SERPL-MCNC: 437 PG/ML (ref 210–950)
WBC # BLD AUTO: 8.13 K/UL (ref 3.9–12.7)

## 2022-12-31 PROCEDURE — 96366 THER/PROPH/DIAG IV INF ADDON: CPT

## 2022-12-31 PROCEDURE — 80053 COMPREHEN METABOLIC PANEL: CPT | Performed by: STUDENT IN AN ORGANIZED HEALTH CARE EDUCATION/TRAINING PROGRAM

## 2022-12-31 PROCEDURE — 82607 VITAMIN B-12: CPT

## 2022-12-31 PROCEDURE — 63600175 PHARM REV CODE 636 W HCPCS: Performed by: STUDENT IN AN ORGANIZED HEALTH CARE EDUCATION/TRAINING PROGRAM

## 2022-12-31 PROCEDURE — G0378 HOSPITAL OBSERVATION PER HR: HCPCS

## 2022-12-31 PROCEDURE — 96372 THER/PROPH/DIAG INJ SC/IM: CPT | Performed by: STUDENT IN AN ORGANIZED HEALTH CARE EDUCATION/TRAINING PROGRAM

## 2022-12-31 PROCEDURE — 85027 COMPLETE CBC AUTOMATED: CPT | Performed by: STUDENT IN AN ORGANIZED HEALTH CARE EDUCATION/TRAINING PROGRAM

## 2022-12-31 PROCEDURE — 83036 HEMOGLOBIN GLYCOSYLATED A1C: CPT | Performed by: STUDENT IN AN ORGANIZED HEALTH CARE EDUCATION/TRAINING PROGRAM

## 2022-12-31 PROCEDURE — 25000003 PHARM REV CODE 250: Performed by: STUDENT IN AN ORGANIZED HEALTH CARE EDUCATION/TRAINING PROGRAM

## 2022-12-31 PROCEDURE — 82728 ASSAY OF FERRITIN: CPT

## 2022-12-31 PROCEDURE — 85007 BL SMEAR W/DIFF WBC COUNT: CPT | Mod: NCS | Performed by: STUDENT IN AN ORGANIZED HEALTH CARE EDUCATION/TRAINING PROGRAM

## 2022-12-31 PROCEDURE — 94761 N-INVAS EAR/PLS OXIMETRY MLT: CPT

## 2022-12-31 PROCEDURE — 99900035 HC TECH TIME PER 15 MIN (STAT)

## 2022-12-31 PROCEDURE — 25000003 PHARM REV CODE 250

## 2022-12-31 PROCEDURE — 82746 ASSAY OF FOLIC ACID SERUM: CPT

## 2022-12-31 PROCEDURE — 36415 COLL VENOUS BLD VENIPUNCTURE: CPT

## 2022-12-31 PROCEDURE — 36415 COLL VENOUS BLD VENIPUNCTURE: CPT | Performed by: STUDENT IN AN ORGANIZED HEALTH CARE EDUCATION/TRAINING PROGRAM

## 2022-12-31 PROCEDURE — 84466 ASSAY OF TRANSFERRIN: CPT

## 2022-12-31 RX ORDER — BENZONATATE 100 MG/1
100 CAPSULE ORAL 3 TIMES DAILY PRN
Status: DISCONTINUED | OUTPATIENT
Start: 2022-12-31 | End: 2023-01-01 | Stop reason: HOSPADM

## 2022-12-31 RX ORDER — GUAIFENESIN 600 MG/1
600 TABLET, EXTENDED RELEASE ORAL 2 TIMES DAILY
Status: DISCONTINUED | OUTPATIENT
Start: 2022-12-31 | End: 2023-01-01 | Stop reason: HOSPADM

## 2022-12-31 RX ORDER — HYDROCODONE BITARTRATE AND ACETAMINOPHEN 5; 325 MG/1; MG/1
1 TABLET ORAL ONCE
Status: COMPLETED | OUTPATIENT
Start: 2022-12-31 | End: 2022-12-31

## 2022-12-31 RX ORDER — ASPIRIN 81 MG/1
81 TABLET ORAL DAILY
Status: DISCONTINUED | OUTPATIENT
Start: 2022-12-31 | End: 2023-01-01 | Stop reason: HOSPADM

## 2022-12-31 RX ADMIN — HYDROCODONE BITARTRATE AND ACETAMINOPHEN 1 TABLET: 5; 325 TABLET ORAL at 01:12

## 2022-12-31 RX ADMIN — ACETAMINOPHEN 650 MG: 325 TABLET ORAL at 10:12

## 2022-12-31 RX ADMIN — CARBAMIDE PEROXIDE 6.5% 5 DROP: 6.5 LIQUID AURICULAR (OTIC) at 10:12

## 2022-12-31 RX ADMIN — ATORVASTATIN CALCIUM 20 MG: 20 TABLET, FILM COATED ORAL at 08:12

## 2022-12-31 RX ADMIN — BENZONATATE 100 MG: 100 CAPSULE ORAL at 06:12

## 2022-12-31 RX ADMIN — GUAIFENESIN 600 MG: 600 TABLET, EXTENDED RELEASE ORAL at 08:12

## 2022-12-31 RX ADMIN — CEFTRIAXONE 2 G: 2 INJECTION, SOLUTION INTRAVENOUS at 10:12

## 2022-12-31 RX ADMIN — INSULIN ASPART 6 UNITS: 100 INJECTION, SOLUTION INTRAVENOUS; SUBCUTANEOUS at 04:12

## 2022-12-31 RX ADMIN — ENOXAPARIN SODIUM 40 MG: 40 INJECTION SUBCUTANEOUS at 04:12

## 2022-12-31 RX ADMIN — ACETAMINOPHEN 650 MG: 325 TABLET ORAL at 03:12

## 2022-12-31 RX ADMIN — FLUOXETINE 40 MG: 20 CAPSULE ORAL at 08:12

## 2022-12-31 RX ADMIN — INSULIN ASPART 2 UNITS: 100 INJECTION, SOLUTION INTRAVENOUS; SUBCUTANEOUS at 10:12

## 2022-12-31 RX ADMIN — INSULIN ASPART 8 UNITS: 100 INJECTION, SOLUTION INTRAVENOUS; SUBCUTANEOUS at 11:12

## 2022-12-31 RX ADMIN — CARBAMIDE PEROXIDE 6.5% 5 DROP: 6.5 LIQUID AURICULAR (OTIC) at 02:12

## 2022-12-31 RX ADMIN — Medication 6 MG: at 08:12

## 2022-12-31 RX ADMIN — ACETAMINOPHEN 650 MG: 325 TABLET ORAL at 04:12

## 2022-12-31 RX ADMIN — ASPIRIN 81 MG: 81 TABLET, COATED ORAL at 08:12

## 2022-12-31 RX ADMIN — INSULIN ASPART 6 UNITS: 100 INJECTION, SOLUTION INTRAVENOUS; SUBCUTANEOUS at 08:12

## 2022-12-31 NOTE — PLAN OF CARE
Problem: Adult Inpatient Plan of Care  Goal: Plan of Care Review  Outcome: Ongoing, Progressing    Chart check complete. Vitals, orders, labs, and progress notes reviewed. Care plan updated. Will monitor.

## 2022-12-31 NOTE — NURSING
Shift assessment complete. Pt is alert and oriented x 4. Complains of left ear pain. Medicated per mar and will monitor for effectiveness. BP 82/45. Pt is asymptomatic. Orders for 1L of LR over 2 hours and will re-check BP after bolus.  Bed in lowest position, locked, and side rails up x 3. Call light in reach.

## 2022-12-31 NOTE — NURSING
Spoke to MD on call about fever of 101.5. Pt received tylenol at 0347. No new orders at this time. Will continue to monitor.

## 2022-12-31 NOTE — PROGRESS NOTES
"[facility] Resident Progress Note    Subjective:      Barrera Siddiqui is a 57 y.o.  female who is being followed by the [facility] Internal Medicine service at [facility] for urinary tract infection and secondary LAUREN.     Overnight, no acute events. This morning patient febrile to 101.6. Tylenol received, patient remains on ABX. On interview, patient endorsing bilateral ear pain, pressure like and polyuria (x4 overnight). Otherwise, denies chest pain, shortness of breath, dysuria, or any change in urinary odor. Patient notes interval improvement in back back present on admission.      Objective:   Last 24 Hour Vital Signs:  BP  Min: 82/45  Max: 127/59  Temp  Av.7 °F (37.6 °C)  Min: 98.2 °F (36.8 °C)  Max: 101.5 °F (38.6 °C)  Pulse  Av.6  Min: 92  Max: 102  Resp  Av.1  Min: 18  Max: 20  SpO2  Av.5 %  Min: 95 %  Max: 99 %  Height  Av' 3" (160 cm)  Min: 5' 3" (160 cm)  Max: 5' 3" (160 cm)  Weight  Av.4 kg (260 lb 15.3 oz)  Min: 117.9 kg (260 lb)  Max: 118.8 kg (261 lb 14.5 oz)  No intake/output data recorded.    Physical Examination:  General Appearance: alert and oriented, no acute distress, on room air   Head: Normocephalic, atraumatic   Eyes: conjunctivae/corneas clear and non-icteric. PERRL, EOMI  Mouth: lips, tongue and mucosa normal, oropharynx clear  Ears: external ears normal, unable to manipulate pinna 2/2 patients pain   Neck: supple, trachea midline, thyroid not enlarged, no LAD  Lungs: CTAB; no crackles or wheezes, no increased work of breathing  Heart: regular rate and rhythm, S1, S2 normal, no murmurs  Abdomen: soft, distended, suprapubic TTP; bowel sounds normal  Extremities: extremities normal, atraumatic, no cyanosis or edema. Lumbar paraspinal TTP, no CVA TTP appreciated   Pulses: 2+ and symmetric   Skin: No obvious ecchymosis, erythema, or rash  Neurologic: AAOx3, no focal neurological deficits noted, CN II-XII grossly intact     Laboratory:  Most Recent Data:  CBC: "   Lab Results   Component Value Date    WBC 8.13 12/31/2022    HGB 9.5 (L) 12/31/2022    HCT 30.3 (L) 12/31/2022     12/31/2022    MCV 84 12/31/2022    RDW 13.5 12/31/2022     BMP:   Lab Results   Component Value Date     (L) 12/31/2022    K 3.9 12/31/2022     12/31/2022    CO2 22 (L) 12/31/2022    BUN 33 (H) 12/31/2022    CREATININE 1.3 12/31/2022     (H) 12/31/2022    CALCIUM 9.0 12/31/2022    MG 1.5 (L) 02/09/2020    PHOS 4.1 02/09/2020     LFTs:   Lab Results   Component Value Date    PROT 8.1 12/31/2022    ALBUMIN 2.8 (L) 12/31/2022    BILITOT 1.1 (H) 12/31/2022    AST 30 12/31/2022    ALKPHOS 93 12/31/2022    ALT 37 12/31/2022     Coags:   Lab Results   Component Value Date    INR 1.2 11/21/2020     FLP:   Lab Results   Component Value Date    CHOL 123 11/21/2020    HDL 43 11/21/2020    LDLCALC 42.0 (L) 11/21/2020    TRIG 190 (H) 11/21/2020    CHOLHDL 35.0 11/21/2020     DM:   Lab Results   Component Value Date    HGBA1C 10.0 (H) 12/31/2022    HGBA1C 8.7 (H) 02/09/2020    HGBA1C 8.8 (H) 02/08/2020    LDLCALC 42.0 (L) 11/21/2020    CREATININE 1.3 12/31/2022     Thyroid:   Lab Results   Component Value Date    TSH 0.222 (L) 11/21/2020    FREET4 0.82 11/21/2020     Anemia:   Lab Results   Component Value Date    FOLATE 12.7 03/28/2019     Cardiac:   Lab Results   Component Value Date    TROPONINI 0.014 12/30/2022    BNP <10 10/29/2018     Urinalysis:   Lab Results   Component Value Date    COLORU Yellow 12/30/2022    SPECGRAV 1.020 12/30/2022    NITRITE Negative 12/30/2022    KETONESU Negative 12/30/2022    UROBILINOGEN 2.0-3.0 (A) 12/30/2022    WBCUA >100 (H) 12/30/2022       Trended Lab Data:  Recent Labs   Lab 12/30/22  0936 12/31/22  0322   WBC 11.30 8.13   HGB 11.3* 9.5*   HCT 35.8* 30.3*    150   MCV 84 84   RDW 14.0 13.5   * 135*   K 4.2 3.9   CL 98 102   CO2 23 22*   BUN 30* 33*   CREATININE 1.6* 1.3   * 285*   PROT 9.4* 8.1   ALBUMIN 3.3* 2.8*   BILITOT 0.9  1.1*   AST 45* 30   ALKPHOS 101 93   ALT 51* 37       Trended Cardiac Data:  Recent Labs   Lab 12/30/22  1812   TROPONINI 0.014       Microbiology Data Reviewed: yes  Pertinent Findings:  Uc pending    Other Results:  EKG (my interpretation):   Normal sinus rhythm, , Qtc 436   Unchanged from previous tracing    Radiology Data Reviewed: yes  Pertinent Findings:  CT RENAL STONE STUDY ABD PELVIS WO  Impression:  1. No definite evidence of radiopaque urolithiasis or obstructive uropathy, as questioned.  2. Nonspecific minor circumferential urinary bladder wall thickening, which may be seen the setting of cystitis.  3. Additional details of chronic and incidental findings, as provided in the body of report.    Current Medications:     Infusions:       Scheduled:   aspirin  325 mg Oral Daily    atorvastatin  20 mg Oral Daily    cefTRIAXone (ROCEPHIN) IVPB  2 g Intravenous Q24H    enoxaparin  40 mg Subcutaneous Daily    FLUoxetine  40 mg Oral Daily        PRN:  acetaminophen, dextrose 10%, dextrose 10%, glucagon (human recombinant), glucose, glucose, influenza, insulin aspart U-100, melatonin, pneumoc 20-marci conj-dip cr(PF), sodium chloride 0.9%    Antibiotics and Day Number of Therapy:  Rocephin - Day 2     Lines and Day Number of Therapy:  PIV    Assessment:     Barrera Siddiqui is a 57 y.o.female with  Patient Active Problem List    Diagnosis Date Noted    LAUREN (acute kidney injury) 12/30/2022    Acute cystitis 12/30/2022    Anxiety disorder 01/08/2022    Insomnia related to another mental disorder 01/08/2022    Constipation 01/08/2022    Moderate recurrent major depression 01/08/2022    Vitamin D deficiency 01/08/2022    Decreased range of motion of neck 03/12/2020    Weakness of left upper extremity 03/12/2020    Poor posture 03/12/2020    Hyperglycemia 10/29/2018    TIA (transient ischemic attack) 10/29/2018    Screening for colorectal cancer 10/18/2017    Depression 12/24/2016    History of smoking 12/24/2016    Chest  pain 09/27/2016    Right calf pain 05/11/2016    Palpitation 05/11/2016    Snoring 05/11/2016    Daytime somnolence 05/11/2016    Headache 05/11/2016    Diastolic heart failure 04/29/2016    Morbid obesity 04/11/2016    Idiopathic hypotension 04/11/2016    Type 2 diabetes mellitus 04/11/2016    Hyperlipidemia 04/11/2016    Essential hypertension 04/09/2016        Plan:   Acute Urinary Tract Infection   - Two day history of urinary frequency and back pain   - Afebrile, vitals stable, WBC 11  - UA with >100 wbc, 3+ leukocytes, >100 rbcs  - CT renal study with no stones or obstructive uropathy   - Received 1L IVF and Rocephin in ED   - Uc pending  - Continue IV Rocephin     LAUREN   - Bun/Cr 30/1.6 on admit   - Baseline Cr ~0.7   - BUN/Cr >20, likely prerenal 2/2 decreased PO intake in setting of active infection   - S/p 1L IVF, Cr improving  - daily CMP     DM2  -  on admit  - A1c 8.7 02/2022, repeat ordered   - Home regimen metformin 500 mg BID and Glipizide 10mg BID  - BG likely elevated in setting of acute infection   - SSI while inpatient     Normocytic anemia  -Hgb 11.3, MCV 84 on admit  -iron studies, ferritin, B12, folate pending  -daily CBC      HTN  - /74 on admit  - Home regimen Triamterene - HCTZ 75-50 daily, Losartan 100 mg daily   - Hold home HTCZ and losartan in setting of LAUREN  -patient normo/hypotensive with antihypertensives helds above - continue to monitor      HLD  - Continue home statin      Depression   - No SI/HI  - Continue home Fluoxetine      Hx TIA   - Occurred at [facility](2017), transient left sided weakness and dysarthria   - No current residual deficits   - home  decreased to ASA 81      Code status: Full  DVT ppx: Lovenox  Diet: Diabetic   Dispo: Pending urine culture result - transition to PO abx     Abilio Cameron MD   [facility] HO-1  [facility]     [facility] Medicine Hospitalist Pager numbers:   [facility] Hospitalist Medicine Team A (Geoff/Gelacio): [phone]  [facility]  Hospitalist Medicine Team B (Uriah/Ame):  [phone]

## 2022-12-31 NOTE — NURSING
Received patient from emergency room on stable conditions, orientation to room provided, care plan reviewed with patient, BLASOx4, no respiratory distress noted, on room air. NSR per cardiac monitor, no red alarm noted. Denies any pain of discomfort, education provided on medication effect and side effect, voices understanding. Vitals signs taken and recorded.    Call light within her reach, no apparent distress noted, bed in low position, bed alarm on, educated on the importance of calling as needed, voices understanding, stable at this time.

## 2022-12-31 NOTE — PROGRESS NOTES
12/30/22 3673   Nurse Notification   Bedside Nurse Notified? Yes   Name of Bedside Nurse ArceliaRN   Nurse Notified Of Other  (pt c/o headache and bilateral ear pain 10/10)   Admission   Initial VN Admission Questions Complete   Shift   Virtual Nurse - Patient Verbalized Approval Of Camera Use;VN Rounding   Safety/Activity   Patient Rounds bed in low position;call light in patient/parent reach;clutter free environment maintained;visualized patient;placement of personal items at bedside   Safety Promotion/Fall Prevention other (see comments)  (room dark; unable to visualize pt and surroundings)   Pain/Comfort/Sleep   Comfort/Acceptable Pain Level 7   Pain Body Location other (see comments)  (headache, bilateral ears)   Pain Rating (0-10): Rest 10   VN cued in to pt's room with permission. Admission questions completed. Plan of care reviewed with pt.e. Call bell w/in reach. Instructed to call for needs/assist.

## 2023-01-01 VITALS
DIASTOLIC BLOOD PRESSURE: 63 MMHG | HEART RATE: 82 BPM | OXYGEN SATURATION: 94 % | HEIGHT: 63 IN | TEMPERATURE: 97 F | WEIGHT: 261.94 LBS | RESPIRATION RATE: 18 BRPM | SYSTOLIC BLOOD PRESSURE: 107 MMHG | BODY MASS INDEX: 46.41 KG/M2

## 2023-01-01 PROBLEM — N12 PYELONEPHRITIS: Status: ACTIVE | Noted: 2023-01-01

## 2023-01-01 PROBLEM — N10 ACUTE PYELONEPHRITIS: Status: ACTIVE | Noted: 2023-01-01

## 2023-01-01 PROBLEM — H92.01 RIGHT EAR PAIN: Status: ACTIVE | Noted: 2023-01-01

## 2023-01-01 LAB
ALBUMIN SERPL BCP-MCNC: 2.5 G/DL (ref 3.5–5.2)
ALP SERPL-CCNC: 99 U/L (ref 55–135)
ALT SERPL W/O P-5'-P-CCNC: 34 U/L (ref 10–44)
ANION GAP SERPL CALC-SCNC: 11 MMOL/L (ref 8–16)
AST SERPL-CCNC: 32 U/L (ref 10–40)
BACTERIA UR CULT: ABNORMAL
BASOPHILS # BLD AUTO: 0.05 K/UL (ref 0–0.2)
BASOPHILS NFR BLD: 0.6 % (ref 0–1.9)
BILIRUB SERPL-MCNC: 0.8 MG/DL (ref 0.1–1)
BUN SERPL-MCNC: 22 MG/DL (ref 6–20)
CALCIUM SERPL-MCNC: 8.9 MG/DL (ref 8.7–10.5)
CHLORIDE SERPL-SCNC: 102 MMOL/L (ref 95–110)
CO2 SERPL-SCNC: 24 MMOL/L (ref 23–29)
CREAT SERPL-MCNC: 1 MG/DL (ref 0.5–1.4)
DIFFERENTIAL METHOD: ABNORMAL
EOSINOPHIL # BLD AUTO: 0.3 K/UL (ref 0–0.5)
EOSINOPHIL NFR BLD: 3 % (ref 0–8)
ERYTHROCYTE [DISTWIDTH] IN BLOOD BY AUTOMATED COUNT: 13.4 % (ref 11.5–14.5)
EST. GFR  (NO RACE VARIABLE): >60 ML/MIN/1.73 M^2
GLUCOSE SERPL-MCNC: 215 MG/DL (ref 70–110)
HCT VFR BLD AUTO: 30.8 % (ref 37–48.5)
HGB BLD-MCNC: 9.4 G/DL (ref 12–16)
IMM GRANULOCYTES # BLD AUTO: 0.05 K/UL (ref 0–0.04)
IMM GRANULOCYTES NFR BLD AUTO: 0.6 % (ref 0–0.5)
LYMPHOCYTES # BLD AUTO: 2.5 K/UL (ref 1–4.8)
LYMPHOCYTES NFR BLD: 27.6 % (ref 18–48)
MCH RBC QN AUTO: 25.8 PG (ref 27–31)
MCHC RBC AUTO-ENTMCNC: 30.5 G/DL (ref 32–36)
MCV RBC AUTO: 84 FL (ref 82–98)
MONOCYTES # BLD AUTO: 0.8 K/UL (ref 0.3–1)
MONOCYTES NFR BLD: 9 % (ref 4–15)
NEUTROPHILS # BLD AUTO: 5.3 K/UL (ref 1.8–7.7)
NEUTROPHILS NFR BLD: 59.2 % (ref 38–73)
NRBC BLD-RTO: 0 /100 WBC
PLATELET # BLD AUTO: 168 K/UL (ref 150–450)
PMV BLD AUTO: 11.6 FL (ref 9.2–12.9)
POCT GLUCOSE: 198 MG/DL (ref 70–110)
POCT GLUCOSE: 260 MG/DL (ref 70–110)
POTASSIUM SERPL-SCNC: 3.7 MMOL/L (ref 3.5–5.1)
PROT SERPL-MCNC: 8.3 G/DL (ref 6–8.4)
RBC # BLD AUTO: 3.65 M/UL (ref 4–5.4)
SODIUM SERPL-SCNC: 137 MMOL/L (ref 136–145)
WBC # BLD AUTO: 8.92 K/UL (ref 3.9–12.7)

## 2023-01-01 PROCEDURE — G0378 HOSPITAL OBSERVATION PER HR: HCPCS

## 2023-01-01 PROCEDURE — 36415 COLL VENOUS BLD VENIPUNCTURE: CPT | Performed by: STUDENT IN AN ORGANIZED HEALTH CARE EDUCATION/TRAINING PROGRAM

## 2023-01-01 PROCEDURE — 25000003 PHARM REV CODE 250: Performed by: STUDENT IN AN ORGANIZED HEALTH CARE EDUCATION/TRAINING PROGRAM

## 2023-01-01 PROCEDURE — 94761 N-INVAS EAR/PLS OXIMETRY MLT: CPT

## 2023-01-01 PROCEDURE — 25000003 PHARM REV CODE 250

## 2023-01-01 PROCEDURE — 63600175 PHARM REV CODE 636 W HCPCS: Performed by: STUDENT IN AN ORGANIZED HEALTH CARE EDUCATION/TRAINING PROGRAM

## 2023-01-01 PROCEDURE — 85025 COMPLETE CBC W/AUTO DIFF WBC: CPT | Performed by: STUDENT IN AN ORGANIZED HEALTH CARE EDUCATION/TRAINING PROGRAM

## 2023-01-01 PROCEDURE — 99900035 HC TECH TIME PER 15 MIN (STAT)

## 2023-01-01 PROCEDURE — 80053 COMPREHEN METABOLIC PANEL: CPT | Performed by: STUDENT IN AN ORGANIZED HEALTH CARE EDUCATION/TRAINING PROGRAM

## 2023-01-01 PROCEDURE — 96366 THER/PROPH/DIAG IV INF ADDON: CPT

## 2023-01-01 RX ORDER — BENZONATATE 100 MG/1
100 CAPSULE ORAL 3 TIMES DAILY PRN
Qty: 30 CAPSULE | Refills: 0 | Status: SHIPPED | OUTPATIENT
Start: 2023-01-01 | End: 2023-01-11

## 2023-01-01 RX ORDER — ACETAMINOPHEN 325 MG/1
650 TABLET ORAL ONCE
Status: COMPLETED | OUTPATIENT
Start: 2023-01-01 | End: 2023-01-01

## 2023-01-01 RX ORDER — TRIAMTERENE/HYDROCHLOROTHIAZID 37.5-25 MG
1 TABLET ORAL DAILY
Qty: 30 TABLET | Refills: 11 | Status: ON HOLD | OUTPATIENT
Start: 2023-01-02 | End: 2023-08-16 | Stop reason: HOSPADM

## 2023-01-01 RX ORDER — AMOXICILLIN AND CLAVULANATE POTASSIUM 875; 125 MG/1; MG/1
1 TABLET, FILM COATED ORAL EVERY 12 HOURS
Qty: 22 TABLET | Refills: 0 | Status: SHIPPED | OUTPATIENT
Start: 2023-01-02 | End: 2023-08-15 | Stop reason: ALTCHOICE

## 2023-01-01 RX ORDER — ASPIRIN 81 MG/1
81 TABLET ORAL DAILY
Qty: 90 TABLET | Refills: 3 | Status: SHIPPED | OUTPATIENT
Start: 2023-01-02 | End: 2024-02-01 | Stop reason: SDUPTHER

## 2023-01-01 RX ADMIN — ASPIRIN 81 MG: 81 TABLET, COATED ORAL at 08:01

## 2023-01-01 RX ADMIN — ATORVASTATIN CALCIUM 20 MG: 20 TABLET, FILM COATED ORAL at 08:01

## 2023-01-01 RX ADMIN — CEFTRIAXONE 2 G: 2 INJECTION, SOLUTION INTRAVENOUS at 10:01

## 2023-01-01 RX ADMIN — INSULIN ASPART 2 UNITS: 100 INJECTION, SOLUTION INTRAVENOUS; SUBCUTANEOUS at 08:01

## 2023-01-01 RX ADMIN — FLUOXETINE 40 MG: 20 CAPSULE ORAL at 08:01

## 2023-01-01 RX ADMIN — INSULIN ASPART 6 UNITS: 100 INJECTION, SOLUTION INTRAVENOUS; SUBCUTANEOUS at 12:01

## 2023-01-01 RX ADMIN — ACETAMINOPHEN 650 MG: 325 TABLET ORAL at 12:01

## 2023-01-01 RX ADMIN — GUAIFENESIN 600 MG: 600 TABLET, EXTENDED RELEASE ORAL at 08:01

## 2023-01-01 RX ADMIN — ACETAMINOPHEN 650 MG: 325 TABLET ORAL at 08:01

## 2023-01-01 RX ADMIN — CARBAMIDE PEROXIDE 6.5% 5 DROP: 6.5 LIQUID AURICULAR (OTIC) at 08:01

## 2023-01-01 NOTE — PLAN OF CARE
SW met with pt via [employer] to complete assessment. Pt is AxO x3 and able to verbally answer assessment questions.  Pt able to confirm demographic information and reports living at home with adult son. Pt reports family to transport home at time of discharge. Pt reports while at home being independent of ADL's and only DME in use is glucometer. Pt denies any Home Health, wound care, or dialysis involvement. SW updated whiteboard with SWCM name and contact information. SW confirmed pt understanding of Observation unit and expected discharge plan. SW will continue to follow pt throughout care and assist with any discharge needs.         01/01/23 1144   Discharge Planning   Assessment Type Discharge Planning Brief Assessment   Resource/Environmental Concerns none   Support Systems Children;Family members   Equipment Currently Used at Home glucometer   Current Living Arrangements home   Patient/Family Anticipates Transition to home with family   Patient/Family Anticipated Services at Transition none   DME Needed Upon Discharge  none   Discharge Plan A Home with family     No future appointments.    COLBY Freeman Case Management  [phone]

## 2023-01-01 NOTE — PROGRESS NOTES
[facility] Resident Progress Note    Subjective:      Barrera Siddiqui is a 57 y.o.  female who is being followed by the [facility] service at [facility] for urinary tract infection and secondary LAUREN.     NAEO. She reports her back pain continues to improve. Feels warm this morning. Denies any CP, SOB, ABD pain, N/V. Continues to complain of bilateral ear pain. In regards to her ear pain, she denies any recent plane travel, head/ear trauma, recent ear infection, tympanostomy tubes as a child, recent ear drainage, hearing loss. She does use Q-tips but denies any injury or pain related to use.      Objective:   Last 24 Hour Vital Signs:  BP  Min: 96/55  Max: 118/56  Temp  Av.6 °F (37.6 °C)  Min: 97.4 °F (36.3 °C)  Max: 100.6 °F (38.1 °C)  Pulse  Av.3  Min: 82  Max: 97  Resp  Av.6  Min: 18  Max: 20  SpO2  Av %  Min: 95 %  Max: 97 %  No intake/output data recorded.    Physical Examination:  General Appearance: alert and oriented, no acute distress, on room air   Head: Normocephalic, atraumatic   Eyes: conjunctivae/corneas clear and non-icteric. PERRL, EOMI  Mouth: lips, tongue and mucosa normal, oropharynx clear  Ears: external ears normal. Right TM appears perforated with old blood and scarring, no active bleeding or drainage. Left internal ear canal with some cerumen,TM visualized without perforation.   Neck: supple, trachea midline, thyroid not enlarged, no LAD  Lungs: CTAB; no crackles or wheezes, no increased work of breathing  Heart: regular rate and rhythm, S1, S2 normal, no murmurs  Abdomen: soft, distended, suprapubic TTP; bowel sounds normal  Extremities: extremities normal, atraumatic, no cyanosis or edema. Lumbar paraspinal TTP, no CVA TTP appreciated   Pulses: 2+ and symmetric   Skin: No obvious ecchymosis, erythema, or rash  Neurologic: AAOx3, no focal neurological deficits noted, CN II-XII grossly intact     Laboratory:  Most Recent Data:  CBC:   Lab Results    Component Value Date    WBC 8.92 01/01/2023    HGB 9.4 (L) 01/01/2023    HCT 30.8 (L) 01/01/2023     01/01/2023    MCV 84 01/01/2023    RDW 13.4 01/01/2023     BMP:   Lab Results   Component Value Date     01/01/2023    K 3.7 01/01/2023     01/01/2023    CO2 24 01/01/2023    BUN 22 (H) 01/01/2023    CREATININE 1.0 01/01/2023     (H) 01/01/2023    CALCIUM 8.9 01/01/2023    MG 1.5 (L) 02/09/2020    PHOS 4.1 02/09/2020     LFTs:   Lab Results   Component Value Date    PROT 8.3 01/01/2023    ALBUMIN 2.5 (L) 01/01/2023    BILITOT 0.8 01/01/2023    AST 32 01/01/2023    ALKPHOS 99 01/01/2023    ALT 34 01/01/2023     Coags:   Lab Results   Component Value Date    INR 1.2 11/21/2020     FLP:   Lab Results   Component Value Date    CHOL 123 11/21/2020    HDL 43 11/21/2020    LDLCALC 42.0 (L) 11/21/2020    TRIG 190 (H) 11/21/2020    CHOLHDL 35.0 11/21/2020     DM:   Lab Results   Component Value Date    HGBA1C 10.0 (H) 12/31/2022    HGBA1C 8.7 (H) 02/09/2020    HGBA1C 8.8 (H) 02/08/2020    LDLCALC 42.0 (L) 11/21/2020    CREATININE 1.0 01/01/2023     Thyroid:   Lab Results   Component Value Date    TSH 0.222 (L) 11/21/2020    FREET4 0.82 11/21/2020     Anemia:   Lab Results   Component Value Date    IRON 19 (L) 12/31/2022    TIBC 286 12/31/2022    FERRITIN 123 12/31/2022    JCOTMZBD74 437 12/31/2022    FOLATE 8.9 12/31/2022     Cardiac:   Lab Results   Component Value Date    TROPONINI 0.014 12/30/2022    BNP <10 10/29/2018     Urinalysis:   Lab Results   Component Value Date    LABURIN (A) 12/30/2022     GRAM NEGATIVE NAVEED  >100,000 cfu/ml  Identification and susceptibility pending      COLORU Yellow 12/30/2022    SPECGRAV 1.020 12/30/2022    NITRITE Negative 12/30/2022    KETONESU Negative 12/30/2022    UROBILINOGEN 2.0-3.0 (A) 12/30/2022    WBCUA >100 (H) 12/30/2022       Trended Lab Data:  Recent Labs   Lab 12/30/22  0936 12/31/22  0322 01/01/23  0431   WBC 11.30 8.13 8.92   HGB 11.3* 9.5* 9.4*    HCT 35.8* 30.3* 30.8*    150 168   MCV 84 84 84   RDW 14.0 13.5 13.4   * 135* 137   K 4.2 3.9 3.7   CL 98 102 102   CO2 23 22* 24   BUN 30* 33* 22*   CREATININE 1.6* 1.3 1.0   * 285* 215*   PROT 9.4* 8.1 8.3   ALBUMIN 3.3* 2.8* 2.5*   BILITOT 0.9 1.1* 0.8   AST 45* 30 32   ALKPHOS 101 93 99   ALT 51* 37 34         Trended Cardiac Data:  Recent Labs   Lab 12/30/22  1812   TROPONINI 0.014         Microbiology Data Reviewed: yes  Pertinent Findings:  Uc results gram negative rods, sensitivities pending    Other Results:  EKG (my interpretation):   Normal sinus rhythm, , Qtc 436   Unchanged from previous tracing    Radiology Data Reviewed: yes  Pertinent Findings:  CT RENAL STONE STUDY ABD PELVIS WO  Impression:  1. No definite evidence of radiopaque urolithiasis or obstructive uropathy, as questioned.  2. Nonspecific minor circumferential urinary bladder wall thickening, which may be seen the setting of cystitis. Nonspecific bilateral perinephric fat stranding.  3. Additional details of chronic and incidental findings, as provided in the body of report.    Current Medications:     Infusions:       Scheduled:   aspirin  81 mg Oral Daily    atorvastatin  20 mg Oral Daily    carbamide peroxide  5 drop Left Ear BID    cefTRIAXone (ROCEPHIN) IVPB  2 g Intravenous Q24H    enoxaparin  40 mg Subcutaneous Daily    FLUoxetine  40 mg Oral Daily    guaiFENesin  600 mg Oral BID        PRN:  acetaminophen, benzonatate, dextrose 10%, dextrose 10%, glucagon (human recombinant), glucose, glucose, influenza, insulin aspart U-100, melatonin, pneumoc 20-marci conj-dip cr(PF), sodium chloride 0.9%    Antibiotics and Day Number of Therapy:  Rocephin - Day 3    Lines and Day Number of Therapy:  PIV    Assessment:     Barrera Siddiqui is a 57 y.o.female with  Patient Active Problem List    Diagnosis Date Noted    LAUREN (acute kidney injury) 12/30/2022    Acute cystitis 12/30/2022    Anxiety disorder 01/08/2022    Insomnia  related to another mental disorder 01/08/2022    Constipation 01/08/2022    Moderate recurrent major depression 01/08/2022    Vitamin D deficiency 01/08/2022    Decreased range of motion of neck 03/12/2020    Weakness of left upper extremity 03/12/2020    Poor posture 03/12/2020    Hyperglycemia 10/29/2018    TIA (transient ischemic attack) 10/29/2018    Screening for colorectal cancer 10/18/2017    Depression 12/24/2016    History of smoking 12/24/2016    Chest pain 09/27/2016    Right calf pain 05/11/2016    Palpitation 05/11/2016    Snoring 05/11/2016    Daytime somnolence 05/11/2016    Headache 05/11/2016    Diastolic heart failure 04/29/2016    Morbid obesity 04/11/2016    Idiopathic hypotension 04/11/2016    Type 2 diabetes mellitus 04/11/2016    Hyperlipidemia 04/11/2016    Essential hypertension 04/09/2016        Plan:   Acute Urinary Tract Infection   - Two day history of urinary frequency and back pain   - Afebrile, vitals stable, WBC 11  - UA with >100 wbc, 3+ leukocytes, >100 rbcs  - CT renal study with no stones or obstructive uropathy   - Received 1L IVF and Rocephin in ED   - Uc resulted gram negative rods, susceptibility pending   - Continue IV Rocephin (day 3)     Pyelonephritis  Presents with UTI and back pain  - bilateral fat stranding on CT  - treating as above  - fever 100.6 overnight  - tylenol PRN for fever or pain     Bilateral ear pain  R TM perforation  - pain started within last 3 days however pt denies any acute event or onset   - otoscope exam concerning for perforated right TM  - continue carbamide peroxide ear drops in left ear only   - follow up with ENT as outpatient    LAUREN, resolved  - Bun/Cr 30/1.6 on admit   - Baseline Cr ~0.7   - BUN/Cr >20, likely prerenal 2/2 decreased PO intake in setting of active infection   - S/p 1L IVF, Cr 1.0 improving  - daily CMP     DM2  -  on admit  - A1c 8.7 02/2022, repeat 10.0  - Home regimen metformin 500 mg BID and Glipizide 10mg BID  - BG  likely elevated in setting of acute infection   - SSI while inpatient     Normocytic anemia, at baseline  - baseline hb 10-11 over the last 4 years  -Hgb 11.3, MCV 84 on admit  -folate 8.9, B12 437  - iron 19, TIBC 286, transferrin 193, Sat 7%     HTN  - /74 on admit  - Home regimen Triamterene - HCTZ 75-50 daily, Losartan 100 mg daily   - Hold home HTCZ and losartan in setting of LAUREN  -patient normo/hypotensive with antihypertensives helds above - continue to monitor      HLD  - Continue home statin      Depression   - No SI/HI  - Continue home Fluoxetine      Hx TIA   - Occurred at [facility](2017), transient left sided weakness and dysarthria   - No current residual deficits   - home  decreased to ASA 81      Code status: Full  DVT ppx: Lovenox  Diet: Diabetic   Dispo: Pending urine culture result - transition to PO abx, discharge today likely    Artemio Wong MD   [facility]    \[facility] [place] [facility]\"" Medicine Hospitalist Pager numbers:   [facility] Hospitalist Medicine Team A (Geoff/Gelacio): [phone]  [facility] Hospitalist Medicine Team B (Uriah/Ame):

## 2023-01-01 NOTE — DISCHARGE SUMMARY
[facility] Hospital Medicine Discharge Summary    Primary Team: [facility] Hospitalist Team A  Attending Physician: Rosemarie Brizuela MD  Resident: Dr. Osman  Intern: Dr. Wong/Dr. Cameron    Date of Admit: 12/30/2022  Date of Discharge: 1/2/2023    Discharge to: Home/Self-Care  Condition: Stable    Discharge Diagnoses     Patient Active Problem List   Diagnosis    Essential hypertension    Morbid obesity    Idiopathic hypotension    Type 2 diabetes mellitus    Hyperlipidemia    Diastolic heart failure    Right calf pain    Palpitation    Snoring    Daytime somnolence    Headache    Screening for colorectal cancer    Hyperglycemia    TIA (transient ischemic attack)    Decreased range of motion of neck    Weakness of left upper extremity    Poor posture    Anxiety disorder    Insomnia related to another mental disorder    Constipation    Depression    History of smoking    Moderate recurrent major depression    Vitamin D deficiency    Chest pain    LAUREN (acute kidney injury)    Acute cystitis    Acute pyelonephritis    Right ear pain       Consultants and Procedures     Consultants:  none    Procedures:   none    Imaging:  CT RENAL STONE STUDY ABD PELVIS WO  Impression:  1. No definite evidence of radiopaque urolithiasis or obstructive uropathy, as questioned.  2. Nonspecific minor circumferential urinary bladder wall thickening, which may be seen the setting of cystitis. Nonspecific bilateral perinephric fat stranding.  3. Additional details of chronic and incidental findings, as provided in the body of report.    Brief History of Present Illness   Barrera Siddiqui is a 57 y.o. female who is being followed by the [facility] service at [facility] for urinary tract infection with pyelonephritis and secondary LAUREN.     For the full HPI please refer to the History & Physical from this admission.    Hospital Course By Problem with Pertinent Findings   Acute Urinary Tract Infection   Pyelonephritis  - Two day  history of urinary frequency and back pain on presentation  - vitals stable, WBC 11 on admit  - UA with >100 wbc, 3+ leukocytes, >100 rbcs  - CT renal study with nonspecific perinephric fat stranding, no stones or obstructive uropathy  - intermittent fevers (Tmax 101.5)  - Treated with rocephin x 3 days inpatient  - Uc resulted gram negative rods, susceptibility pending at discharge  - will discharge with Augmentin x 10 days and follow up on susceptibility tomorrow     Bilateral ear pain  R TM perforation  - pain started prior to admission  - otoscope exam concerning for perforated right TM  - continue carbamide peroxide ear drops in left ear only   - follow up with ENT as outpatient     LAUREN  - Bun/Cr 30/1.6 on admit   - Baseline Cr ~0.7   - BUN/Cr >20, likely prerenal 2/2 decreased PO intake in setting of active infection   - treated with IVFs  - Cr 1.0 at discharge    DM2, uncontrolled  - Home regimen: metformin 500 mg BID and Glipizide 10mg BID  -  on admit  - previous A1c 8.7 02/2022   - inpatient A1c 10%  - SSI while inpatient   - follow up with PCP      Normocytic anemia, at baseline  - baseline hb 10-11 over the last 4 years  -Hgb 11.3, MCV 84 on admit  -folate 8.9, B12 437  - iron 19, TIBC 286, transferrin 193, Sat 7%     HTN  - /74 on admit  - Home regimen Triamterene - HCTZ 75-50 daily, Losartan 100 mg daily   - Held home HTCZ and losartan in setting of LAUREN  - will decrease HCTZ to 37.25-25 mg at discharge  - f/u with PCP    HLD  - Continued home atorvastatin 20 mg      Depression   - No SI/HI  - Continued home Fluoxetine      Hx TIA   - Occurred at [facility](2017), transient left sided weakness and dysarthria   - No current residual deficits   - home  decreased to ASA 81     Review of systems on day of discharge:  Constitutional: denies fever, chills, weight loss  Head & Neck: Positive for bilateral ear pain. denies changes in hearing, changes in vision, sinus congestion, sore  throat  Cardiovascular: denies chest pain, palpitations, orthopnea, leg swelling  Respiratory: denies trouble breathing, shortness of breath, wheezing, cough  Gastrointestinal: denies abdominal pain, nausea, vomiting, diarrhea, constipation, blood ins tool  Genitourinary: denies trouble urinating, blood in urine  Musculoskeletal: denies pain or swelling to extremities, back pain, recent falls  Skin: denies rash, itching, abrasion  Endocrine: denies increased thirst, increased hunger, increased urination  Neurological: denies headaches, dizziness, weakness, loss of consciousness  Psychiatric: denies depression, anxiety, suicidal ideation, homicidal ideation, visual or auditory hallucinations    Vitals and Physical exam on day of discharge:  Temp:  [96.5 °F (35.8 °C)] 96.5 °F (35.8 °C)  Pulse:  [82-83] 82  Resp:  [18] 18  SpO2:  [94 %-97 %] 94 %  BP: (107)/(63) 107/63  General: No acute distress, resting comfortably  HEENT: Atraumatic, normocephalic, moist mucous membranes, no lymphadenopathy noted. R ear TM appears perforated with old blood and scarring. Left ear canal normal.   Cardiovascular: Regular rate and rhythm, normal S1 and S2, no extra heart sounds or murmurs appreciated  Chest wall: Non-tender to palpation, no gross deformities  Back: Non-tender to palpation, no abnormal curvature noted, symmetric rise with respiration  Respiratory: Stable on room air, non-labored breathing, no accessory muscle use noted, clear to ascultation   Abdominal: Non-distended, soft, normoactive bowel sounds, non-tender to palpation, no rebound tenderness, no guarding, no organomegaly noted  Extremities: Atraumatic, non-edematous, moving all four extremities  Pulses: 2+ and symmetric radial arteries, dorsalis pedis arteries, posterior tibial arteries  Skin: Non-diaphoretic, non-juandice, intact  Neurologic: No gross focal neurologic deficits noted    Vitals:    01/01/23 1159   BP: 107/63   Pulse: 82   Resp: 18   Temp: 96.5 °F  (35.8 °C)        Discharge Medications        Medication List        START taking these medications      amoxicillin-clavulanate 875-125mg 875-125 mg per tablet  Commonly known as: AUGMENTIN  Take 1 tablet by mouth every 12 (twelve) hours.     aspirin 81 MG EC tablet  Commonly known as: ECOTRIN  Take 1 tablet (81 mg total) by mouth once daily.  Replaces: aspirin 325 MG tablet     benzonatate 100 MG capsule  Commonly known as: TESSALON  Take 1 capsule (100 mg total) by mouth 3 (three) times daily as needed for Cough.     triamterene-hydrochlorothiazide 37.5-25 mg 37.5-25 mg per tablet  Commonly known as: MAXZIDE-25  Take 1 tablet by mouth once daily.  Replaces: triamterene-hydrochlorothiazide 75-50 mg 75-50 mg per tablet            CONTINUE taking these medications      atorvastatin 20 MG tablet  Commonly known as: LIPITOR     BINAXNOW COVID-19 AG SELF TEST Kit  Generic drug: COVID-19 antigen test     FLUoxetine 40 MG capsule     glipiZIDE 10 MG tablet  Commonly known as: GLUCOTROL     losartan 100 MG tablet  Commonly known as: COZAAR     metFORMIN 500 MG ER 24hr tablet  Commonly known as: GLUCOPHAGE-XR     traZODone 50 MG tablet  Commonly known as: DESYREL            STOP taking these medications      aspirin 325 MG tablet  Replaced by: aspirin 81 MG EC tablet     ciprofloxacin HCl 500 MG tablet  Commonly known as: CIPRO     LINZESS 145 mcg Cap capsule  Generic drug: linaCLOtide     triamterene-hydrochlorothiazide 75-50 mg 75-50 mg per tablet  Commonly known as: MAXZIDE  Replaced by: triamterene-hydrochlorothiazide 37.5-25 mg 37.5-25 mg per tablet               Where to Get Your Medications        These medications were sent to [facility] [address] [id number]  [address] [facility]  [address]  [facility] [address]      Phone: [phone]   amoxicillin-clavulanate 875-125mg 875-125 mg per tablet  aspirin 81 MG EC tablet  benzonatate 100 MG capsule  triamterene-hydrochlorothiazide 37.5-25 mg 37.5-25 mg per  tablet          Discharge Information:   Diet:  Diabetic    Physical Activity:  As tolerated             Instructions:  1. Take all medications as prescribed  2. Keep all follow-up appointments  3. Return to the hospital or call your primary care physicians if any worsening symptoms such as fever, chest pain, shortness of breath, return of symptoms, or any other concerns.    Follow-Up Appointments:  ENT outpatient follow up for R ear TM  PCP     Artemio Wong MD  [facility] Internal Medicine HO-I  01/02/2023 10:44 AM

## 2023-01-01 NOTE — NURSING
Shift assessment complete. Pt is alert and oriented x 4. Complains of ear pain. Left side. Pt can have ear drops at 2000. Bed in lowest position, locked, and side rails up x 3. Call light in reach.

## 2023-01-01 NOTE — PROGRESS NOTES
Discharge orders noted. Additional clinical references attached. Patient's discharge instructions given by bedside RN and reviewed via this VN.  Education provided on new medication, diagnosis, and follow-up appointments.  Teach back method used. Patient verbalized understanding. All questions answered. Transport to [facility] requested. Floor nurse notified.      01/01/23 1330   AVS Confirmation   Discharge instructions and AVS given to and reviewed with patient and/or significant other. Yes

## 2023-01-01 NOTE — NURSING
Patient safety maintained. Medications administered per order. Printed documentation provided for discharge. Education completed per VN. Family at bedside to transport patient. IV and telemetry removed, patient tolerated well.

## 2023-04-03 PROBLEM — N10 ACUTE PYELONEPHRITIS: Status: RESOLVED | Noted: 2023-01-01 | Resolved: 2023-04-03

## 2023-04-03 PROBLEM — N17.9 AKI (ACUTE KIDNEY INJURY): Status: RESOLVED | Noted: 2022-12-30 | Resolved: 2023-04-03

## 2023-04-03 PROBLEM — G45.9 TIA (TRANSIENT ISCHEMIC ATTACK): Status: RESOLVED | Noted: 2018-10-29 | Resolved: 2023-04-03

## 2023-05-08 NOTE — ED NOTES
Pt to and from x ray. Ambulatory.   Your labs today are reassuring. You have a urine infection. Take the antibiotics. Please follow up with your new PCP as scheduled.

## 2023-08-15 ENCOUNTER — HOSPITAL ENCOUNTER (OUTPATIENT)
Facility: HOSPITAL | Age: 58
Discharge: HOME | End: 2023-08-16
Attending: EMERGENCY MEDICINE | Admitting: INTERNAL MEDICINE
Payer: COMMERCIAL

## 2023-08-15 DIAGNOSIS — R07.9 CHEST PAIN: ICD-10-CM

## 2023-08-15 DIAGNOSIS — N17.9 AKI (ACUTE KIDNEY INJURY): ICD-10-CM

## 2023-08-15 DIAGNOSIS — R55 SYNCOPE, UNSPECIFIED SYNCOPE TYPE: Primary | ICD-10-CM

## 2023-08-15 DIAGNOSIS — I10 ESSENTIAL HYPERTENSION: ICD-10-CM

## 2023-08-15 DIAGNOSIS — D64.9 NORMOCYTIC ANEMIA: ICD-10-CM

## 2023-08-15 DIAGNOSIS — S09.90XA HEAD TRAUMA, INITIAL ENCOUNTER: ICD-10-CM

## 2023-08-15 DIAGNOSIS — R55 SYNCOPE AND COLLAPSE: ICD-10-CM

## 2023-08-15 DIAGNOSIS — R51.9 NONINTRACTABLE HEADACHE, UNSPECIFIED CHRONICITY PATTERN, UNSPECIFIED HEADACHE TYPE: ICD-10-CM

## 2023-08-15 DIAGNOSIS — E66.01 MORBID OBESITY: ICD-10-CM

## 2023-08-15 DIAGNOSIS — E11.9 TYPE 2 DIABETES MELLITUS WITHOUT COMPLICATION, WITHOUT LONG-TERM CURRENT USE OF INSULIN: ICD-10-CM

## 2023-08-15 LAB
ALBUMIN SERPL BCP-MCNC: 3.5 G/DL (ref 3.5–5.2)
ALP SERPL-CCNC: 95 U/L (ref 55–135)
ALT SERPL W/O P-5'-P-CCNC: 41 U/L (ref 10–44)
AMPHET+METHAMPHET UR QL: NEGATIVE
ANION GAP SERPL CALC-SCNC: 12 MMOL/L (ref 8–16)
ASCENDING AORTA: 2.94 CM
AST SERPL-CCNC: 62 U/L (ref 10–40)
AV INDEX (PROSTH): 0.74
AV MEAN GRADIENT: 5 MMHG
AV PEAK GRADIENT: 10 MMHG
AV VALVE AREA BY VELOCITY RATIO: 1.98 CM²
AV VALVE AREA: 2.18 CM²
AV VELOCITY RATIO: 0.67
BARBITURATES UR QL SCN>200 NG/ML: NEGATIVE
BASOPHILS # BLD AUTO: 0.03 K/UL (ref 0–0.2)
BASOPHILS NFR BLD: 0.5 % (ref 0–1.9)
BENZODIAZ UR QL SCN>200 NG/ML: NEGATIVE
BILIRUB SERPL-MCNC: 0.4 MG/DL (ref 0.1–1)
BILIRUB UR QL STRIP: NEGATIVE
BNP SERPL-MCNC: <10 PG/ML (ref 0–99)
BSA FOR ECHO PROCEDURE: 2.36 M2
BUN SERPL-MCNC: 21 MG/DL (ref 6–20)
BZE UR QL SCN: NEGATIVE
CALCIUM SERPL-MCNC: 9 MG/DL (ref 8.7–10.5)
CANNABINOIDS UR QL SCN: NEGATIVE
CHLORIDE SERPL-SCNC: 103 MMOL/L (ref 95–110)
CHOLEST SERPL-MCNC: 144 MG/DL (ref 120–199)
CHOLEST/HDLC SERPL: 3.3 {RATIO} (ref 2–5)
CK SERPL-CCNC: 84 U/L (ref 20–180)
CLARITY UR: ABNORMAL
CO2 SERPL-SCNC: 21 MMOL/L (ref 23–29)
COLOR UR: YELLOW
CREAT SERPL-MCNC: 1.6 MG/DL (ref 0.5–1.4)
CREAT UR-MCNC: 101 MG/DL (ref 15–325)
CV ECHO LV RWT: 0.47 CM
DIFFERENTIAL METHOD: ABNORMAL
DOP CALC AO PEAK VEL: 1.57 M/S
DOP CALC AO VTI: 29.3 CM
DOP CALC LVOT AREA: 3 CM2
DOP CALC LVOT DIAMETER: 1.94 CM
DOP CALC LVOT PEAK VEL: 1.05 M/S
DOP CALC LVOT STROKE VOLUME: 63.82 CM3
DOP CALC MV VTI: 25 CM
DOP CALCLVOT PEAK VEL VTI: 21.6 CM
E WAVE DECELERATION TIME: 270.91 MSEC
E/A RATIO: 0.95
E/E' RATIO: 10.47 M/S
ECHO LV POSTERIOR WALL: 1.13 CM (ref 0.6–1.1)
EOSINOPHIL # BLD AUTO: 0.2 K/UL (ref 0–0.5)
EOSINOPHIL NFR BLD: 3 % (ref 0–8)
ERYTHROCYTE [DISTWIDTH] IN BLOOD BY AUTOMATED COUNT: 14.1 % (ref 11.5–14.5)
EST. GFR  (NO RACE VARIABLE): 38 ML/MIN/1.73 M^2
ESTIMATED AVG GLUCOSE: 214 MG/DL (ref 68–131)
ETHANOL UR-MCNC: <10 MG/DL
FRACTIONAL SHORTENING: 37 % (ref 28–44)
GLUCOSE SERPL-MCNC: 262 MG/DL (ref 70–110)
GLUCOSE UR QL STRIP: ABNORMAL
HBA1C MFR BLD: 9.1 % (ref 4–5.6)
HCT VFR BLD AUTO: 32.2 % (ref 37–48.5)
HDLC SERPL-MCNC: 43 MG/DL (ref 40–75)
HDLC SERPL: 29.9 % (ref 20–50)
HGB BLD-MCNC: 10.6 G/DL (ref 12–16)
HGB UR QL STRIP: ABNORMAL
IMM GRANULOCYTES # BLD AUTO: 0.02 K/UL (ref 0–0.04)
IMM GRANULOCYTES NFR BLD AUTO: 0.3 % (ref 0–0.5)
INTERVENTRICULAR SEPTUM: 1.07 CM (ref 0.6–1.1)
IVC DIAMETER: 2 CM
KETONES UR QL STRIP: NEGATIVE
LA MAJOR: 6.07 CM
LA MINOR: 5.39 CM
LA WIDTH: 3.9 CM
LDLC SERPL CALC-MCNC: 66.4 MG/DL (ref 63–159)
LEFT ATRIUM SIZE: 4.09 CM
LEFT ATRIUM VOLUME INDEX MOD: 21.3 ML/M2
LEFT ATRIUM VOLUME INDEX: 34.9 ML/M2
LEFT ATRIUM VOLUME MOD: 47.22 CM3
LEFT ATRIUM VOLUME: 77.42 CM3
LEFT INTERNAL DIMENSION IN SYSTOLE: 2.99 CM (ref 2.1–4)
LEFT VENTRICLE DIASTOLIC VOLUME INDEX: 48 ML/M2
LEFT VENTRICLE DIASTOLIC VOLUME: 106.55 ML
LEFT VENTRICLE MASS INDEX: 87 G/M2
LEFT VENTRICLE SYSTOLIC VOLUME INDEX: 15.6 ML/M2
LEFT VENTRICLE SYSTOLIC VOLUME: 34.65 ML
LEFT VENTRICULAR INTERNAL DIMENSION IN DIASTOLE: 4.78 CM (ref 3.5–6)
LEFT VENTRICULAR MASS: 192.67 G
LEUKOCYTE ESTERASE UR QL STRIP: NEGATIVE
LV LATERAL E/E' RATIO: 8.9 M/S
LV SEPTAL E/E' RATIO: 12.71 M/S
LVOT MG: 2.38 MMHG
LVOT MV: 0.74 CM/S
LYMPHOCYTES # BLD AUTO: 2.4 K/UL (ref 1–4.8)
LYMPHOCYTES NFR BLD: 39.2 % (ref 18–48)
MAGNESIUM SERPL-MCNC: 1.5 MG/DL (ref 1.6–2.6)
MCH RBC QN AUTO: 27.5 PG (ref 27–31)
MCHC RBC AUTO-ENTMCNC: 32.9 G/DL (ref 32–36)
MCV RBC AUTO: 84 FL (ref 82–98)
METHADONE UR QL SCN>300 NG/ML: NEGATIVE
MONOCYTES # BLD AUTO: 0.4 K/UL (ref 0.3–1)
MONOCYTES NFR BLD: 6.4 % (ref 4–15)
MV MEAN GRADIENT: 2 MMHG
MV PEAK A VEL: 0.94 M/S
MV PEAK E VEL: 0.89 M/S
MV PEAK GRADIENT: 3 MMHG
MV STENOSIS PRESSURE HALF TIME: 78.56 MS
MV VALVE AREA BY CONTINUITY EQUATION: 2.55 CM2
MV VALVE AREA P 1/2 METHOD: 2.8 CM2
NEUTROPHILS # BLD AUTO: 3.1 K/UL (ref 1.8–7.7)
NEUTROPHILS NFR BLD: 50.6 % (ref 38–73)
NITRITE UR QL STRIP: NEGATIVE
NONHDLC SERPL-MCNC: 101 MG/DL
NRBC BLD-RTO: 0 /100 WBC
OHS LV EJECTION FRACTION SIMPSONS BIPLANE MOD: 75 %
OPIATES UR QL SCN: NEGATIVE
PCP UR QL SCN>25 NG/ML: NEGATIVE
PH UR STRIP: 6 [PH] (ref 5–8)
PISA TR MAX VEL: 2.34 M/S
PLATELET # BLD AUTO: 185 K/UL (ref 150–450)
PMV BLD AUTO: 12.1 FL (ref 9.2–12.9)
POCT GLUCOSE: 176 MG/DL (ref 70–110)
POCT GLUCOSE: 268 MG/DL (ref 70–110)
POTASSIUM SERPL-SCNC: 3.8 MMOL/L (ref 3.5–5.1)
PROT SERPL-MCNC: 8.5 G/DL (ref 6–8.4)
PROT UR QL STRIP: NEGATIVE
RA MAJOR: 4.24 CM
RA PRESSURE ESTIMATED: 3 MMHG
RBC # BLD AUTO: 3.85 M/UL (ref 4–5.4)
RIGHT VENTRICULAR END-DIASTOLIC DIMENSION: 3.72 CM
RV TB RVSP: 5 MMHG
RV TISSUE DOPPLER FREE WALL SYSTOLIC VELOCITY 1 (APICAL 4 CHAMBER VIEW): 11.8 CM/S
SARS-COV-2 RDRP RESP QL NAA+PROBE: NEGATIVE
SODIUM SERPL-SCNC: 136 MMOL/L (ref 136–145)
SP GR UR STRIP: 1.01 (ref 1–1.03)
STJ: 2.79 CM
TDI LATERAL: 0.1 M/S
TDI SEPTAL: 0.07 M/S
TDI: 0.09 M/S
TOXICOLOGY INFORMATION: NORMAL
TR MAX PG: 22 MMHG
TRICUSPID ANNULAR PLANE SYSTOLIC EXCURSION: 2.1 CM
TRIGL SERPL-MCNC: 173 MG/DL (ref 30–150)
TROPONIN I SERPL DL<=0.01 NG/ML-MCNC: <0.006 NG/ML (ref 0–0.03)
TROPONIN I SERPL DL<=0.01 NG/ML-MCNC: <0.006 NG/ML (ref 0–0.03)
TSH SERPL DL<=0.005 MIU/L-ACNC: 2.86 UIU/ML (ref 0.4–4)
TV REST PULMONARY ARTERY PRESSURE: 25 MMHG
URN SPEC COLLECT METH UR: ABNORMAL
UROBILINOGEN UR STRIP-ACNC: NEGATIVE EU/DL
WBC # BLD AUTO: 6.1 K/UL (ref 3.9–12.7)
Z-SCORE OF LEFT VENTRICULAR DIMENSION IN END DIASTOLE: -4.84
Z-SCORE OF LEFT VENTRICULAR DIMENSION IN END SYSTOLE: -3.58

## 2023-08-15 PROCEDURE — 84484 ASSAY OF TROPONIN QUANT: CPT | Mod: 91 | Performed by: EMERGENCY MEDICINE

## 2023-08-15 PROCEDURE — G0378 HOSPITAL OBSERVATION PER HR: HCPCS

## 2023-08-15 PROCEDURE — 99285 EMERGENCY DEPT VISIT HI MDM: CPT | Mod: 25

## 2023-08-15 PROCEDURE — 84443 ASSAY THYROID STIM HORMONE: CPT

## 2023-08-15 PROCEDURE — U0002 COVID-19 LAB TEST NON-CDC: HCPCS | Performed by: EMERGENCY MEDICINE

## 2023-08-15 PROCEDURE — 25000003 PHARM REV CODE 250: Performed by: EMERGENCY MEDICINE

## 2023-08-15 PROCEDURE — 80053 COMPREHEN METABOLIC PANEL: CPT | Performed by: EMERGENCY MEDICINE

## 2023-08-15 PROCEDURE — 96366 THER/PROPH/DIAG IV INF ADDON: CPT

## 2023-08-15 PROCEDURE — 94761 N-INVAS EAR/PLS OXIMETRY MLT: CPT

## 2023-08-15 PROCEDURE — 96361 HYDRATE IV INFUSION ADD-ON: CPT

## 2023-08-15 PROCEDURE — 85025 COMPLETE CBC W/AUTO DIFF WBC: CPT | Performed by: EMERGENCY MEDICINE

## 2023-08-15 PROCEDURE — 93005 ELECTROCARDIOGRAM TRACING: CPT

## 2023-08-15 PROCEDURE — 96372 THER/PROPH/DIAG INJ SC/IM: CPT

## 2023-08-15 PROCEDURE — 63600175 PHARM REV CODE 636 W HCPCS

## 2023-08-15 PROCEDURE — 83880 ASSAY OF NATRIURETIC PEPTIDE: CPT | Performed by: EMERGENCY MEDICINE

## 2023-08-15 PROCEDURE — 83735 ASSAY OF MAGNESIUM: CPT

## 2023-08-15 PROCEDURE — 93010 ELECTROCARDIOGRAM REPORT: CPT | Mod: ,,, | Performed by: INTERNAL MEDICINE

## 2023-08-15 PROCEDURE — 25000003 PHARM REV CODE 250

## 2023-08-15 PROCEDURE — 80061 LIPID PANEL: CPT

## 2023-08-15 PROCEDURE — 83036 HEMOGLOBIN GLYCOSYLATED A1C: CPT

## 2023-08-15 PROCEDURE — 82962 GLUCOSE BLOOD TEST: CPT

## 2023-08-15 PROCEDURE — 81003 URINALYSIS AUTO W/O SCOPE: CPT | Mod: 59

## 2023-08-15 PROCEDURE — 84484 ASSAY OF TROPONIN QUANT: CPT

## 2023-08-15 PROCEDURE — 96365 THER/PROPH/DIAG IV INF INIT: CPT

## 2023-08-15 PROCEDURE — 25500020 PHARM REV CODE 255: Performed by: INTERNAL MEDICINE

## 2023-08-15 PROCEDURE — 93010 EKG 12-LEAD: ICD-10-PCS | Mod: ,,, | Performed by: INTERNAL MEDICINE

## 2023-08-15 PROCEDURE — 82550 ASSAY OF CK (CPK): CPT

## 2023-08-15 PROCEDURE — 80307 DRUG TEST PRSMV CHEM ANLYZR: CPT

## 2023-08-15 RX ORDER — MULTIVITAMIN
1 TABLET ORAL DAILY
COMMUNITY

## 2023-08-15 RX ORDER — SUMATRIPTAN SUCCINATE 25 MG/1
TABLET ORAL
COMMUNITY
Start: 2023-01-10 | End: 2023-08-15

## 2023-08-15 RX ORDER — ENOXAPARIN SODIUM 100 MG/ML
40 INJECTION SUBCUTANEOUS EVERY 12 HOURS
Status: DISCONTINUED | OUTPATIENT
Start: 2023-08-15 | End: 2023-08-16

## 2023-08-15 RX ORDER — ACETAMINOPHEN 500 MG
1000 TABLET ORAL
Status: COMPLETED | OUTPATIENT
Start: 2023-08-15 | End: 2023-08-15

## 2023-08-15 RX ORDER — BUPROPION HYDROCHLORIDE 150 MG/1
1 TABLET ORAL EVERY MORNING
COMMUNITY
End: 2023-08-15

## 2023-08-15 RX ORDER — ATORVASTATIN CALCIUM 20 MG/1
20 TABLET, FILM COATED ORAL NIGHTLY
Status: DISCONTINUED | OUTPATIENT
Start: 2023-08-15 | End: 2023-08-16 | Stop reason: HOSPADM

## 2023-08-15 RX ORDER — ASPIRIN 81 MG/1
81 TABLET ORAL DAILY
Status: DISCONTINUED | OUTPATIENT
Start: 2023-08-15 | End: 2023-08-16

## 2023-08-15 RX ORDER — ALPRAZOLAM 1 MG/1
TABLET ORAL
COMMUNITY
End: 2023-08-15

## 2023-08-15 RX ORDER — MAGNESIUM SULFATE HEPTAHYDRATE 40 MG/ML
2 INJECTION, SOLUTION INTRAVENOUS ONCE
Status: COMPLETED | OUTPATIENT
Start: 2023-08-15 | End: 2023-08-15

## 2023-08-15 RX ORDER — IBUPROFEN 200 MG
24 TABLET ORAL
Status: DISCONTINUED | OUTPATIENT
Start: 2023-08-15 | End: 2023-08-16 | Stop reason: HOSPADM

## 2023-08-15 RX ORDER — ACETAMINOPHEN 500 MG
1000 TABLET ORAL EVERY 8 HOURS PRN
Status: DISCONTINUED | OUTPATIENT
Start: 2023-08-15 | End: 2023-08-16 | Stop reason: HOSPADM

## 2023-08-15 RX ORDER — PREDNISOLONE ACETATE 10 MG/ML
1 SUSPENSION/ DROPS OPHTHALMIC EVERY 4 HOURS
COMMUNITY
Start: 2023-05-08 | End: 2023-08-15

## 2023-08-15 RX ORDER — INSULIN ASPART 100 [IU]/ML
0-5 INJECTION, SOLUTION INTRAVENOUS; SUBCUTANEOUS EVERY 6 HOURS PRN
Status: DISCONTINUED | OUTPATIENT
Start: 2023-08-15 | End: 2023-08-16 | Stop reason: HOSPADM

## 2023-08-15 RX ORDER — NALOXONE HCL 0.4 MG/ML
0.02 VIAL (ML) INJECTION
Status: DISCONTINUED | OUTPATIENT
Start: 2023-08-15 | End: 2023-08-16 | Stop reason: HOSPADM

## 2023-08-15 RX ORDER — IBUPROFEN 200 MG
16 TABLET ORAL
Status: DISCONTINUED | OUTPATIENT
Start: 2023-08-15 | End: 2023-08-16 | Stop reason: HOSPADM

## 2023-08-15 RX ORDER — SODIUM CHLORIDE 0.9 % (FLUSH) 0.9 %
10 SYRINGE (ML) INJECTION EVERY 12 HOURS PRN
Status: DISCONTINUED | OUTPATIENT
Start: 2023-08-15 | End: 2023-08-16 | Stop reason: HOSPADM

## 2023-08-15 RX ORDER — GLUCAGON 1 MG
1 KIT INJECTION
Status: DISCONTINUED | OUTPATIENT
Start: 2023-08-15 | End: 2023-08-16 | Stop reason: HOSPADM

## 2023-08-15 RX ORDER — KETOROLAC TROMETHAMINE 10 MG/1
TABLET, FILM COATED ORAL
COMMUNITY
Start: 2023-01-10 | End: 2023-08-15

## 2023-08-15 RX ADMIN — ENOXAPARIN SODIUM 40 MG: 40 INJECTION SUBCUTANEOUS at 09:08

## 2023-08-15 RX ADMIN — ENOXAPARIN SODIUM 40 MG: 40 INJECTION SUBCUTANEOUS at 12:08

## 2023-08-15 RX ADMIN — MAGNESIUM SULFATE 2 G: 2 INJECTION INTRAVENOUS at 03:08

## 2023-08-15 RX ADMIN — ATORVASTATIN CALCIUM 20 MG: 20 TABLET, FILM COATED ORAL at 09:08

## 2023-08-15 RX ADMIN — ASPIRIN 81 MG: 81 TABLET, COATED ORAL at 12:08

## 2023-08-15 RX ADMIN — IOHEXOL 100 ML: 350 INJECTION, SOLUTION INTRAVENOUS at 12:08

## 2023-08-15 RX ADMIN — ACETAMINOPHEN 1000 MG: 500 TABLET ORAL at 10:08

## 2023-08-15 RX ADMIN — SODIUM CHLORIDE, POTASSIUM CHLORIDE, SODIUM LACTATE AND CALCIUM CHLORIDE 1000 ML: 600; 310; 30; 20 INJECTION, SOLUTION INTRAVENOUS at 12:08

## 2023-08-15 NOTE — PHARMACY MED REC
"    [facility]           Pharmacy  Admission Medication History     The home medication history was taken by Jackie Dior.      Medication history obtained from Medications listed below were obtained from: Patient/family    Based on information gathered for medication list, you may go to "Admission" then "Reconcile Home Medications" tabs to review and/or act upon those items.     The home medication list has been updated by the Pharmacy department.   Please read ALL comments highlighted in yellow.   Please address this information as you see fit.    Feel free to contact us if you have any questions or require assistance.    The medications listed below were removed from the home medication list.  Please reorder if appropriate:    Patient reports NOT TAKING the following medication(s):  Prozac 40mg  Trazodone 50mg  Xanax 1mg  Wellbutrin xl 150mg  Toradol 10mg  Pred forte 1%  Imitrex 25mg    No current facility-administered medications on file prior to encounter.     Current Outpatient Medications on File Prior to Encounter   Medication Sig Dispense Refill    aspirin (ECOTRIN) 81 MG EC tablet Take 1 tablet (81 mg total) by mouth once daily. 90 tablet 3    atorvastatin (LIPITOR) 20 MG tablet Take 20 mg by mouth once daily.      glipiZIDE (GLUCOTROL) 10 MG tablet Take 10 mg by mouth 2 (two) times daily.      losartan (COZAAR) 100 MG tablet Take 100 mg by mouth once daily.      metFORMIN (GLUCOPHAGE-XR) 500 MG ER 24hr tablet Take 500 mg by mouth 2 (two) times daily.      multivitamin (THERAGRAN) per tablet Take 1 tablet by mouth once daily.      triamterene-hydrochlorothiazide 37.5-25 mg (MAXZIDE-25) 37.5-25 mg per tablet Take 1 tablet by mouth once daily. 30 tablet 11       Please address this information as you see fit.  Feel free to contact us if you have any questions or require assistance.    Jackie Dior  [phone]              .          "

## 2023-08-15 NOTE — Clinical Note
Diagnosis: Syncope, unspecified syncope type [[id number]]   Future Attending Provider: DEISY CORDERO [[id number]]   Admitting Provider:: INDIO TENA [[id number]]

## 2023-08-15 NOTE — ED PROVIDER NOTES
Emergency Department Encounter  Provider Note  Encounter Date: 8/15/2023    Patient Name: Barrera Siddiqui  MRN: [id number]    History of Present Illness   HPI  History of Present Illness:    Chief Complaint:   Chief Complaint   Patient presents with    Loss of Consciousness     Was at work and passed out. Does not have any memory of the event. Unsure if she had any pain prior to the event. Equal . No slurred speech or arm drift. Denies dizziness, chest pain, and N/V. +headache.       58-year-old female presenting with sudden syncope while walking around at work today.  Patient did not have any prodrome, and no seizure-like activity.  Did not urinate on herself.  Does not know how long she was out for, denies any head strike.  Complaining of headache currently.  Denies any chest pain or shortness of breath, was in her normal state of health when she went to work.    The following PMH/PSH/SocHx/FamHx has been reviewed by myself:    Past Medical History:   Diagnosis Date    Depression     Diabetes mellitus     High cholesterol     Hypertension     Stroke     TIA (transient ischemic attack)      Past Surgical History:   Procedure Laterality Date    CHOLECYSTECTOMY      COLONOSCOPY N/A 10/18/2017    Procedure: COLONOSCOPY;  Surgeon: Donald Wright Jr., MD;  Location: [facility];  Service: Endoscopy;  Laterality: N/A;    KNEE SURGERY      TUBAL LIGATION      WRIST SURGERY       Social History     Tobacco Use    Smoking status: Former     Current packs/day: 0.00    Smokeless tobacco: Never   Substance Use Topics    Alcohol use: Yes     Comment: rare    Drug use: No     Family History   Problem Relation Age of Onset    Breast cancer Mother     Liver disease Maternal Grandmother        Allergies reviewed:  Review of patient's allergies indicates:  No Known Allergies    Medications reviewed:  Medication List with Changes/Refills   Current Medications    ASPIRIN (ECOTRIN) 81 MG EC TABLET    Take 1 tablet (81 mg total) by mouth  once daily.    ATORVASTATIN (LIPITOR) 20 MG TABLET    Take 20 mg by mouth once daily.    FLUOXETINE 40 MG CAPSULE    Take 40 mg by mouth once daily.    GLIPIZIDE (GLUCOTROL) 10 MG TABLET    Take 10 mg by mouth 2 (two) times daily.    LOSARTAN (COZAAR) 100 MG TABLET    Take 100 mg by mouth once daily.    METFORMIN (GLUCOPHAGE-XR) 500 MG ER 24HR TABLET    Take 500 mg by mouth 2 (two) times daily.    TRAZODONE (DESYREL) 50 MG TABLET    Take  mg by mouth nightly as needed.    TRIAMTERENE-HYDROCHLOROTHIAZIDE 37.5-25 MG (MAXZIDE-25) 37.5-25 MG PER TABLET    Take 1 tablet by mouth once daily.   Discontinued Medications    AMOXICILLIN-CLAVULANATE 875-125MG (AUGMENTIN) 875-125 MG PER TABLET    Take 1 tablet by mouth every 12 (twelve) hours.    BINAXNOW COVID-19 AG SELF TEST KIT    Use as Directed on the Package       ROS  Review of Systems:    Constitutional:  Negative for fever.   HENT:  Negative for sore throat.    Eyes:  Negative for redness.   Respiratory:  Negative for shortness of breath.    Cardiovascular:  Negative for chest pain.   Gastrointestinal:  Negative for nausea.   Genitourinary:  Negative for dysuria.   Musculoskeletal:  Negative for back pain.   Skin:  Negative for rash.   Neurological:  Negative for weakness.   Hematological:  Does not bruise/bleed easily.   Psychiatric/Behavioral:  The patient is not nervous/anxious.      Physical Exam   Physical Exam    Initial Vitals [08/15/23 0713]   BP Pulse Resp Temp SpO2   (!) 188/88 70 (!) 22 97.9 °F (36.6 °C) 100 %      MAP       --           Triage vital signs reviewed.    Constitutional: Well-nourished, well-developed.  Tearful.  HENT: Normocephalic, atraumatic. Moist mucous membranes.  Eyes: No conjunctival injection.  PERRL.  Resp: Normal respiratory effort, breathing unlabored.  Cardio: Regular rate and rhythm.  GI: Abdomen non-distended.   MSK: Extremities without any deformities noted. No lower extremity edema.  Skin: Warm and dry. No rashes or  lesions noted.  Neuro: Awake and alert. Moves all extremities.  Cranial nerves 2-12 grossly intact.    ED Course   Procedures    Medical Decision Making    History Acquisition     The history is provided by the patient.   Review of prior external/non ED notes:  Admission January 2023 for pyelonephritis    Differential Diagnoses   Based on available information and initial assessment, the working Differential Diagnosis includes, but is not limited to:  Arrhythmia, aortic dissection, MI/unstable angina, PE, cardiogenic shock, CHF, CVA/TIA, intracranial lesion/mass, seizure, perforated viscous, ruptured AAA, orthostatic hypotension, vasovagal episode, anemia, dehydration, medication reaction, intentional overdose  .    EKG   EKG ordered and independently reviewed by me:   Normal sinus rhythm, rate 65, normal intervals, normal axis, no STEMI    Labs   Lab tests ordered and independently reviewed by me:    Labs Reviewed   CBC W/ AUTO DIFFERENTIAL - Abnormal; Notable for the following components:       Result Value    RBC 3.85 (*)     Hemoglobin 10.6 (*)     Hematocrit 32.2 (*)     All other components within normal limits   COMPREHENSIVE METABOLIC PANEL - Abnormal; Notable for the following components:    CO2 21 (*)     Glucose 262 (*)     BUN 21 (*)     Creatinine 1.6 (*)     Total Protein 8.5 (*)     AST 62 (*)     eGFR 38 (*)     All other components within normal limits   POCT GLUCOSE - Abnormal; Notable for the following components:    POCT Glucose 268 (*)     All other components within normal limits   TROPONIN I   B-TYPE NATRIURETIC PEPTIDE   SARS-COV-2 RNA AMPLIFICATION, QUAL   URINALYSIS, REFLEX TO URINE CULTURE   TROPONIN I         Imaging   Imaging ordered and independently reviewed by me:   Imaging Results              CT Head Without Contrast (Final result)  Result time 08/15/23 09:37:26      Final result by Jaquan Hutchinson Jr., MD (08/15/23 09:37:26)                          Final result by [place]  Jaquan JOHNSON Jr., MD (08/15/23 09:37:23)                   Impression:      Normal      Electronically signed by: Jaquan Stout Jr  Date:    08/15/2023  Time:    09:37               Narrative:    EXAMINATION:  CT HEAD WITHOUT CONTRAST    CLINICAL HISTORY:  Syncope, recurrent;    TECHNIQUE:  Low dose axial images were obtained through the head.  Coronal and sagittal reformations were also performed. Contrast was not administered.    COMPARISON:  11/21/2020    FINDINGS:  The CSF spaces, brain parenchyma, and bony calvarium are intact.There is no evidence of hemorrhage, mass, or acute infarct.    The visualized paranasal sinuses are clear.                                           Additional Consideration   Barrera Siddiqui  presents to the emergency Department today with sudden syncope.  Patient has many risk factors.  Will need admission.  EKG nonischemic.  Plan for labs, head CT.  Currently asymptomatic.    Additional testing considered but not indicated during the course of this workup: further imaging and labwork, not indicated  Co-morbidities/chronic illness/exacerbation of chronic illness considered which impacted clinical decision making:  Hypertension, obesity, diabetes, heart failure  Procedures done in the ED or plan for the OR: No  Social determinants of care considered during development of treatment plan include: Decreased medical literacy  Discussion of management or test interpretation with external provider: Yes, describe:  Admitting hospitalist  DNR discussion: No    The patient's list of active medications and allergies as documented per RN staff has been reviewed.  Medications given in the ED and/or prescribed:   Medications   acetaminophen tablet 1,000 mg (1,000 mg Oral Given 8/15/23 1019)             ED Course as of 08/15/23 1109   Tue Aug 15, 2023   1108 CBC auto differential(!)  Independently interpreted by me, unremarkable    [CS]   1108 Comprehensive metabolic panel(!)  Slightly elevated creatinine,  patient has had elevated creatinine before. [CS]   1108 Troponin I: <0.006 [CS]   1108 SARS-CoV-2 RNA, Amplification, Qual: Negative  Negative [CS]   1108 BNP: <10  Negative negative [CS]   1108 POCT Glucose(!): 268 [CS]   1108 CT Head Without Contrast  No acute findings [CS]   1108 Patient with elevated risk for cardiac etiology of syncope with no initial obvious lab or imaging abnormalities.  Will admit for observation.  Signed out to [facility]. [CS]      ED Course User Index  [CS] Sandra Mancuso MD       Explanation of disposition: Admit    Clinical Impression:     1. Syncope, unspecified syncope type    2. Chest pain         ED Disposition Condition    Observation Stable               Sandra Mancuso MD  08/15/23 7233

## 2023-08-15 NOTE — H&P
[facility] Medicine H&P Note     Admitting Team: [facility] Hospitalist Team A  Attending Physician: Heather Tellez MD  Resident: Jaye  Intern: Sandy     Date of Admit: 8/15/2023    Chief Complaint     Passed out for <5 min    Subjective:      History of Present Illness:  Barrera Siddiqui is a 58 y.o. female who  has a past medical history of Depression, Diabetes mellitus, High cholesterol, Hypertension, Stroke, and TIA (transient ischemic attack). 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 The patient denies any prodromal symptoms including dizziness, lightheadedness, SOB, CP, palpitations, changes to vision, metallic taste. She did not know she was about to collapse and reports waking up for a brief period on the floor at her job. Her son states that he received a call at approximately 6:23 alerting him that her mother syncopized and that he needed to take her to the hospital. No reports of her shaking or convulsions, but the son did not specifically ask. The son notes that the patient was confused when he picked her up and did not recognize him until they had arrived to the ED in his private vehicle. The pt notes that she does not recall the ride to the hospital but remembers waking up in the ED as well. She notes 3 prior TIAs, no previous strokes. Last TIA was within the past year but she cannot recall her symptoms at the time. No history of seizures but multiple family members have a history of epilepsy, including her sister, daughter, and nephew. Patient denies biting her tongue or incontinence during this episode. She admits to some L sided head pain that started after the fall and mild R knee and ankle pain. The patient admits to not eating breakfast, but  "that is normal for her. No caffeine. Did drink water.    Past Medical History:  Past Medical History:   Diagnosis Date    Depression     Diabetes mellitus     High cholesterol     Hypertension     Stroke     TIA (transient ischemic attack)        Past Surgical History:  Past Surgical History:   Procedure Laterality Date    CHOLECYSTECTOMY      COLONOSCOPY N/A 10/18/2017    Procedure: COLONOSCOPY;  Surgeon: Donald Wright Jr., MD;  Location: [facility];  Service: Endoscopy;  Laterality: N/A;    KNEE SURGERY      TUBAL LIGATION      WRIST SURGERY         Allergies:  NKDA    Home Medications:   ASA 81 mg daily  Lipitor 20 mg daily  Fluoxetine (last took 3 weeks ago)  Glipizde 10 mg BID  Losartan 100 mg daily  Metformin 500 mg BID  Maxzide 37.5-25 mg daily    Family History:  Family History   Problem Relation Age of Onset    Breast cancer Mother     Liver disease Maternal Grandmother    Family history of epilepsy as above      Social History:  Former smoker, none currently. Rare social EtOH use, not weekly. No recreational drug use. Lives with son. Works at [employer] for >20 years      Review of Systems:  Positive as noted in the HPI above. All other systems are reviewed and are negative.    Health Maintaince :   Primary Care Physician: Evelin Guevara NP     Immunization History   Administered Date(s) Administered    COVID-19, MRNA, LN-S, PF (MODERNA FULL 0.5 ML DOSE) 2021, 2021    Influenza - Quadrivalent - PF *Preferred* (6 months and older) 10/05/2016        Cancer Screening:  PAP: not UTD  MMG: not UTD  Colonoscopy: UTD -- , due in      Objective:   Last 24 Hour Vital Signs:  BP  Min: 117/74  Max: 188/88  Temp  Av.9 °F (36.6 °C)  Min: 97.9 °F (36.6 °C)  Max: 97.9 °F (36.6 °C)  Pulse  Av  Min: 70  Max: 76  Resp  Av  Min: 22  Max: 36  SpO2  Av %  Min: 98 %  Max: 100 %  Height  Av' 3" (160 cm)  Min: 5' 3" (160 cm)  Max: 5' 3" (160 cm)  Weight  Av.2 kg (276 lb)  Min: " 125.2 kg (276 lb)  Max: 125.2 kg (276 lb)  Body mass index is 48.89 kg/m².  No intake/output data recorded.    Orthostatics:  Layin/87, HR 77   Sittin/99, HR 83  Standin/97, HR 87  Subjective lightheaded approximately 30 seconds after standing with transient rise of HR to approximately 98 bpm. She denies the same sensation occurring prior to her syncopal episode at work    Physical Examination:  Gen: WDWN, NAD, resting comfortably in bed, morbidly obese  HEENT: NC/AT, mildly TTP over the L frontal temporal region, EOMI grossly, normal external ears bilaterally, normal external nose, moist MM. No tongue or cheek laceration  Neck: Supple  CV: Regular rate, regular rhythm, no murmurs, rubs, or gallops. 2+ radial pulses bilaterally  Resp: CTAB. No wheezes, rales, rhonchi. Normal work of breathing. Normal effort. Equal chest rise. No respiratory distress  Abd: Soft, nontender, nondistended, no rebound/guarding  MSK/Ext: No clubbing, cyanosis, or edema. Moves all four extremities spontaneously  Neuro: GCS 15, alert, speech is normal, face symmetric, CN II-XII intact. 5/5 BUE and BLE strength. No focal motor deficits elicited on exam  Skin: Warm, dry, no rash  Psych: Normal mood and affect       Laboratory:  Most Recent Data:  CBC:   Lab Results   Component Value Date    WBC 6.10 08/15/2023    HGB 10.6 (L) 08/15/2023    HCT 32.2 (L) 08/15/2023     08/15/2023    MCV 84 08/15/2023    RDW 14.1 08/15/2023     BMP:   Lab Results   Component Value Date     08/15/2023    K 3.8 08/15/2023     08/15/2023    CO2 21 (L) 08/15/2023    BUN 21 (H) 08/15/2023    CREATININE 1.6 (H) 08/15/2023     (H) 08/15/2023    CALCIUM 9.0 08/15/2023    MG 1.5 (L) 2020    PHOS 4.1 2020     LFTs:   Lab Results   Component Value Date    PROT 8.5 (H) 08/15/2023    ALBUMIN 3.5 08/15/2023    BILITOT 0.4 08/15/2023    AST 62 (H) 08/15/2023    ALKPHOS 95 08/15/2023    ALT 41 08/15/2023     Coags:   Lab  Results   Component Value Date    INR 1.2 11/21/2020     FLP:   Lab Results   Component Value Date    CHOL 123 11/21/2020    HDL 43 11/21/2020    LDLCALC 42.0 (L) 11/21/2020    TRIG 190 (H) 11/21/2020    CHOLHDL 35.0 11/21/2020     DM:   Lab Results   Component Value Date    HGBA1C 10.0 (H) 12/31/2022    HGBA1C 8.7 (H) 02/09/2020    HGBA1C 8.8 (H) 02/08/2020    LDLCALC 42.0 (L) 11/21/2020    CREATININE 1.6 (H) 08/15/2023     Thyroid:   Lab Results   Component Value Date    TSH 0.222 (L) 11/21/2020    FREET4 0.82 11/21/2020     Anemia:   Lab Results   Component Value Date    IRON 19 (L) 12/31/2022    TIBC 286 12/31/2022    FERRITIN 123 12/31/2022    QOUCTWEZ30 437 12/31/2022    FOLATE 8.9 12/31/2022     Cardiac:   Lab Results   Component Value Date    TROPONINI <0.006 08/15/2023    BNP <10 08/15/2023     Urinalysis:   Lab Results   Component Value Date    LABURIN ESCHERICHIA COLI  >100,000 cfu/ml   (A) 12/30/2022    COLORU Yellow 12/30/2022    SPECGRAV 1.020 12/30/2022    NITRITE Negative 12/30/2022    KETONESU Negative 12/30/2022    UROBILINOGEN 2.0-3.0 (A) 12/30/2022    WBCUA >100 (H) 12/30/2022       Trended Lab Data:  Recent Labs   Lab 08/15/23  0749   WBC 6.10   HGB 10.6*   HCT 32.2*      MCV 84   RDW 14.1      K 3.8      CO2 21*   BUN 21*   CREATININE 1.6*   *   PROT 8.5*   ALBUMIN 3.5   BILITOT 0.4   AST 62*   ALKPHOS 95   ALT 41       Trended Cardiac Data:  Recent Labs   Lab 08/15/23  0749   TROPONINI <0.006   BNP <10       Microbiology Data:  Covid neg    Other Results:  EKG (my interpretation): NSR at rate of 65 bpm with T wave inversions in lead III, unchanged from prior EKG.    Radiology:  CT Head Without Contrast   Final Result      CTA Head and Neck (xpd)    (Results Pending)         Assessment:     Barrera Siddiqui is a 58 y.o. female who  has a past medical history of Depression, Diabetes mellitus, High cholesterol, Hypertension, Stroke, and TIA (transient ischemic attack).  Sustained a syncopal episode while at work the morning of admission without prodrome. Admitted to [facility] medicine for further evaluation and management     Plan:     #Syncope, unknown etiology  Syncopized while walking at [employer], no prodrome, mild confusion afterwards for approximately 15 minutes. No incontinence, observed convulsions, or tongue biting. EKG in ED normal, neg troponins. Orthostatics negative but subjective LH noted  - Admit to telemetry with continuous cardiac monitoring  - TTE  - 2nd troponin pending  - CTA head and neck  - K WNL, check Mag, replete prn  - TSH  - CK  - UA, Utox  - Will check urine lytes and give 1 L LR  - Will likely need a 30 day event monitor on discharge and ambulatory referral to cardiology and neurology    #Acute kidney injury  Cr 1.6 on admit, baseline ~1. Possible prerenal given subjective lightheadedness when evaluating orthostatics  - Check urine lytes  - Give 1L LR  - renally dose all meds and avoid nephrotoxic agents as able  - Recheck in AM    #Type II DM  A1c 7 months ago of 10.0. On metformin and glipizde BID at home  - Recheck A1c here  - Hold home PO DM meds  - low dose SSI as insulin naive  - hypoglycemia protocols in place    #HTN  On losartan and Maxzide at home, BP on arrival 188/88. On admit 120/78 without receiving home meds  - Hold home medications, unable to rule out orthostatic hypotension as a cause of syncope at this time  - Will CTM, can slowly add meds back on as needed    #HLD  On lipitor 20 mg at home. Lipid panel from 2 years ago significant for elevated triglycerides  - Continue home lipitor  - Repeat lipid panel    #Morbid obesity  BMI on admit of 48.89  - Plan to  patient on weight loss and exercise prior to discharge    #Normocytic anemia  Hgb 10.6, at baseline. Prior anemia work up with low transferrin, TIBC. Normal ferritin, B12, folate  - No evidence of current bleeding  - Will CTM with daily CBC    #HCM  - Not up to date on mammogram or  pap per pt  - Defer to PCP on discharge    Code Status:     Fluids: 1L LR  Electrolytes: replete prn  Nutrition: NPO for now, will start cardiac and DM diet after scans are done  Ppx: Lovenox BID for ppx given BMI  Code: Full    Dispo: Pending results of TTE and CTA H/N, estimated LOS 1-2 days with plan discharge home with 30 day event monitor and a close cards and neuro follow up     Rupert Cee,   [facility], -II    [facility] Medicine Hospitalist Pager numbers:   [facility] Hospitalist Medicine Team A (Geoff/Gelacio): [phone]  [facility] Hospitalist Medicine Team B (Uriah/Ame):  [phone]

## 2023-08-15 NOTE — PLAN OF CARE
08/15/23 1425   Admission   Initial VN Admission Questions Complete   Communication Issues? None   Shift   Virtual Nurse - Patient Verbalized Approval Of Camera Use   Safety/Activity   Patient Rounds visualized patient;placement of personal items at bedside;bed in low position;bed wheels locked;call light in patient/parent reach;clutter free environment maintained;ID band on

## 2023-08-15 NOTE — ED NOTES
Receive report from Evelin and assume care of patient. Patient is in bed resting,  at bedside and call bell within reach.

## 2023-08-15 NOTE — PROGRESS NOTES
"[facility]           Pharmacy  Admission Medication Reconciliation     Based on information gathered for medication list, you may go to "Admission" then "Reconcile Home Medications" tabs to review and/or act upon those items.     The home medication list has been updated by the Pharmacy department.   Please read ALL comments highlighted in red.   Please address this information as you see fit.    Feel free to contact us if you have any questions or require assistance.    Home medication list has been compared to current inpatient medications. Please review the following discrepancies noted below:    Patient reports STILL TAKING the following medication(s) which was not ordered upon admit  Maxzide 37.5-25mg daily    Feel free to contact us if you have any questions or require assistance.    Rei Erwin, PharmD  [phone]    "

## 2023-08-15 NOTE — PROGRESS NOTES
Patient seen and examined.  I agree with the findings and plan of care as in the resident note. Syncope workup in progress.  CT head negative.  Orthostatics positive - will continue hydration.  LAUREN - monitor renal function with hydration.  Check echocardiogram.  May benefit from event monitor on discharge.

## 2023-08-15 NOTE — MEDICAL/APP STUDENT
[facility] Medicine H&P Note     Admitting Team: [facility] Hospitalist Team A  Attending Physician: Heather Tellez MD  Resident: Jose Armando  Intern: Sandy    Date of Admit: 8/15/2023    Chief Complaint     Syncope < 5 min with confusion for 15 minutes.     Subjective:      History of Present Illness:  Barrera Siddiqui is a 58 y.o. female with past medical history of  has a past medical history of Depression, Diabetes mellitus, High cholesterol, Hypertension, Stroke, and TIA (transient ischemic attack)..The patient presented to [facility] on 8/15/2023 with a primary complaint of Loss of Consciousness (Was at work and passed out. Does not have any memory of the event. Unsure if she had any pain prior to the event. Equal . No slurred speech or arm drift. Denies dizziness, chest pain, and N/V. +headache.)      This morning, patient got ready to go to work at [employer] [address] and arrived as usual around 5:20 this morning. 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 Pts manager found the patient and called her sons (6:20 am) who came and picked her up and brought her to [facility] ED. Pt unable to say wether or not she hit her head during the fall. She recalls waking up on the floor and being in the ED. She does not remember the drive to the hospital. Per son, pt was confused when he came to pick her up until she arrived in the ED and could not identify her son and was not oriented to situation.   She denies any prodromal lightheadedness, dizziness, change in vision, heart palpitations, or weakness prior to episode. She denies any tongue biting, urinary or fecal incontinence. She has a history of 3 TIAs, most recent 1 year ago.  Denies recent hx of decreased PO intake, chest pain, SOB, fever, chills, n/v, diarrhea, or muscle weakness. She denies any hx of seizures but has a history of seizures in  sister, daugther, and nephew.     Past Medical History:  Past Medical History:   Diagnosis Date    Depression     Diabetes mellitus     High cholesterol     Hypertension     Stroke     TIA (transient ischemic attack)        Past Surgical History:  Past Surgical History:   Procedure Laterality Date    CHOLECYSTECTOMY      COLONOSCOPY N/A 10/18/2017    Procedure: COLONOSCOPY;  Surgeon: Donald Wright Jr., MD;  Location: [facility];  Service: Endoscopy;  Laterality: N/A;    KNEE SURGERY      TUBAL LIGATION      WRIST SURGERY         Allergies:  Review of patient's allergies indicates:  No Known Allergies    Home Medications:  Prior to Admission medications    Medication Sig Start Date End Date Taking? Authorizing Provider   aspirin (ECOTRIN) 81 MG EC tablet Take 1 tablet (81 mg total) by mouth once daily. 1/2/23 1/2/24  Alexandra Osman MD   atorvastatin (LIPITOR) 20 MG tablet Take 20 mg by mouth once daily. 3/14/22   Provider, Historical   BINAXNOW COVID-19 AG SELF TEST Kit Use as Directed on the Package 10/11/22   Provider, Historical   FLUoxetine 40 MG capsule Take 40 mg by mouth once daily. 2/24/22   Provider, Historical   glipiZIDE (GLUCOTROL) 10 MG tablet Take 10 mg by mouth 2 (two) times daily. 3/14/22   Provider, Historical   losartan (COZAAR) 100 MG tablet Take 100 mg by mouth once daily.    Provider, Historical   metFORMIN (GLUCOPHAGE-XR) 500 MG ER 24hr tablet Take 500 mg by mouth 2 (two) times daily. 12/22/22   Provider, Historical   traZODone (DESYREL) 50 MG tablet Take  mg by mouth nightly as needed. 12/19/22   Provider, Historical   triamterene-hydrochlorothiazide 37.5-25 mg (MAXZIDE-25) 37.5-25 mg per tablet Take 1 tablet by mouth once daily. 1/2/23 1/2/24  Alexandra Osman MD   amoxicillin-clavulanate 875-125mg (AUGMENTIN) 875-125 mg per tablet Take 1 tablet by mouth every 12 (twelve) hours. 1/2/23 8/15/23  Alexandra Osman MD       Family History:  Family History   Problem Relation Age of  "Onset    Breast cancer Mother     Liver disease Maternal Grandmother        Social History:  Social History     Tobacco Use    Smoking status: Former     Current packs/day: 0.00    Smokeless tobacco: Never   Substance Use Topics    Alcohol use: Yes     Comment: rare    Drug use: No     Lives with son. I Work: [employer] [address] (26 years).    Review of Systems:  Pertinent items are noted in HPI. All other systems are reviewed and are negative.    Health Maintaince :   Primary Care Physician: Evelin Guevara    Immunizations:   Defer PCP    Cancer Screening:  Defer PCP     Objective:   Last 24 Hour Vital Signs:  BP  Min: 117/74  Max: 188/88  Temp  Av.9 °F (36.6 °C)  Min: 97.9 °F (36.6 °C)  Max: 97.9 °F (36.6 °C)  Pulse  Av  Min: 70  Max: 76  Resp  Av  Min: 22  Max: 36  SpO2  Av %  Min: 98 %  Max: 100 %  Height  Av' 3" (160 cm)  Min: 5' 3" (160 cm)  Max: 5' 3" (160 cm)  Weight  Av.2 kg (276 lb)  Min: 125.2 kg (276 lb)  Max: 125.2 kg (276 lb)  Body mass index is 48.89 kg/m².  No intake/output data recorded.      Orthostatics:  Layin/87, HR 77   Sittin/99, HR 83  Standin/97, HR 87 (symptomatic: pt with 30s lightheadedness after standing with increase HR to 98)       Physical Examination:  Examination  General: Patient laying down comfortably in NAD  Head: normocephalic, atraumatic  Eyes: PERRL, EOMI, no conjunctival injections or icterus  Mouth: MMM, posterior oropharynx without erythema  Cardiac: RRR, no murmurs appreciated, no extra heart sounds  Pulmonary/Chest: CTAB, symmetric chest rise, no wheezing or crackles  GI: Soft, non tender, non distended, normoactive bowel sounds  Extremities: no edema, clubbing, or cyanosis, 5/5 strength bilateral upper and lower extremities   Skin: dry, warm, intact. No bruising or rashes.  Neuro: Alert and oriented, moving all extremities, sensation equal and symmetric, no focal deficits noted.     Laboratory:  Most Recent Data:  CBC:   Lab " Results   Component Value Date    WBC 6.10 08/15/2023    HGB 10.6 (L) 08/15/2023    HCT 32.2 (L) 08/15/2023     08/15/2023    MCV 84 08/15/2023    RDW 14.1 08/15/2023     WBC Differential: 50.6 % N, 0 % Bands, 39.2 % L, 6.4 % M, 3.0 % Eo, 0.5 % Baso, occasional poly, ovalocytes, and basophilic stippling. Large/giant platelets present   BMP:   Lab Results   Component Value Date     08/15/2023    K 3.8 08/15/2023     08/15/2023    CO2 21 (L) 08/15/2023    BUN 21 (H) 08/15/2023    CREATININE 1.6 (H) 08/15/2023     (H) 08/15/2023    CALCIUM 9.0 08/15/2023    MG 1.5 (L) 02/09/2020    PHOS 4.1 02/09/2020     LFTs:   Lab Results   Component Value Date    PROT 8.5 (H) 08/15/2023    ALBUMIN 3.5 08/15/2023    BILITOT 0.4 08/15/2023    AST 62 (H) 08/15/2023    ALKPHOS 95 08/15/2023    ALT 41 08/15/2023     Coags:   Lab Results   Component Value Date    INR 1.2 11/21/2020     FLP:   Lab Results   Component Value Date    CHOL 123 11/21/2020    HDL 43 11/21/2020    LDLCALC 42.0 (L) 11/21/2020    TRIG 190 (H) 11/21/2020    CHOLHDL 35.0 11/21/2020     DM:   Lab Results   Component Value Date    HGBA1C 10.0 (H) 12/31/2022    HGBA1C 8.7 (H) 02/09/2020    HGBA1C 8.8 (H) 02/08/2020    LDLCALC 42.0 (L) 11/21/2020    CREATININE 1.6 (H) 08/15/2023     Thyroid:   Lab Results   Component Value Date    TSH 0.222 (L) 11/21/2020    FREET4 0.82 11/21/2020     Anemia:   Lab Results   Component Value Date    IRON 19 (L) 12/31/2022    TIBC 286 12/31/2022    FERRITIN 123 12/31/2022    PKASBHTY54 437 12/31/2022    FOLATE 8.9 12/31/2022     Cardiac:   Lab Results   Component Value Date    TROPONINI <0.006 08/15/2023    BNP <10 08/15/2023     Urinalysis:   Lab Results   Component Value Date    LABURIN ESCHERICHIA COLI  >100,000 cfu/ml   (A) 12/30/2022    COLORU Yellow 12/30/2022    SPECGRAV 1.020 12/30/2022    NITRITE Negative 12/30/2022    KETONESU Negative 12/30/2022    UROBILINOGEN 2.0-3.0 (A) 12/30/2022    WBCUA >100 (H)  12/30/2022       Trended Lab Data:  Recent Labs   Lab 08/15/23  0749   WBC 6.10   HGB 10.6*   HCT 32.2*      MCV 84   RDW 14.1      K 3.8      CO2 21*   BUN 21*   CREATININE 1.6*   *   PROT 8.5*   ALBUMIN 3.5   BILITOT 0.4   AST 62*   ALKPHOS 95   ALT 41       Trended Cardiac Data:  Recent Labs   Lab 08/15/23  0749   TROPONINI <0.006   BNP <10       Microbiology Data:  UA and Ucx pending     Other Results:  EKG (my interpretation): unusual P axis, possible ectopic atrial rhythm. R superior axis deviation. Possible anterior infarct. T wave abnormality, consider inferior ischemia.    Radiology:  Imaging Results              CT Head Without Contrast (Final result)  Result time 08/15/23 09:37:26      Final result by Jaquan Hutchinson Jr., MD (08/15/23 09:37:26)                          Final result by Jaquan Hutchinson Jr., MD (08/15/23 09:37:23)                   Impression:      Normal      Electronically signed by: Jaquan Stout Jr  Date:    08/15/2023  Time:    09:37               Narrative:    EXAMINATION:  CT HEAD WITHOUT CONTRAST    CLINICAL HISTORY:  Syncope, recurrent;    TECHNIQUE:  Low dose axial images were obtained through the head.  Coronal and sagittal reformations were also performed. Contrast was not administered.    COMPARISON:  11/21/2020    FINDINGS:  The CSF spaces, brain parenchyma, and bony calvarium are intact.There is no evidence of hemorrhage, mass, or acute infarct.    The visualized paranasal sinuses are clear.                                      CTA head and neck pending      Assessment:     Barrera Siddiqui is a 58 y.o. female with PMHx of TIA x3 ( no hx of stroke), HTN, HLD, T2DM (not on insulin) depression/ anxiety, vit D deficiency presenting for 1 episode of syncope with post syncopal confusion this morning. Pt without any prodromal symptoms. Pt stable on initial exam without focal deficits but + orthostatics (symptomatic). Pt admitted to [facility] for  further workup of syncope.        Plan:     Syncope   Hx of TIA x3   - syncopal episode this morning from standing position   - denies prodromal symptoms, + post syncopal confusion for 15 minutes.   - most recent TIA ~ 1 year ago, no hx of stroke  - pt with risk factors, obesity, HTN, HLD, T2DM  - orthostatics taken with no drop in BP but pt with 30 sec of dizziness on standing.   - EKG   - negative troponins, CPK, and BNP  Plan  - CTA head and neck  - Echo  - urine tox  - TSH  - IV LR   - plan for cardiac monitor on discharge     LAUREN  - Cr on admission 1.6, baseline 1  - hold home losartan and maxzide   - UA and urine electrolytes pending   - IV LR     Diastolic Heart Failure  - Echo with diastolic dysfunction, impaired LV relaxation, EF 60-65% 10/30/18,   - repeat echo    Hypomagnesemia  - mag 1.5 on admission  - replenish with mag sulfate   - continue to monitor and replenish as needed     Hypertension  - BP on admission 188/88, repeat 138/87   - pt did not take home medication this AM   - hold losartan and maxzide due to LAUREN  - continue to monitor    Type 2 Diabetes  -  on admission  - last too medications @ 9pm yesterday   - last A1c 10 on 12/31/22  - recheck A1c   - hold home metformin and glipizide   - insulin sliding scale while inpatient     Hyperlipidemia  - last lipid panel with elevated triglycerides 11/21/22  - repeat lipid panel   - continue home ASA 81 and lipitor 20    Depression, Anxiety   - pt ran out of fluoxetine 3 weeks ago, has not taken it since  - f/u PCP    Constipation   - pt with hx of constipation  - continue to monitor and add bowel regimen if needed     Normocytic Anemia  - Hgb 10.6, at baseline  - no evidence of current bleeding  - continue to monitor    Vitamin D Deficiency   - Vit D 17 on 7/25/2019  - f/u with PCP    Health Care Maintenance  - Ordered TSH, A1c, and lipid panel.  - Colonoscopy: not UTD  - Pap smear: not UTD  - Mammogram: not UTD  - defer PCP    Code Status:  full  DVT Prophylaxis: Lovenox BID   Diet: NPO  Disposition: pending workup of syncope     Alexandra Iraheta  [facility] MS4  [facility] Hospitalists Team A      [facility] Hospitalist Pager numbers:   [facility] Hospitalist Medicine Team A (Geoff/Gelacio): [phone]  [facility] Hospitalist Medicine Team B (Uriah/Ame):  [phone]

## 2023-08-16 VITALS
OXYGEN SATURATION: 98 % | RESPIRATION RATE: 18 BRPM | SYSTOLIC BLOOD PRESSURE: 147 MMHG | HEIGHT: 63 IN | BODY MASS INDEX: 48.9 KG/M2 | TEMPERATURE: 98 F | WEIGHT: 276 LBS | DIASTOLIC BLOOD PRESSURE: 77 MMHG | HEART RATE: 79 BPM

## 2023-08-16 PROBLEM — D64.9 NORMOCYTIC ANEMIA: Status: ACTIVE | Noted: 2023-08-16

## 2023-08-16 LAB
ALBUMIN SERPL BCP-MCNC: 3.3 G/DL (ref 3.5–5.2)
ALP SERPL-CCNC: 78 U/L (ref 55–135)
ALT SERPL W/O P-5'-P-CCNC: 38 U/L (ref 10–44)
ANION GAP SERPL CALC-SCNC: 10 MMOL/L (ref 8–16)
AST SERPL-CCNC: 57 U/L (ref 10–40)
BASOPHILS # BLD AUTO: 0.04 K/UL (ref 0–0.2)
BASOPHILS NFR BLD: 0.6 % (ref 0–1.9)
BILIRUB SERPL-MCNC: 0.6 MG/DL (ref 0.1–1)
BUN SERPL-MCNC: 20 MG/DL (ref 6–20)
CALCIUM SERPL-MCNC: 8.8 MG/DL (ref 8.7–10.5)
CHLORIDE SERPL-SCNC: 106 MMOL/L (ref 95–110)
CO2 SERPL-SCNC: 21 MMOL/L (ref 23–29)
CREAT SERPL-MCNC: 1.4 MG/DL (ref 0.5–1.4)
DIFFERENTIAL METHOD: ABNORMAL
EOSINOPHIL # BLD AUTO: 0.2 K/UL (ref 0–0.5)
EOSINOPHIL NFR BLD: 2.4 % (ref 0–8)
ERYTHROCYTE [DISTWIDTH] IN BLOOD BY AUTOMATED COUNT: 13.9 % (ref 11.5–14.5)
EST. GFR  (NO RACE VARIABLE): 44 ML/MIN/1.73 M^2
GLUCOSE SERPL-MCNC: 193 MG/DL (ref 70–110)
HCT VFR BLD AUTO: 32.3 % (ref 37–48.5)
HGB BLD-MCNC: 10.3 G/DL (ref 12–16)
IMM GRANULOCYTES # BLD AUTO: 0.02 K/UL (ref 0–0.04)
IMM GRANULOCYTES NFR BLD AUTO: 0.3 % (ref 0–0.5)
LYMPHOCYTES # BLD AUTO: 2.1 K/UL (ref 1–4.8)
LYMPHOCYTES NFR BLD: 34 % (ref 18–48)
MAGNESIUM SERPL-MCNC: 2 MG/DL (ref 1.6–2.6)
MCH RBC QN AUTO: 26.7 PG (ref 27–31)
MCHC RBC AUTO-ENTMCNC: 31.9 G/DL (ref 32–36)
MCV RBC AUTO: 84 FL (ref 82–98)
MONOCYTES # BLD AUTO: 0.5 K/UL (ref 0.3–1)
MONOCYTES NFR BLD: 8.2 % (ref 4–15)
NEUTROPHILS # BLD AUTO: 3.4 K/UL (ref 1.8–7.7)
NEUTROPHILS NFR BLD: 54.5 % (ref 38–73)
NRBC BLD-RTO: 0 /100 WBC
PHOSPHATE SERPL-MCNC: 3.9 MG/DL (ref 2.7–4.5)
PLATELET # BLD AUTO: 176 K/UL (ref 150–450)
PMV BLD AUTO: 12 FL (ref 9.2–12.9)
POCT GLUCOSE: 185 MG/DL (ref 70–110)
POCT GLUCOSE: 190 MG/DL (ref 70–110)
POCT GLUCOSE: 218 MG/DL (ref 70–110)
POTASSIUM SERPL-SCNC: 3.6 MMOL/L (ref 3.5–5.1)
PROT SERPL-MCNC: 7.8 G/DL (ref 6–8.4)
RBC # BLD AUTO: 3.86 M/UL (ref 4–5.4)
SODIUM SERPL-SCNC: 137 MMOL/L (ref 136–145)
WBC # BLD AUTO: 6.24 K/UL (ref 3.9–12.7)

## 2023-08-16 PROCEDURE — 36415 COLL VENOUS BLD VENIPUNCTURE: CPT

## 2023-08-16 PROCEDURE — 94761 N-INVAS EAR/PLS OXIMETRY MLT: CPT

## 2023-08-16 PROCEDURE — 96372 THER/PROPH/DIAG INJ SC/IM: CPT

## 2023-08-16 PROCEDURE — 85025 COMPLETE CBC W/AUTO DIFF WBC: CPT

## 2023-08-16 PROCEDURE — 84100 ASSAY OF PHOSPHORUS: CPT

## 2023-08-16 PROCEDURE — 25000003 PHARM REV CODE 250

## 2023-08-16 PROCEDURE — 83735 ASSAY OF MAGNESIUM: CPT

## 2023-08-16 PROCEDURE — 96361 HYDRATE IV INFUSION ADD-ON: CPT

## 2023-08-16 PROCEDURE — G0378 HOSPITAL OBSERVATION PER HR: HCPCS

## 2023-08-16 PROCEDURE — 63600175 PHARM REV CODE 636 W HCPCS

## 2023-08-16 PROCEDURE — 80053 COMPREHEN METABOLIC PANEL: CPT

## 2023-08-16 RX ORDER — POTASSIUM CHLORIDE 20 MEQ/1
40 TABLET, EXTENDED RELEASE ORAL ONCE
Status: COMPLETED | OUTPATIENT
Start: 2023-08-16 | End: 2023-08-16

## 2023-08-16 RX ADMIN — POTASSIUM CHLORIDE 40 MEQ: 1500 TABLET, EXTENDED RELEASE ORAL at 07:08

## 2023-08-16 RX ADMIN — ACETAMINOPHEN 1000 MG: 500 TABLET ORAL at 12:08

## 2023-08-16 RX ADMIN — SODIUM CHLORIDE, POTASSIUM CHLORIDE, SODIUM LACTATE AND CALCIUM CHLORIDE 1000 ML: 600; 310; 30; 20 INJECTION, SOLUTION INTRAVENOUS at 08:08

## 2023-08-16 RX ADMIN — ACETAMINOPHEN 1000 MG: 500 TABLET ORAL at 02:08

## 2023-08-16 RX ADMIN — ENOXAPARIN SODIUM 40 MG: 40 INJECTION SUBCUTANEOUS at 08:08

## 2023-08-16 RX ADMIN — ASPIRIN 81 MG: 81 TABLET, COATED ORAL at 08:08

## 2023-08-16 NOTE — MEDICAL/APP STUDENT
[facility] Hospital Medicine Progress Note    Primary Team: hospitals Hospitalist Team   Attending Physician: Rosemarie Brizuela MD  Resident: Jaye   Intern: Sandy     Subjective:      Pt did well overnight, but still endorses headache from yesterday. Does endorse R sided LE pain from him to ankle to which she attributes to the fall. Otherwise denies any fever, chills, chest pain, palpitations, SOB or abdominal pain.      Objective:     Last 24 Hour Vital Signs:  BP  Min: 117/60  Max: 131/67  Temp  Av.1 °F (36.2 °C)  Min: 96.1 °F (35.6 °C)  Max: 98 °F (36.7 °C)  Pulse  Av.4  Min: 60  Max: 80  Resp  Av.2  Min: 16  Max: 36  SpO2  Av.3 %  Min: 95 %  Max: 98 %  Height  Av' 3" (160 cm)  Min: 5' 3" (160 cm)  Max: 5' 3" (160 cm)  Weight  Av.2 kg (276 lb 0.3 oz)  Min: 125.2 kg (276 lb 0.3 oz)  Max: 125.2 kg (276 lb 0.3 oz)  No intake/output data recorded.    Physical Examination:   Examination  General: Patient resting comfortably in NAD  Head: normocephalic, atraumatic  Cardiac: RRR, no murmurs appreciated, no extra heart sounds  Pulmonary/Chest: breathing comfortably on RA. CTAB, symmetric chest rise, no wheezing or crackles  GI: Soft, non distended. Minimal RUQ pain on palpation.   Extremities: no edema, clubbing, or cyanosis  Skin: dry, warm, intact. No bruising or rashes.  Neuro: Alert and oriented, moving all extremities    Laboratory:  Laboratory Data   Recent Labs   Lab 08/15/23  0749 08/15/23  1304 23  0441 23  0442   WBC 6.10  --   --  6.24   HGB 10.6*  --   --  10.3*   HCT 32.2*  --   --  32.3*     --   --  176   MCV 84  --   --  84     --  137  --    K 3.8  --  3.6  --      --  106  --    CO2 21*  --  21*  --    BUN 21*  --  20  --    CREATININE 1.6*  --  1.4  --    *  --  193*  --    CALCIUM 9.0  --  8.8  --    PROT 8.5*  --  7.8  --    ALBUMIN 3.5  --  3.3*  --    PHOS  --   --  3.9  --    MG  --  1.5* 2.0  --    AST 62*  --  57*  --    ALT 41  --  38  " "--    ALKPHOS 95  --  78  --    TROPONINI <0.006 <0.006  --   --    BNP <10  --   --   --      Invalid input(s): "POCTGLU"  Recent Labs   Lab 08/15/23  1304   HGBA1C 9.1*         Other Results:  EKG : Unusual P axis, possible ectopic atrial rhythm   Right superior axis deviation   Possible Anterior infarct (cited on or before 04-MAY-2022)   T wave abnormality, consider inferior ischemia   Abnormal ECG   When compared with ECG of 30-DEC-2022 16:32,   Ectopic atrial rhythm has replaced Sinus rhythm   Vent. rate has decreased BY  35 BPM   The axis Shifted left   T wave inversion now evident in Lateral leads     Radiology Data  CTA Head and Neck (xpd)   Final Result      CT Head Without Contrast   Final Result          Current Medications:     Infusions:       Scheduled:   aspirin  81 mg Oral Daily    atorvastatin  20 mg Oral QHS    enoxparin  40 mg Subcutaneous Q12H    lactated ringers  1,000 mL Intravenous Once    potassium chloride  40 mEq Oral Once        PRN:  acetaminophen, dextrose 10%, dextrose 10%, glucagon (human recombinant), glucose, glucose, insulin aspart U-100, naloxone, sodium chloride 0.9%      Assessment:     Barrera Siddiqui is a 58 y.o.female  with PMHx of TIA x3 ( no hx of stroke), HTN, HLD, T2DM (not on insulin) depression/ anxiety, vit D deficiency presenting for 1 episode of syncope with post syncopal confusion this morning. Pt without any prodromal symptoms. Pt stable on initial exam without focal deficits but + orthostatics (symptomatic). Pt admitted to [facility] for further workup of syncope.        Plan:     Syncope   Hx of TIA x3   - syncopal episode this morning from standing position   - denies prodromal symptoms, + post syncopal confusion for 15 minutes.   - most recent TIA ~ 1 year ago, no hx of stroke  - pt with risk factors, obesity, HTN, HLD, T2DM  - orthostatics taken with no drop in BP but pt with 30 sec of dizziness on standing.   - EKG possible ectopic atrial rhythm, R superior axis " deviation, T wave abnormality, possible anterior infarct and inferior ischemia   - negative troponins, CPK, and BNP  - UDS negative  -TSH 2.86 wnl  - CTA head and neck no evidence of acute abnormality or large vessel disease  - Echo: mildly dilated LA.  Mild posterior mitral annular calcification  Plan  - IV LR   - plan for cardiac monitor on discharge      LAUREN  - Cr on admission 1.6, baseline 1  - hold home losartan and maxzide   - UA  with trace blood and glucose   - urine electrolytes pending   - IV LR      Diastolic Heart Failure  - Echo with diastolic dysfunction, impaired LV relaxation, EF 60-65% 10/30/18,   - repeat echo with mildly dilated LA.  Mild posterior mitral annular calcification  - f/u outpatient      Hypomagnesemia  - mag 1.5 on admission  - replenish with mag sulfate   - mag now 2.0  - continue to monitor and replenish as needed      Hypertension  - BP on admission 188/88, repeat 138/87   - pt did not take home medication this AM   - hold losartan and maxzide due to LAUREN  - continue to monitor     Type 2 Diabetes, uncontrolled without insulin use   -  on admission  - last too medications @ 9pm yesterday   - last A1c 10 on 12/31/22  - recheck A1c 9.1  - hold home metformin and glipizide   - insulin sliding scale while inpatient      Hyperlipidemia  - last lipid panel with elevated triglycerides 11/21/22  - repeat lipid panel consistent with elevated triglycerides   - continue home ASA 81 and lipitor 20  - f/u PCP     Depression, Anxiety   - pt ran out of fluoxetine 3 weeks ago, has not taken it since  - f/u PCP     Constipation   - pt with hx of constipation  - continue to monitor and add bowel regimen if needed      Normocytic Anemia  - Hgb 10.6, at baseline  - no evidence of current bleeding  - continue to monitor     Vitamin D Deficiency   - Vit D 17 on 7/25/2019  - f/u with PCP     Health Care Maintenance  - Ordered TSH, A1c, and lipid panel.  - Colonoscopy: not UTD  - Pap smear: not  UTD  - Mammogram: not UTD  - defer PCP     Code Status: full  DVT Prophylaxis: Lovenox BID   Diet: diabetic   Disposition: pending workup of syncope     Alexandra Iraheta  [facility] MS4  [facility] Hospitalists Team A    [facility] Hospitalist Pager numbers:   [facility] Hospitalist Medicine Team A (Geoff/Gelacio): [phone]  [facility] Hospitalist Medicine Team B (Uriah/Ame):  [phone]

## 2023-08-16 NOTE — PROGRESS NOTES
"[facility] Medicine H&P Note     Admitting Team: \[facility]\"" Hospitalist Team A  Attending Physician: Rosemarie Brizuela MD  Resident: Jaye  Intern: Sandy     Date of Admit: 8/15/2023    Subjective:      No acute overnight events. The patient states that she is doing well. No further syncopal episodes. Continues to not have any dizziness, lightheadedness, CP, palpitations, SOB. She does continue to endorse a L sided HA. It subsided for a while after being admitted and returned at approximately 2-3 AM this morning that was minimally improved with Tylenol. She normally takes Aleve for this pain     Objective:   Last 24 Hour Vital Signs:  BP  Min: 117/60  Max: 188/88  Temp  Av.3 °F (36.3 °C)  Min: 96.1 °F (35.6 °C)  Max: 98 °F (36.7 °C)  Pulse  Av.3  Min: 60  Max: 80  Resp  Av.4  Min: 16  Max: 36  SpO2  Av.8 %  Min: 95 %  Max: 100 %  Height  Av' 3" (160 cm)  Min: 5' 3" (160 cm)  Max: 5' 3" (160 cm)  Weight  Av.2 kg (276 lb 0.1 oz)  Min: 125.2 kg (276 lb)  Max: 125.2 kg (276 lb 0.3 oz)  Body mass index is 48.89 kg/m².  No intake/output data recorded.    Physical Examination:  Gen: WDWN, NAD, resting comfortably in bed, morbidly obese  HEENT: NC/AT, mildly TTP over the L frontal temporal region, EOMI grossly, normal external ears bilaterally, normal external nose, moist MM. No tongue or cheek laceration  Neck: Supple  CV: Regular rate, regular rhythm, no murmurs, rubs, or gallops. 2+ radial pulses bilaterally  Resp: CTAB. No wheezes, rales, rhonchi. Normal work of breathing. Normal effort. Equal chest rise. No respiratory distress  Abd: Soft, nontender, nondistended, no rebound/guarding  MSK/Ext: No clubbing, cyanosis, or edema. Moves all four extremities spontaneously. TTP over the medial aspect of the R knee, pt notes that she had prior knee surgery over the area and has a history of knee pain there  Neuro: GCS 15, alert, speech is normal, face symmetric, CN II-XII intact. 5/5 BUE and BLE " strength. No focal motor deficits elicited on exam  Skin: Warm, dry, no rash  Psych: Normal mood and affect       Laboratory:  Most Recent Data:  CBC:   Lab Results   Component Value Date    WBC 6.24 08/16/2023    HGB 10.3 (L) 08/16/2023    HCT 32.3 (L) 08/16/2023     08/16/2023    MCV 84 08/16/2023    RDW 13.9 08/16/2023     BMP:   Lab Results   Component Value Date     08/16/2023    K 3.6 08/16/2023     08/16/2023    CO2 21 (L) 08/16/2023    BUN 20 08/16/2023    CREATININE 1.4 08/16/2023     (H) 08/16/2023    CALCIUM 8.8 08/16/2023    MG 2.0 08/16/2023    PHOS 3.9 08/16/2023     LFTs:   Lab Results   Component Value Date    PROT 7.8 08/16/2023    ALBUMIN 3.3 (L) 08/16/2023    BILITOT 0.6 08/16/2023    AST 57 (H) 08/16/2023    ALKPHOS 78 08/16/2023    ALT 38 08/16/2023     Coags:   Lab Results   Component Value Date    INR 1.2 11/21/2020     FLP:   Lab Results   Component Value Date    CHOL 144 08/15/2023    HDL 43 08/15/2023    LDLCALC 66.4 08/15/2023    TRIG 173 (H) 08/15/2023    CHOLHDL 29.9 08/15/2023     DM:   Lab Results   Component Value Date    HGBA1C 9.1 (H) 08/15/2023    HGBA1C 10.0 (H) 12/31/2022    HGBA1C 8.7 (H) 02/09/2020    LDLCALC 66.4 08/15/2023    CREATININE 1.4 08/16/2023     Thyroid:   Lab Results   Component Value Date    TSH 2.860 08/15/2023    FREET4 0.82 11/21/2020     Anemia:   Lab Results   Component Value Date    IRON 19 (L) 12/31/2022    TIBC 286 12/31/2022    FERRITIN 123 12/31/2022    CYBBZKFN16 437 12/31/2022    FOLATE 8.9 12/31/2022     Cardiac:   Lab Results   Component Value Date    TROPONINI <0.006 08/15/2023    BNP <10 08/15/2023     Urinalysis:   Lab Results   Component Value Date    LABURIN ESCHERICHIA COLI  >100,000 cfu/ml   (A) 12/30/2022    COLORU Yellow 08/15/2023    SPECGRAV 1.010 08/15/2023    NITRITE Negative 08/15/2023    KETONESU Negative 08/15/2023    UROBILINOGEN Negative 08/15/2023    WBCUA >100 (H) 12/30/2022       Trended Lab  Data:  Recent Labs   Lab 08/15/23  0749 08/16/23  0441 08/16/23  0442   WBC 6.10  --  6.24   HGB 10.6*  --  10.3*   HCT 32.2*  --  32.3*     --  176   MCV 84  --  84   RDW 14.1  --  13.9    137  --    K 3.8 3.6  --     106  --    CO2 21* 21*  --    BUN 21* 20  --    CREATININE 1.6* 1.4  --    * 193*  --    PROT 8.5* 7.8  --    ALBUMIN 3.5 3.3*  --    BILITOT 0.4 0.6  --    AST 62* 57*  --    ALKPHOS 95 78  --    ALT 41 38  --        Trended Cardiac Data:  Recent Labs   Lab 08/15/23  0749 08/15/23  1304   TROPONINI <0.006 <0.006   BNP <10  --        Microbiology Data:  Covid neg    Other Results:  EKG (my interpretation): NSR at rate of 65 bpm with T wave inversions in lead III, unchanged from prior EKG.    TTE 8/15/23    Left Ventricle: The left ventricle is normal in size. Normal wall thickness. There is normal systolic function. There is normal diastolic function.    Left Atrium: Left atrium is mildly dilated.    Right Ventricle: Normal right ventricular cavity size.    Aortic Valve: The aortic valve is a trileaflet valve.    Mitral Valve: Mild posterior mitral annular calcification.    IVC/SVC: Normal venous pressure at 3 mmHg.    Radiology:  CT head and CTA head and neck without acute process. No evidence of acute infarct or significant vessel occlusion     Assessment:     Barrera Siddiqui is a 58 y.o. female who  has a past medical history of Depression, Diabetes mellitus, High cholesterol, Hypertension, Stroke, and TIA (transient ischemic attack). Sustained a syncopal episode while at work the morning of admission without prodrome. Admitted to [facility] medicine for further evaluation and management     Plan:     #Syncope, unknown etiology  Syncopized while walking at [employer], no prodrome, mild confusion afterwards for approximately 15 minutes. No incontinence, observed convulsions, or tongue biting. EKG in ED normal, neg troponins. Orthostatics negative but subjective LH noted  - Continuous  cardiac monitoring  - Trops negative  - CTA H/N, CTH, and TTE nonrevealing  - TSH, urine studies, CK normal  - Will likely need a 30 day event monitor on discharge and ambulatory referral to cardiology and neurology    #Acute kidney injury, improving  Cr 1.6 on admit, baseline ~1. Possible prerenal given subjective lightheadedness when evaluating orthostatics  - Check urine lytes, pending collection  - Cr improved to 1.4 s/p 1L LR  - Will give additional liter of LR  - renally dose all meds and avoid nephrotoxic agents as able    #Type II DM  A1c 7 months ago of 10.0. On metformin and glipizde BID at home  - Repeat A1c down from 10.0 to 9.1  - Hold home PO DM meds  - low dose SSI as insulin naive  - hypoglycemia protocols in place    #HTN  On losartan and Maxzide at home, BP on arrival 188/88. On admit 120/78 without receiving home meds  - Hold home medications, unable to rule out orthostatic hypotension as a cause of syncope at this time  - Systolic pressure has remained <140 throughout the admission  - Will CTM, can slowly add meds back on as needed  - Consider holding diuretic on discharge    #HLD  On lipitor 20 mg at home. Lipid panel from 2 years ago significant for elevated triglycerides  - Continue home lipitor  - Repeat lipid panel with elevated triglycerides    #Morbid obesity  BMI on admit of 48.89  - Plan to  patient on weight loss and exercise prior to discharge    #Normocytic anemia  Hgb 10.6, at baseline. Prior anemia work up with low transferrin, TIBC. Normal ferritin, B12, folate  - No evidence of current bleeding  - Will CTM with daily CBC    #HCM  - Not up to date on mammogram or pap per pt  - Defer to PCP on discharge    Fluids: 1L LR  Electrolytes: replete prn  Nutrition: Cardiac diabetic diet  Ppx: Lovenox BID for ppx given BMI  Code: Full    Dispo: Medically stable for discharge on 30 day event monitor with ambulatory referral to cardiology and neurology    Rupert Cee DO  [facility]  [facility], -II    [facility] Medicine Hospitalist Pager numbers:   [facility] Hospitalist Medicine Team A (Geoff/Gelacio): [phone]  [facility] Hospitalist Medicine Team B (Uriah/Ame):  [phone]

## 2023-08-16 NOTE — NURSING
Patient discharging home with family. All discharge instructions reviewed by VN. IV and telemetry removed, patient tolerated well.

## 2023-08-16 NOTE — PLAN OF CARE
SW met with Pt this AM to complete DCA at bedside. Pt reported 8/10 pain but agreed to answer sw questions. Pt was in a pleasant mood with positive affect. Pt stated that she lives at home with her adult son Dayday [phone] and will have him help transport the pt home at time of d/c. Per pt she does not use any HME at home/work and is fully independent in her ADL's. Pt did not have any additional questions or concerns for SW at this time. SW will request f/u appts. White board updated with CM name and contact information.  Discharge brochure provided.  Pt encouraged to call with any questions or concerns.  Cm will continue to follow pt through transitions of care and assist with any discharge needs.    Prosper Parkinson, MSW  [phone]    Future Appointments   Date Time Provider Department Center   9/13/2023  8:20 AM Samantha Villa MD [facility] [id number]        08/16/23 1046   Discharge Planning   Assessment Type Discharge Planning Brief Assessment   Resource/Environmental Concerns none   Support Systems Children   Equipment Currently Used at Home none   Current Living Arrangements home   Care Facility Name home   Patient/Family Anticipates Transition to home with family   Patient/Family Anticipated Services at Transition none   DME Needed Upon Discharge  other (see comments)  (tbd)   Discharge Plan A Home with family   Physical Activity   On average, how many days per week do you engage in moderate to strenuous exercise (like a brisk walk)? 5 days   On average, how many minutes do you engage in exercise at this level? 30 min   Financial Resource Strain   How hard is it for you to pay for the very basics like food, housing, medical care, and heating? Not hard   Housing Stability   In the last 12 months, was there a time when you were not able to pay the mortgage or rent on time? N   In the last 12 months, was there a time when you did not have a steady place to sleep or slept in a shelter (including now)? N    Transportation Needs   In the past 12 months, has lack of transportation kept you from medical appointments or from getting medications? no   In the past 12 months, has lack of transportation kept you from meetings, work, or from getting things needed for daily living? No   Food Insecurity   Within the past 12 months, you worried that your food would run out before you got the money to buy more. Never true   Within the past 12 months, the food you bought just didn't last and you didn't have money to get more. Never true   Stress   Do you feel stress - tense, restless, nervous, or anxious, or unable to sleep at night because your mind is troubled all the time - these days? Rather much   Social Connections   In a typical week, how many times do you talk on the phone with family, friends, or neighbors? More than 3   How often do you get together with friends or relatives? More than 3   How often do you attend Protestant or Yarsani services? 1 to 4   Do you belong to any clubs or organizations such as Protestant groups, unions, fraternal or athletic groups, or school groups? Yes   How often do you attend meetings of the clubs or organizations you belong to? 1 to 4   Are you , , , , never , or living with a partner? Patient refu   Alcohol Use   Q1: How often do you have a drink containing alcohol? Never   Q2: How many drinks containing alcohol do you have on a typical day when you are drinking? None   Q3: How often do you have six or more drinks on one occasion? Never

## 2023-08-16 NOTE — PLAN OF CARE
Discharge orders noted. AVS prepared with medication list, importance of medication compliance, follow up appointments, diet, home care instructions, treatment plan, self management, and when to seek medical attention. Detailed clinical reference list attached. AVS printed and handed to patient by bedside nurse. VN reviewed discharge instructions with patient using teachback method.  Allowed time for questions, all questions answered.  Patient verbalized complete understanding of discharge instructions and voices no concerns.      Discharge instructions complete.  No new medications,   Transport wheelchair not requested, pt waiting on son to arrive.   Bedside nurse notified.

## 2023-08-16 NOTE — CONSULTS
Patient with Diabetes Type 2.  A1C 9.1.  Diagnosed before [place] per patient.  States she takes Glipizide and Metformin twice a day.  She states she has a blood glucose monitor but only occasionally takes her sugar.  Patient educated on what diabetes is, s/s of hypo/hyperglycemia, carbohydrate counting diet with food suggestions, resources for counting carbs (apps for phone), and [facility] diabetic educational resource website.  She will follow up with PCP for further management of diabetes care.  All questions answered.  Thanks for the consult.

## 2023-08-16 NOTE — PLAN OF CARE
"  Problem: Adult Inpatient Plan of Care  Goal: Plan of Care Review  Outcome: Ongoing, Progressing     Problem: Adult Inpatient Plan of Care  Goal: Optimal Comfort and Wellbeing  Outcome: Ongoing, Progressing     Problem: Bariatric Environmental Safety  Goal: Safety Maintained with Care  Outcome: Ongoing, Progressing     Problem: Diabetes Comorbidity  Goal: Blood Glucose Level Within Targeted Range  Outcome: Ongoing, Progressing     .Plan of care reviewed with the patient. Scheduled medicines given and the patient tolerated well. Given Tylenol 1000 mg for headache 6/10 and reported to MD. Fall and safety precautions taken and the standard interventions are in place. On Telemetry monitoring with NSR, no true "red alarms' noted, Patient's Accu Check was 176 mg/dl. Advised the patient to call for the assistance. Continued monitoring the patient.   "

## 2023-08-16 NOTE — DISCHARGE SUMMARY
[facility] Hospital Medicine Discharge Summary    Primary Team: [facility] Hospitalist Team A  Attending Physician: Dr. Brizuela  Resident: Jaye  Intern: Sandy    Date of Admit: 8/15/2023  Date of Discharge: 8/16/2023    Discharge to: Home  Condition: Stable    Discharge Diagnoses     Syncope of unknown etiology  LAUREN  T2DM  HTN  Morbid obesity  Normocytic anemia    Consultants and Procedures     Consultants:  None    Procedures:   None    Imaging:  CTH  CTA Head and Neck  TTE  MRI brain    Brief History of Present Illness      Barrera Siddiqui is a 58 y.o. female who  has a past medical history of Depression, Diabetes mellitus, High cholesterol, Hypertension, Stroke, and TIA (transient ischemic attack). The patient presented to [facility] on 8/15/2023 with a primary complaint of passing out.    Patient was in their usual state of health until day of admission when patient passed out while walking at [facility]. Patient denied any prodrome, no incontinence, no observed convulsions or tongue biting. Patient had mild confusion afterwards for approx 15 min. In the ED, patient's EKG was unremarkable, negative troponins and patient was admitted to telemetry with continuous cardiac monitoring. CTH showed no evidence of hemorrhage, mass or acute infarct, CTA head and neck showed no acute abnormality. Echo unrevealing of cardiac etiology for syncope. Patient given LR, repleted electrolytes, continued on home meds for DM, HTN. During her stay, patient did complain of L sided HA. Obtained MRI to r/o acute processes. Otherwise patient stable for discharge w/outpatient follow up with Cardiology and Neurology and 30 day event monitor.     For the full HPI please refer to the History & Physical from this admission.    Hospital Course By Problem with Pertinent Findings     #Syncope, unknown etiology  Syncopized while walking at [employer], no prodrome, mild confusion afterwards for approximately 15 minutes. No incontinence, observed  convulsions, or tongue biting. EKG in ED normal, neg troponins. Orthostatics negative but subjective LH noted  - Continuous cardiac monitoring  - Trops negative  - CTA H/N, CTH, and TTE nonrevealing, MRI brain no acute abnormality  - TSH, urine studies, CK normal  - Discharge with 30 day event monitor and ambulatory referral to cardiology and neurology     #Acute kidney injury, improving  Cr 1.6 on admit, baseline ~1. Possible prerenal given subjective lightheadedness when evaluating orthostatics  - Check urine lytes, pending collection  - Cr improved to 1.4 s/p 1L LR  - Will give additional liter of LR  - renally dose all meds and avoid nephrotoxic agents as able  - Stable on discharge.     #Type II DM  A1c 7 months ago of 10.0. On metformin and glipizde BID at home  - Repeat A1c down from 10.0 to 9.1  - Hold home PO DM meds  - low dose SSI as insulin naive  - hypoglycemia protocols in place     #HTN  On losartan and Maxzide at home, BP on arrival 188/88. On admit 120/78 without receiving home meds  - Hold home medications, unable to rule out orthostatic hypotension as a cause of syncope at this time  - Systolic pressure has remained <140 throughout the admission  - Discontinue Maxzide, continue losartan on discharge  - Stable for discharge     #HLD  On lipitor 20 mg at home. Lipid panel from 2 years ago significant for elevated triglycerides  - Continue home lipitor  - Repeat lipid panel with elevated triglycerides  - FU with PCP     #Morbid obesity  BMI on admit of 48.89  - Counseled patient on diet and weight loss  - FU with PCP     #Normocytic anemia  Hgb 10.6, at baseline. Prior anemia work up with low transferrin, TIBC. Normal ferritin, B12, folate  - No evidence of current bleeding  - Stable at discharge  - FU with PCP     #HCM  - Not up to date on mammogram or pap per pt  - Defer to PCP on discharge    Discharge Medications        Medication List        CONTINUE taking these medications      aspirin 81 MG  EC tablet  Commonly known as: ECOTRIN  Take 1 tablet (81 mg total) by mouth once daily.     atorvastatin 20 MG tablet  Commonly known as: LIPITOR     glipiZIDE 10 MG tablet  Commonly known as: GLUCOTROL     losartan 100 MG tablet  Commonly known as: COZAAR     metFORMIN 500 MG ER 24hr tablet  Commonly known as: GLUCOPHAGE-XR     multivitamin per tablet  Commonly known as: THERAGRAN            STOP taking these medications      triamterene-hydrochlorothiazide 37.5-25 mg 37.5-25 mg per tablet  Commonly known as: MAXZIDE-25            Discharge Information:     Diet:  Cardiac/Diabetic diet    Physical Activity:  As tolerated             Instructions:  1. Take all medications as prescribed  2. Keep all follow-up appointments  3. Return to the hospital or call your primary care physicians if any worsening symptoms such as fever, chest pain, shortness of breath, return of symptoms, or any other concerns.    Follow-Up Appointments:  Referral sent to Neurology  Referral sent to Cardiology    Jenn Delgado MD  [facility] Internal Medicine HO-I  [facility] Internal Medicine Team A

## 2023-08-28 ENCOUNTER — OFFICE VISIT (OUTPATIENT)
Dept: CARDIOLOGY | Facility: CLINIC | Age: 58
End: 2023-08-28
Payer: COMMERCIAL

## 2023-08-28 VITALS
BODY MASS INDEX: 48.43 KG/M2 | HEART RATE: 72 BPM | OXYGEN SATURATION: 97 % | WEIGHT: 273.31 LBS | SYSTOLIC BLOOD PRESSURE: 150 MMHG | DIASTOLIC BLOOD PRESSURE: 88 MMHG | HEIGHT: 63 IN

## 2023-08-28 DIAGNOSIS — I50.32 CHRONIC DIASTOLIC HEART FAILURE: ICD-10-CM

## 2023-08-28 DIAGNOSIS — I10 ESSENTIAL HYPERTENSION: ICD-10-CM

## 2023-08-28 DIAGNOSIS — R55 SYNCOPE, UNSPECIFIED SYNCOPE TYPE: Primary | ICD-10-CM

## 2023-08-28 DIAGNOSIS — E11.9 TYPE 2 DIABETES MELLITUS WITHOUT COMPLICATION, WITHOUT LONG-TERM CURRENT USE OF INSULIN: ICD-10-CM

## 2023-08-28 DIAGNOSIS — E78.2 MIXED HYPERLIPIDEMIA: ICD-10-CM

## 2023-08-28 DIAGNOSIS — E66.01 MORBID OBESITY: ICD-10-CM

## 2023-08-28 PROCEDURE — 1160F PR REVIEW ALL MEDS BY PRESCRIBER/CLIN PHARMACIST DOCUMENTED: ICD-10-PCS | Mod: CPTII,S$GLB,,

## 2023-08-28 PROCEDURE — 99999 PR PBB SHADOW E&M-EST. PATIENT-LVL III: ICD-10-PCS | Mod: PBBFAC,,,

## 2023-08-28 PROCEDURE — 99204 PR OFFICE/OUTPT VISIT, NEW, LEVL IV, 45-59 MIN: ICD-10-PCS | Mod: S$GLB,,,

## 2023-08-28 PROCEDURE — 3008F PR BODY MASS INDEX (BMI) DOCUMENTED: ICD-10-PCS | Mod: CPTII,S$GLB,,

## 2023-08-28 PROCEDURE — 4010F ACE/ARB THERAPY RXD/TAKEN: CPT | Mod: CPTII,S$GLB,,

## 2023-08-28 PROCEDURE — 3077F SYST BP >= 140 MM HG: CPT | Mod: CPTII,S$GLB,,

## 2023-08-28 PROCEDURE — 3079F DIAST BP 80-89 MM HG: CPT | Mod: CPTII,S$GLB,,

## 2023-08-28 PROCEDURE — 1159F MED LIST DOCD IN RCRD: CPT | Mod: CPTII,S$GLB,,

## 2023-08-28 PROCEDURE — 99999 PR PBB SHADOW E&M-EST. PATIENT-LVL III: CPT | Mod: PBBFAC,,,

## 2023-08-28 PROCEDURE — 4010F PR ACE/ARB THEARPY RXD/TAKEN: ICD-10-PCS | Mod: CPTII,S$GLB,,

## 2023-08-28 PROCEDURE — 3046F PR MOST RECENT HEMOGLOBIN A1C LEVEL > 9.0%: ICD-10-PCS | Mod: CPTII,S$GLB,,

## 2023-08-28 PROCEDURE — 3077F PR MOST RECENT SYSTOLIC BLOOD PRESSURE >= 140 MM HG: ICD-10-PCS | Mod: CPTII,S$GLB,,

## 2023-08-28 PROCEDURE — 3046F HEMOGLOBIN A1C LEVEL >9.0%: CPT | Mod: CPTII,S$GLB,,

## 2023-08-28 PROCEDURE — 1160F RVW MEDS BY RX/DR IN RCRD: CPT | Mod: CPTII,S$GLB,,

## 2023-08-28 PROCEDURE — 3008F BODY MASS INDEX DOCD: CPT | Mod: CPTII,S$GLB,,

## 2023-08-28 PROCEDURE — 3079F PR MOST RECENT DIASTOLIC BLOOD PRESSURE 80-89 MM HG: ICD-10-PCS | Mod: CPTII,S$GLB,,

## 2023-08-28 PROCEDURE — 99204 OFFICE O/P NEW MOD 45 MIN: CPT | Mod: S$GLB,,,

## 2023-08-28 PROCEDURE — 1159F PR MEDICATION LIST DOCUMENTED IN MEDICAL RECORD: ICD-10-PCS | Mod: CPTII,S$GLB,,

## 2023-08-28 RX ORDER — AMLODIPINE BESYLATE 5 MG/1
5 TABLET ORAL DAILY
Qty: 30 TABLET | Refills: 11 | Status: SHIPPED | OUTPATIENT
Start: 2023-08-28 | End: 2023-10-09 | Stop reason: DRUGHIGH

## 2023-08-28 NOTE — ASSESSMENT & PLAN NOTE
-Goal BP < 130/80  -uncontrolled- home -150s/80-90s  -continue medical therapy: losartan 100 mg   -add  Amlodipine 5 mg    -check BP twice daily and maintain log, take AM BP 1-2 Hours after medication   -discussed lifestyle modifications- diet, exercise, weight loss

## 2023-08-28 NOTE — PROGRESS NOTES
Subjective:    Patient ID:  Barrera Siddiqui is a 58 y.o. female who presents for evaluation of Follow-up (Hospital f/u)      PCP: Evelin Guevara, NP     Referring Provider: Rosemarie Brizuela MD    HPI: Patient is a 57 yo F w/PMH of HTN, HLD, TIA, DM-2, morbid obesity,  and depression who presents for initial evaluation post admission 2/2 syncopal episode. 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 The patient denies any prodromal symptoms including dizziness, lightheadedness, SOB, CP, palpitations, changes to vision, metallic taste. She did not know she was about to collapse and reports waking up for a brief period on the floor at her job. Post syncopal confusion. She reports 3 prior TIA episodes. In 2017 notes TIA w left sided weakness residual which required therapy to regain function. LAUREN in hospital thus Maxzide was dc'd and she was started on Losartan 100 mg No additional episodes during admission and patient was discharged w Cardiology and Neurology referral .     She denies any syncopal episodes since discharge.She notes left sided headache since her syncopal episode.  She denies CP, SOB, palpitations, orthopnea, PND, pre-syncope, LOC, swelling, or claudication. She notes medication compliance without side effects. She monitor her home BP and ranges 140s-150s/80-90s. She does not exercise regularly, but is active at work and walks at lot.     Past Medical History:   Diagnosis Date    Depression     Diabetes mellitus     High cholesterol     Hypertension     Stroke     TIA (transient ischemic attack)      Past Surgical History:   Procedure Laterality Date    CHOLECYSTECTOMY      COLONOSCOPY N/A 10/18/2017    Procedure: COLONOSCOPY;  Surgeon: Donald Wright Jr., MD;  Location: [facility];  Service: Endoscopy;  Laterality:  N/A;    KNEE SURGERY      TUBAL LIGATION      WRIST SURGERY       Social History     Socioeconomic History    Marital status: Single   Tobacco Use    Smoking status: Former    Smokeless tobacco: Never   Substance and Sexual Activity    Alcohol use: Yes     Comment: rare    Drug use: No    Sexual activity: Yes     Partners: Male     Social Determinants of Health     Financial Resource Strain: Low Risk  (8/16/2023)    Overall Financial Resource Strain (CARDIA)     Difficulty of Paying Living Expenses: Not hard at all   Food Insecurity: No Food Insecurity (8/16/2023)    Hunger Vital Sign     Worried About Running Out of Food in the Last Year: Never true     Ran Out of Food in the Last Year: Never true   Transportation Needs: No Transportation Needs (8/16/2023)    PRAPARE - Transportation     Lack of Transportation (Medical): No     Lack of Transportation (Non-Medical): No   Physical Activity: Sufficiently Active (8/16/2023)    Exercise Vital Sign     Days of Exercise per Week: 5 days     Minutes of Exercise per Session: 30 min   Stress: Stress Concern Present (8/16/2023)    [employer] - Occupational Stress Questionnaire     Feeling of Stress : Rather much   Social Connections: Unknown (8/16/2023)    Social Connection and Isolation Panel [NHANES]     Frequency of Communication with Friends and Family: More than three times a week     Frequency of Social Gatherings with Friends and Family: More than three times a week     Attends Zoroastrian Services: 1 to 4 times per year     Active Member of Clubs or Organizations: Yes     Attends Club or Organization Meetings: 1 to 4 times per year     Marital Status: Patient refused   Housing Stability: Unknown (8/16/2023)    Housing Stability Vital Sign     Unable to Pay for Housing in the Last Year: No     Unstable Housing in the Last Year: No     Family History   Problem Relation Age of Onset    Breast cancer Mother     Liver disease Maternal  "Grandmother        Review of patient's allergies indicates:  No Known Allergies    Medication List with Changes/Refills   Current Medications    ASPIRIN (ECOTRIN) 81 MG EC TABLET    Take 1 tablet (81 mg total) by mouth once daily.    ATORVASTATIN (LIPITOR) 20 MG TABLET    Take 20 mg by mouth once daily.    GLIPIZIDE (GLUCOTROL) 10 MG TABLET    Take 10 mg by mouth 2 (two) times daily.    LOSARTAN (COZAAR) 100 MG TABLET    Take 100 mg by mouth once daily.    METFORMIN (GLUCOPHAGE-XR) 500 MG ER 24HR TABLET    Take 500 mg by mouth 2 (two) times daily.    MULTIVITAMIN (THERAGRAN) PER TABLET    Take 1 tablet by mouth once daily.       Review of Systems   Constitutional: Negative for diaphoresis and fever.   HENT:  Negative for congestion and hearing loss.    Eyes:  Negative for blurred vision and pain.   Cardiovascular:  Negative for chest pain, claudication, dyspnea on exertion, leg swelling, near-syncope, palpitations and syncope.   Respiratory:  Negative for shortness of breath and sleep disturbances due to breathing.    Hematologic/Lymphatic: Negative for bleeding problem. Does not bruise/bleed easily.   Skin:  Negative for color change and poor wound healing.   Gastrointestinal:  Negative for abdominal pain and nausea.   Genitourinary:  Negative for bladder incontinence and flank pain.   Neurological:  Positive for headaches. Negative for focal weakness and light-headedness.        Objective:   BP (!) 150/88 (BP Location: Right arm, Patient Position: Sitting, BP Method: X-Large (Manual))   Pulse 72   Ht 5' 3" (1.6 m)   Wt 124 kg (273 lb 4.8 oz)   LMP 07/29/2018 (Exact Date)   SpO2 97%   BMI 48.41 kg/m²    Physical Exam  Constitutional:       Appearance: She is well-developed. She is not diaphoretic.   HENT:      Head: Normocephalic and atraumatic.   Eyes:      General: No scleral icterus.     Pupils: Pupils are equal, round, and reactive to light.   Neck:      Vascular: No JVD.   Cardiovascular:      Rate and " Rhythm: Normal rate and regular rhythm.      Pulses: Intact distal pulses.           Radial pulses are 2+ on the right side and 2+ on the left side.        Dorsalis pedis pulses are 2+ on the right side and 2+ on the left side.        Posterior tibial pulses are 2+ on the right side and 2+ on the left side.      Heart sounds: S1 normal and S2 normal. No murmur heard.     No friction rub. No gallop.   Pulmonary:      Effort: Pulmonary effort is normal. No respiratory distress.      Breath sounds: Normal breath sounds. No wheezing or rales.   Chest:      Chest wall: No tenderness.   Abdominal:      General: Bowel sounds are normal. There is no distension.      Palpations: Abdomen is soft. There is no mass.      Tenderness: There is no abdominal tenderness. There is no rebound.   Musculoskeletal:         General: No tenderness. Normal range of motion.      Cervical back: Normal range of motion and neck supple.   Skin:     General: Skin is warm and dry.      Coloration: Skin is not pale.   Neurological:      Mental Status: She is alert and oriented to person, place, and time.      Coordination: Coordination normal.      Deep Tendon Reflexes: Reflexes normal.   Psychiatric:         Behavior: Behavior normal.         Judgment: Judgment normal.           EKG  8/15/23 reviewed- NSR w nonspecific T wave abnormality     Cardiac echo 8/15/23  Summary         Left Ventricle: The left ventricle is normal in size. Normal wall thickness. There is normal systolic function. There is normal diastolic function.    Left Atrium: Left atrium is mildly dilated.    Right Ventricle: Normal right ventricular cavity size.    Aortic Valve: The aortic valve is a trileaflet valve.    Mitral Valve: Mild posterior mitral annular calcification.    IVC/SVC: Normal venous pressure at 3 mmHg.    Cardiac echo 4/9/2016  CONCLUSIONS     1 - Normal left ventricular systolic function (EF 60-65%).     2 - Left ventricular diastolic dysfunction.     3 -  Normal right ventricular systolic function      Assessment:       1. Syncope, unspecified syncope type    2. Essential hypertension    3. Mixed hyperlipidemia    4. Chronic diastolic heart failure    5. Type 2 diabetes mellitus without complication, without long-term current use of insulin    6. Morbid obesity         Plan:         Syncope  -Cardiac event monitor to evaluate for arrhthymias     Essential hypertension  -Goal BP < 130/80  -continue medical therapy: losartan 100 mg   -discussed lifestyle modifications- diet, exercise, weight loss     Hyperlipidemia  -Last LDL 66.4 8/15/23  -continue statin therapy- atorvastatin 20 mg   -discussed lifestyle modifications     Diastolic heart failure  -Cardiac echo 8/15/23-  Normal systolic and diastolic function, which is improved form cardiac echo 2016 w LVEF 60-65% w diastolic dysfunction  -continue medical therapy: losartan 100 mg, ASA   -continue HTN management   -discussed lifestyle modifications     Type 2 diabetes mellitus  -Uncontrolled  -A1c 9.1 8/15/23  -managed by PCP  -continue medical therapy     Morbid obesity  -BMI 48.89  -discussed lifestyle modifications  -discussed health risk associated w obesity       Total duration of face to face visit time 30 minutes.  Total time spent counseling greater than fifty percent of total visit time.  Counseling included discussion regarding imaging findings, diagnosis, possibilities, treatment options, risks and benefits.  The patient had many questions regarding the options and long-term effects      Stanley Bañuelos Np  Cardiology

## 2023-08-28 NOTE — ASSESSMENT & PLAN NOTE
-Cardiac echo 8/15/23-  Normal systolic and diastolic function, which is improved form cardiac echo 2016 w LVEF 60-65% w diastolic dysfunction  -continue medical therapy: losartan 100 mg, ASA   -continue HTN management   -discussed lifestyle modifications

## 2023-08-28 NOTE — ASSESSMENT & PLAN NOTE
-Last LDL 66.4 8/15/23  -continue statin therapy- atorvastatin 20 mg   -discussed lifestyle modifications

## 2023-08-29 ENCOUNTER — CLINICAL SUPPORT (OUTPATIENT)
Dept: CARDIOLOGY | Facility: HOSPITAL | Age: 58
End: 2023-08-29
Payer: COMMERCIAL

## 2023-08-29 DIAGNOSIS — R55 SYNCOPE, UNSPECIFIED SYNCOPE TYPE: ICD-10-CM

## 2023-08-29 PROCEDURE — 93270 REMOTE 30 DAY ECG REV/REPORT: CPT

## 2023-08-29 PROCEDURE — 93272 ECG/REVIEW INTERPRET ONLY: CPT | Mod: ,,, | Performed by: INTERNAL MEDICINE

## 2023-08-29 PROCEDURE — 93272 CARDIAC EVENT MONITOR (CUPID ONLY): ICD-10-PCS | Mod: ,,, | Performed by: INTERNAL MEDICINE

## 2023-08-29 PROCEDURE — 93271 ECG/MONITORING AND ANALYSIS: CPT

## 2023-09-11 ENCOUNTER — HOSPITAL ENCOUNTER (EMERGENCY)
Facility: HOSPITAL | Age: 58
Discharge: HOME | End: 2023-09-11
Attending: EMERGENCY MEDICINE
Payer: COMMERCIAL

## 2023-09-11 VITALS
OXYGEN SATURATION: 97 % | HEART RATE: 104 BPM | WEIGHT: 271 LBS | RESPIRATION RATE: 20 BRPM | TEMPERATURE: 99 F | HEIGHT: 63 IN | DIASTOLIC BLOOD PRESSURE: 85 MMHG | SYSTOLIC BLOOD PRESSURE: 166 MMHG | BODY MASS INDEX: 48.02 KG/M2

## 2023-09-11 DIAGNOSIS — U07.1 COVID-19 VIRUS DETECTED: ICD-10-CM

## 2023-09-11 DIAGNOSIS — U07.1 COVID-19: Primary | ICD-10-CM

## 2023-09-11 LAB
INFLUENZA A, MOLECULAR: NEGATIVE
INFLUENZA B, MOLECULAR: NEGATIVE
SARS-COV-2 RDRP RESP QL NAA+PROBE: POSITIVE
SPECIMEN SOURCE: NORMAL

## 2023-09-11 PROCEDURE — 99283 EMERGENCY DEPT VISIT LOW MDM: CPT | Mod: ER

## 2023-09-11 PROCEDURE — 87502 INFLUENZA DNA AMP PROBE: CPT | Mod: ER | Performed by: NURSE PRACTITIONER

## 2023-09-11 PROCEDURE — U0002 COVID-19 LAB TEST NON-CDC: HCPCS | Mod: ER | Performed by: NURSE PRACTITIONER

## 2023-09-11 RX ORDER — METHOCARBAMOL 750 MG/1
750 TABLET, FILM COATED ORAL 3 TIMES DAILY PRN
Qty: 15 TABLET | Refills: 0 | Status: SHIPPED | OUTPATIENT
Start: 2023-09-11 | End: 2023-09-11 | Stop reason: SDUPTHER

## 2023-09-11 RX ORDER — METHOCARBAMOL 750 MG/1
750 TABLET, FILM COATED ORAL 3 TIMES DAILY PRN
Qty: 15 TABLET | Refills: 0 | Status: SHIPPED | OUTPATIENT
Start: 2023-09-11 | End: 2023-09-16

## 2023-09-11 NOTE — ED PROVIDER NOTES
Source of History:  Patient, chart    Chief complaint:  COVID-19 Concerns (Headache, nasal congestion, cough and body aches. ) and Constipation (Patient reports not having a bowel movement since 09/03/23. )      HPI:  Barrera Siddiqui is a 58 y.o. female with medical history of depression, diabetes, hypertension, anxiety, presenting with headache, nasal congestion, cough and body aches since yesterday.  Patient states symptoms worse this morning.  Patient states she did returned from a cruise on Saturday and felt fine at that time.  Patient states she has been taking Tylenol with no relief.    This is the extent to the patients complaints today here in the emergency department.    ROS: As per HPI and below:  Constitutional:  No fever.  Positive for body aches.  Eyes: No visual changes.  ENT:  Positive for nasal congestion.  No ear pain    Cardiovascular: No chest pain.  Respiratory:  Positive for cough.  GI: No abdominal pain.  No nausea or vomiting.  Genitourinary: No abnormal urination.  Neurologic:  Positive for headache. No focal weakness.  No numbness.  MSK: no back pain.  Integument: No rashes or lesions.  Hematologic: No easy bruising.  Endocrine: No excessive thirst or urination.    Review of patient's allergies indicates:  No Known Allergies    PMH:  As per HPI and below:  Past Medical History:   Diagnosis Date    Depression     Diabetes mellitus     High cholesterol     Hypertension     Stroke     TIA (transient ischemic attack)      Past Surgical History:   Procedure Laterality Date    CHOLECYSTECTOMY      COLONOSCOPY N/A 10/18/2017    Procedure: COLONOSCOPY;  Surgeon: Donald Wright Jr., MD;  Location: [facility];  Service: Endoscopy;  Laterality: N/A;    KNEE SURGERY      TUBAL LIGATION      WRIST SURGERY         Social History     Tobacco Use    Smoking status: Former    Smokeless tobacco: Never   Substance Use Topics    Alcohol use: Yes     Comment: rare    Drug use: No       Physical Exam:    BP (!)  "166/85 (BP Location: Right arm, Patient Position: Sitting)   Pulse 104   Temp 98.6 °F (37 °C) (Oral)   Resp 20   Ht 5' 3" (1.6 m)   Wt 122.9 kg (271 lb)   LMP 07/29/2018 (Exact Date)   SpO2 97%   BMI 48.01 kg/m²   Nursing note and vital signs reviewed.  Appearance:  No acute distress.  Comfortable.  Head: Normocephalic.  Atraumatic.    Eyes: No conjunctival injection.  No swelling.  Chest/Respiratory: Normal work of breathing.  No retractions.  No stridor.  Cardiovascular: Regular rhythm.  No appearance of shock.  No edema.  Abdomen:  No distention.    Musculoskeletal:  No deformities.  Normal range of motion.    Skin:  No rashes.  Good turgor.  Neurologic:  Normal movement and ambulation.  Mental Status:  Alert and oriented x 3.  Appropriate, conversant.     MDM    Pt is an obese 58 y.o. obese female with symptoms/ concern of COVID infection.  Patient states headache, congestion, cough and body aches started yesterday.  Vital signs are normal.  The patient is not hypoxic and has no respiratory distress.  Do not suspect sepsis or pneumonia.  Do not feel any further workup is indicated.  COVID test positive. Strict return precautions and self isolation recommendations are given to the patient.    History Acquisition   Additional history was acquired from other historians.  Chart    The patient's list of active medical problems, social history, medications, and allergies as documented per RN staff has been reviewed.     Differential Diagnoses   Based on available information and the initial assessment, the working differential diagnoses considered during this evaluation include but are not limited to COVID, influenza, sinusitis, allergic rhinitis, viral illness, others.    I will get COVID, influenza and reassess.      LABS     Labs Reviewed   SARS-COV-2 RNA AMPLIFICATION, QUAL - Abnormal; Notable for the following components:       Result Value    SARS-CoV-2 RNA, Amplification, Qual Positive (*)     All other " components within normal limits    Narrative:     Is the patient symptomatic?->Yes   INFLUENZA A & B BY MOLECULAR     Additional Consideration   All available testing was considered during the course of this workup.  Comorbidities taken into consideration during the patient's evaluation and treatment include weight, age.    Social determinants of health were taken into consideration during development of our treatment plan.    Medications - No data to display   ED Course as of 09/11/23 1801   Mon Sep 11, 2023   1748 We will check COVID and reassess.    COVID positive in the ED. Patient advised to rotate Tylenol and ibuprofen as needed for fever and body aches.  Increase fluids and rest.  Self isolate for 5 days from today.  Work note given.  Strict return to ED precautions discussed.  Patient verbalized understanding of this plan of care.  All questions and concerns addressed.    Patient is hemodynamically stable, vital signs are normal. Discharge instructions given. Return to ED precautions discussed. Follow up as directed. Pt verbalized understanding of this plan.  Pt is stable for discharge.  [RZ]      ED Course User Index  [RZ] Cora Logan NP             CLINICAL IMPRESSION  1. COVID-19         ED Disposition Condition    Discharge Stable            Instruction:  I see no indication of an emergent process beyond that addressed during our encounter but have duly counseled the patient/family regarding the need for prompt follow-up as well as the indications that should prompt immediate return to the emergency room should new or worrisome developments occur.  The patient/family has been provided with verbal and printed direction regarding our final diagnosis(es) as well as instructions regarding use of OTC and/or Rx medications intended to manage the patient's aforementioned conditions including:  ED Prescriptions       Medication Sig Dispense Start Date End Date Auth. Provider    methocarbamoL (ROBAXIN) 750  MG Tab  (Status: Discontinued) Take 1 tablet (750 mg total) by mouth 3 (three) times daily as needed (muscle strain). 15 tablet 9/11/2023 9/11/2023 Cora Logan NP    methocarbamoL (ROBAXIN) 750 MG Tab Take 1 tablet (750 mg total) by mouth 3 (three) times daily as needed (muscle strain). 15 tablet 9/11/2023 9/16/2023 Cora Logan NP          Patient has been advised of following recommended follow-up instructions:  Follow-up Information       Follow up With Specialties Details Why Contact Info    Evelin Guevara NP Family Medicine Schedule an appointment as soon as possible for a visit  As needed [address] [place] [address]  [facility] [id number] [phone]            The patient/family communicates understanding of all this information and all remaining questions and concerns were addressed at this time.      The patient's condition did not warrant review of the  and prescription of controlled substances.      This note was created using dictation software.  This program may occasionally mistype words and phrases.          Cora Logan NP  09/11/23 2567

## 2023-09-11 NOTE — DISCHARGE INSTRUCTIONS
Your COVID test in the ER today is positive.  You can rotate Tylenol and ibuprofen as needed for fever and body aches. Increase fluids and rest. Self isolate for the next 5 days.  Return to the ED if you have any increased chest pain, shortness of breath or difficulty breathing.    Our goal in the emergency department is to always give you outstanding care and exceptional service. You may receive a survey by mail or e-mail in the next week regarding your experience in our ED. We would greatly appreciate your completing and returning the survey. Your feedback provides us with a way to recognize our staff who give very good care and it helps us learn how to improve when your experience was below our aspiration of excellence.

## 2023-09-12 ENCOUNTER — TELEPHONE (OUTPATIENT)
Dept: NEUROLOGY | Facility: CLINIC | Age: 58
End: 2023-09-12
Payer: COMMERCIAL

## 2023-09-12 NOTE — TELEPHONE ENCOUNTER
Dr. Villa requested pt be moved to afternoon slot. I called pt to do this, however, noticed that pt tested positive for COVID-19 on 9/11 two days before appt. Received pt's vm and left message that appt would be cancelled due to the positive test. Left clinic number and instructed pt call back to be rescheduled.

## 2023-09-25 ENCOUNTER — OFFICE VISIT (OUTPATIENT)
Dept: NEUROLOGY | Facility: CLINIC | Age: 58
End: 2023-09-25
Payer: COMMERCIAL

## 2023-09-25 VITALS
WEIGHT: 273.94 LBS | DIASTOLIC BLOOD PRESSURE: 85 MMHG | SYSTOLIC BLOOD PRESSURE: 146 MMHG | BODY MASS INDEX: 48.54 KG/M2 | HEART RATE: 94 BPM | HEIGHT: 63 IN

## 2023-09-25 DIAGNOSIS — R55 SYNCOPE, UNSPECIFIED SYNCOPE TYPE: ICD-10-CM

## 2023-09-25 PROCEDURE — 3046F PR MOST RECENT HEMOGLOBIN A1C LEVEL > 9.0%: ICD-10-PCS | Mod: CPTII,S$GLB,, | Performed by: PSYCHIATRY & NEUROLOGY

## 2023-09-25 PROCEDURE — 3079F PR MOST RECENT DIASTOLIC BLOOD PRESSURE 80-89 MM HG: ICD-10-PCS | Mod: CPTII,S$GLB,, | Performed by: PSYCHIATRY & NEUROLOGY

## 2023-09-25 PROCEDURE — 99214 OFFICE O/P EST MOD 30 MIN: CPT | Mod: S$GLB,,, | Performed by: PSYCHIATRY & NEUROLOGY

## 2023-09-25 PROCEDURE — 3008F BODY MASS INDEX DOCD: CPT | Mod: CPTII,S$GLB,, | Performed by: PSYCHIATRY & NEUROLOGY

## 2023-09-25 PROCEDURE — 1159F MED LIST DOCD IN RCRD: CPT | Mod: CPTII,S$GLB,, | Performed by: PSYCHIATRY & NEUROLOGY

## 2023-09-25 PROCEDURE — 4010F ACE/ARB THERAPY RXD/TAKEN: CPT | Mod: CPTII,S$GLB,, | Performed by: PSYCHIATRY & NEUROLOGY

## 2023-09-25 PROCEDURE — 1159F PR MEDICATION LIST DOCUMENTED IN MEDICAL RECORD: ICD-10-PCS | Mod: CPTII,S$GLB,, | Performed by: PSYCHIATRY & NEUROLOGY

## 2023-09-25 PROCEDURE — 3077F SYST BP >= 140 MM HG: CPT | Mod: CPTII,S$GLB,, | Performed by: PSYCHIATRY & NEUROLOGY

## 2023-09-25 PROCEDURE — 99214 PR OFFICE/OUTPT VISIT, EST, LEVL IV, 30-39 MIN: ICD-10-PCS | Mod: S$GLB,,, | Performed by: PSYCHIATRY & NEUROLOGY

## 2023-09-25 PROCEDURE — 3079F DIAST BP 80-89 MM HG: CPT | Mod: CPTII,S$GLB,, | Performed by: PSYCHIATRY & NEUROLOGY

## 2023-09-25 PROCEDURE — 3046F HEMOGLOBIN A1C LEVEL >9.0%: CPT | Mod: CPTII,S$GLB,, | Performed by: PSYCHIATRY & NEUROLOGY

## 2023-09-25 PROCEDURE — 4010F PR ACE/ARB THEARPY RXD/TAKEN: ICD-10-PCS | Mod: CPTII,S$GLB,, | Performed by: PSYCHIATRY & NEUROLOGY

## 2023-09-25 PROCEDURE — 3008F PR BODY MASS INDEX (BMI) DOCUMENTED: ICD-10-PCS | Mod: CPTII,S$GLB,, | Performed by: PSYCHIATRY & NEUROLOGY

## 2023-09-25 PROCEDURE — 3077F PR MOST RECENT SYSTOLIC BLOOD PRESSURE >= 140 MM HG: ICD-10-PCS | Mod: CPTII,S$GLB,, | Performed by: PSYCHIATRY & NEUROLOGY

## 2023-09-25 PROCEDURE — 99999 PR PBB SHADOW E&M-EST. PATIENT-LVL III: CPT | Mod: PBBFAC,,, | Performed by: PSYCHIATRY & NEUROLOGY

## 2023-09-25 PROCEDURE — 99999 PR PBB SHADOW E&M-EST. PATIENT-LVL III: ICD-10-PCS | Mod: PBBFAC,,, | Performed by: PSYCHIATRY & NEUROLOGY

## 2023-09-25 RX ORDER — AMITRIPTYLINE HYDROCHLORIDE 10 MG/1
10 TABLET, FILM COATED ORAL NIGHTLY
Qty: 30 TABLET | Refills: 0 | Status: SHIPPED | OUTPATIENT
Start: 2023-09-25 | End: 2023-11-15

## 2023-09-25 NOTE — PROGRESS NOTES
"  Barrera Siddiqui is a 58 y.o. year old female that  presents with chief complain of HA since a recent syncopal episode.    HPI:  Ms Siddiqui has HTN, HLD, DM, morbid obesity, TIA, and depression.  Stated that few weeks ago she was at work standing in the  when all the sudden " passed out " and woke up in the hospital. She was admitted to the hospital for syncope evaluation and had MRI brain as well as CTA brain and neck that I personally reviewed today and showed no abnormality that could explain such episode, as well as unrevealing TTE and EKG. She is currently undergoing 30 day Holter monitoring by cardiology.  No further episode of transient loss of consciousness ever since but indicated that shortly after that episode (she thinks she probably struck her head) she has been having daily, off and on L sided dull/harp HA that is not associated with nausea, vomiting, visual changes, vertigo, double vision, focal weakness or numbness, slurred speech or language dysfunction.  Of note, has been daily tylenol and ibuprofen without HA improvement.         Past Medical History:   Diagnosis Date    Depression     Diabetes mellitus     High cholesterol     Hypertension     Stroke     TIA (transient ischemic attack)      Social History     Socioeconomic History    Marital status: Single   Tobacco Use    Smoking status: Former    Smokeless tobacco: Never   Substance and Sexual Activity    Alcohol use: Yes     Comment: rare    Drug use: No    Sexual activity: Yes     Partners: Male     Social Determinants of Health     Financial Resource Strain: Low Risk  (8/16/2023)    Overall Financial Resource Strain (CARDIA)     Difficulty of Paying Living Expenses: Not hard at all   Food Insecurity: No Food Insecurity (8/16/2023)    Hunger Vital Sign     Worried About Running Out of Food in the Last Year: Never true     Ran Out of Food in the Last Year: Never true   Transportation Needs: No Transportation Needs (8/16/2023)    PRAPARE - " Transportation     Lack of Transportation (Medical): No     Lack of Transportation (Non-Medical): No   Physical Activity: Sufficiently Active (8/16/2023)    Exercise Vital Sign     Days of Exercise per Week: 5 days     Minutes of Exercise per Session: 30 min   Stress: Stress Concern Present (8/16/2023)    [place] citizen [place] [employer] - Occupational Stress Questionnaire     Feeling of Stress : Rather much   Social Connections: Unknown (8/16/2023)    Social Connection and Isolation Panel [NHANES]     Frequency of Communication with Friends and Family: More than three times a week     Frequency of Social Gatherings with Friends and Family: More than three times a week     Attends Mandaen Services: 1 to 4 times per year     Active Member of Clubs or Organizations: Yes     Attends Club or Organization Meetings: 1 to 4 times per year     Marital Status: Patient refused   Housing Stability: Unknown (8/16/2023)    Housing Stability Vital Sign     Unable to Pay for Housing in the Last Year: No     Unstable Housing in the Last Year: No     Past Surgical History:   Procedure Laterality Date    CHOLECYSTECTOMY      COLONOSCOPY N/A 10/18/2017    Procedure: COLONOSCOPY;  Surgeon: Donald Wright Jr., MD;  Location: [facility];  Service: Endoscopy;  Laterality: N/A;    KNEE SURGERY      TUBAL LIGATION      WRIST SURGERY       Family History   Problem Relation Age of Onset    Breast cancer Mother     Liver disease Maternal Grandmother            Review of Systems  General ROS: negative for chills, fever or weight loss  Psychological ROS: negative for hallucination, positive for depression, no suicidal ideation  Ophthalmic ROS: negative for blurry vision, photophobia or eye pain  ENT ROS: negative for epistaxis, sore throat or rhinorrhea  Respiratory ROS: no cough, shortness of breath, or wheezing  Cardiovascular ROS: no chest pain or dyspnea on exertion  Gastrointestinal ROS: no abdominal pain, change in bowel  "habits, or black/ bloody stools  Genito-Urinary ROS: no dysuria, trouble voiding, or hematuria  Musculoskeletal ROS: negative for gait disturbance or muscular weakness  Neurological ROS: positive for syncope, no ataxia  Dermatological ROS: negative for pruritis, rash and jaundice      Physical Exam:  BP (!) 146/85   Pulse 94   Ht 5' 3" (1.6 m)   Wt 124.2 kg (273 lb 14.7 oz)   LMP 07/29/2018 (Exact Date)   BMI 48.52 kg/m²   General appearance: alert, cooperative, no distress  Constitutional:Oriented to person, place, and time.appears well-developed and well-nourished.   HEENT: Normocephalic, atraumatic, neck symmetrical, no nasal discharge   Eyes: conjunctivae/corneas clear, PERRL, EOM's intact  Lungs: clear to auscultation bilaterally, no dullness to percussion bilaterally  Heart: regular rate and rhythm without rub; no displacement of the PMI   Abdomen: soft, non-tender; bowel sounds normoactive; no organomegaly  Extremities: extremities symmetric; no clubbing, cyanosis, or edema  Integument: Skin color, texture, turgor normal; no rashes; hair distrubution normal  Neurologic:   Mental status: alert and awake, oriented x 4, comprehension, naming, and repetition intact. No right to left confusion. Performs serial 7's without difficulty .No dysarthria.  CN 2-12: pupils 4 mm bilaterally, reactive to light. Fundi without papilledema. Visual fields full to confrontation. EOM full without nystagmus. Face sensation normal in all distributions. Face symmetric. Hearing grossly intact. Palate elevates well. Tongue midline without atrophy or fasciculations.  Motor: 5/5 all over  Sensory: intact in all modalities.  DTR's: 2+ all over.  Plantars: no tested.  Coordination: finger to nose and heel-knee-shin intact.  Gait: no ataxia or bradykinesia     LABS:    Complete Blood Count  Lab Results   Component Value Date    RBC 3.86 (L) 08/16/2023    HGB 10.3 (L) 08/16/2023    HCT 32.3 (L) 08/16/2023    MCV 84 08/16/2023    MCH " 26.7 (L) 08/16/2023    MCHC 31.9 (L) 08/16/2023    RDW 13.9 08/16/2023     08/16/2023    MPV 12.0 08/16/2023    GRAN 3.4 08/16/2023    GRAN 54.5 08/16/2023    LYMPH 2.1 08/16/2023    LYMPH 34.0 08/16/2023    MONO 0.5 08/16/2023    MONO 8.2 08/16/2023    EOS 0.2 08/16/2023    BASO 0.04 08/16/2023    EOSINOPHIL 2.4 08/16/2023    BASOPHIL 0.6 08/16/2023    DIFFMETHOD Automated 08/16/2023       Comprehensive Metabolic Panel  Lab Results   Component Value Date     (H) 08/16/2023    BUN 20 08/16/2023    CREATININE 1.4 08/16/2023     08/16/2023    K 3.6 08/16/2023     08/16/2023    PROT 7.8 08/16/2023    ALBUMIN 3.3 (L) 08/16/2023    BILITOT 0.6 08/16/2023    AST 57 (H) 08/16/2023    ALKPHOS 78 08/16/2023    CO2 21 (L) 08/16/2023    ALT 38 08/16/2023    ANIONGAP 10 08/16/2023    EGFRNONAA >60.0 06/18/2022    ESTGFRAFRICA >60.0 06/18/2022       TSH  Lab Results   Component Value Date    TSH 2.860 08/15/2023         Assessment: 59 y/o with HTN, HLD, DM, morbid obesity, TIA, and depression, presents for evaluation of HA after a syncopal episode in which she apparently sustained a mild head trauma.  Neuro exam and neuro-imaging unremarkable.  Possible post concussive HA, thus recommended trial of low dose amitriptyline and and avoiding excessive use of tylenol and ibuprofen.             ICD-10-CM ICD-9-CM    1. Syncope, unspecified syncope type  R55 780.2 Ambulatory referral/consult to Neurology        The encounter diagnosis was Syncope, unspecified syncope type.      Plan:  1) Postconcussive HA: as above.  HA calendar x 4 weeks.  2) HTN  3) HLD  4) DM  5) Morbid obesity  6) TIA  7) Depression            No orders of the defined types were placed in this encounter.          Alok Batista MD

## 2023-10-09 ENCOUNTER — OFFICE VISIT (OUTPATIENT)
Dept: CARDIOLOGY | Facility: CLINIC | Age: 58
End: 2023-10-09
Payer: COMMERCIAL

## 2023-10-09 VITALS
HEART RATE: 99 BPM | DIASTOLIC BLOOD PRESSURE: 80 MMHG | SYSTOLIC BLOOD PRESSURE: 128 MMHG | WEIGHT: 277.75 LBS | OXYGEN SATURATION: 96 % | BODY MASS INDEX: 49.21 KG/M2 | HEIGHT: 63 IN

## 2023-10-09 DIAGNOSIS — I50.32 CHRONIC DIASTOLIC HEART FAILURE: ICD-10-CM

## 2023-10-09 DIAGNOSIS — R55 SYNCOPE, UNSPECIFIED SYNCOPE TYPE: ICD-10-CM

## 2023-10-09 DIAGNOSIS — R51.9 NONINTRACTABLE HEADACHE, UNSPECIFIED CHRONICITY PATTERN, UNSPECIFIED HEADACHE TYPE: ICD-10-CM

## 2023-10-09 DIAGNOSIS — I10 ESSENTIAL HYPERTENSION: Primary | ICD-10-CM

## 2023-10-09 DIAGNOSIS — E66.01 MORBID OBESITY: ICD-10-CM

## 2023-10-09 DIAGNOSIS — E11.9 TYPE 2 DIABETES MELLITUS WITHOUT COMPLICATION, WITHOUT LONG-TERM CURRENT USE OF INSULIN: ICD-10-CM

## 2023-10-09 DIAGNOSIS — E78.2 MIXED HYPERLIPIDEMIA: ICD-10-CM

## 2023-10-09 PROCEDURE — 3079F PR MOST RECENT DIASTOLIC BLOOD PRESSURE 80-89 MM HG: ICD-10-PCS | Mod: CPTII,S$GLB,,

## 2023-10-09 PROCEDURE — 99999 PR PBB SHADOW E&M-EST. PATIENT-LVL III: CPT | Mod: PBBFAC,,,

## 2023-10-09 PROCEDURE — 99214 OFFICE O/P EST MOD 30 MIN: CPT | Mod: S$GLB,,,

## 2023-10-09 PROCEDURE — 3046F PR MOST RECENT HEMOGLOBIN A1C LEVEL > 9.0%: ICD-10-PCS | Mod: CPTII,S$GLB,,

## 2023-10-09 PROCEDURE — 1159F MED LIST DOCD IN RCRD: CPT | Mod: CPTII,S$GLB,,

## 2023-10-09 PROCEDURE — 3046F HEMOGLOBIN A1C LEVEL >9.0%: CPT | Mod: CPTII,S$GLB,,

## 2023-10-09 PROCEDURE — 3079F DIAST BP 80-89 MM HG: CPT | Mod: CPTII,S$GLB,,

## 2023-10-09 PROCEDURE — 1160F PR REVIEW ALL MEDS BY PRESCRIBER/CLIN PHARMACIST DOCUMENTED: ICD-10-PCS | Mod: CPTII,S$GLB,,

## 2023-10-09 PROCEDURE — 3074F PR MOST RECENT SYSTOLIC BLOOD PRESSURE < 130 MM HG: ICD-10-PCS | Mod: CPTII,S$GLB,,

## 2023-10-09 PROCEDURE — 3074F SYST BP LT 130 MM HG: CPT | Mod: CPTII,S$GLB,,

## 2023-10-09 PROCEDURE — 99214 PR OFFICE/OUTPT VISIT, EST, LEVL IV, 30-39 MIN: ICD-10-PCS | Mod: S$GLB,,,

## 2023-10-09 PROCEDURE — 4010F ACE/ARB THERAPY RXD/TAKEN: CPT | Mod: CPTII,S$GLB,,

## 2023-10-09 PROCEDURE — 1160F RVW MEDS BY RX/DR IN RCRD: CPT | Mod: CPTII,S$GLB,,

## 2023-10-09 PROCEDURE — 1159F PR MEDICATION LIST DOCUMENTED IN MEDICAL RECORD: ICD-10-PCS | Mod: CPTII,S$GLB,,

## 2023-10-09 PROCEDURE — 3008F PR BODY MASS INDEX (BMI) DOCUMENTED: ICD-10-PCS | Mod: CPTII,S$GLB,,

## 2023-10-09 PROCEDURE — 99999 PR PBB SHADOW E&M-EST. PATIENT-LVL III: ICD-10-PCS | Mod: PBBFAC,,,

## 2023-10-09 PROCEDURE — 3008F BODY MASS INDEX DOCD: CPT | Mod: CPTII,S$GLB,,

## 2023-10-09 PROCEDURE — 4010F PR ACE/ARB THEARPY RXD/TAKEN: ICD-10-PCS | Mod: CPTII,S$GLB,,

## 2023-10-09 RX ORDER — LOSARTAN POTASSIUM 100 MG/1
100 TABLET ORAL DAILY
Qty: 90 TABLET | Refills: 3 | Status: SHIPPED | OUTPATIENT
Start: 2023-10-09

## 2023-10-09 RX ORDER — SEMAGLUTIDE 0.68 MG/ML
INJECTION, SOLUTION SUBCUTANEOUS
COMMUNITY
Start: 2023-09-20

## 2023-10-09 RX ORDER — AMLODIPINE BESYLATE 10 MG/1
10 TABLET ORAL DAILY
Qty: 90 TABLET | Refills: 3 | Status: SHIPPED | OUTPATIENT
Start: 2023-10-09 | End: 2024-10-08

## 2023-10-09 NOTE — PROGRESS NOTES
Subjective:    Patient ID:  Barrera Siddiqui is a 58 y.o. female who presents for evaluation of No chief complaint on file.      PCP: Evelin Guevara, NP     Referring Provider: Rosemarie Brizuela MD    HPI: Patient is a 57 yo F w/PMH of HTN, HLD, TIA, DM-2, morbid obesity,  and depression [facility]. She was last seen on 8/28/23  for initial evaluation post admission 2/2 syncopal episode. She reported to ED on 8/15/23 post syncopal episode at work. Admitted 8/15/23-8/16/23 negative MRI  and CTA Head and neck. She denies any syncopal episodes since discharge. She notes left sided headache since her syncopal episode.  She is also followed by Neurology, seen on 9/25/23  and was started on low dose amitriptyline for possible post concussive HA. She denies CP, SOB, palpitations, orthopnea, PND, pre-syncope, LOC, swelling, or claudication. She notes medication compliance without side effects. She monitor her home BP and ranges 130s-150s/80-90s. She does not exercise regularly, but is active at work and walks at lot.     Past Medical History:   Diagnosis Date    Depression     Diabetes mellitus     High cholesterol     Hypertension     Stroke     TIA (transient ischemic attack)      Past Surgical History:   Procedure Laterality Date    CHOLECYSTECTOMY      COLONOSCOPY N/A 10/18/2017    Procedure: COLONOSCOPY;  Surgeon: Donald Wright Jr., MD;  Location: [facility];  Service: Endoscopy;  Laterality: N/A;    KNEE SURGERY      TUBAL LIGATION      WRIST SURGERY       Social History     Socioeconomic History    Marital status: Single   Tobacco Use    Smoking status: Former    Smokeless tobacco: Never   Substance and Sexual Activity    Alcohol use: Yes     Comment: rare    Drug use: No    Sexual activity: Yes     Partners: Male     Social Determinants of Health     Financial Resource Strain: Low Risk  (8/16/2023)    Overall Financial Resource Strain (CARDIA)     Difficulty of Paying Living Expenses: Not hard  at all   Food Insecurity: No Food Insecurity (8/16/2023)    Hunger Vital Sign     Worried About Running Out of Food in the Last Year: Never true     Ran Out of Food in the Last Year: Never true   Transportation Needs: No Transportation Needs (8/16/2023)    PRAPARE - Transportation     Lack of Transportation (Medical): No     Lack of Transportation (Non-Medical): No   Physical Activity: Sufficiently Active (8/16/2023)    Exercise Vital Sign     Days of Exercise per Week: 5 days     Minutes of Exercise per Session: 30 min   Stress: Stress Concern Present (8/16/2023)    [employer] - Occupational Stress Questionnaire     Feeling of Stress : Rather much   Social Connections: Unknown (8/16/2023)    Social Connection and Isolation Panel [NHANES]     Frequency of Communication with Friends and Family: More than three times a week     Frequency of Social Gatherings with Friends and Family: More than three times a week     Attends Baptism Services: 1 to 4 times per year     Active Member of Clubs or Organizations: Yes     Attends Club or Organization Meetings: 1 to 4 times per year     Marital Status: Patient refused   Housing Stability: Unknown (8/16/2023)    Housing Stability Vital Sign     Unable to Pay for Housing in the Last Year: No     Unstable Housing in the Last Year: No     Family History   Problem Relation Age of Onset    Breast cancer Mother     Liver disease Maternal Grandmother        Review of patient's allergies indicates:  No Known Allergies    Medication List with Changes/Refills   New Medications    AMLODIPINE (NORVASC) 10 MG TABLET    Take 1 tablet (10 mg total) by mouth once daily.   Current Medications    AMITRIPTYLINE (ELAVIL) 10 MG TABLET    Take 1 tablet (10 mg total) by mouth every evening.    ASPIRIN (ECOTRIN) 81 MG EC TABLET    Take 1 tablet (81 mg total) by mouth once daily.    ATORVASTATIN (LIPITOR) 20 MG TABLET    Take 20 mg by mouth once daily.    GLIPIZIDE  "(GLUCOTROL) 10 MG TABLET    Take 10 mg by mouth 2 (two) times daily.    METFORMIN (GLUCOPHAGE-XR) 500 MG ER 24HR TABLET    Take 500 mg by mouth 2 (two) times daily.    MULTIVITAMIN (THERAGRAN) PER TABLET    Take 1 tablet by mouth once daily.    OZEMPIC 0.25 MG OR 0.5 MG (2 MG/3 ML) PEN INJECTOR    Inject into the skin.   Changed and/or Refilled Medications    Modified Medication Previous Medication    LOSARTAN (COZAAR) 100 MG TABLET losartan (COZAAR) 100 MG tablet       Take 1 tablet (100 mg total) by mouth once daily.    Take 100 mg by mouth once daily.   Discontinued Medications    AMLODIPINE (NORVASC) 5 MG TABLET    Take 1 tablet (5 mg total) by mouth once daily.       Review of Systems   Constitutional: Negative for diaphoresis and fever.   HENT:  Negative for congestion and hearing loss.    Eyes:  Negative for blurred vision and pain.   Cardiovascular:  Negative for chest pain, claudication, dyspnea on exertion, leg swelling, near-syncope, palpitations and syncope.   Respiratory:  Negative for shortness of breath and sleep disturbances due to breathing.    Hematologic/Lymphatic: Negative for bleeding problem. Does not bruise/bleed easily.   Skin:  Negative for color change and poor wound healing.   Gastrointestinal:  Negative for abdominal pain and nausea.   Genitourinary:  Negative for bladder incontinence and flank pain.   Neurological:  Positive for headaches. Negative for focal weakness and light-headedness.        Objective:   /80 (BP Location: Left arm, Patient Position: Sitting, BP Method: Large (Manual))   Pulse 99   Ht 5' 3" (1.6 m)   Wt 126 kg (277 lb 12.1 oz)   LMP 07/29/2018 (Exact Date)   SpO2 96%   BMI 49.20 kg/m²    Physical Exam  Constitutional:       Appearance: She is well-developed. She is not diaphoretic.   HENT:      Head: Normocephalic and atraumatic.   Eyes:      General: No scleral icterus.     Pupils: Pupils are equal, round, and reactive to light.   Neck:      Vascular: No " JVD.   Cardiovascular:      Rate and Rhythm: Normal rate and regular rhythm.      Pulses: Intact distal pulses.           Radial pulses are 2+ on the right side and 2+ on the left side.        Dorsalis pedis pulses are 2+ on the right side and 2+ on the left side.        Posterior tibial pulses are 2+ on the right side and 2+ on the left side.      Heart sounds: S1 normal and S2 normal. No murmur heard.     No friction rub. No gallop.   Pulmonary:      Effort: Pulmonary effort is normal. No respiratory distress.      Breath sounds: Normal breath sounds. No wheezing or rales.   Chest:      Chest wall: No tenderness.   Abdominal:      General: Bowel sounds are normal. There is no distension.      Palpations: Abdomen is soft. There is no mass.      Tenderness: There is no abdominal tenderness. There is no rebound.   Musculoskeletal:         General: No tenderness. Normal range of motion.      Cervical back: Normal range of motion and neck supple.   Skin:     General: Skin is warm and dry.      Coloration: Skin is not pale.   Neurological:      Mental Status: She is alert and oriented to person, place, and time.      Coordination: Coordination normal.      Deep Tendon Reflexes: Reflexes normal.   Psychiatric:         Behavior: Behavior normal.         Judgment: Judgment normal.           EKG  8/15/23 reviewed- NSR w nonspecific T wave abnormality     Cardiac echo 8/15/23  Summary         Left Ventricle: The left ventricle is normal in size. Normal wall thickness. There is normal systolic function. There is normal diastolic function.    Left Atrium: Left atrium is mildly dilated.    Right Ventricle: Normal right ventricular cavity size.    Aortic Valve: The aortic valve is a trileaflet valve.    Mitral Valve: Mild posterior mitral annular calcification.    IVC/SVC: Normal venous pressure at 3 mmHg.    MRI Brain 8/16/23  Impression:     No acute abnormality.     T2 hyperintensities noted at the gray-white junction  bilaterally similar and number and distribution to the 2020 exam.  Findings may represent sequelae microvascular ischemic change.       CTA head and neck 8/15/23    Impression:     No acute abnormality. No high-grade stenosis or major vessel occlusion  Cardiac echo 4/9/2016  CONCLUSIONS     1 - Normal left ventricular systolic function (EF 60-65%).     2 - Left ventricular diastolic dysfunction.     3 - Normal right ventricular systolic function      Assessment:       1. Essential hypertension    2. Syncope, unspecified syncope type    3. Mixed hyperlipidemia    4. Chronic diastolic heart failure    5. Type 2 diabetes mellitus without complication, without long-term current use of insulin    6. Morbid obesity    7. Nonintractable headache, unspecified chronicity pattern, unspecified headache type           Plan:         Syncope  -Cardiac event monitor completed, results pending      Essential hypertension  -Goal BP < 130/80   -uncontrolled but slightly  improved- home -150s/80-90s  -continue medical therapy: losartan 100 mg   -increase   Amlodipine 10 mg    -check BP twice daily and maintain log, take AM BP 1-2 Hours after medication   -discussed lifestyle modifications- diet, exercise, weight loss     Hyperlipidemia  -Last LDL 66.4 8/15/23  -continue statin therapy- atorvastatin 20 mg   -discussed lifestyle modifications     Diastolic heart failure  -Cardiac echo 8/15/23-  Normal systolic and diastolic function, which is improved form cardiac echo 2016 w LVEF 60-65% w diastolic dysfunction  -continue medical therapy: losartan 100 mg, ASA   -continue HTN management   -discussed lifestyle modifications     Type 2 diabetes mellitus  -Uncontrolled  -A1c 9.1 8/15/23  -managed by PCP  -continue medical therapy     Morbid obesity  -BMI 48.89  -discussed lifestyle modifications  -discussed health risk associated w obesity     Headache  -continues to report headache  -started on  low dose amitriptyline for possible  post concussive HA, by Neurology         Total duration of face to face visit time 30 minutes.  Total time spent counseling greater than fifty percent of total visit time.  Counseling included discussion regarding imaging findings, diagnosis, possibilities, treatment options, risks and benefits.  The patient had many questions regarding the options and long-term effects      Stanley Bañuelos Np  Cardiology

## 2023-10-09 NOTE — ASSESSMENT & PLAN NOTE
-continues to report headache  -started on  low dose amitriptyline for possible post concussive HA, by Neurology

## 2023-10-09 NOTE — ASSESSMENT & PLAN NOTE
-Goal BP < 130/80   -uncontrolled but slightly  improved- home -150s/80-90s  -continue medical therapy: losartan 100 mg   -increase   Amlodipine 10 mg    -check BP twice daily and maintain log, take AM BP 1-2 Hours after medication   -discussed lifestyle modifications- diet, exercise, weight loss

## 2023-10-10 ENCOUNTER — TELEPHONE (OUTPATIENT)
Dept: CARDIOLOGY | Facility: CLINIC | Age: 58
End: 2023-10-10
Payer: COMMERCIAL

## 2023-10-10 NOTE — TELEPHONE ENCOUNTER
----- Message from Stanley Bañuelos NP sent at 10/9/2023  3:35 PM CDT -----  Ms. Artur your event monitor results were normal. No additional testing is required at this time.    Thank you,  Stanley Bañuelos NP

## 2023-10-28 NOTE — OR NURSING
Family updated as to patient's status.  
Patient is c/o Lt upper premolar/molar toothache one month. Patient stated that, one month ago, she started having Lt upper premolar/molar toothache, which continued, patient had been to ED and was treated with abx. Patient symptoms continued, and 3 days ago got worse with swelling of the LT gum area/swelling of the Lt side of the face, which got worse, so had come to ED for evaluation. Patient denies f/c/n/v/cp/sob. Denies HA/neck pain. Denies any other associated symptoms. No rash. No drooling, no trouble swallowing. No trouble speaking.

## 2023-11-15 RX ORDER — AMITRIPTYLINE HYDROCHLORIDE 10 MG/1
10 TABLET, FILM COATED ORAL NIGHTLY
Qty: 30 TABLET | Refills: 0 | Status: SHIPPED | OUTPATIENT
Start: 2023-11-15 | End: 2023-12-20 | Stop reason: ALTCHOICE

## 2023-11-16 ENCOUNTER — TELEPHONE (OUTPATIENT)
Dept: NEUROLOGY | Facility: CLINIC | Age: 58
End: 2023-11-16

## 2023-11-16 NOTE — TELEPHONE ENCOUNTER
Called and LVM with clinic number. Informed appointment on 11/21 with Dr. Batista is canceled due to provider being out. Informed patient we can do virtual appointments on 11/17 or reschedule to an in person visit.

## 2023-11-20 ENCOUNTER — OFFICE VISIT (OUTPATIENT)
Dept: CARDIOLOGY | Facility: CLINIC | Age: 58
End: 2023-11-20
Payer: COMMERCIAL

## 2023-11-20 VITALS
HEIGHT: 63 IN | BODY MASS INDEX: 49.61 KG/M2 | OXYGEN SATURATION: 98 % | HEART RATE: 95 BPM | SYSTOLIC BLOOD PRESSURE: 132 MMHG | WEIGHT: 280 LBS | DIASTOLIC BLOOD PRESSURE: 82 MMHG

## 2023-11-20 DIAGNOSIS — R51.9 NONINTRACTABLE HEADACHE, UNSPECIFIED CHRONICITY PATTERN, UNSPECIFIED HEADACHE TYPE: ICD-10-CM

## 2023-11-20 DIAGNOSIS — E11.9 TYPE 2 DIABETES MELLITUS WITHOUT COMPLICATION, WITHOUT LONG-TERM CURRENT USE OF INSULIN: ICD-10-CM

## 2023-11-20 DIAGNOSIS — I50.32 CHRONIC DIASTOLIC HEART FAILURE: Primary | ICD-10-CM

## 2023-11-20 DIAGNOSIS — E78.2 MIXED HYPERLIPIDEMIA: ICD-10-CM

## 2023-11-20 DIAGNOSIS — E66.01 MORBID OBESITY: ICD-10-CM

## 2023-11-20 DIAGNOSIS — R55 SYNCOPE, UNSPECIFIED SYNCOPE TYPE: ICD-10-CM

## 2023-11-20 DIAGNOSIS — I10 ESSENTIAL HYPERTENSION: ICD-10-CM

## 2023-11-20 PROBLEM — N17.9 AKI (ACUTE KIDNEY INJURY): Status: RESOLVED | Noted: 2022-12-30 | Resolved: 2023-11-20

## 2023-11-20 PROCEDURE — 4010F ACE/ARB THERAPY RXD/TAKEN: CPT | Mod: CPTII,S$GLB,,

## 2023-11-20 PROCEDURE — 3046F HEMOGLOBIN A1C LEVEL >9.0%: CPT | Mod: CPTII,S$GLB,,

## 2023-11-20 PROCEDURE — 3008F PR BODY MASS INDEX (BMI) DOCUMENTED: ICD-10-PCS | Mod: CPTII,S$GLB,,

## 2023-11-20 PROCEDURE — 99999 PR PBB SHADOW E&M-EST. PATIENT-LVL III: ICD-10-PCS | Mod: PBBFAC,,,

## 2023-11-20 PROCEDURE — 3008F BODY MASS INDEX DOCD: CPT | Mod: CPTII,S$GLB,,

## 2023-11-20 PROCEDURE — 99214 PR OFFICE/OUTPT VISIT, EST, LEVL IV, 30-39 MIN: ICD-10-PCS | Mod: S$GLB,,,

## 2023-11-20 PROCEDURE — 3075F PR MOST RECENT SYSTOLIC BLOOD PRESS GE 130-139MM HG: ICD-10-PCS | Mod: CPTII,S$GLB,,

## 2023-11-20 PROCEDURE — 3079F DIAST BP 80-89 MM HG: CPT | Mod: CPTII,S$GLB,,

## 2023-11-20 PROCEDURE — 1159F PR MEDICATION LIST DOCUMENTED IN MEDICAL RECORD: ICD-10-PCS | Mod: CPTII,S$GLB,,

## 2023-11-20 PROCEDURE — 99214 OFFICE O/P EST MOD 30 MIN: CPT | Mod: S$GLB,,,

## 2023-11-20 PROCEDURE — 3075F SYST BP GE 130 - 139MM HG: CPT | Mod: CPTII,S$GLB,,

## 2023-11-20 PROCEDURE — 99999 PR PBB SHADOW E&M-EST. PATIENT-LVL III: CPT | Mod: PBBFAC,,,

## 2023-11-20 PROCEDURE — 1160F PR REVIEW ALL MEDS BY PRESCRIBER/CLIN PHARMACIST DOCUMENTED: ICD-10-PCS | Mod: CPTII,S$GLB,,

## 2023-11-20 PROCEDURE — 1160F RVW MEDS BY RX/DR IN RCRD: CPT | Mod: CPTII,S$GLB,,

## 2023-11-20 PROCEDURE — 3046F PR MOST RECENT HEMOGLOBIN A1C LEVEL > 9.0%: ICD-10-PCS | Mod: CPTII,S$GLB,,

## 2023-11-20 PROCEDURE — 1159F MED LIST DOCD IN RCRD: CPT | Mod: CPTII,S$GLB,,

## 2023-11-20 PROCEDURE — 4010F PR ACE/ARB THEARPY RXD/TAKEN: ICD-10-PCS | Mod: CPTII,S$GLB,,

## 2023-11-20 PROCEDURE — 3079F PR MOST RECENT DIASTOLIC BLOOD PRESSURE 80-89 MM HG: ICD-10-PCS | Mod: CPTII,S$GLB,,

## 2023-11-20 RX ORDER — FUROSEMIDE 20 MG/1
20 TABLET ORAL 2 TIMES DAILY
Qty: 60 TABLET | Refills: 11 | Status: SHIPPED | OUTPATIENT
Start: 2023-11-20 | End: 2023-12-20 | Stop reason: DRUGHIGH

## 2023-11-20 RX ORDER — POTASSIUM CHLORIDE 750 MG/1
10 TABLET, EXTENDED RELEASE ORAL 2 TIMES DAILY
Qty: 14 TABLET | Refills: 0 | Status: SHIPPED | OUTPATIENT
Start: 2023-11-20 | End: 2023-12-20 | Stop reason: ALTCHOICE

## 2023-11-20 NOTE — ASSESSMENT & PLAN NOTE
-Uncontrolled  -A1c 9.1 8/15/23  -managed by PCP  -continue medical therapy- glipizide and ozempic

## 2023-11-20 NOTE — ASSESSMENT & PLAN NOTE
-Goal BP < 130/80   -uncontrolled but slightly  improved- home -140s/80-90s  -continue medical therapy: losartan 100 mg, Amlodipine 10 mg    -furosemide 20 mg BID added for HF management   -check BP twice daily and maintain log, take AM BP 1-2 Hours after medication   -discussed lifestyle modifications- diet, exercise, weight loss

## 2023-11-20 NOTE — ASSESSMENT & PLAN NOTE
-continues to report headache  -started on  low dose amitriptyline for possible post concussive HA, by Neurology  -she also report feeling of off balance, instructed to contact Neurology

## 2023-11-20 NOTE — ASSESSMENT & PLAN NOTE
-Cardiac echo 8/15/23-  Normal systolic and diastolic function, which is improved form cardiac echo 2016 w LVEF 60-65% w diastolic dysfunction  -continue medical therapy: losartan 100 mg, ASA   -continue HTN management   -discussed lifestyle modifications   -repeat cardiac echo to evaluate heart function 2/2 increased SOB   -add furosemide 20 mg BID X 7 days w KCL 10 meq ( last K+ 3.6) -2/2 SOB, orthopnea, PND   -weight self daily, notify office if > 3 pound weight gain in 1 day or 5 pounds in 1 week   -call office on Monday with update

## 2023-11-20 NOTE — PROGRESS NOTES
Subjective:    Patient ID:  Barrera Siddiqui is a 58 y.o. female who presents for evaluation of Shortness of Breath and Hypertension      PCP: Evelin Guevara, NP     Referring Provider: Rosemarie Brizuela MD    HPI: Patient is a 59 yo F w/PMH of HTN, HLD, TIA, DM-2, morbid obesity,  and depression [facility]. She was last seen on 8/28/23  for initial evaluation post admission 2/2 syncopal episode. She reported to ED on 8/15/23 post syncopal episode at work. Admitted 8/15/23-8/16/23 negative MRI  and CTA Head and neck. She denies any syncopal episodes since discharge. She notes left sided headache since her syncopal episode.  She is also followed by Neurology, seen on 9/25/23  and was started on low dose amitriptyline for possible post concussive HA. She denies CP, SOB, palpitations, orthopnea, PND, pre-syncope, LOC, swelling, or claudication. She notes medication compliance without side effects. She monitor her home BP and ranges 130s-150s/80-90s. She does not exercise regularly, but is active at work and walks at lot.     11/20/23: Patient presents today for f/u for HTN management. She was last seen on 10/9/23 for f/u appt and was continued on medical therapy and amlodipine was increased to 10 mg daily. He notes past 3-4 days increased SOB and off balance feeling. She is also followed by Neurology post fall and syncopal episode. She notes 3 pillow orthopnea, increased from 2 pillows 2 nights ago. She is also positive for PND. She also notes a 4 pound weight gain.  She denies CP, SOB, palpitations, PND, pre-syncope, LOC, swelling, or claudication. She notes medication compliance without side effects. She monitor her home BP and ranges 120s-140s/80-90s. She does not exercise regularly.      Past Medical History:   Diagnosis Date    Depression     Diabetes mellitus     High cholesterol     Hypertension     Stroke     TIA (transient ischemic attack)      Past Surgical History:   Procedure Laterality  Date    CHOLECYSTECTOMY      COLONOSCOPY N/A 10/18/2017    Procedure: COLONOSCOPY;  Surgeon: Donald Wright Jr., MD;  Location: [facility];  Service: Endoscopy;  Laterality: N/A;    KNEE SURGERY      TUBAL LIGATION      WRIST SURGERY       Social History     Socioeconomic History    Marital status: Single   Tobacco Use    Smoking status: Former    Smokeless tobacco: Never   Substance and Sexual Activity    Alcohol use: Yes     Comment: rare    Drug use: No    Sexual activity: Yes     Partners: Male     Social Determinants of Health     Financial Resource Strain: Low Risk  (8/16/2023)    Overall Financial Resource Strain (CARDIA)     Difficulty of Paying Living Expenses: Not hard at all   Food Insecurity: No Food Insecurity (8/16/2023)    Hunger Vital Sign     Worried About Running Out of Food in the Last Year: Never true     Ran Out of Food in the Last Year: Never true   Transportation Needs: No Transportation Needs (8/16/2023)    PRAPARE - Transportation     Lack of Transportation (Medical): No     Lack of Transportation (Non-Medical): No   Physical Activity: Sufficiently Active (8/16/2023)    Exercise Vital Sign     Days of Exercise per Week: 5 days     Minutes of Exercise per Session: 30 min   Stress: Stress Concern Present (8/16/2023)    [employer] - Occupational Stress Questionnaire     Feeling of Stress : Rather much   Social Connections: Unknown (8/16/2023)    Social Connection and Isolation Panel [NHANES]     Frequency of Communication with Friends and Family: More than three times a week     Frequency of Social Gatherings with Friends and Family: More than three times a week     Attends Buddhist Services: 1 to 4 times per year     Active Member of Clubs or Organizations: Yes     Attends Club or Organization Meetings: 1 to 4 times per year     Marital Status: Patient refused   Housing Stability: Unknown (8/16/2023)    Housing Stability Vital Sign     Unable to Pay for  Housing in the Last Year: No     Unstable Housing in the Last Year: No     Family History   Problem Relation Age of Onset    Breast cancer Mother     Liver disease Maternal Grandmother        Review of patient's allergies indicates:  No Known Allergies    Medication List with Changes/Refills   New Medications    FUROSEMIDE (LASIX) 20 MG TABLET    Take 1 tablet (20 mg total) by mouth 2 (two) times daily.    POTASSIUM CHLORIDE SA (K-DUR,KLOR-CON M) 10 MEQ TABLET    Take 1 tablet (10 mEq total) by mouth 2 (two) times daily.   Current Medications    AMITRIPTYLINE (ELAVIL) 10 MG TABLET    TAKE 1 TABLET(10 MG) BY MOUTH EVERY EVENING    AMLODIPINE (NORVASC) 10 MG TABLET    Take 1 tablet (10 mg total) by mouth once daily.    ASPIRIN (ECOTRIN) 81 MG EC TABLET    Take 1 tablet (81 mg total) by mouth once daily.    ATORVASTATIN (LIPITOR) 20 MG TABLET    Take 20 mg by mouth once daily.    GLIPIZIDE (GLUCOTROL) 10 MG TABLET    Take 10 mg by mouth 2 (two) times daily.    LOSARTAN (COZAAR) 100 MG TABLET    Take 1 tablet (100 mg total) by mouth once daily.    METFORMIN (GLUCOPHAGE-XR) 500 MG ER 24HR TABLET    Take 500 mg by mouth 2 (two) times daily.    MULTIVITAMIN (THERAGRAN) PER TABLET    Take 1 tablet by mouth once daily.    OZEMPIC 0.25 MG OR 0.5 MG (2 MG/3 ML) PEN INJECTOR    Inject into the skin.       Review of Systems   Constitutional: Negative for diaphoresis and fever.   HENT:  Negative for congestion and hearing loss.    Eyes:  Negative for blurred vision and pain.   Cardiovascular:  Negative for chest pain, claudication, dyspnea on exertion, leg swelling, near-syncope, palpitations and syncope.   Respiratory:  Negative for shortness of breath and sleep disturbances due to breathing.    Hematologic/Lymphatic: Negative for bleeding problem. Does not bruise/bleed easily.   Skin:  Negative for color change and poor wound healing.   Gastrointestinal:  Negative for abdominal pain and nausea.   Genitourinary:  Negative for  "bladder incontinence and flank pain.   Neurological:  Positive for disturbances in coordination and headaches. Negative for focal weakness and light-headedness.        Objective:   /82 (BP Location: Left arm, Patient Position: Sitting, BP Method: Medium (Manual))   Pulse 95   Ht 5' 3" (1.6 m)   Wt 127 kg (280 lb)   LMP 07/29/2018 (Exact Date)   SpO2 98%   BMI 49.60 kg/m²    Physical Exam  Constitutional:       Appearance: She is well-developed. She is not diaphoretic.   HENT:      Head: Normocephalic and atraumatic.   Eyes:      General: No scleral icterus.     Pupils: Pupils are equal, round, and reactive to light.   Neck:      Vascular: No JVD.   Cardiovascular:      Rate and Rhythm: Normal rate and regular rhythm.      Pulses: Intact distal pulses.           Radial pulses are 2+ on the right side and 2+ on the left side.        Dorsalis pedis pulses are 2+ on the right side and 2+ on the left side.        Posterior tibial pulses are 2+ on the right side and 2+ on the left side.      Heart sounds: S1 normal and S2 normal. No murmur heard.     No friction rub. No gallop.   Pulmonary:      Effort: Pulmonary effort is normal. No respiratory distress.      Breath sounds: Normal breath sounds. No wheezing or rales.   Chest:      Chest wall: No tenderness.   Abdominal:      General: Bowel sounds are normal. There is no distension.      Palpations: Abdomen is soft. There is no mass.      Tenderness: There is no abdominal tenderness. There is no rebound.   Musculoskeletal:         General: No tenderness. Normal range of motion.      Cervical back: Normal range of motion and neck supple.   Skin:     General: Skin is warm and dry.      Coloration: Skin is not pale.   Neurological:      Mental Status: She is alert and oriented to person, place, and time.      Coordination: Coordination normal.      Deep Tendon Reflexes: Reflexes normal.   Psychiatric:         Behavior: Behavior normal.         Judgment: Judgment " normal.             Cardiac event monitor 8/29/23  Conclusion         Negative event monitor with no clinical arrhythmias.    Symptoms corresponding with normal sinus rhythm/tachycardia  bpm.       EKG  8/15/23 reviewed- NSR w nonspecific T wave abnormality     Cardiac echo 8/15/23  Summary         Left Ventricle: The left ventricle is normal in size. Normal wall thickness. There is normal systolic function. There is normal diastolic function.    Left Atrium: Left atrium is mildly dilated.    Right Ventricle: Normal right ventricular cavity size.    Aortic Valve: The aortic valve is a trileaflet valve.    Mitral Valve: Mild posterior mitral annular calcification.    IVC/SVC: Normal venous pressure at 3 mmHg.    MRI Brain 8/16/23  Impression:     No acute abnormality.     T2 hyperintensities noted at the gray-white junction bilaterally similar and number and distribution to the 2020 exam.  Findings may represent sequelae microvascular ischemic change.       CTA head and neck 8/15/23    Impression:     No acute abnormality. No high-grade stenosis or major vessel occlusion  Cardiac echo 4/9/2016  CONCLUSIONS     1 - Normal left ventricular systolic function (EF 60-65%).     2 - Left ventricular diastolic dysfunction.     3 - Normal right ventricular systolic function      Assessment:       1. Chronic diastolic heart failure    2. Essential hypertension    3. Mixed hyperlipidemia    4. Type 2 diabetes mellitus without complication, without long-term current use of insulin    5. Morbid obesity    6. Syncope, unspecified syncope type    7. Nonintractable headache, unspecified chronicity pattern, unspecified headache type             Plan:         Diastolic heart failure  -Cardiac echo 8/15/23-  Normal systolic and diastolic function, which is improved form cardiac echo 2016 w LVEF 60-65% w diastolic dysfunction  -continue medical therapy: losartan 100 mg, ASA   -continue HTN management   -discussed lifestyle  modifications   -repeat cardiac echo to evaluate heart function 2/2 increased SOB   -add furosemide 20 mg BID X 7 days w KCL 10 meq ( last K+ 3.6) -2/2 SOB, orthopnea, PND   -weight self daily, notify office if > 3 pound weight gain in 1 day or 5 pounds in 1 week   -call office on Monday with update     Essential hypertension  -Goal BP < 130/80   -uncontrolled but slightly  improved- home -140s/80-90s  -continue medical therapy: losartan 100 mg, Amlodipine 10 mg    -furosemide 20 mg BID added for HF management   -check BP twice daily and maintain log, take AM BP 1-2 Hours after medication   -discussed lifestyle modifications- diet, exercise, weight loss     Hyperlipidemia  -Last LDL 66.4 8/15/23  -continue statin therapy- atorvastatin 20 mg   -discussed lifestyle modifications     Type 2 diabetes mellitus  -Uncontrolled  -A1c 9.1 8/15/23  -managed by PCP  -continue medical therapy- glipizide and ozempic      Morbid obesity  -BMI 49.60  -discussed lifestyle modifications  -discussed health risk associated w obesity     Syncope  -Cardiac event monitor 8/29/23- negative event monitor w no clinical arrhythmias     Headache  -continues to report headache  -started on  low dose amitriptyline for possible post concussive HA, by Neurology  -she also report feeling of off balance, instructed to contact Neurology            Total duration of face to face visit time 30 minutes.  Total time spent counseling greater than fifty percent of total visit time.  Counseling included discussion regarding imaging findings, diagnosis, possibilities, treatment options, risks and benefits.  The patient had many questions regarding the options and long-term effects      Stanley Bañuelos Np  Cardiology

## 2023-11-27 ENCOUNTER — HOSPITAL ENCOUNTER (OUTPATIENT)
Dept: CARDIOLOGY | Facility: HOSPITAL | Age: 58
Discharge: HOME | End: 2023-11-27
Payer: COMMERCIAL

## 2023-11-27 VITALS — BODY MASS INDEX: 49.61 KG/M2 | HEIGHT: 63 IN | WEIGHT: 280 LBS

## 2023-11-27 DIAGNOSIS — I50.32 CHRONIC DIASTOLIC HEART FAILURE: ICD-10-CM

## 2023-11-27 PROCEDURE — 93306 ECHO (CUPID ONLY): ICD-10-PCS | Mod: 26,,, | Performed by: INTERNAL MEDICINE

## 2023-11-27 PROCEDURE — 93306 TTE W/DOPPLER COMPLETE: CPT | Mod: PN

## 2023-11-27 PROCEDURE — 93306 TTE W/DOPPLER COMPLETE: CPT | Mod: 26,,, | Performed by: INTERNAL MEDICINE

## 2023-11-28 LAB
ASCENDING AORTA: 3.15 CM
AV INDEX (PROSTH): 0.75
AV MEAN GRADIENT: 5 MMHG
AV PEAK GRADIENT: 8 MMHG
AV VALVE AREA BY VELOCITY RATIO: 2.4 CM²
AV VALVE AREA: 2.47 CM²
AV VELOCITY RATIO: 0.73
BSA FOR ECHO PROCEDURE: 2.38 M2
CV ECHO LV RWT: 0.63 CM
DOP CALC AO PEAK VEL: 1.43 M/S
DOP CALC AO VTI: 23.8 CM
DOP CALC LVOT AREA: 3.3 CM2
DOP CALC LVOT DIAMETER: 2.05 CM
DOP CALC LVOT PEAK VEL: 1.04 M/S
DOP CALC LVOT STROKE VOLUME: 58.72 CM3
DOP CALC MV VTI: 17.8 CM
DOP CALCLVOT PEAK VEL VTI: 17.8 CM
E WAVE DECELERATION TIME: 167.16 MSEC
E/A RATIO: 1.07
E/E' RATIO: 8.13 M/S
ECHO LV POSTERIOR WALL: 1.31 CM (ref 0.6–1.1)
EJECTION FRACTION: 55 %
FRACTIONAL SHORTENING: 34 % (ref 28–44)
INTERVENTRICULAR SEPTUM: 1.09 CM (ref 0.6–1.1)
LA MAJOR: 5.2 CM
LA MINOR: 5.1 CM
LA WIDTH: 3.8 CM
LEFT ATRIUM SIZE: 4.01 CM
LEFT ATRIUM VOLUME INDEX MOD: 27.5 ML/M2
LEFT ATRIUM VOLUME INDEX: 29.9 ML/M2
LEFT ATRIUM VOLUME MOD: 61.31 CM3
LEFT ATRIUM VOLUME: 66.7 CM3
LEFT INTERNAL DIMENSION IN SYSTOLE: 2.78 CM (ref 2.1–4)
LEFT VENTRICLE DIASTOLIC VOLUME INDEX: 35.07 ML/M2
LEFT VENTRICLE DIASTOLIC VOLUME: 78.21 ML
LEFT VENTRICLE MASS INDEX: 80 G/M2
LEFT VENTRICLE SYSTOLIC VOLUME INDEX: 13 ML/M2
LEFT VENTRICLE SYSTOLIC VOLUME: 29.03 ML
LEFT VENTRICULAR INTERNAL DIMENSION IN DIASTOLE: 4.19 CM (ref 3.5–6)
LEFT VENTRICULAR MASS: 177.51 G
LV LATERAL E/E' RATIO: 7.22 M/S
LV SEPTAL E/E' RATIO: 9.29 M/S
LVOT MG: 2.39 MMHG
LVOT MV: 0.73 CM/S
MV PEAK A VEL: 0.61 M/S
MV PEAK E VEL: 0.65 M/S
MV PEAK GRADIENT: 2 MMHG
MV STENOSIS PRESSURE HALF TIME: 48.48 MS
MV VALVE AREA BY CONTINUITY EQUATION: 3.3 CM2
MV VALVE AREA P 1/2 METHOD: 4.54 CM2
OHS LV EJECTION FRACTION SIMPSONS BIPLANE MOD: 53 %
PISA AR MAX VEL: 2.87 M/S
PISA TR MAX VEL: 1.89 M/S
PULM VEIN S/D RATIO: 1.32
PV PEAK D VEL: 0.31 M/S
PV PEAK GRADIENT: 5 MMHG
PV PEAK S VEL: 0.41 M/S
PV PEAK VELOCITY: 1.16 M/S
RA MAJOR: 4.35 CM
RA PRESSURE ESTIMATED: 3 MMHG
RA WIDTH: 2.81 CM
RIGHT VENTRICULAR END-DIASTOLIC DIMENSION: 3.35 CM
RV TB RVSP: 5 MMHG
RV TISSUE DOPPLER FREE WALL SYSTOLIC VELOCITY 1 (APICAL 4 CHAMBER VIEW): 14.58 CM/S
SINUS: 2.91 CM
STJ: 2.94 CM
TDI LATERAL: 0.09 M/S
TDI SEPTAL: 0.07 M/S
TDI: 0.08 M/S
TR MAX PG: 14 MMHG
TRICUSPID ANNULAR PLANE SYSTOLIC EXCURSION: 2.05 CM
TV REST PULMONARY ARTERY PRESSURE: 17 MMHG
Z-SCORE OF LEFT VENTRICULAR DIMENSION IN END DIASTOLE: -6.32
Z-SCORE OF LEFT VENTRICULAR DIMENSION IN END SYSTOLE: -4.28

## 2023-11-29 ENCOUNTER — TELEPHONE (OUTPATIENT)
Dept: CARDIOLOGY | Facility: CLINIC | Age: 58
End: 2023-11-29
Payer: COMMERCIAL

## 2023-11-29 NOTE — TELEPHONE ENCOUNTER
----- Message from Kira Parikh sent at 11/29/2023 11:52 AM CST -----  Regarding: Results  Contact: [phone]  TEST RESULTS:   Patient would like to get test results.  Name of test (lab, mammo, etc.): Echo Cardio Gram   Date of test: 11/27/23

## 2023-11-30 ENCOUNTER — OFFICE VISIT (OUTPATIENT)
Dept: NEUROLOGY | Facility: CLINIC | Age: 58
End: 2023-11-30
Payer: COMMERCIAL

## 2023-11-30 ENCOUNTER — TELEPHONE (OUTPATIENT)
Dept: ADMINISTRATIVE | Facility: OTHER | Age: 58
End: 2023-11-30
Payer: COMMERCIAL

## 2023-11-30 VITALS
DIASTOLIC BLOOD PRESSURE: 84 MMHG | BODY MASS INDEX: 48.37 KG/M2 | HEART RATE: 98 BPM | HEIGHT: 63 IN | SYSTOLIC BLOOD PRESSURE: 137 MMHG | WEIGHT: 273 LBS

## 2023-11-30 DIAGNOSIS — R06.02 SOBOE (SHORTNESS OF BREATH ON EXERTION): Primary | ICD-10-CM

## 2023-11-30 DIAGNOSIS — M48.02 SPINAL STENOSIS, CERVICAL REGION: Primary | ICD-10-CM

## 2023-11-30 PROCEDURE — 3008F BODY MASS INDEX DOCD: CPT | Mod: CPTII,S$GLB,, | Performed by: PSYCHIATRY & NEUROLOGY

## 2023-11-30 PROCEDURE — 4010F ACE/ARB THERAPY RXD/TAKEN: CPT | Mod: CPTII,S$GLB,, | Performed by: PSYCHIATRY & NEUROLOGY

## 2023-11-30 PROCEDURE — 1159F MED LIST DOCD IN RCRD: CPT | Mod: CPTII,S$GLB,, | Performed by: PSYCHIATRY & NEUROLOGY

## 2023-11-30 PROCEDURE — 4010F PR ACE/ARB THEARPY RXD/TAKEN: ICD-10-PCS | Mod: CPTII,S$GLB,, | Performed by: PSYCHIATRY & NEUROLOGY

## 2023-11-30 PROCEDURE — 3079F PR MOST RECENT DIASTOLIC BLOOD PRESSURE 80-89 MM HG: ICD-10-PCS | Mod: CPTII,S$GLB,, | Performed by: PSYCHIATRY & NEUROLOGY

## 2023-11-30 PROCEDURE — 99999 PR PBB SHADOW E&M-EST. PATIENT-LVL III: CPT | Mod: PBBFAC,,, | Performed by: PSYCHIATRY & NEUROLOGY

## 2023-11-30 PROCEDURE — 3046F HEMOGLOBIN A1C LEVEL >9.0%: CPT | Mod: CPTII,S$GLB,, | Performed by: PSYCHIATRY & NEUROLOGY

## 2023-11-30 PROCEDURE — 3075F SYST BP GE 130 - 139MM HG: CPT | Mod: CPTII,S$GLB,, | Performed by: PSYCHIATRY & NEUROLOGY

## 2023-11-30 PROCEDURE — 99213 PR OFFICE/OUTPT VISIT, EST, LEVL III, 20-29 MIN: ICD-10-PCS | Mod: S$GLB,,, | Performed by: PSYCHIATRY & NEUROLOGY

## 2023-11-30 PROCEDURE — 99999 PR PBB SHADOW E&M-EST. PATIENT-LVL III: ICD-10-PCS | Mod: PBBFAC,,, | Performed by: PSYCHIATRY & NEUROLOGY

## 2023-11-30 PROCEDURE — 3008F PR BODY MASS INDEX (BMI) DOCUMENTED: ICD-10-PCS | Mod: CPTII,S$GLB,, | Performed by: PSYCHIATRY & NEUROLOGY

## 2023-11-30 PROCEDURE — 3046F PR MOST RECENT HEMOGLOBIN A1C LEVEL > 9.0%: ICD-10-PCS | Mod: CPTII,S$GLB,, | Performed by: PSYCHIATRY & NEUROLOGY

## 2023-11-30 PROCEDURE — 3075F PR MOST RECENT SYSTOLIC BLOOD PRESS GE 130-139MM HG: ICD-10-PCS | Mod: CPTII,S$GLB,, | Performed by: PSYCHIATRY & NEUROLOGY

## 2023-11-30 PROCEDURE — 99213 OFFICE O/P EST LOW 20 MIN: CPT | Mod: S$GLB,,, | Performed by: PSYCHIATRY & NEUROLOGY

## 2023-11-30 PROCEDURE — 3079F DIAST BP 80-89 MM HG: CPT | Mod: CPTII,S$GLB,, | Performed by: PSYCHIATRY & NEUROLOGY

## 2023-11-30 PROCEDURE — 1159F PR MEDICATION LIST DOCUMENTED IN MEDICAL RECORD: ICD-10-PCS | Mod: CPTII,S$GLB,, | Performed by: PSYCHIATRY & NEUROLOGY

## 2023-11-30 RX ORDER — AMITRIPTYLINE HYDROCHLORIDE 25 MG/1
25 TABLET, FILM COATED ORAL NIGHTLY PRN
Qty: 90 TABLET | Refills: 1 | Status: SHIPPED | OUTPATIENT
Start: 2023-11-30 | End: 2024-02-05

## 2023-11-30 NOTE — PROGRESS NOTES
Barrera Siddiqui is a 58 y.o. year old female that  presents for HA follow up.    HPI:  Ms Siddiqui has HTN, HLD, DM, morbid obesity, TIA, and depression.   I first saw her on 9/25/23 regarding new onset HA that commended shortly after a syncopal episode last August in which she struck her head. At that time she had normal MRI brain and neurovascular imaging, thus entertained the possibility of post concussive HA and decided to treat with amitriptyline 10 mg at night. Said that she took the medication x one month and saw some improvement in the severity and frequency of the HA.  Today, she reports discomfort at the base of the neck that is not aggravated by neck movements. No vertigo, double vision, transient visual obscurations, photophobia, phonophobia, vertigo, tinnitus, focal weakness, confusion, slurred speech, or language impairment. She reports no snoring, witnessed apneas, or excessive daytime sleepiness.  Complains of fingertips numbness in both hands that just started few days ago.        Past Medical History:   Diagnosis Date    Depression     Diabetes mellitus     High cholesterol     Hypertension     Stroke     TIA (transient ischemic attack)      Social History     Socioeconomic History    Marital status: Single   Tobacco Use    Smoking status: Former    Smokeless tobacco: Never   Substance and Sexual Activity    Alcohol use: Yes     Comment: rare    Drug use: No    Sexual activity: Yes     Partners: Male     Social Determinants of Health     Financial Resource Strain: Medium Risk (11/24/2023)    Overall Financial Resource Strain (CARDIA)     Difficulty of Paying Living Expenses: Somewhat hard   Food Insecurity: Food Insecurity Present (11/24/2023)    Hunger Vital Sign     Worried About Running Out of Food in the Last Year: Sometimes true     Ran Out of Food in the Last Year: Sometimes true   Transportation Needs: No Transportation Needs (11/24/2023)    PRAPARE - Transportation     Lack of Transportation  (Medical): No     Lack of Transportation (Non-Medical): No   Physical Activity: Inactive (11/24/2023)    Exercise Vital Sign     Days of Exercise per Week: 0 days     Minutes of Exercise per Session: 30 min   Stress: No Stress Concern Present (11/24/2023)    [employer] - Occupational Stress Questionnaire     Feeling of Stress : Only a little   Social Connections: Moderately Isolated (11/24/2023)    Social Connection and Isolation Panel [NHANES]     Frequency of Communication with Friends and Family: Twice a week     Frequency of Social Gatherings with Friends and Family: Once a week     Attends [employer] Services: 1 to 4 times per year     Active Member of Clubs or Organizations: No     Attends Club or Organization Meetings: Never     Marital Status:    Housing Stability: High Risk (11/24/2023)    Housing Stability Vital Sign     Unable to Pay for Housing in the Last Year: Yes     Number of Places Lived in the Last Year: 1     Unstable Housing in the Last Year: No     Past Surgical History:   Procedure Laterality Date    CHOLECYSTECTOMY      COLONOSCOPY N/A 10/18/2017    Procedure: COLONOSCOPY;  Surgeon: Donald Wright Jr., MD;  Location: [facility];  Service: Endoscopy;  Laterality: N/A;    KNEE SURGERY      TUBAL LIGATION      WRIST SURGERY       Family History   Problem Relation Age of Onset    Breast cancer Mother     Liver disease Maternal Grandmother            Review of Systems  General ROS: negative for chills, fever or weight loss  Psychological ROS: negative for hallucination, positive for depression, no suicidal ideation  Ophthalmic ROS: negative for blurry vision, photophobia or eye pain  ENT ROS: negative for epistaxis, sore throat or rhinorrhea  Respiratory ROS: no cough, shortness of breath, or wheezing  Cardiovascular ROS: no chest pain or dyspnea on exertion  Gastrointestinal ROS: no abdominal pain, change in bowel habits, or black/ bloody  "stools  Genito-Urinary ROS: no dysuria, trouble voiding, or hematuria  Musculoskeletal ROS: negative for gait disturbance or muscular weakness  Neurological ROS: no syncope or seizures; no ataxia  Dermatological ROS: negative for pruritis, rash and jaundice      Physical Exam:  /84 (BP Location: Left arm, Patient Position: Sitting, BP Method: Large (Automatic))   Pulse 98   Ht 5' 3" (1.6 m)   Wt 123.8 kg (273 lb)   LMP 07/29/2018 (Exact Date)   BMI 48.36 kg/m²   General appearance: alert, cooperative, no distress  Constitutional:Oriented to person, place, and time.appears well-developed and well-nourished.   HEENT: Normocephalic, atraumatic, neck symmetrical, mild L neck discomfort on palpation, no nasal discharge   Eyes: conjunctivae/corneas clear, PERRL, EOM's intact  Lungs: clear to auscultation bilaterally, no dullness to percussion bilaterally  Heart: regular rate and rhythm without rub; no displacement of the PMI   Abdomen: soft, non-tender; bowel sounds normoactive; no organomegaly  Extremities: extremities symmetric; no clubbing, cyanosis, or edema  Integument: Skin color, texture, turgor normal; no rashes; hair distrubution normal  Neurologic:   Mental status: alert and awake, oriented x 4, comprehension, naming, and repetition intact. No right to left confusion. Performs serial 7's without difficulty .No dysarthria.  CN 2-12: pupils 4 mm bilaterally, reactive to light. Fundi without papilledema. Visual fields full to confrontation. EOM full without nystagmus. Face sensation normal in all distributions. Face symmetric. Hearing grossly intact. Palate elevates well. Tongue midline without atrophy or fasciculations.  Motor: 5/5 all over  Sensory: intact in all modalities.  DTR's: 2+ all over.  Plantars: no tested.  Coordination: finger to nose and heel-knee-shin intact.  Gait: no ataxia or bradykinesia     LABS:    Complete Blood Count  Lab Results   Component Value Date    RBC 3.86 (L) 08/16/2023 "    HGB 10.3 (L) 08/16/2023    HCT 32.3 (L) 08/16/2023    MCV 84 08/16/2023    MCH 26.7 (L) 08/16/2023    MCHC 31.9 (L) 08/16/2023    RDW 13.9 08/16/2023     08/16/2023    MPV 12.0 08/16/2023    GRAN 3.4 08/16/2023    GRAN 54.5 08/16/2023    LYMPH 2.1 08/16/2023    LYMPH 34.0 08/16/2023    MONO 0.5 08/16/2023    MONO 8.2 08/16/2023    EOS 0.2 08/16/2023    BASO 0.04 08/16/2023    EOSINOPHIL 2.4 08/16/2023    BASOPHIL 0.6 08/16/2023    DIFFMETHOD Automated 08/16/2023       Comprehensive Metabolic Panel  Lab Results   Component Value Date     (H) 08/16/2023    BUN 20 08/16/2023    CREATININE 1.4 08/16/2023     08/16/2023    K 3.6 08/16/2023     08/16/2023    PROT 7.8 08/16/2023    ALBUMIN 3.3 (L) 08/16/2023    BILITOT 0.6 08/16/2023    AST 57 (H) 08/16/2023    ALKPHOS 78 08/16/2023    CO2 21 (L) 08/16/2023    ALT 38 08/16/2023    ANIONGAP 10 08/16/2023    EGFRNONAA >60.0 06/18/2022    ESTGFRAFRICA >60.0 06/18/2022       TSH  Lab Results   Component Value Date    TSH 2.860 08/15/2023         Assessment: 57 y/o with HTN, HLD, DM, morbid obesity, TIA, depression, and HA after a mild head trauma.  Normal neuro imaging. Normal exam.  She is morbidly obese but has no symptoms suggestive of EDGAR, and the fact that she has no papilledema and reports no history of HA prior to her mild head trauma makes me think that her HA is not secondary to IIH.  Has some mild neck symptoms, thus will also search for a cervicogenic pain generator, but the truth of the matter is that I feel more inclined to believe that she most likely has post concussive HA.  Will increase amitriptyline to 25 mg nightly.            ICD-10-CM ICD-9-CM    1. Spinal stenosis, cervical region  M48.02 723.0 MRI Cervical Spine Without Contrast        The encounter diagnosis was Spinal stenosis, cervical region.      Plan:  1) Postconcussive HA: as above.  HA calendar x 4 weeks.  2) HTN  3) HLD  4) DM  5) Morbid obesity  6) TIA  7)  Depression          Orders Placed This Encounter   Procedures    MRI Cervical Spine Without Contrast           Alokshanna Batista MD

## 2023-12-06 DIAGNOSIS — R06.02 SOB (SHORTNESS OF BREATH): Primary | ICD-10-CM

## 2023-12-11 ENCOUNTER — HOSPITAL ENCOUNTER (OUTPATIENT)
Dept: RADIOLOGY | Facility: HOSPITAL | Age: 58
Discharge: HOME | End: 2023-12-11
Attending: PSYCHIATRY & NEUROLOGY
Payer: COMMERCIAL

## 2023-12-11 DIAGNOSIS — M48.02 SPINAL STENOSIS, CERVICAL REGION: ICD-10-CM

## 2023-12-11 PROCEDURE — 72141 MRI CERVICAL SPINE WITHOUT CONTRAST: ICD-10-PCS | Mod: 26,,, | Performed by: RADIOLOGY

## 2023-12-11 PROCEDURE — 72141 MRI NECK SPINE W/O DYE: CPT | Mod: 26,,, | Performed by: RADIOLOGY

## 2023-12-11 PROCEDURE — 72141 MRI NECK SPINE W/O DYE: CPT | Mod: TC,PN

## 2023-12-20 ENCOUNTER — OFFICE VISIT (OUTPATIENT)
Dept: CARDIOLOGY | Facility: CLINIC | Age: 58
End: 2023-12-20
Payer: COMMERCIAL

## 2023-12-20 VITALS
HEART RATE: 93 BPM | SYSTOLIC BLOOD PRESSURE: 118 MMHG | HEIGHT: 63 IN | BODY MASS INDEX: 49.75 KG/M2 | DIASTOLIC BLOOD PRESSURE: 72 MMHG | WEIGHT: 280.75 LBS | OXYGEN SATURATION: 99 %

## 2023-12-20 DIAGNOSIS — I50.32 CHRONIC DIASTOLIC HEART FAILURE: Primary | ICD-10-CM

## 2023-12-20 DIAGNOSIS — R55 SYNCOPE, UNSPECIFIED SYNCOPE TYPE: ICD-10-CM

## 2023-12-20 DIAGNOSIS — E11.9 TYPE 2 DIABETES MELLITUS WITHOUT COMPLICATION, WITHOUT LONG-TERM CURRENT USE OF INSULIN: ICD-10-CM

## 2023-12-20 DIAGNOSIS — E78.2 MIXED HYPERLIPIDEMIA: ICD-10-CM

## 2023-12-20 DIAGNOSIS — R51.9 NONINTRACTABLE HEADACHE, UNSPECIFIED CHRONICITY PATTERN, UNSPECIFIED HEADACHE TYPE: ICD-10-CM

## 2023-12-20 DIAGNOSIS — I10 ESSENTIAL HYPERTENSION: ICD-10-CM

## 2023-12-20 DIAGNOSIS — E66.01 MORBID OBESITY WITH BMI OF 45.0-49.9, ADULT: ICD-10-CM

## 2023-12-20 PROCEDURE — 3046F HEMOGLOBIN A1C LEVEL >9.0%: CPT | Mod: CPTII,S$GLB,,

## 2023-12-20 PROCEDURE — 1159F MED LIST DOCD IN RCRD: CPT | Mod: CPTII,S$GLB,,

## 2023-12-20 PROCEDURE — 3046F PR MOST RECENT HEMOGLOBIN A1C LEVEL > 9.0%: ICD-10-PCS | Mod: CPTII,S$GLB,,

## 2023-12-20 PROCEDURE — 1160F RVW MEDS BY RX/DR IN RCRD: CPT | Mod: CPTII,S$GLB,,

## 2023-12-20 PROCEDURE — 1159F PR MEDICATION LIST DOCUMENTED IN MEDICAL RECORD: ICD-10-PCS | Mod: CPTII,S$GLB,,

## 2023-12-20 PROCEDURE — 99999 PR PBB SHADOW E&M-EST. PATIENT-LVL III: CPT | Mod: PBBFAC,,,

## 2023-12-20 PROCEDURE — 3078F DIAST BP <80 MM HG: CPT | Mod: CPTII,S$GLB,,

## 2023-12-20 PROCEDURE — 99214 OFFICE O/P EST MOD 30 MIN: CPT | Mod: S$GLB,,,

## 2023-12-20 PROCEDURE — 1160F PR REVIEW ALL MEDS BY PRESCRIBER/CLIN PHARMACIST DOCUMENTED: ICD-10-PCS | Mod: CPTII,S$GLB,,

## 2023-12-20 PROCEDURE — 99214 PR OFFICE/OUTPT VISIT, EST, LEVL IV, 30-39 MIN: ICD-10-PCS | Mod: S$GLB,,,

## 2023-12-20 PROCEDURE — 4010F PR ACE/ARB THEARPY RXD/TAKEN: ICD-10-PCS | Mod: CPTII,S$GLB,,

## 2023-12-20 PROCEDURE — 3074F PR MOST RECENT SYSTOLIC BLOOD PRESSURE < 130 MM HG: ICD-10-PCS | Mod: CPTII,S$GLB,,

## 2023-12-20 PROCEDURE — 99999 PR PBB SHADOW E&M-EST. PATIENT-LVL III: ICD-10-PCS | Mod: PBBFAC,,,

## 2023-12-20 PROCEDURE — 3078F PR MOST RECENT DIASTOLIC BLOOD PRESSURE < 80 MM HG: ICD-10-PCS | Mod: CPTII,S$GLB,,

## 2023-12-20 PROCEDURE — 3074F SYST BP LT 130 MM HG: CPT | Mod: CPTII,S$GLB,,

## 2023-12-20 PROCEDURE — 3008F BODY MASS INDEX DOCD: CPT | Mod: CPTII,S$GLB,,

## 2023-12-20 PROCEDURE — 4010F ACE/ARB THERAPY RXD/TAKEN: CPT | Mod: CPTII,S$GLB,,

## 2023-12-20 PROCEDURE — 3008F PR BODY MASS INDEX (BMI) DOCUMENTED: ICD-10-PCS | Mod: CPTII,S$GLB,,

## 2023-12-20 RX ORDER — POTASSIUM CHLORIDE 750 MG/1
10 TABLET, EXTENDED RELEASE ORAL 2 TIMES DAILY
COMMUNITY
Start: 2023-11-20

## 2023-12-20 RX ORDER — FUROSEMIDE 40 MG/1
40 TABLET ORAL 2 TIMES DAILY
Qty: 60 TABLET | Refills: 11 | Status: SHIPPED | OUTPATIENT
Start: 2023-12-20 | End: 2024-12-19

## 2023-12-20 NOTE — ASSESSMENT & PLAN NOTE
-Cardiac echo 11/27/23-  LVEF 55% , and normal  diastolic function  -continue medical therapy: losartan 100 mg  -increase furosemide  to 40 mg BID  w KCL 10 meq ( last K+ 3.6) -2/2 SOB, orthopnea, PND   -continue HTN management   -discussed lifestyle modifications  -weight self daily, notify office if > 3 pound weight gain in 1 day or 5 pounds in 1 week   -patient reported a 3 pound weight loss w addition fof furosemide, she also reports decreased coughing but continuje to note 3 pillow orthopnea and now decrease response to furosemide   -increase furosemide to 40 mg BID

## 2023-12-20 NOTE — PROGRESS NOTES
Subjective:    Patient ID:  Barrera Siddiqui is a 58 y.o. female who presents for evaluation of No chief complaint on file.      PCP: Evelin Guevara, NP     Referring Provider: Rosemarie Brizuela MD    HPI: Patient is a 57 yo F w/PMH of HTN, HLD, TIA, DM-2, morbid obesity,  and depression [facility]. She was last seen on 8/28/23  for initial evaluation post admission 2/2 syncopal episode. She reported to ED on 8/15/23 post syncopal episode at work. Admitted 8/15/23-8/16/23 negative MRI  and CTA Head and neck. She denies any syncopal episodes since discharge. She notes left sided headache since her syncopal episode.  She is also followed by Neurology, seen on 9/25/23  and was started on low dose amitriptyline for possible post concussive HA. She denies CP, SOB, palpitations, orthopnea, PND, pre-syncope, LOC, swelling, or claudication. She notes medication compliance without side effects. She monitor her home BP and ranges 130s-150s/80-90s. She does not exercise regularly, but is active at work and walks at lot.     11/20/23: Patient presents today for f/u for HTN management. She was last seen on 10/9/23 for f/u appt and was continued on medical therapy and amlodipine was increased to 10 mg daily. He notes past 3-4 days increased SOB and off balance feeling. She is also followed by Neurology post fall and syncopal episode. She notes 3 pillow orthopnea, increased from 2 pillows 2 nights ago. She is also positive for PND. She also notes a 4 pound weight gain.  She denies CP, SOB, palpitations, PND, pre-syncope, LOC, swelling, or claudication. She notes medication compliance without side effects. She monitor her home BP and ranges 120s-140s/80-90s. She does not exercise regularly.      12/20/23: Patient presents today for f/u for HTN management. She was last seen on 11/20/23 for f/u appt and was continued on medical therapy She was referred to Pulmonary medicine and has an upcoming appt w PFTs. She is  also followed by Neurology post fall and syncopal episode. She noted 3 pillow orthopnea, increased from 2 pillows 2 nights ago. She is also positive for PND. She also notes a 4 pound weight gain and was started on furosemide. She noted a 3 pound weight loss and decreased coughing at night since starting furosemide. She continue to report orthopnea. She continues to note SAWYER. She denies CP, palpitations, PND, pre-syncope, LOC, swelling, or claudication. She notes medication compliance without side effects. She monitor her home BP and ranges 120s-130s/80-90s. She does not exercise regularly.      Past Medical History:   Diagnosis Date    Depression     Diabetes mellitus     High cholesterol     Hypertension     Stroke     TIA (transient ischemic attack)      Past Surgical History:   Procedure Laterality Date    CHOLECYSTECTOMY      COLONOSCOPY N/A 10/18/2017    Procedure: COLONOSCOPY;  Surgeon: Donald Wright Jr., MD;  Location: [facility];  Service: Endoscopy;  Laterality: N/A;    KNEE SURGERY      TUBAL LIGATION      WRIST SURGERY       Social History     Socioeconomic History    Marital status: Single   Tobacco Use    Smoking status: Former    Smokeless tobacco: Never   Substance and Sexual Activity    Alcohol use: Yes     Comment: rare    Drug use: No    Sexual activity: Yes     Partners: Male     Social Determinants of Health     Financial Resource Strain: Medium Risk (11/24/2023)    Overall Financial Resource Strain (CARDIA)     Difficulty of Paying Living Expenses: Somewhat hard   Food Insecurity: Food Insecurity Present (11/24/2023)    Hunger Vital Sign     Worried About Running Out of Food in the Last Year: Sometimes true     Ran Out of Food in the Last Year: Sometimes true   Transportation Needs: No Transportation Needs (11/24/2023)    PRAPARE - Transportation     Lack of Transportation (Medical): No     Lack of Transportation (Non-Medical): No   Physical Activity: Inactive (11/24/2023)    Exercise Vital  Sign     Days of Exercise per Week: 0 days     Minutes of Exercise per Session: 30 min   Stress: No Stress Concern Present (11/24/2023)    [employer] - Occupational Stress Questionnaire     Feeling of Stress : Only a little   Social Connections: Moderately Isolated (11/24/2023)    Social Connection and Isolation Panel [NHANES]     Frequency of Communication with Friends and Family: Twice a week     Frequency of Social Gatherings with Friends and Family: Once a week     Attends Muslim Services: 1 to 4 times per year     Active Member of Clubs or Organizations: No     Attends Club or Organization Meetings: Never     Marital Status:    Housing Stability: High Risk (11/24/2023)    Housing Stability Vital Sign     Unable to Pay for Housing in the Last Year: Yes     Number of Places Lived in the Last Year: 1     Unstable Housing in the Last Year: No     Family History   Problem Relation Age of Onset    Breast cancer Mother     Liver disease Maternal Grandmother        Review of patient's allergies indicates:  No Known Allergies    Medication List with Changes/Refills   Current Medications    AMITRIPTYLINE (ELAVIL) 10 MG TABLET    TAKE 1 TABLET(10 MG) BY MOUTH EVERY EVENING    AMITRIPTYLINE (ELAVIL) 25 MG TABLET    Take 1 tablet (25 mg total) by mouth nightly as needed for Insomnia.    AMLODIPINE (NORVASC) 10 MG TABLET    Take 1 tablet (10 mg total) by mouth once daily.    ASPIRIN (ECOTRIN) 81 MG EC TABLET    Take 1 tablet (81 mg total) by mouth once daily.    ATORVASTATIN (LIPITOR) 20 MG TABLET    Take 20 mg by mouth once daily.    GLIPIZIDE (GLUCOTROL) 10 MG TABLET    Take 10 mg by mouth 2 (two) times daily.    LOSARTAN (COZAAR) 100 MG TABLET    Take 1 tablet (100 mg total) by mouth once daily.    METFORMIN (GLUCOPHAGE-XR) 500 MG ER 24HR TABLET    Take 500 mg by mouth 2 (two) times daily.    MULTIVITAMIN (THERAGRAN) PER TABLET    Take 1 tablet by mouth once daily.    OZEMPIC 0.25  "MG OR 0.5 MG (2 MG/3 ML) PEN INJECTOR    Inject into the skin.    POTASSIUM CHLORIDE (KLOR-CON) 10 MEQ TBSR    Take 10 mEq by mouth 2 (two) times daily.   Discontinued Medications    FUROSEMIDE (LASIX) 20 MG TABLET    Take 1 tablet (20 mg total) by mouth 2 (two) times daily.    POTASSIUM CHLORIDE SA (K-DUR,KLOR-CON M) 10 MEQ TABLET    Take 1 tablet (10 mEq total) by mouth 2 (two) times daily.       Review of Systems   Constitutional: Negative for diaphoresis and fever.   HENT:  Negative for congestion and hearing loss.    Eyes:  Negative for blurred vision and pain.   Cardiovascular:  Negative for chest pain, claudication, dyspnea on exertion, leg swelling, near-syncope, palpitations and syncope.   Respiratory:  Negative for shortness of breath and sleep disturbances due to breathing.    Hematologic/Lymphatic: Negative for bleeding problem. Does not bruise/bleed easily.   Skin:  Negative for color change and poor wound healing.   Gastrointestinal:  Negative for abdominal pain and nausea.   Genitourinary:  Negative for bladder incontinence and flank pain.   Neurological:  Positive for disturbances in coordination and headaches. Negative for focal weakness and light-headedness.        Objective:   /72 (BP Location: Left arm, Patient Position: Sitting, BP Method: X-Large (Manual))   Pulse 93   Ht 5' 3" (1.6 m)   Wt 127.3 kg (280 lb 12.1 oz)   LMP 07/29/2018 (Exact Date)   SpO2 99%   BMI 49.73 kg/m²    Physical Exam  Constitutional:       Appearance: She is well-developed. She is not diaphoretic.   HENT:      Head: Normocephalic and atraumatic.   Eyes:      General: No scleral icterus.     Pupils: Pupils are equal, round, and reactive to light.   Neck:      Vascular: No JVD.   Cardiovascular:      Rate and Rhythm: Normal rate and regular rhythm.      Pulses: Intact distal pulses.           Radial pulses are 2+ on the right side and 2+ on the left side.        Dorsalis pedis pulses are 2+ on the right side " and 2+ on the left side.        Posterior tibial pulses are 2+ on the right side and 2+ on the left side.      Heart sounds: S1 normal and S2 normal. No murmur heard.     No friction rub. No gallop.   Pulmonary:      Effort: Pulmonary effort is normal. No respiratory distress.      Breath sounds: Normal breath sounds. No wheezing or rales.   Chest:      Chest wall: No tenderness.   Abdominal:      General: Bowel sounds are normal. There is no distension.      Palpations: Abdomen is soft. There is no mass.      Tenderness: There is no abdominal tenderness. There is no rebound.   Musculoskeletal:         General: No tenderness. Normal range of motion.      Cervical back: Normal range of motion and neck supple.   Skin:     General: Skin is warm and dry.      Coloration: Skin is not pale.   Neurological:      Mental Status: She is alert and oriented to person, place, and time.      Coordination: Coordination normal.      Deep Tendon Reflexes: Reflexes normal.   Psychiatric:         Behavior: Behavior normal.         Judgment: Judgment normal.             Cardiac echo 11/27/23  Summary         Left Ventricle: The left ventricle is normal in size. Normal wall thickness. There is concentric remodeling. Normal wall motion. There is normal systolic function. Ejection fraction by visual approximation is 55%. There is normal diastolic function.    Right Ventricle: Normal right ventricular cavity size. Wall thickness is normal. Right ventricle wall motion  is normal. Systolic function is normal.    Mitral Valve: There is mild posterior mitral annular calcification present.    Pulmonary Artery: The estimated pulmonary artery systolic pressure is 17 mmHg.    IVC/SVC: Normal venous pressure at 3 mmHg.  Cardiac event monitor 8/29/23  Conclusion         Negative event monitor with no clinical arrhythmias.    Symptoms corresponding with normal sinus rhythm/tachycardia  bpm.       EKG  8/15/23 reviewed- NSR w nonspecific T wave  abnormality     Cardiac echo 8/15/23  Summary         Left Ventricle: The left ventricle is normal in size. Normal wall thickness. There is normal systolic function. There is normal diastolic function.    Left Atrium: Left atrium is mildly dilated.    Right Ventricle: Normal right ventricular cavity size.    Aortic Valve: The aortic valve is a trileaflet valve.    Mitral Valve: Mild posterior mitral annular calcification.    IVC/SVC: Normal venous pressure at 3 mmHg.    MRI Brain 8/16/23  Impression:     No acute abnormality.     T2 hyperintensities noted at the gray-white junction bilaterally similar and number and distribution to the 2020 exam.  Findings may represent sequelae microvascular ischemic change.       CTA head and neck 8/15/23    Impression:     No acute abnormality. No high-grade stenosis or major vessel occlusion  Cardiac echo 4/9/2016  CONCLUSIONS     1 - Normal left ventricular systolic function (EF 60-65%).     2 - Left ventricular diastolic dysfunction.     3 - Normal right ventricular systolic function      Assessment:       1. Chronic diastolic heart failure    2. Essential hypertension    3. Mixed hyperlipidemia    4. Type 2 diabetes mellitus without complication, without long-term current use of insulin    5. Morbid obesity with BMI of 45.0-49.9, adult    6. Nonintractable headache, unspecified chronicity pattern, unspecified headache type    7. Syncope, unspecified syncope type           Plan:         Diastolic heart failure  -Cardiac echo 11/27/23-  LVEF 55% , and normal  diastolic function  -continue medical therapy: losartan 100 mg  -increase furosemide  to 40 mg BID  w KCL 10 meq ( last K+ 3.6) -2/2 SOB, orthopnea, PND   -continue HTN management   -discussed lifestyle modifications  -weight self daily, notify office if > 3 pound weight gain in 1 day or 5 pounds in 1 week   -patient reported a 3 pound weight loss w addition fof furosemide, she also reports decreased coughing but continuje  to note 3 pillow orthopnea and now decrease response to furosemide   -increase furosemide to 40 mg BID     Essential hypertension  -Goal BP < 130/80  - improved- home -130s/80-90s  -in office 118/72  -continue medical therapy: losartan 100 mg, Amlodipine 10 mg    -furosemide 40 mg BID added for HF management   -check BP twice daily and maintain log, take AM BP 1-2 Hours after medication   -discussed lifestyle modifications- diet, exercise, weight loss     Hyperlipidemia  -Last LDL 66.4 8/15/23  -continue statin therapy- atorvastatin 20 mg   -discussed lifestyle modifications     Type 2 diabetes mellitus  -Uncontrolled  -A1c 9.1 8/15/23  -managed by PCP  -continue medical therapy- glipizide and ozempic      Morbid obesity with BMI of 45.0-49.9, adult  -BMI 49.73  -discussed lifestyle modifications  -discussed health risk associated w obesity     Headache  -continues to report headache  -started on  low dose amitriptyline for possible post concussive HA, by Neurology  -she also report feeling of off balance, instructed to contact Neurology      Syncope  -Cardiac event monitor 8/29/23- negative event monitor w no clinical arrhythmias         Total duration of face to face visit time 30 minutes.  Total time spent counseling greater than fifty percent of total visit time.  Counseling included discussion regarding imaging findings, diagnosis, possibilities, treatment options, risks and benefits.  The patient had many questions regarding the options and long-term effects      Stanley Bañuelos Np  Cardiology

## 2023-12-20 NOTE — ASSESSMENT & PLAN NOTE
-Goal BP < 130/80  - improved- home -130s/80-90s  -in office 118/72  -continue medical therapy: losartan 100 mg, Amlodipine 10 mg    -furosemide 40 mg BID added for HF management   -check BP twice daily and maintain log, take AM BP 1-2 Hours after medication   -discussed lifestyle modifications- diet, exercise, weight loss

## 2023-12-26 ENCOUNTER — HOSPITAL ENCOUNTER (OUTPATIENT)
Dept: PULMONOLOGY | Facility: HOSPITAL | Age: 58
Discharge: HOME | End: 2023-12-26
Attending: INTERNAL MEDICINE
Payer: COMMERCIAL

## 2023-12-26 DIAGNOSIS — R06.02 SOB (SHORTNESS OF BREATH): ICD-10-CM

## 2023-12-26 PROCEDURE — 94729 DIFFUSING CAPACITY: CPT

## 2023-12-26 PROCEDURE — 94727 GAS DIL/WSHOT DETER LNG VOL: CPT

## 2023-12-26 PROCEDURE — 94010 BREATHING CAPACITY TEST: CPT

## 2023-12-28 LAB
BRPFT: ABNORMAL
DLCO ADJ PRE: 20.23 ML/(MIN*MMHG) (ref 16.97–28.44)
DLCO SINGLE BREATH LLN: 16.97
DLCO SINGLE BREATH PRE REF: 79.3 %
DLCO SINGLE BREATH REF: 22.71
DLCOC SBVA LLN: 3.2
DLCOC SBVA PRE REF: 124.8 %
DLCOC SBVA REF: 4.76
DLCOC SINGLE BREATH LLN: 16.97
DLCOC SINGLE BREATH PRE REF: 89.1 %
DLCOC SINGLE BREATH REF: 22.71
DLCOVA LLN: 3.2
DLCOVA PRE REF: 111 %
DLCOVA PRE: 5.28 ML/(MIN*MMHG*L) (ref 3.2–6.32)
DLCOVA REF: 4.76
DLVAADJ PRE: 5.94 ML/(MIN*MMHG*L) (ref 3.2–6.32)
ERVN2 LLN: -16449.15
ERVN2 PRE REF: 62.9 %
ERVN2 PRE: 0.53 L (ref -16449.15–16450.85)
ERVN2 REF: 0.85
FEF 25 75 LLN: 0.95
FEF 25 75 PRE REF: 136.5 %
FEF 25 75 REF: 2.09
FEV1 FVC LLN: 69
FEV1 FVC PRE REF: 105.2 %
FEV1 FVC REF: 80
FEV1 LLN: 1.6
FEV1 PRE REF: 102.4 %
FEV1 REF: 2.16
FRCN2 LLN: 1.82
FRCN2 PRE REF: 76.7 %
FRCN2 REF: 2.64
FVC LLN: 2.02
FVC PRE REF: 97 %
FVC REF: 2.7
IVC PRE: 2.33 L (ref 2.02–3.38)
IVC SINGLE BREATH LLN: 2.02
IVC SINGLE BREATH PRE REF: 86.1 %
IVC SINGLE BREATH REF: 2.7
PEF LLN: 3.75
PEF PRE REF: 73 %
PEF REF: 5.69
PRE DLCO: 18 ML/(MIN*MMHG) (ref 16.97–28.44)
PRE FEF 25 75: 2.86 L/S (ref 0.95–3.24)
PRE FET 100: 7.26 SEC
PRE FEV1 FVC: 84.47 % (ref 68.96–91.66)
PRE FEV1: 2.21 L (ref 1.6–2.72)
PRE FRC N2: 2.02 L
PRE FVC: 2.62 L (ref 2.02–3.38)
PRE PEF: 4.16 L/S (ref 3.75–7.64)
RVN2 LLN: 1.22
RVN2 PRE REF: 50.8 %
RVN2 PRE: 0.91 L (ref 1.22–2.37)
RVN2 REF: 1.79
RVN2TLCN2 LLN: 28.41
RVN2TLCN2 PRE REF: 67.9 %
RVN2TLCN2 PRE: 25.8 % (ref 28.41–47.59)
RVN2TLCN2 REF: 38
TLCN2 LLN: 3.78
TLCN2 PRE REF: 74 %
TLCN2 PRE: 3.53 L (ref 3.78–5.76)
TLCN2 REF: 4.77
VA PRE: 3.41 L (ref 4.62–4.62)
VA SINGLE BREATH LLN: 4.62
VA SINGLE BREATH PRE REF: 73.7 %
VA SINGLE BREATH REF: 4.62
VCMAXN2 LLN: 2.02
VCMAXN2 PRE REF: 97 %
VCMAXN2 PRE: 2.62 L (ref 2.02–3.38)
VCMAXN2 REF: 2.7

## 2023-12-28 PROCEDURE — 94727 PR PULM FUNCTION TEST BY GAS: ICD-10-PCS | Mod: 26,,, | Performed by: INTERNAL MEDICINE

## 2023-12-28 PROCEDURE — 94727 GAS DIL/WSHOT DETER LNG VOL: CPT | Mod: 26,,, | Performed by: INTERNAL MEDICINE

## 2023-12-28 PROCEDURE — 94010 BREATHING CAPACITY TEST: CPT | Mod: 26,,, | Performed by: INTERNAL MEDICINE

## 2023-12-28 PROCEDURE — 94729 DIFFUSING CAPACITY: CPT | Mod: 26,,, | Performed by: INTERNAL MEDICINE

## 2023-12-28 PROCEDURE — 94729 PR C02/MEMBANE DIFFUSE CAPACITY: ICD-10-PCS | Mod: 26,,, | Performed by: INTERNAL MEDICINE

## 2023-12-28 PROCEDURE — 94010 BREATHING CAPACITY TEST: ICD-10-PCS | Mod: 26,,, | Performed by: INTERNAL MEDICINE

## 2024-01-03 ENCOUNTER — OFFICE VISIT (OUTPATIENT)
Dept: PULMONOLOGY | Facility: CLINIC | Age: 59
End: 2024-01-03
Payer: COMMERCIAL

## 2024-01-03 VITALS
SYSTOLIC BLOOD PRESSURE: 142 MMHG | OXYGEN SATURATION: 99 % | HEIGHT: 63 IN | TEMPERATURE: 97 F | DIASTOLIC BLOOD PRESSURE: 86 MMHG | BODY MASS INDEX: 49.96 KG/M2 | WEIGHT: 281.94 LBS

## 2024-01-03 DIAGNOSIS — E66.01 MORBID OBESITY WITH BMI OF 45.0-49.9, ADULT: ICD-10-CM

## 2024-01-03 DIAGNOSIS — G47.30 SLEEP APNEA, UNSPECIFIED TYPE: ICD-10-CM

## 2024-01-03 DIAGNOSIS — R07.89 OTHER CHEST PAIN: Primary | ICD-10-CM

## 2024-01-03 DIAGNOSIS — R40.0 DAYTIME SOMNOLENCE: ICD-10-CM

## 2024-01-03 DIAGNOSIS — R06.02 SOBOE (SHORTNESS OF BREATH ON EXERTION): ICD-10-CM

## 2024-01-03 DIAGNOSIS — R55 SYNCOPE, UNSPECIFIED SYNCOPE TYPE: ICD-10-CM

## 2024-01-03 PROCEDURE — 99999 PR PBB SHADOW E&M-EST. PATIENT-LVL IV: CPT | Mod: PBBFAC,,, | Performed by: INTERNAL MEDICINE

## 2024-01-03 PROCEDURE — 99204 OFFICE O/P NEW MOD 45 MIN: CPT | Mod: S$PBB,,, | Performed by: INTERNAL MEDICINE

## 2024-01-03 NOTE — ASSESSMENT & PLAN NOTE
Episode of unexplained syncope in August of 2023.    Since that time she has had continued headaches and shortness of breath.  No previous shortness of breath.  Her mom and sister both had a history of blood clots she has no personal history.  PFTs within normal limits   CTA ordered to rule out PE and to look at lung parenchyma.

## 2024-01-03 NOTE — PROGRESS NOTES
"Subjective:     Reason for visit: SOB/Syncope    Patient ID:  Barrera Siddiqui is a 58 y.o. female    History:  Ms. Siddiqui is a 59 yo with diabetes, hypertension, depression, obesity, and elevated transaminases that presents for evaluation of ongoing shortness of breath.  She had an episode in 2023 where she lost consciousness at work and had a severe headache and shortness of breath.  She was evaluated at the ER and at that time there were concern for a stroke because she does have a history of a stroke in .  CTA and MRIs done at that time were unchanged did not show evidence of new occlusion or stroke.  She is continued to have headaches on and off and ongoing shortness of breath.  She has had an echo and cardiac evaluation that has been within normal limits.  She has never had a blood clot however she did have a history of a stroke in her mom and sister have had blood clots in the past both are  now.  She is up-to-date on her colonoscopy she did not get a mammogram last year because all of this workup occurred around the time she normally would but previously she has yearly and they were normal.      Additional Pulmonary History:  Childhood Illnesses:  NA  Occupational:  NA  Environmental:  none  Tobacco/Smoking:  never    Objective:     Vitals:    24 1302   BP: (!) 142/86   BP Location: Left arm   Patient Position: Sitting   Temp: 97 °F (36.1 °C)   SpO2: 99%   Weight: 127.9 kg (281 lb 15.5 oz)   Height: 5' 3" (1.6 m)         Physical Exam  Vitals reviewed.   Constitutional:       General: She is not in acute distress.     Appearance: She is morbidly obese. She is not diaphoretic.   HENT:      Head: Normocephalic and atraumatic.      Right Ear: External ear normal.      Left Ear: External ear normal.   Eyes:      Conjunctiva/sclera: Conjunctivae normal.      Pupils: Pupils are equal, round, and reactive to light.   Neck:      Trachea: No tracheal deviation.   Cardiovascular:      Rate and " Rhythm: Normal rate and regular rhythm.      Heart sounds: Normal heart sounds. No murmur heard.  Pulmonary:      Effort: Pulmonary effort is normal. No respiratory distress.      Breath sounds: Normal breath sounds. No stridor. No wheezing or rales.   Abdominal:      General: Bowel sounds are normal. There is no distension.      Palpations: Abdomen is soft.      Tenderness: There is no abdominal tenderness.   Musculoskeletal:         General: Normal range of motion.      Cervical back: Normal range of motion and neck supple.   Skin:     General: Skin is warm and dry.      Findings: No erythema.   Neurological:      Mental Status: She is alert and oriented to person, place, and time.      Gait: Gait is intact.   Psychiatric:         Mood and Affect: Mood and affect normal.         Cognition and Memory: Memory normal.         Judgment: Judgment normal.                Pertinent Studies Reviewed & Interpreted:     Pulmonary Function Tests:   Mild restriction noted on PFTs however that is consistent with her BMI    Echocardiograms:   Results for orders placed during the hospital encounter of 11/27/23    Echo Saline Bubble? No    Interpretation Summary    Left Ventricle: The left ventricle is normal in size. Normal wall thickness. There is concentric remodeling. Normal wall motion. There is normal systolic function. Ejection fraction by visual approximation is 55%. There is normal diastolic function.    Right Ventricle: Normal right ventricular cavity size. Wall thickness is normal. Right ventricle wall motion  is normal. Systolic function is normal.    Mitral Valve: There is mild posterior mitral annular calcification present.    Pulmonary Artery: The estimated pulmonary artery systolic pressure is 17 mmHg.    IVC/SVC: Normal venous pressure at 3 mmHg.        Assessment & Plan:       Problem List Items Addressed This Visit          Neuro    Daytime somnolence    Overview     Also concern for waking up at night gasping  for air  Sleep medicine referral placed            Cardiac/Vascular    Chest pain - Primary    Relevant Orders    CTA Chest Non-Coronary (PE Studies)    SOBOE (shortness of breath on exertion)    Overview     Ongoing shortness of breath since her episode in August but she never had shortness of breath prior to this.  CTA ordered to rule out PE.            Endocrine    Morbid obesity with BMI of 45.0-49.9, adult    Overview     This explains the mild restriction noted on her PFTs.  And definitely affects her health in a negative way            Other    Syncope    Current Assessment & Plan     Episode of unexplained syncope in August of 2023.    Since that time she has had continued headaches and shortness of breath.  No previous shortness of breath.  Her mom and sister both had a history of blood clots she has no personal history.  PFTs within normal limits   CTA ordered to rule out PE and to look at lung parenchyma.         Sleep apnea    Overview     Very high suspicion for EDGAR given her symptoms of waking up gasping for air.  Sleep medicine referral placed         Relevant Orders    Ambulatory referral/consult to Sleep Disorders          RETURN TO CLINIC IN 2 MONTHS       Portions of the record may have been created with voice-recognition software. Occasional wrong-word or sound-a-like substitutions may have occurred due to the inherent limitations of voice-recognition software. Read the chart carefully and recognize, using context, where substitutions have occurred.    Addie Sotomayor M.D.  Pulmonary/Critical Care

## 2024-01-05 ENCOUNTER — HOSPITAL ENCOUNTER (OUTPATIENT)
Dept: RADIOLOGY | Facility: HOSPITAL | Age: 59
Discharge: HOME | End: 2024-01-05
Attending: INTERNAL MEDICINE
Payer: COMMERCIAL

## 2024-01-05 DIAGNOSIS — R07.89 OTHER CHEST PAIN: ICD-10-CM

## 2024-01-05 PROCEDURE — 71275 CT ANGIOGRAPHY CHEST: CPT | Mod: 26,,, | Performed by: STUDENT IN AN ORGANIZED HEALTH CARE EDUCATION/TRAINING PROGRAM

## 2024-01-05 PROCEDURE — 25500020 PHARM REV CODE 255: Performed by: INTERNAL MEDICINE

## 2024-01-05 PROCEDURE — 71275 CT ANGIOGRAPHY CHEST: CPT | Mod: TC

## 2024-01-05 RX ADMIN — IOHEXOL 150 ML: 350 INJECTION, SOLUTION INTRAVENOUS at 06:01

## 2024-01-18 ENCOUNTER — TELEPHONE (OUTPATIENT)
Dept: CARDIOLOGY | Facility: CLINIC | Age: 59
End: 2024-01-18

## 2024-01-18 ENCOUNTER — TELEPHONE (OUTPATIENT)
Dept: PULMONOLOGY | Facility: CLINIC | Age: 59
End: 2024-01-18

## 2024-01-18 NOTE — TELEPHONE ENCOUNTER
----- Message from Florence Truong sent at 1/18/2024 10:02 AM CST -----  Type:  Needs Medical Advice    Who Called: [facility]   Symptoms (please be specific): needing office visit notes from November to current    Would the patient rather a call back or a response via [facility]?    Best Call Back Number: fax [phone] fernanda MCKENZIE  Additional Information: [phone] phone

## 2024-01-18 NOTE — TELEPHONE ENCOUNTER
Spoke with Ms.Megan Hugo Rodríguez.  I advised  I will fax over patient clinical notes.   verbalized she understands.

## 2024-01-18 NOTE — TELEPHONE ENCOUNTER
----- Message from Florence Truong sent at 1/18/2024  9:58 AM CST -----  Contact: Viktoria--nurse  Type:  Needs Medical Advice    Who Called: [facility]   Symptoms (please be specific): needing office visit notes from November to current    Would the patient rather a call back or a response via [facility]?    Best Call Back Number: fax [phone] attention Viktoria MCKENZIE  Additional Information: [phone] phone          Pt was advised that she need to go to [facility] and sign a release so that we can send them our notes.     Tonja  Medical Assistant for  Stanley Bañuelos, BERTA  [phone]  [facility]  [address] [address]

## 2024-02-01 ENCOUNTER — OFFICE VISIT (OUTPATIENT)
Dept: CARDIOLOGY | Facility: CLINIC | Age: 59
End: 2024-02-01
Payer: COMMERCIAL

## 2024-02-01 VITALS
SYSTOLIC BLOOD PRESSURE: 128 MMHG | WEIGHT: 280.88 LBS | BODY MASS INDEX: 49.77 KG/M2 | DIASTOLIC BLOOD PRESSURE: 68 MMHG | OXYGEN SATURATION: 93 % | HEIGHT: 63 IN | HEART RATE: 83 BPM

## 2024-02-01 DIAGNOSIS — E78.2 MIXED HYPERLIPIDEMIA: ICD-10-CM

## 2024-02-01 DIAGNOSIS — R55 SYNCOPE, UNSPECIFIED SYNCOPE TYPE: ICD-10-CM

## 2024-02-01 DIAGNOSIS — R51.9 NONINTRACTABLE HEADACHE, UNSPECIFIED CHRONICITY PATTERN, UNSPECIFIED HEADACHE TYPE: ICD-10-CM

## 2024-02-01 DIAGNOSIS — I50.32 CHRONIC DIASTOLIC HEART FAILURE: ICD-10-CM

## 2024-02-01 DIAGNOSIS — E66.01 MORBID OBESITY WITH BMI OF 45.0-49.9, ADULT: ICD-10-CM

## 2024-02-01 DIAGNOSIS — G47.33 OBSTRUCTIVE SLEEP APNEA SYNDROME: ICD-10-CM

## 2024-02-01 DIAGNOSIS — E11.9 TYPE 2 DIABETES MELLITUS WITHOUT COMPLICATION, WITHOUT LONG-TERM CURRENT USE OF INSULIN: ICD-10-CM

## 2024-02-01 DIAGNOSIS — I10 ESSENTIAL HYPERTENSION: Primary | ICD-10-CM

## 2024-02-01 PROCEDURE — 99999 PR PBB SHADOW E&M-EST. PATIENT-LVL III: CPT | Mod: PBBFAC,,,

## 2024-02-01 PROCEDURE — 99213 OFFICE O/P EST LOW 20 MIN: CPT | Mod: PBBFAC,PN

## 2024-02-01 PROCEDURE — 99214 OFFICE O/P EST MOD 30 MIN: CPT | Mod: S$GLB,,,

## 2024-02-01 RX ORDER — ATORVASTATIN CALCIUM 20 MG/1
20 TABLET, FILM COATED ORAL DAILY
Qty: 90 TABLET | Refills: 3 | Status: SHIPPED | OUTPATIENT
Start: 2024-02-01

## 2024-02-01 RX ORDER — ASPIRIN 81 MG/1
81 TABLET ORAL DAILY
Qty: 90 TABLET | Refills: 3 | Status: SHIPPED | OUTPATIENT
Start: 2024-02-01 | End: 2025-01-31

## 2024-02-01 NOTE — ASSESSMENT & PLAN NOTE
-Cardiac echo 11/27/23-  LVEF 55% , and normal  diastolic function  -continue medical therapy: losartan 100 mg, furosemide  to 40 mg BID  w KCL 10 meq ( last K+ 3.6)   -continue HTN management   -discussed lifestyle modifications  -weight self daily, notify office if > 3 pound weight gain in 1 day or 5 pounds in 1 week

## 2024-02-01 NOTE — ASSESSMENT & PLAN NOTE
-Cardiac event monitor 8/29/23- negative event monitor w no clinical arrhythmias   -CTA chest: 1/5/24  Impression:     Assessment of pulmonary arteries is severely limited due to poor contrast bolus timing.  No obvious large central pulmonary arterial filling defect.

## 2024-02-01 NOTE — ASSESSMENT & PLAN NOTE
-Goal BP < 130/80  - improved- home -130s/80-90s  -in office   -continue medical therapy: losartan 100 mg, Amlodipine 10 mg , furosemide 40 mg   -check BP twice daily and maintain log, take AM BP 1-2 Hours after medication   -discussed lifestyle modifications- diet, exercise, weight loss

## 2024-02-01 NOTE — ASSESSMENT & PLAN NOTE
-continues to report headache  -started on  low dose amitriptyline for possible post concussive HA, by Neurology  -she reports left neck pain and spasms to 4th & 5 th digits, she has had MRI cervical spine  12/11/23and  instructed to contact Neurology      -MRI cervical spine 12/11/23  Impression:     Multilevel degenerative changes of the cervical spine are most pronounced at C4-C5 with minor spinal canal stenosis and mild left-sided neural foraminal stenosis.     Mild left-sided C2-C3 and C3-C4 neural foraminal stenosis.

## 2024-02-01 NOTE — PROGRESS NOTES
Subjective:    Patient ID:  Barrera Siddiqui is a 58 y.o. female who presents for evaluation of No chief complaint on file.      PCP: Evelin Guevara, NP     Referring Provider: Rosemarie Brizuela MD    HPI: Patient is a 57 yo F w/PMH of HTN, HLD, TIA, DM-2, morbid obesity,  and depression [facility]. She was last seen on 8/28/23  for initial evaluation post admission 2/2 syncopal episode. She reported to ED on 8/15/23 post syncopal episode at work. Admitted 8/15/23-8/16/23 negative MRI  and CTA Head and neck. She denies any syncopal episodes since discharge. She notes left sided headache since her syncopal episode.  She is also followed by Neurology, seen on 9/25/23  and was started on low dose amitriptyline for possible post concussive HA. She denies CP, SOB, palpitations, orthopnea, PND, pre-syncope, LOC, swelling, or claudication. She notes medication compliance without side effects. She monitor her home BP and ranges 130s-150s/80-90s. She does not exercise regularly, but is active at work and walks at lot.     11/20/23: Patient presents today for f/u for HTN management. She was last seen on 10/9/23 for f/u appt and was continued on medical therapy and amlodipine was increased to 10 mg daily. He notes past 3-4 days increased SOB and off balance feeling. She is also followed by Neurology post fall and syncopal episode. She notes 3 pillow orthopnea, increased from 2 pillows 2 nights ago. She is also positive for PND. She also notes a 4 pound weight gain.  She denies CP, SOB, palpitations, PND, pre-syncope, LOC, swelling, or claudication. She notes medication compliance without side effects. She monitor her home BP and ranges 120s-140s/80-90s. She does not exercise regularly.      12/20/23: Patient presents today for f/u for HTN management. She was last seen on 11/20/23 for f/u appt and was continued on medical therapy She was referred to Pulmonary medicine and has an upcoming appt w PFTs. She is  also followed by Neurology post fall and syncopal episode. She noted 3 pillow orthopnea, increased from 2 pillows 2 nights ago. She is also positive for PND. She also notes a 4 pound weight gain and was started on furosemide. She noted a 3 pound weight loss and decreased coughing at night since starting furosemide. She continue to report orthopnea. She continues to note SAWYER. She denies CP, palpitations, PND, pre-syncope, LOC, swelling, or claudication. She notes medication compliance without side effects. She monitor her home BP and ranges 120s-130s/80-90s. She does not exercise regularly.      2/1/24:Patient presents today for f/u for HTN management. She was last seen on 12/20/23  for f/u appt and was continued on medical therapy. Furosemide was increased to 40 mg BID. She is followed by  Pulmonary medicine and Neurology post fall and syncopal episode.  Patient reports neck and left hand pain, with cramping and spasms to 4th & 5th digit.  She continue to report orthopnea. She is also positive for PND.  She continues to note SAWYER. She denies CP, palpitations, PND, pre-syncope, LOC, swelling, or claudication. She notes medication compliance without side effects. She monitor her home BP and ranges 120s-130s/80-90s. She does not exercise regularly.         Past Medical History:   Diagnosis Date    Depression     Diabetes mellitus     High cholesterol     Hypertension     Stroke     TIA (transient ischemic attack)      Past Surgical History:   Procedure Laterality Date    CHOLECYSTECTOMY      COLONOSCOPY N/A 10/18/2017    Procedure: COLONOSCOPY;  Surgeon: Donald Wright Jr., MD;  Location: [facility];  Service: Endoscopy;  Laterality: N/A;    KNEE SURGERY      TUBAL LIGATION      WRIST SURGERY       Social History     Socioeconomic History    Marital status: Single   Tobacco Use    Smoking status: Never    Smokeless tobacco: Never   Substance and Sexual Activity    Alcohol use: Yes     Comment: rare    Drug use: No     Sexual activity: Yes     Partners: Male     Social Determinants of Health     Financial Resource Strain: Medium Risk (11/24/2023)    Overall Financial Resource Strain (CARDIA)     Difficulty of Paying Living Expenses: Somewhat hard   Food Insecurity: Food Insecurity Present (11/24/2023)    Hunger Vital Sign     Worried About Running Out of Food in the Last Year: Sometimes true     Ran Out of Food in the Last Year: Sometimes true   Transportation Needs: No Transportation Needs (11/24/2023)    PRAPARE - Transportation     Lack of Transportation (Medical): No     Lack of Transportation (Non-Medical): No   Physical Activity: Inactive (11/24/2023)    Exercise Vital Sign     Days of Exercise per Week: 0 days     Minutes of Exercise per Session: 30 min   Stress: No Stress Concern Present (11/24/2023)    [place] [employer] - Occupational Stress Questionnaire     Feeling of Stress : Only a little   Social Connections: Moderately Isolated (11/24/2023)    Social Connection and Isolation Panel [NHANES]     Frequency of Communication with Friends and Family: Twice a week     Frequency of Social Gatherings with Friends and Family: Once a week     Attends Ep[employer] Services: 1 to 4 times per year     Active Member of Clubs or Organizations: No     Attends Club or Organization Meetings: Never     Marital Status:    Housing Stability: High Risk (11/24/2023)    Housing Stability Vital Sign     Unable to Pay for Housing in the Last Year: Yes     Number of Places Lived in the Last Year: 1     Unstable Housing in the Last Year: No     Family History   Problem Relation Age of Onset    Breast cancer Mother     Liver disease Maternal Grandmother        Review of patient's allergies indicates:  No Known Allergies    Medication List with Changes/Refills   Current Medications    AMITRIPTYLINE (ELAVIL) 25 MG TABLET    Take 1 tablet (25 mg total) by mouth nightly as needed for Insomnia.    AMLODIPINE (NORVASC) 10 MG  TABLET    Take 1 tablet (10 mg total) by mouth once daily.    FUROSEMIDE (LASIX) 40 MG TABLET    Take 1 tablet (40 mg total) by mouth 2 (two) times daily.    GLIPIZIDE (GLUCOTROL) 10 MG TABLET    Take 10 mg by mouth 2 (two) times daily.    LOSARTAN (COZAAR) 100 MG TABLET    Take 1 tablet (100 mg total) by mouth once daily.    METFORMIN (GLUCOPHAGE-XR) 500 MG ER 24HR TABLET    Take 500 mg by mouth 2 (two) times daily.    MULTIVITAMIN (THERAGRAN) PER TABLET    Take 1 tablet by mouth once daily.    OZEMPIC 0.25 MG OR 0.5 MG (2 MG/3 ML) PEN INJECTOR    Inject into the skin.    POTASSIUM CHLORIDE (KLOR-CON) 10 MEQ TBSR    Take 10 mEq by mouth 2 (two) times daily.   Changed and/or Refilled Medications    Modified Medication Previous Medication    ASPIRIN (ECOTRIN) 81 MG EC TABLET aspirin (ECOTRIN) 81 MG EC tablet       Take 1 tablet (81 mg total) by mouth once daily.    Take 1 tablet (81 mg total) by mouth once daily.    ATORVASTATIN (LIPITOR) 20 MG TABLET atorvastatin (LIPITOR) 20 MG tablet       Take 1 tablet (20 mg total) by mouth once daily.    Take 20 mg by mouth once daily.       Review of Systems   Constitutional: Negative for diaphoresis and fever.   HENT:  Negative for congestion and hearing loss.    Eyes:  Negative for blurred vision and pain.   Cardiovascular:  Positive for orthopnea and paroxysmal nocturnal dyspnea. Negative for chest pain, claudication, dyspnea on exertion, leg swelling, near-syncope, palpitations and syncope.   Respiratory:  Positive for shortness of breath, sleep disturbances due to breathing and snoring.    Hematologic/Lymphatic: Negative for bleeding problem. Does not bruise/bleed easily.   Skin:  Negative for color change and poor wound healing.   Musculoskeletal:  Positive for joint pain and neck pain.   Gastrointestinal:  Negative for abdominal pain and nausea.   Genitourinary:  Negative for bladder incontinence and flank pain.   Neurological:  Positive for disturbances in coordination  and headaches. Negative for focal weakness and light-headedness.        Objective:   LMP 07/29/2018 (Exact Date)    Physical Exam  Constitutional:       Appearance: She is well-developed. She is not diaphoretic.   HENT:      Head: Normocephalic and atraumatic.   Eyes:      General: No scleral icterus.     Pupils: Pupils are equal, round, and reactive to light.   Neck:      Vascular: No JVD.   Cardiovascular:      Rate and Rhythm: Normal rate and regular rhythm.      Pulses: Intact distal pulses.           Radial pulses are 2+ on the right side and 2+ on the left side.        Dorsalis pedis pulses are 2+ on the right side and 2+ on the left side.        Posterior tibial pulses are 2+ on the right side and 2+ on the left side.      Heart sounds: S1 normal and S2 normal. No murmur heard.     No friction rub. No gallop.   Pulmonary:      Effort: Pulmonary effort is normal. No respiratory distress.      Breath sounds: Normal breath sounds. No wheezing or rales.   Chest:      Chest wall: No tenderness.   Abdominal:      General: Bowel sounds are normal. There is no distension.      Palpations: Abdomen is soft. There is no mass.      Tenderness: There is no abdominal tenderness. There is no rebound.   Musculoskeletal:         General: No tenderness. Normal range of motion.      Cervical back: Normal range of motion and neck supple.   Skin:     General: Skin is warm and dry.      Coloration: Skin is not pale.   Neurological:      Mental Status: She is alert and oriented to person, place, and time.      Coordination: Coordination normal.      Deep Tendon Reflexes: Reflexes normal.   Psychiatric:         Behavior: Behavior normal.         Judgment: Judgment normal.             Cardiac echo 11/27/23  Summary         Left Ventricle: The left ventricle is normal in size. Normal wall thickness. There is concentric remodeling. Normal wall motion. There is normal systolic function. Ejection fraction by visual approximation is 55%.  There is normal diastolic function.    Right Ventricle: Normal right ventricular cavity size. Wall thickness is normal. Right ventricle wall motion  is normal. Systolic function is normal.    Mitral Valve: There is mild posterior mitral annular calcification present.    Pulmonary Artery: The estimated pulmonary artery systolic pressure is 17 mmHg.    IVC/SVC: Normal venous pressure at 3 mmHg.  Cardiac event monitor 8/29/23  Conclusion         Negative event monitor with no clinical arrhythmias.    Symptoms corresponding with normal sinus rhythm/tachycardia  bpm.       EKG  8/15/23 reviewed- NSR w nonspecific T wave abnormality     Cardiac echo 8/15/23  Summary         Left Ventricle: The left ventricle is normal in size. Normal wall thickness. There is normal systolic function. There is normal diastolic function.    Left Atrium: Left atrium is mildly dilated.    Right Ventricle: Normal right ventricular cavity size.    Aortic Valve: The aortic valve is a trileaflet valve.    Mitral Valve: Mild posterior mitral annular calcification.    IVC/SVC: Normal venous pressure at 3 mmHg.    MRI Brain 8/16/23  Impression:     No acute abnormality.     T2 hyperintensities noted at the gray-white junction bilaterally similar and number and distribution to the 2020 exam.  Findings may represent sequelae microvascular ischemic change.       CTA head and neck 8/15/23    Impression:     No acute abnormality. No high-grade stenosis or major vessel occlusion  Cardiac echo 4/9/2016  CONCLUSIONS     1 - Normal left ventricular systolic function (EF 60-65%).     2 - Left ventricular diastolic dysfunction.     3 - Normal right ventricular systolic function      Assessment:       1. Essential hypertension    2. Chronic diastolic heart failure    3. Mixed hyperlipidemia    4. Type 2 diabetes mellitus without complication, without long-term current use of insulin    5. Morbid obesity with BMI of 45.0-49.9, adult    6. Syncope,  unspecified syncope type    7. Obstructive sleep apnea syndrome    8. Nonintractable headache, unspecified chronicity pattern, unspecified headache type             Plan:         Essential hypertension  -Goal BP < 130/80  - improved- home -130s/80-90s  -in office   -continue medical therapy: losartan 100 mg, Amlodipine 10 mg , furosemide 40 mg   -check BP twice daily and maintain log, take AM BP 1-2 Hours after medication   -discussed lifestyle modifications- diet, exercise, weight loss     Diastolic heart failure  -Cardiac echo 11/27/23-  LVEF 55% , and normal  diastolic function  -continue medical therapy: losartan 100 mg, furosemide  to 40 mg BID  w KCL 10 meq ( last K+ 3.6)   -continue HTN management   -discussed lifestyle modifications  -weight self daily, notify office if > 3 pound weight gain in 1 day or 5 pounds in 1 week     Hyperlipidemia  -Last LDL 66.4 8/15/23  -continue statin therapy- atorvastatin 20 mg   -discussed lifestyle modifications     Type 2 diabetes mellitus  -Uncontrolled  -A1c 9.1 8/15/23  -managed by PCP  -continue medical therapy- glipizide and ozempic      Morbid obesity with BMI of 45.0-49.9, adult  -BMI 49.73  -discussed lifestyle modifications  -discussed health risk associated w obesity     Syncope  -Cardiac event monitor 8/29/23- negative event monitor w no clinical arrhythmias   -CTA chest: 1/5/24  Impression:     Assessment of pulmonary arteries is severely limited due to poor contrast bolus timing.  No obvious large central pulmonary arterial filling defect.       Sleep apnea  -Referred to sleep medicine- appt. Scheduled for 4/9/24    Headache  -continues to report headache  -started on  low dose amitriptyline for possible post concussive HA, by Neurology  -she reports left neck pain and spasms to 4th & 5 th digits, she has had MRI cervical spine  12/11/23and  instructed to contact Neurology      -MRI cervical spine 12/11/23  Impression:     Multilevel degenerative changes  of the cervical spine are most pronounced at C4-C5 with minor spinal canal stenosis and mild left-sided neural foraminal stenosis.     Mild left-sided C2-C3 and C3-C4 neural foraminal stenosis.              Total duration of face to face visit time 30 minutes.  Total time spent counseling greater than fifty percent of total visit time.  Counseling included discussion regarding imaging findings, diagnosis, possibilities, treatment options, risks and benefits.  The patient had many questions regarding the options and long-term effects      Stanley Bañuelos Np  Cardiology

## 2024-02-05 ENCOUNTER — TELEPHONE (OUTPATIENT)
Dept: CARDIOLOGY | Facility: CLINIC | Age: 59
End: 2024-02-05

## 2024-02-05 ENCOUNTER — PATIENT MESSAGE (OUTPATIENT)
Dept: CARDIOLOGY | Facility: CLINIC | Age: 59
End: 2024-02-05

## 2024-02-05 NOTE — TELEPHONE ENCOUNTER
Mrs. Siddiqui sent a my chart message for NP Stanley Bañuelos to call her. I called and she stated she is out of work LEAVE of [facility] with another Dr. BUT Hugo wants Stanley to call them and answer 2 questions.       [facility]- [phone]    Tonja  Medical Assistant

## 2024-02-06 ENCOUNTER — PATIENT MESSAGE (OUTPATIENT)
Dept: CARDIOLOGY | Facility: CLINIC | Age: 59
End: 2024-02-06

## 2024-02-14 ENCOUNTER — TELEPHONE (OUTPATIENT)
Dept: NEUROLOGY | Facility: CLINIC | Age: 59
End: 2024-02-14

## 2024-02-14 ENCOUNTER — OFFICE VISIT (OUTPATIENT)
Dept: NEUROLOGY | Facility: CLINIC | Age: 59
End: 2024-02-14
Payer: COMMERCIAL

## 2024-02-14 VITALS
HEIGHT: 63 IN | BODY MASS INDEX: 49.98 KG/M2 | HEART RATE: 88 BPM | WEIGHT: 282.06 LBS | DIASTOLIC BLOOD PRESSURE: 92 MMHG | SYSTOLIC BLOOD PRESSURE: 148 MMHG

## 2024-02-14 DIAGNOSIS — M54.12 CERVICAL RADICULOPATHY: Primary | ICD-10-CM

## 2024-02-14 PROCEDURE — 99213 OFFICE O/P EST LOW 20 MIN: CPT | Mod: S$GLB,,, | Performed by: PSYCHIATRY & NEUROLOGY

## 2024-02-14 PROCEDURE — 99213 OFFICE O/P EST LOW 20 MIN: CPT | Mod: PBBFAC | Performed by: PSYCHIATRY & NEUROLOGY

## 2024-02-14 PROCEDURE — 99999 PR PBB SHADOW E&M-EST. PATIENT-LVL III: CPT | Mod: PBBFAC,,, | Performed by: PSYCHIATRY & NEUROLOGY

## 2024-02-14 RX ORDER — GABAPENTIN 100 MG/1
300 CAPSULE ORAL 3 TIMES DAILY
Qty: 270 CAPSULE | Refills: 11 | Status: SHIPPED | OUTPATIENT
Start: 2024-02-14 | End: 2024-04-11

## 2024-02-14 NOTE — PROGRESS NOTES
"  Barrera Siddiqui is a 58 y.o. year old female that  presents for routine neurology follow up and review of MRI cervical spine.    HPI:  Ms Siddiqui has HTN, HLD, DM, morbid obesity, TIA,,depression, and chronic HA following mild head trauma at the time of syncopal episode August/2023. Importantly, syncope work up included MRI brain and CTA brain and neck that were unrevealing.  I last saw her on 11/30/23 regarding persistent head/neck pain and requested MRI cervical spine searching for a pain generator in the neck. In that regard, the study showed " multilevel degenerative changes of the cervical spine are most pronounced at C4-C5 with minor spinal canal stenosis and mild left-sided neural foraminal stenosis and mild left-sided C2-C3 and C3-C4 neural foraminal stenosis.  She indicated that the pain starts in the left side of the neck, goes to the L head and then comes back to the neck and travels to the fingers L hand, mainly the L thumb, index and ring fingers.   Currently on amitriptyline 25 mg which provides marginal headache control.  She was recently seen by cardiology and sleep study was recommended due to concern for EDGAR.  Otherwise, she reports no new neurological developments.     Past Medical History:   Diagnosis Date    Depression     Diabetes mellitus     High cholesterol     Hypertension     Stroke     TIA (transient ischemic attack)      Social History     Socioeconomic History    Marital status: Single   Tobacco Use    Smoking status: Never    Smokeless tobacco: Never   Substance and Sexual Activity    Alcohol use: Yes     Comment: rare    Drug use: No    Sexual activity: Yes     Partners: Male     Social Determinants of Health     Financial Resource Strain: Medium Risk (11/24/2023)    Overall Financial Resource Strain (CARDIA)     Difficulty of Paying Living Expenses: Somewhat hard   Food Insecurity: Food Insecurity Present (11/24/2023)    Hunger Vital Sign     Worried About Running Out of Food in the Last " Year: Sometimes true     Ran Out of Food in the Last Year: Sometimes true   Transportation Needs: No Transportation Needs (11/24/2023)    PRAPARE - Transportation     Lack of Transportation (Medical): No     Lack of Transportation (Non-Medical): No   Physical Activity: Inactive (11/24/2023)    Exercise Vital Sign     Days of Exercise per Week: 0 days     Minutes of Exercise per Session: 30 min   Stress: No Stress Concern Present (11/24/2023)    [place] [employer] - Occupational Stress Questionnaire     Feeling of Stress : Only a little   Social Connections: Moderately Isolated (11/24/2023)    Social Connection and Isolation Panel [NHANES]     Frequency of Communication with Friends and Family: Twice a week     Frequency of Social Gatherings with Friends and Family: Once a week     Attends [employer] Services: 1 to 4 times per year     Active Member of Clubs or Organizations: No     Attends Club or Organization Meetings: Never     Marital Status:    Housing Stability: High Risk (11/24/2023)    Housing Stability Vital Sign     Unable to Pay for Housing in the Last Year: Yes     Number of Places Lived in the Last Year: 1     Unstable Housing in the Last Year: No     Past Surgical History:   Procedure Laterality Date    CHOLECYSTECTOMY      COLONOSCOPY N/A 10/18/2017    Procedure: COLONOSCOPY;  Surgeon: Donald Wright Jr., MD;  Location: [facility];  Service: Endoscopy;  Laterality: N/A;    KNEE SURGERY      TUBAL LIGATION      WRIST SURGERY       Family History   Problem Relation Age of Onset    Breast cancer Mother     Liver disease Maternal Grandmother            Review of Systems  General ROS: negative for chills, fever or weight loss  Psychological ROS: negative for hallucination, POSITIVE for depression, no suicidal ideation  Ophthalmic ROS: negative for blurry vision, photophobia or eye pain  ENT ROS: negative for epistaxis, sore throat or rhinorrhea  Respiratory ROS: no cough,  "shortness of breath, or wheezing  Cardiovascular ROS: no chest pain or dyspnea on exertion  Gastrointestinal ROS: no abdominal pain, change in bowel habits, or black/ bloody stools  Genito-Urinary ROS: no dysuria, trouble voiding, or hematuria  Musculoskeletal ROS: negative for gait disturbance or muscular weakness  Neurological ROS: no syncope or seizures; no ataxia  Dermatological ROS: negative for pruritis, rash and jaundice      Physical Exam:  BP (!) 148/92 (BP Location: Left arm, Patient Position: Sitting, BP Method: Large (Automatic))   Pulse 88   Ht 5' 3" (1.6 m)   Wt 128 kg (282 lb 1.3 oz)   LMP 07/29/2018 (Exact Date)   BMI 49.97 kg/m²   General appearance: alert, cooperative, no distress  Constitutional:Oriented to person, place, and time.appears well-developed and well-nourished.   HEENT: Normocephalic, atraumatic, neck symmetrical, no nasal discharge   Eyes: conjunctivae/corneas clear, PERRL, EOM's intact  Lungs: clear to auscultation bilaterally, no dullness to percussion bilaterally  Heart: regular rate and rhythm without rub; no displacement of the PMI   Abdomen: soft, non-tender; bowel sounds normoactive; no organomegaly  Extremities: extremities symmetric; no clubbing, cyanosis, or edema  Integument: Skin color, texture, turgor normal; no rashes; hair distrubution normal  Neurologic:   Mental status: alert and awake, oriented x 4, comprehension, naming, and repetition intact. No right to left confusion. Performs serial 7's without difficulty .No dysarthria.  CN 2-12: pupils 4 mm bilaterally, reactive to light. Fundi without papilledema. Visual fields full to confrontation. EOM full without nystagmus. Face sensation normal in all distributions. Face symmetric. Hearing grossly intact. Palate elevates well. Tongue midline without atrophy or fasciculations.  Motor: 5/5 all over  Sensory: intact in all modalities.  DTR's: 1+ all over.  Plantars: no tested.  Coordination: finger to nose and " heel-knee-shin intact.  Gait: no ataxia or bradykinesia           LABS:    Complete Blood Count  Lab Results   Component Value Date    RBC 4.68 12/26/2023    HGB 12.5 12/26/2023    HCT 38.8 12/26/2023    MCV 83 12/26/2023    MCH 26.7 (L) 12/26/2023    MCHC 32.2 12/26/2023    RDW 13.6 12/26/2023     12/26/2023    MPV 12.1 12/26/2023    GRAN 4.2 12/26/2023    GRAN 47.5 12/26/2023    LYMPH 3.7 12/26/2023    LYMPH 42.7 12/26/2023    MONO 0.5 12/26/2023    MONO 6.1 12/26/2023    EOS 0.2 12/26/2023    BASO 0.07 12/26/2023    EOSINOPHIL 2.6 12/26/2023    BASOPHIL 0.8 12/26/2023    DIFFMETHOD Automated 12/26/2023       Comprehensive Metabolic Panel  Lab Results   Component Value Date     (H) 12/26/2023    BUN 18 12/26/2023    CREATININE 1.0 12/26/2023     (L) 12/26/2023    K 3.5 12/26/2023    CL 99 12/26/2023    PROT 9.3 (H) 12/26/2023    ALBUMIN 3.8 12/26/2023    BILITOT 0.6 12/26/2023    AST 95 (H) 12/26/2023    ALKPHOS 118 12/26/2023    CO2 21 (L) 12/26/2023    ALT 60 (H) 12/26/2023    ANIONGAP 15 12/26/2023    EGFRNONAA >60.0 06/18/2022    ESTGFRAFRICA >60.0 06/18/2022       TSH  Lab Results   Component Value Date    TSH 2.860 08/15/2023         Assessment:  pleasant 57 y/o with HTN, HLD, DM, morbid obesity, TIA,,depression, and chronic HA/neck pain following mild head trauma at the time of syncopal episode August/2023.  Neuro exam is unimpressive but pattern of neck/head/L UE pain in conjunction with MRI neck findings are highly suggestive of a cervical radiculopathy.  Will get electrophysiological testing.  Trial of gabapentin.           No diagnosis found.  There were no encounter diagnoses.      Plan:  1) Chronic head/neck pain: as above  2) HTN  3) HLD  4) DM  5) Morbid obesity  6) TIA  7) Depression              No orders of the defined types were placed in this encounter.          Alok Batista MD

## 2024-02-14 NOTE — TELEPHONE ENCOUNTER
----- Message from Florence Truong sent at 1/18/2024 10:01 AM CST -----  Type:  Needs Medical Advice    Who Called: [facility]   Symptoms (please be specific): needing office visit notes from November to current    Would the patient rather a call back or a response via [facility]?    Best Call Back Number: fax [phone] fernanda MCKENZIE  Additional Information: [phone] phone

## 2024-02-14 NOTE — TELEPHONE ENCOUNTER
Spoke to patient to schedule EMG Procedure. We scheduled and confirmed for March 15th for 9am here at the [facility] [place]. We went over the details and per patient's request she was added to the waiting list. An appointment letter has been printed and placed in the mail.

## 2024-02-14 NOTE — TELEPHONE ENCOUNTER
----- Message from Dina Mendoza MA sent at 2/14/2024 10:27 AM CST -----  Regarding: EMG Scheduling  Good morning Agustin,     Please schedule this patient for an EMG, per provider.     Donald- your are attached to follow the patient in case they call back looking for an update on the appointment.     Jason

## 2024-03-12 ENCOUNTER — OFFICE VISIT (OUTPATIENT)
Dept: SLEEP MEDICINE | Facility: CLINIC | Age: 59
End: 2024-03-12
Payer: COMMERCIAL

## 2024-03-12 VITALS
WEIGHT: 277.31 LBS | SYSTOLIC BLOOD PRESSURE: 136 MMHG | HEIGHT: 63 IN | HEART RATE: 99 BPM | OXYGEN SATURATION: 98 % | DIASTOLIC BLOOD PRESSURE: 80 MMHG | BODY MASS INDEX: 49.14 KG/M2

## 2024-03-12 DIAGNOSIS — E66.01 MORBID OBESITY: Primary | ICD-10-CM

## 2024-03-12 DIAGNOSIS — R06.83 SNORING: ICD-10-CM

## 2024-03-12 DIAGNOSIS — G47.30 SLEEP APNEA, UNSPECIFIED TYPE: ICD-10-CM

## 2024-03-12 DIAGNOSIS — G47.19 EXCESSIVE DAYTIME SLEEPINESS: ICD-10-CM

## 2024-03-12 PROCEDURE — 4010F ACE/ARB THERAPY RXD/TAKEN: CPT | Mod: ,,, | Performed by: INTERNAL MEDICINE

## 2024-03-12 PROCEDURE — 99214 OFFICE O/P EST MOD 30 MIN: CPT | Mod: S$PBB,,, | Performed by: INTERNAL MEDICINE

## 2024-03-12 PROCEDURE — 99999 PR PBB SHADOW E&M-EST. PATIENT-LVL IV: CPT | Mod: PBBFAC,,, | Performed by: INTERNAL MEDICINE

## 2024-03-12 NOTE — PROGRESS NOTES
Subjective:   Patient ID: Barrera Siddiqui is a 58 y.o. female    Chief Complaint:   Chief Complaint   Patient presents with    Apnea       Referred by Dr. Addie Sotomayor    HPI  aBrrera Siddiqui is a 58 y.o. female who was referred by Dr. Addie Sotomayor for a sleep evaluation for sleep disordered breathing The patient  has a past medical history of Depression, Diabetes mellitus, High cholesterol, Hypertension, Stroke, and TIA (transient ischemic attack).       The patient reports diurnal fatigue.  The patient reports witnessed snoring noticed by her son. There is  witnessed apneas. She has  awoken from a snore and has  gasping for air.   Snoring is usually of loud intensity.  When she wakes up from sleep, she does  feel un refreshed.     There is also reported dry mouth.        Sleep summary during the workdays per patient report:  Bedtime: 10 PM  Sleep Latency: 60 min  # Awakenings: 4  WASO (wakefulness after sleep onset) a few min  Risetime: 6 AM    Sleep summary during days off per patient report:  Bedtime: 10 PM  Sleep Latency: 60 min  # Awakenings: 4   WASO (wakefulness after sleep onset) a few min  Risetime: 8 AM    Naps are  taken frequently.        CONTRIBUTING and/or CONFOUNDING FACTORS:    Nocturnal pain:   n    Nocturia >2/night:   n  Heartburn/GI pain:   n  Environment:    n  Bed partner noise/movements : n      Patient provided ESS:    [place] Sleepiness Scale TOTAL   (validated sleepiness questionnaire with a higher score indicating greater sleepiness; range 0-24)      3/12/2024     4:56 PM   EPWORTH SLEEPINESS SCALE   Sitting and reading 0   Watching TV 1   Sitting, inactive in a public place (e.g. a theatre or a meeting) 0   As a passenger in a car for an hour without a break 2   Lying down to rest in the afternoon when circumstances permit 2   Sitting and talking to someone 0   Sitting quietly after a lunch without alcohol 2   In a car, while stopped for a few minutes in traffic 0   Total score 7             STOP BANG  questionnaire:    Snoring present: y  Tiredness present:y  Obstruction (apneas/choking episodes): y  Pressure (HTN): y    BMI greater than 35 kg/m2: y  Age greater than 50 years old: y  Neck circumference > than 17 inches if male or > than 16 inches if female : n  Gender being male: n    Total STOP BANG score = 6/8  Low risk EDGAR: 0-2, Intermediate risk EDGAR: 3-4, High risk EDGAR: 5+         No data to display                   No data to display                Most Recent Vital Signs:    The patient's body mass index is 49.13 kg/m².    Wt Readings from Last 5 Encounters:   03/12/24 125.8 kg (277 lb 5.4 oz)   02/14/24 128 kg (282 lb 1.3 oz)   02/01/24 127.4 kg (280 lb 13.9 oz)   01/03/24 127.9 kg (281 lb 15.5 oz)   12/20/23 127.3 kg (280 lb 12.1 oz)     BMI Readings from Last 5 Encounters:   03/12/24 49.13 kg/m²   02/14/24 49.97 kg/m²   02/01/24 49.75 kg/m²   01/03/24 49.95 kg/m²   12/20/23 49.73 kg/m²     Pulse Readings from Last 3 Encounters:   03/12/24 99   02/14/24 88   02/01/24 83         Current Outpatient Medications:     amLODIPine (NORVASC) 10 MG tablet, Take 1 tablet (10 mg total) by mouth once daily., Disp: 90 tablet, Rfl: 3    aspirin (ECOTRIN) 81 MG EC tablet, Take 1 tablet (81 mg total) by mouth once daily., Disp: 90 tablet, Rfl: 3    atorvastatin (LIPITOR) 20 MG tablet, Take 1 tablet (20 mg total) by mouth once daily., Disp: 90 tablet, Rfl: 3    furosemide (LASIX) 40 MG tablet, Take 1 tablet (40 mg total) by mouth 2 (two) times daily., Disp: 60 tablet, Rfl: 11    gabapentin (NEURONTIN) 100 MG capsule, Take 3 capsules (300 mg total) by mouth 3 (three) times daily., Disp: 270 capsule, Rfl: 11    glipiZIDE (GLUCOTROL) 10 MG tablet, Take 10 mg by mouth 2 (two) times daily., Disp: , Rfl:     losartan (COZAAR) 100 MG tablet, Take 1 tablet (100 mg total) by mouth once daily., Disp: 90 tablet, Rfl: 3    metFORMIN (GLUCOPHAGE-XR) 500 MG ER 24hr tablet, Take 500 mg by mouth 2 (two) times daily., Disp: , Rfl:      multivitamin (THERAGRAN) per tablet, Take 1 tablet by mouth once daily., Disp: , Rfl:     OZEMPIC 0.25 mg or 0.5 mg (2 mg/3 mL) pen injector, Inject into the skin., Disp: , Rfl:     potassium chloride (KLOR-CON) 10 MEQ TbSR, Take 10 mEq by mouth 2 (two) times daily., Disp: , Rfl:      Objective:   Vitals reviewed   Physical Exam  Vitals reviewed.   Constitutional:       Appearance: Normal appearance.   HENT:      Head: Normocephalic.      Mouth/Throat:      Comments: Mallampati iv  Pulmonary:      Effort: Pulmonary effort is normal.   Neurological:      General: No focal deficit present.      Mental Status: She is alert and oriented to person, place, and time.   Psychiatric:         Mood and Affect: Mood normal.         Behavior: Behavior normal.         Assessment:     Problem List Items Addressed This Visit          Other    Sleep apnea    Overview     Very high suspicion for EDGAR given her symptoms of waking up gasping for air.  Sleep medicine referral placed              Plan:       Comorbid morbid obesity    Could likely be secondary to sleep disordered breathing of obstructive origin.  Reviewed STOP BANG and ESS.    Patient has been informed about the pathophysiology and prognosis associated with EDGAR and CSA and has been advised to undergo a baseline Polysomnography (PSG) study for further evaluation of his sleep disorder breathing.    Patient understood and agreed to undergo for a PSG study. Will order Home Sleep Study and proceed with further studies or prescription for CPAP as appropriate.  Patient has been advised to loose weight through a diet and exercise plan.    Today's office encounter included review of salient clinical notes from others involved in the care of the patient, discrete laboratory elements and, as available, prior and present intervention for the disturbance of sleep.  The information gleaned was used in establishing the diagnosis and in stratification of disease risk.    The patient has  a disturbance of sleep with clinically relevant potential for adverse behavioral, cardiovascular, and metabolic outcomes.  Untreated, the disturbance of sleep can have significant effect on mental health, clinical health and survival.       Additionally close attention to patient's past medical history as described below were discussed today during the office evaluation.   she  has a past medical history of Depression, Diabetes mellitus, High cholesterol, Hypertension, Stroke, and TIA (transient ischemic attack).       All patient's questions and concerns regarding Sleep Disorder Breathing were addressed during this visit.    Will follow-up patient with results of PSG

## 2024-03-15 ENCOUNTER — PROCEDURE VISIT (OUTPATIENT)
Dept: NEUROLOGY | Facility: CLINIC | Age: 59
End: 2024-03-15
Payer: COMMERCIAL

## 2024-03-15 DIAGNOSIS — M54.12 CERVICAL RADICULOPATHY: ICD-10-CM

## 2024-03-15 PROCEDURE — 95913 NRV CNDJ TEST 13/> STUDIES: CPT | Mod: PBBFAC | Performed by: PSYCHIATRY & NEUROLOGY

## 2024-03-15 PROCEDURE — 95886 MUSC TEST DONE W/N TEST COMP: CPT | Mod: S$GLB,,, | Performed by: PSYCHIATRY & NEUROLOGY

## 2024-03-15 PROCEDURE — 95886 MUSC TEST DONE W/N TEST COMP: CPT | Mod: PBBFAC | Performed by: PSYCHIATRY & NEUROLOGY

## 2024-03-15 PROCEDURE — 95913 NRV CNDJ TEST 13/> STUDIES: CPT | Mod: S$GLB,,, | Performed by: PSYCHIATRY & NEUROLOGY

## 2024-03-18 ENCOUNTER — TELEPHONE (OUTPATIENT)
Dept: SLEEP MEDICINE | Facility: OTHER | Age: 59
End: 2024-03-18

## 2024-03-20 ENCOUNTER — OFFICE VISIT (OUTPATIENT)
Dept: PULMONOLOGY | Facility: CLINIC | Age: 59
End: 2024-03-20
Payer: COMMERCIAL

## 2024-03-20 VITALS
HEART RATE: 106 BPM | WEIGHT: 278.25 LBS | HEIGHT: 63 IN | BODY MASS INDEX: 49.3 KG/M2 | SYSTOLIC BLOOD PRESSURE: 134 MMHG | OXYGEN SATURATION: 99 % | DIASTOLIC BLOOD PRESSURE: 82 MMHG

## 2024-03-20 DIAGNOSIS — K59.00 CONSTIPATION, UNSPECIFIED CONSTIPATION TYPE: ICD-10-CM

## 2024-03-20 DIAGNOSIS — R06.02 SOBOE (SHORTNESS OF BREATH ON EXERTION): Primary | ICD-10-CM

## 2024-03-20 PROCEDURE — 99999 PR PBB SHADOW E&M-EST. PATIENT-LVL III: CPT | Mod: PBBFAC,,, | Performed by: INTERNAL MEDICINE

## 2024-03-20 NOTE — ASSESSMENT & PLAN NOTE
Her main concern today was constipation and unable to eat for 2 days. I advised her to go to the ER.

## 2024-03-20 NOTE — PROGRESS NOTES
Subjective:     Reason for visit: SOB/Syncope    Patient ID:  Barrera Siddiqui is a 58 y.o. female    Interval History   Ms. Siddiqui has since followed up with Dr. Sands and sleep Medicine and has a home sleep study scheduled for .  Her symptoms have not changed however all of her other testing does not reveal a pulmonary cause of her shortness of breath.  I suspect this is all related to untreated sleep apnea as well as her obesity and deconditioning.  Unrelated to her pulmonary issues, today she is having severe neck pain and arm pain as well as severe constipation for the last 3 days.  She reports being unable to eat or drink anything other than water for the last 2 days.  Despite taking excessive amounts laxatives she is unable to pass anything.  We discussed going to the ER and she is going to discuss it with her son.    History:  Ms. Siddiqui is a 57 yo with diabetes, hypertension, depression, obesity, and elevated transaminases that presents for evaluation of ongoing shortness of breath.  She had an episode in 2023 where she lost consciousness at work and had a severe headache and shortness of breath.  She was evaluated at the ER and at that time there were concern for a stroke because she does have a history of a stroke in .  CTA and MRIs done at that time were unchanged did not show evidence of new occlusion or stroke.  She is continued to have headaches on and off and ongoing shortness of breath.  She has had an echo and cardiac evaluation that has been within normal limits.  She has never had a blood clot however she did have a history of a stroke in her mom and sister have had blood clots in the past both are  now.  She is up-to-date on her colonoscopy she did not get a mammogram last year because all of this workup occurred around the time she normally would but previously she has yearly and they were normal.      Additional Pulmonary History:  Childhood Illnesses:  NA  Occupational:   "NA  Environmental:  none  Tobacco/Smoking:  never    Objective:     Vitals:    03/20/24 1331   BP: 134/82   BP Location: Left arm   Patient Position: Sitting   Pulse: 106   SpO2: 99%   Weight: 126.2 kg (278 lb 3.5 oz)   Height: 5' 3" (1.6 m)         Physical Exam  Vitals reviewed.   Constitutional:       General: She is not in acute distress.     Appearance: She is morbidly obese. She is not diaphoretic.   HENT:      Head: Normocephalic and atraumatic.      Right Ear: External ear normal.      Left Ear: External ear normal.   Eyes:      Conjunctiva/sclera: Conjunctivae normal.      Pupils: Pupils are equal, round, and reactive to light.   Neck:      Trachea: No tracheal deviation.   Cardiovascular:      Rate and Rhythm: Normal rate and regular rhythm.      Heart sounds: Normal heart sounds. No murmur heard.  Pulmonary:      Effort: Pulmonary effort is normal. No respiratory distress.      Breath sounds: Normal breath sounds. No stridor. No wheezing or rales.   Abdominal:      General: Bowel sounds are normal. There is no distension.      Palpations: Abdomen is soft.      Tenderness: There is no abdominal tenderness.   Musculoskeletal:         General: Normal range of motion.      Cervical back: Normal range of motion and neck supple.   Skin:     General: Skin is warm and dry.      Findings: No erythema.   Neurological:      Mental Status: She is alert and oriented to person, place, and time.      Gait: Gait is intact.   Psychiatric:         Mood and Affect: Mood and affect normal.         Cognition and Memory: Memory normal.         Judgment: Judgment normal.                Pertinent Studies Reviewed & Interpreted:     Pulmonary Function Tests:   Mild restriction noted on PFTs however that is consistent with her BMI    Echocardiograms:   Results for orders placed during the hospital encounter of 11/27/23    Echo Saline Bubble? No    Interpretation Summary    Left Ventricle: The left ventricle is normal in size. " Normal wall thickness. There is concentric remodeling. Normal wall motion. There is normal systolic function. Ejection fraction by visual approximation is 55%. There is normal diastolic function.    Right Ventricle: Normal right ventricular cavity size. Wall thickness is normal. Right ventricle wall motion  is normal. Systolic function is normal.    Mitral Valve: There is mild posterior mitral annular calcification present.    Pulmonary Artery: The estimated pulmonary artery systolic pressure is 17 mmHg.    IVC/SVC: Normal venous pressure at 3 mmHg.        Assessment & Plan:       Problem List Items Addressed This Visit          Cardiac/Vascular    SOBOE (shortness of breath on exertion) - Primary    Overview     CTA negative and lung parenchyma with no evidence of ILD or other issue  I am concerned that her SOB is primarily due to her obesity and EDGAR.   She is established with Dr. Sands now and has a home sleep study scheduled on April second.   She can follow up as needed with me.             GI    Constipation    Current Assessment & Plan     Her main concern today was constipation and unable to eat for 2 days. I advised her to go to the ER.                RETURN TO CLINIC PRN       Portions of the record may have been created with voice-recognition software. Occasional wrong-word or sound-a-like substitutions may have occurred due to the inherent limitations of voice-recognition software. Read the chart carefully and recognize, using context, where substitutions have occurred.    Addie Sotomayor M.D.  Pulmonary/Critical Care

## 2024-03-21 ENCOUNTER — TELEPHONE (OUTPATIENT)
Dept: NEUROLOGY | Facility: CLINIC | Age: 59
End: 2024-03-21

## 2024-03-21 NOTE — TELEPHONE ENCOUNTER
----- Message from Yolette Restrepo sent at 3/21/2024  3:32 PM CDT -----  Regarding: Results  Contact: pt [phone]  Pt calling to discuss results of EMG. Pt also states medication is not working.

## 2024-04-02 ENCOUNTER — TELEPHONE (OUTPATIENT)
Dept: NEUROLOGY | Facility: CLINIC | Age: 59
End: 2024-04-02

## 2024-04-02 ENCOUNTER — HOSPITAL ENCOUNTER (OUTPATIENT)
Dept: SLEEP MEDICINE | Facility: HOSPITAL | Age: 59
Discharge: HOME | End: 2024-04-02
Attending: INTERNAL MEDICINE
Payer: COMMERCIAL

## 2024-04-02 DIAGNOSIS — R06.83 SNORING: ICD-10-CM

## 2024-04-02 DIAGNOSIS — G47.30 SLEEP APNEA, UNSPECIFIED TYPE: ICD-10-CM

## 2024-04-02 DIAGNOSIS — E66.01 MORBID OBESITY: ICD-10-CM

## 2024-04-02 DIAGNOSIS — G47.19 EXCESSIVE DAYTIME SLEEPINESS: ICD-10-CM

## 2024-04-02 PROCEDURE — 95800 SLP STDY UNATTENDED: CPT

## 2024-04-02 NOTE — TELEPHONE ENCOUNTER
----- Message from Deisy Mccray sent at 4/2/2024 11:33 AM CDT -----  Regarding: Results. plan of care and appt  Contact: [phone]  Pt is following up on a message that was sent on 3/21 regarding her results and medication adjustment. The pt states she has been in pain and would like to be seen. No appts were available.

## 2024-04-04 ENCOUNTER — TELEPHONE (OUTPATIENT)
Dept: NEUROLOGY | Facility: CLINIC | Age: 59
End: 2024-04-04

## 2024-04-04 DIAGNOSIS — M54.2 NECK PAIN: Primary | ICD-10-CM

## 2024-04-04 RX ORDER — GABAPENTIN 600 MG/1
600 TABLET ORAL 3 TIMES DAILY
Qty: 90 TABLET | Refills: 11 | Status: SHIPPED | OUTPATIENT
Start: 2024-04-04 | End: 2024-04-11

## 2024-04-04 RX ORDER — METHOCARBAMOL 500 MG/1
500 TABLET, FILM COATED ORAL 4 TIMES DAILY
Qty: 40 TABLET | Refills: 0 | Status: SHIPPED | OUTPATIENT
Start: 2024-04-04 | End: 2024-04-14

## 2024-04-04 NOTE — TELEPHONE ENCOUNTER
"Patient is calling to discuss EMG results and pain management. Prior to patient seeing Dr. Batista on 2/14, he pain was localized to her head and neck. Since the EMG was completed 3/15, patient has been having pain in her neck radiating down to her left arm and hand. Patient is unable to use her left arm due to pain. Patient has taken tylenol for the pain, in which "puts her to sleep" and then when she wakes up the pain is still present.   Offered to schedule patient a follow up with Dr. Batista on 5/08, his first available date. Patient states that is too far out since she is at pain level 10. Patient refused to schedule at this time.  Strongly advised patient  to report to the emergency room to be evaluated, since her pain was 10/10.   Patient will also contact PCP. Informed patient a message will be forwarded to Dr. Batista.               ----- Message from Deisy Mccray sent at 4/2/2024 11:33 AM CDT -----  Regarding: Results. plan of care and appt  Contact: [phone]  Pt is following up on a message that was sent on 3/21 regarding her results and medication adjustment. The pt states she has been in pain and would like to be seen. No appts were available.  "

## 2024-04-04 NOTE — PROGRESS NOTES
DATE: 4/9/2024  PATIENT: Barrera Siddiqui    Supervising Physician: Kwasi Burrell M.D.    CHIEF COMPLAINT: left arm pain    HISTORY:  Barrera Siddiqui is a 58 y.o. female with a pmhx of DM II, CHF here for initial evaluation of neck and left arm pain (Neck - 7, Arm - 7). The pain has been present since a fall on 8/15/23. Pt says she passed out but is not sure why. The patient describes the pain as shooting. The pain is worse with nothing and improved by nothing and is constant. There is negative associated numbness and tingling. There is positive subjective weakness. Prior treatments have included PT with no relief, but no ESIs or surgery.     The patient denies myelopathic symptoms such as handwriting changes or difficulty with buttons/coins/keys. Denies perineal paresthesias, bowel/bladder dysfunction.    Pt says since the fall she has continued to feel light headed and off balance.    PAST MEDICAL/SURGICAL HISTORY:  Past Medical History:   Diagnosis Date    Depression     Diabetes mellitus     High cholesterol     Hypertension     Stroke     TIA (transient ischemic attack)      Past Surgical History:   Procedure Laterality Date    CHOLECYSTECTOMY      COLONOSCOPY N/A 10/18/2017    Procedure: COLONOSCOPY;  Surgeon: Donald Wright Jr., MD;  Location: [facility];  Service: Endoscopy;  Laterality: N/A;    KNEE SURGERY      TUBAL LIGATION      WRIST SURGERY         Medications:  Current Outpatient Medications on File Prior to Visit   Medication Sig Dispense Refill    amLODIPine (NORVASC) 10 MG tablet Take 1 tablet (10 mg total) by mouth once daily. 90 tablet 3    aspirin (ECOTRIN) 81 MG EC tablet Take 1 tablet (81 mg total) by mouth once daily. 90 tablet 3    atorvastatin (LIPITOR) 20 MG tablet Take 1 tablet (20 mg total) by mouth once daily. 90 tablet 3    furosemide (LASIX) 40 MG tablet Take 1 tablet (40 mg total) by mouth 2 (two) times daily. 60 tablet 11    gabapentin (NEURONTIN) 100 MG capsule Take 3 capsules (300 mg total)  by mouth 3 (three) times daily. 270 capsule 11    gabapentin (NEURONTIN) 600 MG tablet Take 1 tablet (600 mg total) by mouth 3 (three) times daily. 90 tablet 11    glipiZIDE (GLUCOTROL) 10 MG tablet Take 10 mg by mouth 2 (two) times daily.      losartan (COZAAR) 100 MG tablet Take 1 tablet (100 mg total) by mouth once daily. 90 tablet 3    metFORMIN (GLUCOPHAGE-XR) 500 MG ER 24hr tablet Take 500 mg by mouth 2 (two) times daily.      methocarbamoL (ROBAXIN) 500 MG Tab Take 1 tablet (500 mg total) by mouth 4 (four) times daily. for 10 days 40 tablet 0    multivitamin (THERAGRAN) per tablet Take 1 tablet by mouth once daily.      OZEMPIC 0.25 mg or 0.5 mg (2 mg/3 mL) pen injector Inject into the skin.      potassium chloride (KLOR-CON) 10 MEQ TbSR Take 10 mEq by mouth 2 (two) times daily.       No current facility-administered medications on file prior to visit.       Social History:   Social History     Socioeconomic History    Marital status: Single   Tobacco Use    Smoking status: Never    Smokeless tobacco: Never   Substance and Sexual Activity    Alcohol use: Yes     Comment: rare    Drug use: No    Sexual activity: Yes     Partners: Male     Social Determinants of Health     Financial Resource Strain: Medium Risk (11/24/2023)    Overall Financial Resource Strain (CARDIA)     Difficulty of Paying Living Expenses: Somewhat hard   Food Insecurity: Food Insecurity Present (11/24/2023)    Hunger Vital Sign     Worried About Running Out of Food in the Last Year: Sometimes true     Ran Out of Food in the Last Year: Sometimes true   Transportation Needs: No Transportation Needs (11/24/2023)    PRAPARE - Transportation     Lack of Transportation (Medical): No     Lack of Transportation (Non-Medical): No   Physical Activity: Inactive (11/24/2023)    Exercise Vital Sign     Days of Exercise per Week: 0 days     Minutes of Exercise per Session: 30 min   Stress: No Stress Concern Present (11/24/2023)    [place] [employer]  Occupational Health - Occupational Stress Questionnaire     Feeling of Stress : Only a little   Social Connections: Moderately Isolated (11/24/2023)    Social Connection and Isolation Panel [NHANES]     Frequency of Communication with Friends and Family: Twice a week     Frequency of Social Gatherings with Friends and Family: Once a week     Attends Orthodox Services: 1 to 4 times per year     Active Member of Clubs or Organizations: No     Attends Club or Organization Meetings: Never     Marital Status:    Housing Stability: High Risk (11/24/2023)    Housing Stability Vital Sign     Unable to Pay for Housing in the Last Year: Yes     Number of Places Lived in the Last Year: 1     Unstable Housing in the Last Year: No       REVIEW OF SYSTEMS:  Constitution: Negative. Negative for chills, fever and night sweats.   Cardiovascular: Negative for chest pain and syncope.   Respiratory: Negative for cough and shortness of breath.   Gastrointestinal: See HPI. Negative for nausea/vomiting. Negative for abdominal pain.  Genitourinary: See HPI. Negative for discoloration or dysuria.  Skin: Negative for dry skin, itching and rash.   Hematologic/Lymphatic: Negative for bleeding problem. Does not bruise/bleed easily.   Musculoskeletal: Negative for falls and muscle weakness.   Neurological: See HPI. No seizures.   Endocrine: Negative for polydipsia, polyphagia and polyuria.   Allergic/Immunologic: Negative for hives and persistent infections.  Psychiatric/Behavioral: Negative for depression and insomnia.         EXAM:  [facility] 07/29/2018 (Exact Date)     General: The patient is a very pleasant 58 y.o. female in no apparent distress, the patient is oriented to person, place and time.  Psych: Normal mood and affect  HEENT: Vision grossly intact, hearing intact to the spoken word.  Lungs: Respirations unlabored.  Gait: Normal station and gait, no difficulty with toe or heel walk.   Skin: Cervical skin negative for rashes,  lesions, hairy patches and surgical scars.  Range of motion: Cervical range of motion is acceptable. There is negative tenderness to palpation.  Spinal Balance: Global saggital and coronal spinal balance acceptable, no significant for scoliosis and kyphosis.  Musculoskeletal: No pain with the range of motion of the bilateral shoulders and elbows. Normal bulk and contour of the bilateral hands.  Vascular: Bilateral hands warm and well perfused, radial pulses 2+ bilaterally.  Neurological: 4/5  strength in left hand. Normal sensation to light touch in the C5-T1 and L2-S1 dermatomes bilaterally.  Deep tendon reflexes symmetric 2+ in the bilateral upper and lower extremities.  Negative Inverted Radial Reflex and Fitzpatrick's bilaterally. Negative Babinski bilaterally.     IMAGING:   Today I personally reviewed AP, Lat and Flex/Ex  upright C-spine films that demonstrate mild degenerative changes with straightening of normal lordosis.     MRI cervical demonstrates multilevel degenerative changes of the cervical spine are most pronounced at C4-C5 with minor spinal canal stenosis and mild left-sided neural foraminal stenosis.    There is no height or weight on file to calculate BMI.    Hemoglobin A1C   Date Value Ref Range Status   08/15/2023 9.1 (H) 4.0 - 5.6 % Final     Comment:     ADA Screening Guidelines:  5.7-6.4%  Consistent with prediabetes  >or=6.5%  Consistent with diabetes    High levels of fetal hemoglobin interfere with the HbA1C  assay. Heterozygous hemoglobin variants (HbS, HgC, etc)do  not significantly interfere with this assay.   However, presence of multiple variants may affect accuracy.     12/31/2022 10.0 (H) 4.0 - 5.6 % Final     Comment:     ADA Screening Guidelines:  5.7-6.4%  Consistent with prediabetes  >or=6.5%  Consistent with diabetes    High levels of fetal hemoglobin interfere with the HbA1C  assay. Heterozygous hemoglobin variants (HbS, HgC, etc)do  not significantly interfere with this  assay.   However, presence of multiple variants may affect accuracy.     02/09/2020 8.7 (H) 4.0 - 5.6 % Final     Comment:     ADA Screening Guidelines:  5.7-6.4%  Consistent with prediabetes  >or=6.5%  Consistent with diabetes  High levels of fetal hemoglobin interfere with the HbA1C  assay. Heterozygous hemoglobin variants (HbS, HgC, etc)do  not significantly interfere with this assay.   However, presence of multiple variants may affect accuracy.             ASSESSMENT/PLAN:    There are no diagnoses linked to this encounter.    Today we discussed at length all of the different treatment options including anti-inflammatories, acetaminophen, rest, ice, heat, physical therapy including strengthening and stretching exercises, home exercises, ROM, aerobic conditioning, aqua therapy, other modalities including ultrasound, massage, and dry needling, epidural steroid injections and finally surgical intervention.      Pt presents with chronic left arm pain. Failure of conservative rx. Will send lyrica to pharmacy. Will order cervical GRACIE with pain management. Pt will fu if pain persists. Will message neurology regarding continued light headedness and balance problems, as cervical MRI does not explain symptoms.

## 2024-04-05 DIAGNOSIS — M50.30 DDD (DEGENERATIVE DISC DISEASE), CERVICAL: Primary | ICD-10-CM

## 2024-04-06 PROCEDURE — 95806 SLEEP STUDY UNATT&RESP EFFT: CPT | Mod: 26,,, | Performed by: INTERNAL MEDICINE

## 2024-04-07 ENCOUNTER — PATIENT MESSAGE (OUTPATIENT)
Dept: SLEEP MEDICINE | Facility: CLINIC | Age: 59
End: 2024-04-07

## 2024-04-10 DIAGNOSIS — R55 SYNCOPE AND COLLAPSE: Primary | ICD-10-CM

## 2024-04-10 DIAGNOSIS — G47.33 OSA (OBSTRUCTIVE SLEEP APNEA): Primary | ICD-10-CM

## 2024-04-11 ENCOUNTER — HOSPITAL ENCOUNTER (OUTPATIENT)
Dept: RADIOLOGY | Facility: HOSPITAL | Age: 59
Discharge: HOME | End: 2024-04-11
Attending: ORTHOPAEDIC SURGERY
Payer: COMMERCIAL

## 2024-04-11 ENCOUNTER — OFFICE VISIT (OUTPATIENT)
Dept: ORTHOPEDICS | Facility: CLINIC | Age: 59
End: 2024-04-11
Payer: COMMERCIAL

## 2024-04-11 VITALS — BODY MASS INDEX: 19.25 KG/M2 | HEIGHT: 63 IN | WEIGHT: 108.63 LBS

## 2024-04-11 DIAGNOSIS — E11.69 TYPE 2 DIABETES MELLITUS WITH OTHER SPECIFIED COMPLICATION, WITHOUT LONG-TERM CURRENT USE OF INSULIN: Primary | ICD-10-CM

## 2024-04-11 DIAGNOSIS — M54.2 NECK PAIN: ICD-10-CM

## 2024-04-11 DIAGNOSIS — M50.30 DDD (DEGENERATIVE DISC DISEASE), CERVICAL: ICD-10-CM

## 2024-04-11 PROCEDURE — 99999 PR PBB SHADOW E&M-EST. PATIENT-LVL III: CPT | Mod: PBBFAC,,, | Performed by: ORTHOPAEDIC SURGERY

## 2024-04-11 PROCEDURE — 72050 X-RAY EXAM NECK SPINE 4/5VWS: CPT | Mod: 26,,, | Performed by: RADIOLOGY

## 2024-04-11 PROCEDURE — 99204 OFFICE O/P NEW MOD 45 MIN: CPT | Mod: S$PBB,,, | Performed by: ORTHOPAEDIC SURGERY

## 2024-04-11 PROCEDURE — 72050 X-RAY EXAM NECK SPINE 4/5VWS: CPT | Mod: TC

## 2024-04-11 PROCEDURE — 4010F ACE/ARB THERAPY RXD/TAKEN: CPT | Mod: ,,, | Performed by: ORTHOPAEDIC SURGERY

## 2024-04-11 RX ORDER — PREGABALIN 100 MG/1
100 CAPSULE ORAL 2 TIMES DAILY
Qty: 60 CAPSULE | Refills: 5 | Status: SHIPPED | OUTPATIENT
Start: 2024-04-11 | End: 2024-10-10

## 2024-04-11 RX ORDER — PREGABALIN 100 MG/1
100 CAPSULE ORAL 2 TIMES DAILY
Qty: 60 CAPSULE | Refills: 6 | Status: CANCELLED | OUTPATIENT
Start: 2024-04-11 | End: 2024-10-10

## 2024-04-15 ENCOUNTER — OFFICE VISIT (OUTPATIENT)
Dept: PAIN MEDICINE | Facility: CLINIC | Age: 59
End: 2024-04-15
Payer: COMMERCIAL

## 2024-04-15 VITALS
BODY MASS INDEX: 49.14 KG/M2 | SYSTOLIC BLOOD PRESSURE: 134 MMHG | WEIGHT: 277.31 LBS | HEIGHT: 63 IN | HEART RATE: 99 BPM | DIASTOLIC BLOOD PRESSURE: 85 MMHG

## 2024-04-15 DIAGNOSIS — M50.30 DDD (DEGENERATIVE DISC DISEASE), CERVICAL: ICD-10-CM

## 2024-04-15 DIAGNOSIS — M54.12 CERVICAL RADICULOPATHY: Primary | ICD-10-CM

## 2024-04-15 DIAGNOSIS — M47.812 CERVICAL SPONDYLOSIS: ICD-10-CM

## 2024-04-15 PROCEDURE — 99999 PR PBB SHADOW E&M-EST. PATIENT-LVL III: CPT | Mod: PBBFAC,,, | Performed by: STUDENT IN AN ORGANIZED HEALTH CARE EDUCATION/TRAINING PROGRAM

## 2024-04-15 PROCEDURE — 99204 OFFICE O/P NEW MOD 45 MIN: CPT | Mod: S$PBB,,, | Performed by: STUDENT IN AN ORGANIZED HEALTH CARE EDUCATION/TRAINING PROGRAM

## 2024-04-15 PROCEDURE — 4010F ACE/ARB THERAPY RXD/TAKEN: CPT | Mod: ,,, | Performed by: STUDENT IN AN ORGANIZED HEALTH CARE EDUCATION/TRAINING PROGRAM

## 2024-04-15 NOTE — H&P (VIEW-ONLY)
[facility] Interventional Pain Medicine - New Patient Evaluation    Referred by: Aaarefsamira Self   Reason for referral: Cervical radiculopathy     CC:   Chief Complaint   Patient presents with    Neck Pain         4/15/2024     1:32 PM   Last 3 PDI Scores   Pain Disability Index (PDI) 70       Subjective 04/15/2024:   Barrera Siddiqui is a 58 y.o. female who presents complaining of neck pain with Left sided radicular symptoms. Pain started about 8 months ago after a fall. MRI Cervical showed multilevel degenerative changes of the cervical spine are most pronounced at C4-C5 with minor spinal canal stenosis and mild left-sided neural foraminal stenosis, as well as mild left-sided C2-C3 and C3-C4 neural foraminal stenosis. She was seen by Neurosurgery and a Cervical epidural has been ordered. She was started on Lyrica last week.      Initial Pain Assessment:  Pain Assessment  Pain Assessment: 0-10  Pain Score: 10-Worst pain ever  Pain Location: Neck  Pain Orientation: Left  Pain Radiating Towards: left arm and hand  Pain Descriptors: Aching, Dull  Pain Frequency: Constant/continuous  Pain Onset: Awakened from sleep  Date Pain First Started: 08/15/23  Clinical Progression: Gradually worsening  Aggravating Factors: Stretching, Walking  Result of Injury: Yes (since pt. had fall in august.)  Work-Related Injury: No  Patient's Stated Pain Goal: No pain  Pain Intervention(s): Other (Comment), Rest     Patient denies night fever/night sweats, urinary incontinence, bowel incontinence, significant weight loss, and significant motor weakness.    Previous Interventions:  - None    Previous Therapies:  PT/OT: no   Chiropractor:   HEP:   Relevant Surgery: no   Previous Medications:   - NSAIDS:   - Muscle Relaxants: Robaxin    - TCAs:   - SNRIs:   - Topicals:   - Anticonvulsants: Lyrica    - Opioids:   - Adjuvants:     Current Pain Medications:  Lyrica 100 mg BID  Robaxin 500 mg QID prn     Review of Systems:  ROS    GENERAL:  No weight  loss, malaise or fevers.  HEENT:   No recent changes in vision or hearing  NECK:  No difficulty with swallowing. No stridor.   RESPIRATORY:  Negative for cough, wheezing or shortness of breath, patient denies any recent URI.  CARDIOVASCULAR:  Negative for chest pain, leg swelling or palpitations.  GI:  Negative for abdominal discomfort, blood in stools or black stools or change in bowel habits.  MUSCULOSKELETAL:  See HPI.  SKIN:  Negative for lesions, rash, and itching.  PSYCH:  No mood disorder or recent psychosocial stressors.    HEMATOLOGY/LYMPHOLOGY:  Negative for prolonged bleeding, bruising easily or swollen nodes.  Patient is not currently taking any anti-coagulants  NEURO:   No history of headaches, syncope, paralysis, seizures or tremors.  All other reviewed and negative other than HPI.    History:  Current medications, allergies, medical history, surgical history,   family history, and social history were reviewed in the chart as marked.    Full Medication List:    Current Outpatient Medications:     amLODIPine (NORVASC) 10 MG tablet, Take 1 tablet (10 mg total) by mouth once daily., Disp: 90 tablet, Rfl: 3    aspirin (ECOTRIN) 81 MG EC tablet, Take 1 tablet (81 mg total) by mouth once daily., Disp: 90 tablet, Rfl: 3    atorvastatin (LIPITOR) 20 MG tablet, Take 1 tablet (20 mg total) by mouth once daily., Disp: 90 tablet, Rfl: 3    furosemide (LASIX) 40 MG tablet, Take 1 tablet (40 mg total) by mouth 2 (two) times daily., Disp: 60 tablet, Rfl: 11    glipiZIDE (GLUCOTROL) 10 MG tablet, Take 10 mg by mouth 2 (two) times daily., Disp: , Rfl:     losartan (COZAAR) 100 MG tablet, Take 1 tablet (100 mg total) by mouth once daily., Disp: 90 tablet, Rfl: 3    metFORMIN (GLUCOPHAGE-XR) 500 MG ER 24hr tablet, Take 500 mg by mouth 2 (two) times daily., Disp: , Rfl:     multivitamin (THERAGRAN) per tablet, Take 1 tablet by mouth once daily., Disp: , Rfl:     OZEMPIC 0.25 mg or 0.5 mg (2 mg/3 mL) pen injector, Inject  "into the skin., Disp: , Rfl:     potassium chloride (KLOR-CON) 10 MEQ TbSR, Take 10 mEq by mouth 2 (two) times daily., Disp: , Rfl:     pregabalin (LYRICA) 100 MG capsule, Take 1 capsule (100 mg total) by mouth 2 (two) times daily., Disp: 60 capsule, Rfl: 5     Allergies:  Patient has no known allergies.     Medical History:   has a past medical history of Depression, Diabetes mellitus, High cholesterol, Hypertension, Stroke, and TIA (transient ischemic attack).    Surgical History:   has a past surgical history that includes Tubal ligation; Knee surgery; Wrist surgery; Cholecystectomy; and Colonoscopy (N/A, 10/18/2017).    Family History:  family history includes Breast cancer in her mother; Liver disease in her maternal grandmother.    Social History:   reports that she has never smoked. She has never used smokeless tobacco. She reports current alcohol use. She reports that she does not use drugs.    Physical Exam:  Vitals:    04/15/24 1327 04/15/24 1333   BP: 134/85    Pulse: 99    Weight: 125.8 kg (277 lb 5.4 oz)    Height: 5' 3" (1.6 m)    PainSc: 10-Worst pain ever 10-Worst pain ever   PainLoc: Neck        GENERAL: Well appearing, in no acute distress, alert and oriented x3.  PSYCH:  Mood and affect appropriate.  SKIN: Skin color, texture, turgor normal, no rashes or lesions.  HEAD/FACE:  Normocephalic, atraumatic. Cranial nerves grossly intact.  NECK: Decreased ROM 2/2 pain. +pain to palpation over the cervical paraspinous muscles and b/l trapezius mm. Spurling positive on the left.  CV: RRR with palpation of the radial artery.  PULM: No evidence of respiratory difficulty, symmetric chest rise.  GI:  Soft and non-distended.  MSK: No obvious deformities, edema, or skin discoloration.  No atrophy or tone abnormalities are noted.   NEURO: Bilateral upper and lower extremity coordination and strength is symmetric. Subjective numbness over the left thumb and 4th digit.  Fitzpatrick Negative.   MENTAL STATUS: A x O x " 3, good concentration, speech is fluent and goal directed  MOTOR: 5/5 in all muscle groups  GAIT: Normal. Ambulates unassisted.    Imaging:  XR CERVICAL SPINE AP LAT WITH FLEX EXTEN     CLINICAL HISTORY:  Other cervical disc degeneration, unspecified cervical region     TECHNIQUE:  Three views of the cervical spine plus flexion and extension views were performed.     COMPARISON:  CTA head and neck and MR cervical spine 2023     FINDINGS:  Mild DJD anterior C1-C2, airway neck soft tissues normal.  Mild degenerative disc spondylosis C3-visualize C5, C 6 T1 obscured on lateral view by overlying shoulder structures without significant abnormality noted on CTA neck 2023. straightening usual lordotic curve, neck muscle spasm.     Impression:     No fracture or subluxation.  Additional findings above.      Electronically signed by:Andrew Abdullahi MD  Date:                                            04/11/2024      MRI CERVICAL SPINE WITHOUT CONTRAST     CLINICAL HISTORY:  Spinal stenosis, cervical; Spinal stenosis, cervical region     TECHNIQUE:  Multiplanar, multisequence MR images of the cervical spine were performed without the administration of contrast.     COMPARISON:  None.     FINDINGS:  Alignment: Straightening of the normal cervical lordosis.     Vertebrae: Cervical vertebral body heights are maintained.  No abnormal osseous edema.  No evidence of acute fracture.  Marrow signal is within normal limits.     Discs: Disc heights appear maintained.     Cord: No cord signal abnormality.     Skull base and craniocervical junction: Within normal limits.     Degenerative findings:     C2-C3: Asymmetric left disc osteophyte complex.  No ventral cord contact or spinal canal stenosis.  Mild left-sided neural foraminal stenosis.     C3-C4: Mild broad-based posterior disc osteophyte complex contacts and slightly flattens the ventral cord without significant spinal canal stenosis.  Left greater than right uncovertebral joint  spurring contributing to mild left-sided neural foraminal stenosis.     C4-C5: Tiny central disc osteophyte complex contacts and deforms the ventral cord contributing to minor spinal canal stenosis.  Left-sided uncovertebral joint spurring contributes to mild left-sided neural foraminal stenosis.     C5-C6: No significant posterior disc osteophyte complex, spinal canal stenosis or neural foraminal stenosis.     C6-C7: No significant posterior disc osteophyte complex, spinal canal stenosis or neural foraminal stenosis.     C7-T1: No significant posterior disc osteophyte complex, spinal canal stenosis or neural foraminal stenosis.     T1-T2: No significant posterior disc osteophyte complex or spinal canal stenosis.  Right greater than left facet arthropathy with mild right-sided neural foraminal stenosis.     Paraspinal muscles & soft tissues: Within normal limits.     Impression:     Multilevel degenerative changes of the cervical spine are most pronounced at C4-C5 with minor spinal canal stenosis and mild left-sided neural foraminal stenosis.     Mild left-sided C2-C3 and C3-C4 neural foraminal stenosis.        Electronically signed by:Arcenio Clark  Date:                                            12/11/2023    Labs:  BMP  Lab Results   Component Value Date     (L) 12/26/2023    K 3.5 12/26/2023    CL 99 12/26/2023    CO2 21 (L) 12/26/2023    BUN 18 12/26/2023    CREATININE 1.0 12/26/2023    CALCIUM 9.9 12/26/2023    ANIONGAP 15 12/26/2023    EGFRNORACEVR >60 12/26/2023     Lab Results   Component Value Date    ALT 60 (H) 12/26/2023    AST 95 (H) 12/26/2023    ALKPHOS 118 12/26/2023    BILITOT 0.6 12/26/2023     Lab Results   Component Value Date    WBC 8.75 12/26/2023    HGB 12.5 12/26/2023    HCT 38.8 12/26/2023    MCV 83 12/26/2023     12/26/2023       Assessment:  Problem List Items Addressed This Visit    None  Visit Diagnoses       Cervical radiculopathy    -  Primary    Relevant Orders     Ambulatory referral/consult to Physical/Occupational Therapy    DDD (degenerative disc disease), cervical        Relevant Orders    Ambulatory referral/consult to Physical/Occupational Therapy    Cervical spondylosis        Relevant Orders    Ambulatory referral/consult to Physical/Occupational Therapy          04/15/2024: Barrera Siddiqui is a 58 y.o. female who  has a past medical history of Depression, Diabetes mellitus, High cholesterol, Hypertension, Stroke, and TIA (transient ischemic attack).  By history and examination this patient has chronic neck pain with with left radiculopathy.  The underlying cause is facet arthritis, degenerative disc disease, foraminal stenosis, and central canal stenosis.  Pathology is confirmed by imaging.  MRI Cervical showed multilevel degenerative changes of the cervical spine are most pronounced at C4-C5 with minor spinal canal stenosis and mild left-sided neural foraminal stenosis, as well as mild left-sided C2-C3 and C3-C4 neural foraminal stenosis.  We discussed the underlying diagnoses and multiple treatment options including non-opioid medications, interventional procedures, physical therapy, and home exercise.  Cervical GRACIE has been ordered by Neurosurgery.  The risks and benefits of each treatment option were discussed and all questions were answered.        Treatment Plan:   Procedures: Cervical GRACIE has been ordered by Neurosurgery.   PT/OT/HEP: I have stressed the importance of physical activity and a home exercise plan to help with pain and improve health. Referral placed to PT.   Medications:    - Lyrica per Neurosurgery   -  Reviewed and consistent with medication use as prescribed.  Imaging: MRI reviewed and discussed with the patient  Follow Up: RTC 2 weeks post procedure    Tiera Murphy DO   Interventional Pain Medicine / Anesthesiology    Disclaimer: This note was partly generated using dictation software which may occasionally result in transcription  errors.

## 2024-04-15 NOTE — H&P (VIEW-ONLY)
[facility] Inter[facility] Medicine - New Patient Evaluation    Referred by: Aaarefsamira Self   Reason for referral: Cervical radiculopathy     CC:   Chief Complaint   Patient presents with    Neck Pain         4/15/2024     1:32 PM   Last 3 PDI Scores   Pain Disability Index (PDI) 70       Subjective 04/15/2024:   Barrera Siddiqui is a 58 y.o. female who presents complaining of neck pain with Left sided radicular symptoms. Pain started about 8 months ago after a fall. MRI Cervical showed multilevel degenerative changes of the cervical spine are most pronounced at C4-C5 with minor spinal canal stenosis and mild left-sided neural foraminal stenosis, as well as mild left-sided C2-C3 and C3-C4 neural foraminal stenosis. She was seen by Neurosurgery and a Cervical epidural has been ordered. She was started on Lyrica last week.      Initial Pain Assessment:  Pain Assessment  Pain Assessment: 0-10  Pain Score: 10-Worst pain ever  Pain Location: Neck  Pain Orientation: Left  Pain Radiating Towards: left arm and hand  Pain Descriptors: Aching, Dull  Pain Frequency: Constant/continuous  Pain Onset: Awakened from sleep  Date Pain First Started: 08/15/23  Clinical Progression: Gradually worsening  Aggravating Factors: Stretching, Walking  Result of Injury: Yes (since pt. had fall in august.)  Work-Related Injury: No  Patient's Stated Pain Goal: No pain  Pain Intervention(s): Other (Comment), Rest     Patient denies night fever/night sweats, urinary incontinence, bowel incontinence, significant weight loss, and significant motor weakness.    Previous Interventions:  - None    Previous Therapies:  PT/OT: no   Chiropractor:   HEP:   Relevant Surgery: no   Previous Medications:   - NSAIDS:   - Muscle Relaxants: Robaxin    - TCAs:   - SNRIs:   - Topicals:   - Anticonvulsants: Lyrica    - Opioids:   - Adjuvants:     Current Pain Medications:  Lyrica 100 mg BID  Robaxin 500 mg QID prn     Review of Systems:  ROS    GENERAL:  No weight  loss, malaise or fevers.  HEENT:   No recent changes in vision or hearing  NECK:  No difficulty with swallowing. No stridor.   RESPIRATORY:  Negative for cough, wheezing or shortness of breath, patient denies any recent URI.  CARDIOVASCULAR:  Negative for chest pain, leg swelling or palpitations.  GI:  Negative for abdominal discomfort, blood in stools or black stools or change in bowel habits.  MUSCULOSKELETAL:  See HPI.  SKIN:  Negative for lesions, rash, and itching.  PSYCH:  No mood disorder or recent psychosocial stressors.    HEMATOLOGY/LYMPHOLOGY:  Negative for prolonged bleeding, bruising easily or swollen nodes.  Patient is not currently taking any anti-coagulants  NEURO:   No history of headaches, syncope, paralysis, seizures or tremors.  All other reviewed and negative other than HPI.    History:  Current medications, allergies, medical history, surgical history,   family history, and social history were reviewed in the chart as marked.    Full Medication List:    Current Outpatient Medications:     amLODIPine (NORVASC) 10 MG tablet, Take 1 tablet (10 mg total) by mouth once daily., Disp: 90 tablet, Rfl: 3    aspirin (ECOTRIN) 81 MG EC tablet, Take 1 tablet (81 mg total) by mouth once daily., Disp: 90 tablet, Rfl: 3    atorvastatin (LIPITOR) 20 MG tablet, Take 1 tablet (20 mg total) by mouth once daily., Disp: 90 tablet, Rfl: 3    furosemide (LASIX) 40 MG tablet, Take 1 tablet (40 mg total) by mouth 2 (two) times daily., Disp: 60 tablet, Rfl: 11    glipiZIDE (GLUCOTROL) 10 MG tablet, Take 10 mg by mouth 2 (two) times daily., Disp: , Rfl:     losartan (COZAAR) 100 MG tablet, Take 1 tablet (100 mg total) by mouth once daily., Disp: 90 tablet, Rfl: 3    metFORMIN (GLUCOPHAGE-XR) 500 MG ER 24hr tablet, Take 500 mg by mouth 2 (two) times daily., Disp: , Rfl:     multivitamin (THERAGRAN) per tablet, Take 1 tablet by mouth once daily., Disp: , Rfl:     OZEMPIC 0.25 mg or 0.5 mg (2 mg/3 mL) pen injector, Inject  "into the skin., Disp: , Rfl:     potassium chloride (KLOR-CON) 10 MEQ TbSR, Take 10 mEq by mouth 2 (two) times daily., Disp: , Rfl:     pregabalin (LYRICA) 100 MG capsule, Take 1 capsule (100 mg total) by mouth 2 (two) times daily., Disp: 60 capsule, Rfl: 5     Allergies:  Patient has no known allergies.     Medical History:   has a past medical history of Depression, Diabetes mellitus, High cholesterol, Hypertension, Stroke, and TIA (transient ischemic attack).    Surgical History:   has a past surgical history that includes Tubal ligation; Knee surgery; Wrist surgery; Cholecystectomy; and Colonoscopy (N/A, 10/18/2017).    Family History:  family history includes Breast cancer in her mother; Liver disease in her maternal grandmother.    Social History:   reports that she has never smoked. She has never used smokeless tobacco. She reports current alcohol use. She reports that she does not use drugs.    Physical Exam:  Vitals:    04/15/24 1327 04/15/24 1333   BP: 134/85    Pulse: 99    Weight: 125.8 kg (277 lb 5.4 oz)    Height: 5' 3" (1.6 m)    PainSc: 10-Worst pain ever 10-Worst pain ever   PainLoc: Neck        GENERAL: Well appearing, in no acute distress, alert and oriented x3.  PSYCH:  Mood and affect appropriate.  SKIN: Skin color, texture, turgor normal, no rashes or lesions.  HEAD/FACE:  Normocephalic, atraumatic. Cranial nerves grossly intact.  NECK: Decreased ROM 2/2 pain. +pain to palpation over the cervical paraspinous muscles and b/l trapezius mm. Spurling positive on the left.  CV: RRR with palpation of the radial artery.  PULM: No evidence of respiratory difficulty, symmetric chest rise.  GI:  Soft and non-distended.  MSK: No obvious deformities, edema, or skin discoloration.  No atrophy or tone abnormalities are noted.   NEURO: Bilateral upper and lower extremity coordination and strength is symmetric. Subjective numbness over the left thumb and 4th digit.  Fitzpatrick Negative.   MENTAL STATUS: A x O x " 3, good concentration, speech is fluent and goal directed  MOTOR: 5/5 in all muscle groups  GAIT: Normal. Ambulates unassisted.    Imaging:  XR CERVICAL SPINE AP LAT WITH FLEX EXTEN     CLINICAL HISTORY:  Other cervical disc degeneration, unspecified cervical region     TECHNIQUE:  Three views of the cervical spine plus flexion and extension views were performed.     COMPARISON:  CTA head and neck and MR cervical spine 2023     FINDINGS:  Mild DJD anterior C1-C2, airway neck soft tissues normal.  Mild degenerative disc spondylosis C3-visualize C5, C 6 T1 obscured on lateral view by overlying shoulder structures without significant abnormality noted on CTA neck 2023. straightening usual lordotic curve, neck muscle spasm.     Impression:     No fracture or subluxation.  Additional findings above.      Electronically signed by:Andrew Abdullahi MD  Date:                                            04/11/2024      MRI CERVICAL SPINE WITHOUT CONTRAST     CLINICAL HISTORY:  Spinal stenosis, cervical; Spinal stenosis, cervical region     TECHNIQUE:  Multiplanar, multisequence MR images of the cervical spine were performed without the administration of contrast.     COMPARISON:  None.     FINDINGS:  Alignment: Straightening of the normal cervical lordosis.     Vertebrae: Cervical vertebral body heights are maintained.  No abnormal osseous edema.  No evidence of acute fracture.  Marrow signal is within normal limits.     Discs: Disc heights appear maintained.     Cord: No cord signal abnormality.     Skull base and craniocervical junction: Within normal limits.     Degenerative findings:     C2-C3: Asymmetric left disc osteophyte complex.  No ventral cord contact or spinal canal stenosis.  Mild left-sided neural foraminal stenosis.     C3-C4: Mild broad-based posterior disc osteophyte complex contacts and slightly flattens the ventral cord without significant spinal canal stenosis.  Left greater than right uncovertebral joint  spurring contributing to mild left-sided neural foraminal stenosis.     C4-C5: Tiny central disc osteophyte complex contacts and deforms the ventral cord contributing to minor spinal canal stenosis.  Left-sided uncovertebral joint spurring contributes to mild left-sided neural foraminal stenosis.     C5-C6: No significant posterior disc osteophyte complex, spinal canal stenosis or neural foraminal stenosis.     C6-C7: No significant posterior disc osteophyte complex, spinal canal stenosis or neural foraminal stenosis.     C7-T1: No significant posterior disc osteophyte complex, spinal canal stenosis or neural foraminal stenosis.     T1-T2: No significant posterior disc osteophyte complex or spinal canal stenosis.  Right greater than left facet arthropathy with mild right-sided neural foraminal stenosis.     Paraspinal muscles & soft tissues: Within normal limits.     Impression:     Multilevel degenerative changes of the cervical spine are most pronounced at C4-C5 with minor spinal canal stenosis and mild left-sided neural foraminal stenosis.     Mild left-sided C2-C3 and C3-C4 neural foraminal stenosis.        Electronically signed by:Arcenio Clark  Date:                                            12/11/2023    Labs:  BMP  Lab Results   Component Value Date     (L) 12/26/2023    K 3.5 12/26/2023    CL 99 12/26/2023    CO2 21 (L) 12/26/2023    BUN 18 12/26/2023    CREATININE 1.0 12/26/2023    CALCIUM 9.9 12/26/2023    ANIONGAP 15 12/26/2023    EGFRNORACEVR >60 12/26/2023     Lab Results   Component Value Date    ALT 60 (H) 12/26/2023    AST 95 (H) 12/26/2023    ALKPHOS 118 12/26/2023    BILITOT 0.6 12/26/2023     Lab Results   Component Value Date    WBC 8.75 12/26/2023    HGB 12.5 12/26/2023    HCT 38.8 12/26/2023    MCV 83 12/26/2023     12/26/2023       Assessment:  Problem List Items Addressed This Visit    None  Visit Diagnoses       Cervical radiculopathy    -  Primary    Relevant Orders     Ambulatory referral/consult to Physical/Occupational Therapy    DDD (degenerative disc disease), cervical        Relevant Orders    Ambulatory referral/consult to Physical/Occupational Therapy    Cervical spondylosis        Relevant Orders    Ambulatory referral/consult to Physical/Occupational Therapy          04/15/2024: Barrera Siddiqui is a 58 y.o. female who  has a past medical history of Depression, Diabetes mellitus, High cholesterol, Hypertension, Stroke, and TIA (transient ischemic attack).  By history and examination this patient has chronic neck pain with with left radiculopathy.  The underlying cause is facet arthritis, degenerative disc disease, foraminal stenosis, and central canal stenosis.  Pathology is confirmed by imaging.  MRI Cervical showed multilevel degenerative changes of the cervical spine are most pronounced at C4-C5 with minor spinal canal stenosis and mild left-sided neural foraminal stenosis, as well as mild left-sided C2-C3 and C3-C4 neural foraminal stenosis.  We discussed the underlying diagnoses and multiple treatment options including non-opioid medications, interventional procedures, physical therapy, and home exercise.  Cervical GRACIE has been ordered by Neurosurgery.  The risks and benefits of each treatment option were discussed and all questions were answered.        Treatment Plan:   Procedures: Cervical GRACIE has been ordered by Neurosurgery.   PT/OT/HEP: I have stressed the importance of physical activity and a home exercise plan to help with pain and improve health. Referral placed to PT.   Medications:    - Lyrica per Neurosurgery   -  Reviewed and consistent with medication use as prescribed.  Imaging: MRI reviewed and discussed with the patient  Follow Up: RTC 2 weeks post procedure    Tiera Murphy DO   Interventional Pain Medicine / Anesthesiology    Disclaimer: This note was partly generated using dictation software which may occasionally result in transcription  errors.

## 2024-04-15 NOTE — PROGRESS NOTES
[facility] Interventional Pain Medicine - New Patient Evaluation    Referred by: Aaarefsamira Self   Reason for referral: Cervical radiculopathy     CC:   Chief Complaint   Patient presents with    Neck Pain         4/15/2024     1:32 PM   Last 3 PDI Scores   Pain Disability Index (PDI) 70       Subjective 04/15/2024:   Barrera Siddiqui is a 58 y.o. female who presents complaining of neck pain with Left sided radicular symptoms. Pain started about 8 months ago after a fall. MRI Cervical showed multilevel degenerative changes of the cervical spine are most pronounced at C4-C5 with minor spinal canal stenosis and mild left-sided neural foraminal stenosis, as well as mild left-sided C2-C3 and C3-C4 neural foraminal stenosis. She was seen by Neurosurgery and a Cervical epidural has been ordered. She was started on Lyrica last week.      Initial Pain Assessment:  Pain Assessment  Pain Assessment: 0-10  Pain Score: 10-Worst pain ever  Pain Location: Neck  Pain Orientation: Left  Pain Radiating Towards: left arm and hand  Pain Descriptors: Aching, Dull  Pain Frequency: Constant/continuous  Pain Onset: Awakened from sleep  Date Pain First Started: 08/15/23  Clinical Progression: Gradually worsening  Aggravating Factors: Stretching, Walking  Result of Injury: Yes (since pt. had fall in august.)  Work-Related Injury: No  Patient's Stated Pain Goal: No pain  Pain Intervention(s): Other (Comment), Rest     Patient denies night fever/night sweats, urinary incontinence, bowel incontinence, significant weight loss, and significant motor weakness.    Previous Interventions:  - None    Previous Therapies:  PT/OT: no   Chiropractor:   HEP:   Relevant Surgery: no   Previous Medications:   - NSAIDS:   - Muscle Relaxants: Robaxin    - TCAs:   - SNRIs:   - Topicals:   - Anticonvulsants: Lyrica    - Opioids:   - Adjuvants:     Current Pain Medications:  Lyrica 100 mg BID  Robaxin 500 mg QID prn     Review of Systems:  ROS    GENERAL:  No weight  loss, malaise or fevers.  HEENT:   No recent changes in vision or hearing  NECK:  No difficulty with swallowing. No stridor.   RESPIRATORY:  Negative for cough, wheezing or shortness of breath, patient denies any recent URI.  CARDIOVASCULAR:  Negative for chest pain, leg swelling or palpitations.  GI:  Negative for abdominal discomfort, blood in stools or black stools or change in bowel habits.  MUSCULOSKELETAL:  See HPI.  SKIN:  Negative for lesions, rash, and itching.  PSYCH:  No mood disorder or recent psychosocial stressors.    HEMATOLOGY/LYMPHOLOGY:  Negative for prolonged bleeding, bruising easily or swollen nodes.  Patient is not currently taking any anti-coagulants  NEURO:   No history of headaches, syncope, paralysis, seizures or tremors.  All other reviewed and negative other than HPI.    History:  Current medications, allergies, medical history, surgical history,   family history, and social history were reviewed in the chart as marked.    Full Medication List:    Current Outpatient Medications:     amLODIPine (NORVASC) 10 MG tablet, Take 1 tablet (10 mg total) by mouth once daily., Disp: 90 tablet, Rfl: 3    aspirin (ECOTRIN) 81 MG EC tablet, Take 1 tablet (81 mg total) by mouth once daily., Disp: 90 tablet, Rfl: 3    atorvastatin (LIPITOR) 20 MG tablet, Take 1 tablet (20 mg total) by mouth once daily., Disp: 90 tablet, Rfl: 3    furosemide (LASIX) 40 MG tablet, Take 1 tablet (40 mg total) by mouth 2 (two) times daily., Disp: 60 tablet, Rfl: 11    glipiZIDE (GLUCOTROL) 10 MG tablet, Take 10 mg by mouth 2 (two) times daily., Disp: , Rfl:     losartan (COZAAR) 100 MG tablet, Take 1 tablet (100 mg total) by mouth once daily., Disp: 90 tablet, Rfl: 3    metFORMIN (GLUCOPHAGE-XR) 500 MG ER 24hr tablet, Take 500 mg by mouth 2 (two) times daily., Disp: , Rfl:     multivitamin (THERAGRAN) per tablet, Take 1 tablet by mouth once daily., Disp: , Rfl:     OZEMPIC 0.25 mg or 0.5 mg (2 mg/3 mL) pen injector, Inject  "into the skin., Disp: , Rfl:     potassium chloride (KLOR-CON) 10 MEQ TbSR, Take 10 mEq by mouth 2 (two) times daily., Disp: , Rfl:     pregabalin (LYRICA) 100 MG capsule, Take 1 capsule (100 mg total) by mouth 2 (two) times daily., Disp: 60 capsule, Rfl: 5     Allergies:  Patient has no known allergies.     Medical History:   has a past medical history of Depression, Diabetes mellitus, High cholesterol, Hypertension, Stroke, and TIA (transient ischemic attack).    Surgical History:   has a past surgical history that includes Tubal ligation; Knee surgery; Wrist surgery; Cholecystectomy; and Colonoscopy (N/A, 10/18/2017).    Family History:  family history includes Breast cancer in her mother; Liver disease in her maternal grandmother.    Social History:   reports that she has never smoked. She has never used smokeless tobacco. She reports current alcohol use. She reports that she does not use drugs.    Physical Exam:  Vitals:    04/15/24 1327 04/15/24 1333   BP: 134/85    Pulse: 99    Weight: 125.8 kg (277 lb 5.4 oz)    Height: 5' 3" (1.6 m)    PainSc: 10-Worst pain ever 10-Worst pain ever   PainLoc: Neck        GENERAL: Well appearing, in no acute distress, alert and oriented x3.  PSYCH:  Mood and affect appropriate.  SKIN: Skin color, texture, turgor normal, no rashes or lesions.  HEAD/FACE:  Normocephalic, atraumatic. Cranial nerves grossly intact.  NECK: Decreased ROM 2/2 pain. +pain to palpation over the cervical paraspinous muscles and b/l trapezius mm. Spurling positive on the left.  CV: RRR with palpation of the radial artery.  PULM: No evidence of respiratory difficulty, symmetric chest rise.  GI:  Soft and non-distended.  MSK: No obvious deformities, edema, or skin discoloration.  No atrophy or tone abnormalities are noted.   NEURO: Bilateral upper and lower extremity coordination and strength is symmetric. Subjective numbness over the left thumb and 4th digit.  Fitzpatrick Negative.   MENTAL STATUS: A x O x " 3, good concentration, speech is fluent and goal directed  MOTOR: 5/5 in all muscle groups  GAIT: Normal. Ambulates unassisted.    Imaging:  XR CERVICAL SPINE AP LAT WITH FLEX EXTEN     CLINICAL HISTORY:  Other cervical disc degeneration, unspecified cervical region     TECHNIQUE:  Three views of the cervical spine plus flexion and extension views were performed.     COMPARISON:  CTA head and neck and MR cervical spine 2023     FINDINGS:  Mild DJD anterior C1-C2, airway neck soft tissues normal.  Mild degenerative disc spondylosis C3-visualize C5, C 6 T1 obscured on lateral view by overlying shoulder structures without significant abnormality noted on CTA neck 2023. straightening usual lordotic curve, neck muscle spasm.     Impression:     No fracture or subluxation.  Additional findings above.      Electronically signed by:Andrew Abdullahi MD  Date:                                            04/11/2024      MRI CERVICAL SPINE WITHOUT CONTRAST     CLINICAL HISTORY:  Spinal stenosis, cervical; Spinal stenosis, cervical region     TECHNIQUE:  Multiplanar, multisequence MR images of the cervical spine were performed without the administration of contrast.     COMPARISON:  None.     FINDINGS:  Alignment: Straightening of the normal cervical lordosis.     Vertebrae: Cervical vertebral body heights are maintained.  No abnormal osseous edema.  No evidence of acute fracture.  Marrow signal is within normal limits.     Discs: Disc heights appear maintained.     Cord: No cord signal abnormality.     Skull base and craniocervical junction: Within normal limits.     Degenerative findings:     C2-C3: Asymmetric left disc osteophyte complex.  No ventral cord contact or spinal canal stenosis.  Mild left-sided neural foraminal stenosis.     C3-C4: Mild broad-based posterior disc osteophyte complex contacts and slightly flattens the ventral cord without significant spinal canal stenosis.  Left greater than right uncovertebral joint  spurring contributing to mild left-sided neural foraminal stenosis.     C4-C5: Tiny central disc osteophyte complex contacts and deforms the ventral cord contributing to minor spinal canal stenosis.  Left-sided uncovertebral joint spurring contributes to mild left-sided neural foraminal stenosis.     C5-C6: No significant posterior disc osteophyte complex, spinal canal stenosis or neural foraminal stenosis.     C6-C7: No significant posterior disc osteophyte complex, spinal canal stenosis or neural foraminal stenosis.     C7-T1: No significant posterior disc osteophyte complex, spinal canal stenosis or neural foraminal stenosis.     T1-T2: No significant posterior disc osteophyte complex or spinal canal stenosis.  Right greater than left facet arthropathy with mild right-sided neural foraminal stenosis.     Paraspinal muscles & soft tissues: Within normal limits.     Impression:     Multilevel degenerative changes of the cervical spine are most pronounced at C4-C5 with minor spinal canal stenosis and mild left-sided neural foraminal stenosis.     Mild left-sided C2-C3 and C3-C4 neural foraminal stenosis.        Electronically signed by:Arcenio Clark  Date:                                            12/11/2023    Labs:  BMP  Lab Results   Component Value Date     (L) 12/26/2023    K 3.5 12/26/2023    CL 99 12/26/2023    CO2 21 (L) 12/26/2023    BUN 18 12/26/2023    CREATININE 1.0 12/26/2023    CALCIUM 9.9 12/26/2023    ANIONGAP 15 12/26/2023    EGFRNORACEVR >60 12/26/2023     Lab Results   Component Value Date    ALT 60 (H) 12/26/2023    AST 95 (H) 12/26/2023    ALKPHOS 118 12/26/2023    BILITOT 0.6 12/26/2023     Lab Results   Component Value Date    WBC 8.75 12/26/2023    HGB 12.5 12/26/2023    HCT 38.8 12/26/2023    MCV 83 12/26/2023     12/26/2023       Assessment:  Problem List Items Addressed This Visit    None  Visit Diagnoses       Cervical radiculopathy    -  Primary    Relevant Orders     Ambulatory referral/consult to Physical/Occupational Therapy    DDD (degenerative disc disease), cervical        Relevant Orders    Ambulatory referral/consult to Physical/Occupational Therapy    Cervical spondylosis        Relevant Orders    Ambulatory referral/consult to Physical/Occupational Therapy          04/15/2024: Barrera Siddiqui is a 58 y.o. female who  has a past medical history of Depression, Diabetes mellitus, High cholesterol, Hypertension, Stroke, and TIA (transient ischemic attack).  By history and examination this patient has chronic neck pain with with left radiculopathy.  The underlying cause is facet arthritis, degenerative disc disease, foraminal stenosis, and central canal stenosis.  Pathology is confirmed by imaging.  MRI Cervical showed multilevel degenerative changes of the cervical spine are most pronounced at C4-C5 with minor spinal canal stenosis and mild left-sided neural foraminal stenosis, as well as mild left-sided C2-C3 and C3-C4 neural foraminal stenosis.  We discussed the underlying diagnoses and multiple treatment options including non-opioid medications, interventional procedures, physical therapy, and home exercise.  Cervical GRACIE has been ordered by Neurosurgery.  The risks and benefits of each treatment option were discussed and all questions were answered.        Treatment Plan:   Procedures: Cervical GRACIE has been ordered by Neurosurgery.   PT/OT/HEP: I have stressed the importance of physical activity and a home exercise plan to help with pain and improve health. Referral placed to PT.   Medications:    - Lyrica per Neurosurgery   -  Reviewed and consistent with medication use as prescribed.  Imaging: MRI reviewed and discussed with the patient  Follow Up: RTC 2 weeks post procedure    Tiera Murphy DO   Interventional Pain Medicine / Anesthesiology    Disclaimer: This note was partly generated using dictation software which may occasionally result in transcription  errors.

## 2024-04-16 ENCOUNTER — TELEPHONE (OUTPATIENT)
Dept: PAIN MEDICINE | Facility: CLINIC | Age: 59
End: 2024-04-16

## 2024-04-16 DIAGNOSIS — M54.12 CERVICAL RADICULOPATHY: Primary | ICD-10-CM

## 2024-04-16 NOTE — TELEPHONE ENCOUNTER
----- Message from Tiera Murphy DO sent at 4/15/2024  5:01 PM CDT -----  Regarding: RE: Order for OKSANA KELLY  Just forwarding thanks!  ----- Message -----  From: Sheldon Murrieta MA  Sent: 4/15/2024   2:35 PM CDT  To: Tiera Murphy DO  Subject: FW: Order for OKSANA KELLY                            ----- Message -----  From: Sofia Joy PA-C  Sent: 4/15/2024   2:05 PM CDT  To: [facility] Schedulers  Subject: Order for OKSANA KELLY                                Patient Name: OKSANA KELLY([id number])  Sex: Female  : 1965      PCP: GISSELLE GARCIA    Center: [facility]     Level of Service:27427     MA OFFICE/OUTPT VISIT, NEW, LEVL IV, 45-59 MIN    Types of orders made on 04/15/2024: Procedure Request    Order Date:4/15/2024  Ordering User:SOFIA JOY [[id number]]  Adelaida adamser Provider:Sofia Joy PA-C [[id number]]  Authorizing Provider: Sofia Joy PA-C [[id number]]  Supervising Provider:MEGHANA DANG [[id number]]  Type of Supervision:Supervision Required  Department:[facility][[id number]]    Common Order Information  Procedure -> Epidural Injection (specify level) Cmt: cervical (aim left)    Order Specific Information  Order: Procedure Order to Pain Management [C  ustom: [id number]]  Order #:          [id number]: 1 FUTURE    Priority: Routine  Class: Clinic Performed    Future Order Information      Expires on:04/15/2025            Expected by:04/15/2024                   Associated Diagnoses      M54.2 Neck pain      Facility Name: -> [facility]           Priority: Routine  Class: Clinic Performed    Future Order Information      Expires on:04/15/  2025            Expected by:04/15/2024                   Associated Diagnoses      M54.2 Neck pain      Procedure -> Epidural Injection (specify level) Cmt: cervical (aim left)        Facility Name: -> [facility]

## 2024-04-19 ENCOUNTER — TELEPHONE (OUTPATIENT)
Dept: PAIN MEDICINE | Facility: CLINIC | Age: 59
End: 2024-04-19

## 2024-04-23 ENCOUNTER — TELEPHONE (OUTPATIENT)
Dept: PULMONOLOGY | Facility: CLINIC | Age: 59
End: 2024-04-23

## 2024-04-23 NOTE — TELEPHONE ENCOUNTER
Call was returned to Dr Cruz in regards to the patient disability request. Dr Cruz states he'll like to speak with Dr Sotomayor. I informed him that I will send Dr Sotomayor a message. He verbalized understanding.    Dr Cruz [phone]

## 2024-04-23 NOTE — TELEPHONE ENCOUNTER
----- Message from [employer] sent at 4/23/2024  2:45 PM CDT -----  Regarding: Disability Review  Contact: Nancy [phone]  Chaka Norman with [employer] is calling in ref to pt's disability request that was denied. Asking to go over some of the pt's records with provider. Best Call Back Number: Nancy [phone]

## 2024-04-24 ENCOUNTER — TELEPHONE (OUTPATIENT)
Dept: PAIN MEDICINE | Facility: CLINIC | Age: 59
End: 2024-04-24

## 2024-04-29 ENCOUNTER — HOSPITAL ENCOUNTER (OUTPATIENT)
Dept: RADIOLOGY | Facility: HOSPITAL | Age: 59
Discharge: HOME | End: 2024-04-29
Attending: NURSE PRACTITIONER
Payer: COMMERCIAL

## 2024-04-29 DIAGNOSIS — R55 SYNCOPE AND COLLAPSE: ICD-10-CM

## 2024-04-29 PROCEDURE — 70551 MRI BRAIN STEM W/O DYE: CPT | Mod: TC,PN

## 2024-04-29 PROCEDURE — 70551 MRI BRAIN STEM W/O DYE: CPT | Mod: 26,,, | Performed by: STUDENT IN AN ORGANIZED HEALTH CARE EDUCATION/TRAINING PROGRAM

## 2024-04-29 NOTE — PRE-PROCEDURE INSTRUCTIONS
Patient reviewed on 4/29/2024.  Okay to proceed at [facility]. The following pre-procedure instructions and arrival time have been reviewed with patient via phone and sent to patient portal for review.  Patient verbalized an understanding.  Pt to be accompanied by son-Lakhwinder jeny of procedure as responsible .    Dear Barrera ,     You are scheduled for a procedure with Dr. Murphy on 5/01/2024.  Your scheduled arrival time is 08:40 am.  This arrival time is roughly 1 hour before your anticipated procedure time to allow sufficient time for pre-op..  Please wear comfortable clothes. You will be placed in a gown for your procedure.  Please do not wear a dress.  This procedure will take place at the [facility] at the corner of [facility] and [facility].  It is in the [facility] next to [facility].  The address is:     [address] [place] [address].  [place] [address]     After entering the building, you will proceed to the second floor where you can check in with registration. You should take any medications that you routinely take for blood pressure, heart medications, thyroid, cholesterol, etc.      The fasting restrictions are dependent on whether or not you are receiving sedation.  Sedation is not available for all procedures.      Your fasting instructions are as follow:  IV sedation. You should not eat for 8 hours and can only drink clear liquids (water or black coffee without cream/sugar) up until 2 hours before your scheduled time.  You CANNOT drive yourself and must have a .     If you are on blood thinners, you need to follow the anticoagulation instructions that had been discussed previously.  You should only stop the blood thinners if it was approved by your primary care physician or your cardiologist.  In the event that you are not able to stop your blood thinners, a blood thinner was not listed on your medication list, or we were not able to get clearance from your  cardiologist, then the procedure may have to be postponed/canceled.      IF you were told to stop your blood thinners, this is how long you should generally hold some of the more common ones.  Remember that stopping blood thinners is only necessary for certain procedures. If you are unsure of your instructions, please call us.   Aspirin - 5 days  Plavix/Clopidogrel - 7 days  Warfarin / Coumadin - 5 days  Eliquis - 3 days  Pradaxa/Dabigatran - 4 days  Xarelto/Rivaroxaban - 3 days     HOLD all non-insulin injections (shots) until after surgery (Ozempic, Mounjaro, Trulicity, Victoza, Byetta, Wegovy and Adlyxin) (Total of 7 days prior)     If you are a diabetic, do not take your medication if you will be fasting, but bring it with you. Please plan on being here for roughly 3 hours.     Please call us if you have been sick (running fever, having any flu-like symptoms) or have been taking antibiotics in the past 2 weeks or had any outpatient procedures other than with us (colonoscopy, endoscopy, OBGYN, dental, etc.). If you have been previously COVID positive, you will need to hold off on your procedure until you are symptom free for 10 days. If you did not have any symptoms, you can have your procedure 10 days from your positive test result.       *HOLD ALL VITAMINS, MINERALS, HERBS (INCLUDING HERBAL TEAS) AND SUPPLEMENTS  *SHOWER WITH ANTIBACTERIAL SOAP (EX. DIAL) NIGHT BEFORE AND MORNING OF PROCEDURE  *DO NOT APPLY ANY LOTIONS, OILS, POWDERS, PERFUME/COLOGNE, OINTMENTS, GELS, CREAMS, MAKEUP OR DEODORANT TO YOUR SKIN MORNING OF PROCEDURE  *LEAVE JEWELRY AND ANY VALUABLES AT HOME  *WEAR LOOSE COMFORTABLE CLOTHING (PREFERABLY A BUTTON UP SHIRT)     Please reply to this message as receipt of delivery.     Thank you,  [facility]  [place], LPN [facility]  Pre-Admit

## 2024-05-01 ENCOUNTER — HOSPITAL ENCOUNTER (OUTPATIENT)
Facility: HOSPITAL | Age: 59
Discharge: HOME | End: 2024-05-01
Attending: STUDENT IN AN ORGANIZED HEALTH CARE EDUCATION/TRAINING PROGRAM | Admitting: STUDENT IN AN ORGANIZED HEALTH CARE EDUCATION/TRAINING PROGRAM

## 2024-05-01 ENCOUNTER — TELEPHONE (OUTPATIENT)
Dept: PAIN MEDICINE | Facility: CLINIC | Age: 59
End: 2024-05-01

## 2024-05-01 DIAGNOSIS — M50.30 DDD (DEGENERATIVE DISC DISEASE), CERVICAL: ICD-10-CM

## 2024-05-01 DIAGNOSIS — G89.29 CHRONIC PAIN: ICD-10-CM

## 2024-05-01 DIAGNOSIS — M54.12 CERVICAL RADICULOPATHY: Primary | ICD-10-CM

## 2024-05-01 LAB
GLUCOSE SERPL-MCNC: 367 MG/DL (ref 70–110)
POCT GLUCOSE: 367 MG/DL (ref 70–110)

## 2024-05-01 PROCEDURE — 82962 GLUCOSE BLOOD TEST: CPT | Performed by: STUDENT IN AN ORGANIZED HEALTH CARE EDUCATION/TRAINING PROGRAM

## 2024-05-01 RX ORDER — SODIUM CHLORIDE 9 MG/ML
INJECTION, SOLUTION INTRAVENOUS CONTINUOUS
Status: DISCONTINUED | OUTPATIENT
Start: 2024-05-01 | End: 2024-05-01 | Stop reason: HOSPADM

## 2024-05-01 NOTE — INTERVAL H&P NOTE
Procedure cancelled for hyperglycemia. Patient's blood glucose 367. We will reschedule this patient's procedure.    Tiera Murphy,    Interventional Pain Management

## 2024-05-06 NOTE — PRE-PROCEDURE INSTRUCTIONS
Patient reviewed on 5/6/2024.  Okay to proceed at [facility]. The following pre-procedure instructions and arrival time have been reviewed with patient via phone and sent to patient portal for review.  Patient verbalized an understanding.  Pt to be accompanied by son-Lakhwinder jeny of procedure as responsible .     Dear Barrera ,     You are scheduled for a procedure with Dr. Murphy on 5/08/2024.  Your scheduled arrival time is 8:40 am.  This arrival time is roughly 1 hour before your anticipated procedure time to allow sufficient time for pre-op..  Please wear comfortable clothes. You will be placed in a gown for your procedure.  Please do not wear a dress.  This procedure will take place at the [facility] at the corner of [facility] and [facility].  It is in the [facility] next to [facility].  The address is:     [address] [place] [address].  [place] [id number]     After entering the building, you will proceed to the second floor where you can check in with registration. You should take any medications that you routinely take for blood pressure, heart medications, thyroid, cholesterol, etc.      The fasting restrictions are dependent on whether or not you are receiving sedation.  Sedation is not available for all procedures.      Your fasting instructions are as follow:  IV sedation. You should not eat for 8 hours and can only drink clear liquids (water or black coffee without cream/sugar) up until 2 hours before your scheduled time.  You CANNOT drive yourself and must have a .     If you are on blood thinners, you need to follow the anticoagulation instructions that had been discussed previously.  You should only stop the blood thinners if it was approved by your primary care physician or your cardiologist.  In the event that you are not able to stop your blood thinners, a blood thinner was not listed on your medication list, or we were not able to get clearance from your  cardiologist, then the procedure may have to be postponed/canceled.      IF you were told to stop your blood thinners, this is how long you should generally hold some of the more common ones.  Remember that stopping blood thinners is only necessary for certain procedures. If you are unsure of your instructions, please call us.   Aspirin - 5 days  Plavix/Clopidogrel - 7 days  Warfarin / Coumadin - 5 days  Eliquis - 3 days  Pradaxa/Dabigatran - 4 days  Xarelto/Rivaroxaban - 3 days     HOLD all non-insulin injections (shots) until after surgery (Ozempic, Mounjaro, Trulicity, Victoza, Byetta, Wegovy and Adlyxin) (Total of 7 days prior)     If you are a diabetic, do not take your medication if you will be fasting, but bring it with you. Please plan on being here for roughly 3 hours.     Please call us if you have been sick (running fever, having any flu-like symptoms) or have been taking antibiotics in the past 2 weeks or had any outpatient procedures other than with us (colonoscopy, endoscopy, OBGYN, dental, etc.). If you have been previously COVID positive, you will need to hold off on your procedure until you are symptom free for 10 days. If you did not have any symptoms, you can have your procedure 10 days from your positive test result.       *HOLD ALL VITAMINS, MINERALS, HERBS (INCLUDING HERBAL TEAS) AND SUPPLEMENTS  *SHOWER WITH ANTIBACTERIAL SOAP (EX. DIAL) NIGHT BEFORE AND MORNING OF PROCEDURE  *DO NOT APPLY ANY LOTIONS, OILS, POWDERS, PERFUME/COLOGNE, OINTMENTS, GELS, CREAMS, MAKEUP OR DEODORANT TO YOUR SKIN MORNING OF PROCEDURE  *LEAVE JEWELRY AND ANY VALUABLES AT HOME  *WEAR LOOSE COMFORTABLE CLOTHING (PREFERABLY A BUTTON UP SHIRT)     Please reply to this message as receipt of delivery.     Thank you,  [facility]  [place], LPN [facility]  Pre-Admit

## 2024-05-08 ENCOUNTER — HOSPITAL ENCOUNTER (OUTPATIENT)
Facility: HOSPITAL | Age: 59
Discharge: HOME | End: 2024-05-08
Attending: STUDENT IN AN ORGANIZED HEALTH CARE EDUCATION/TRAINING PROGRAM | Admitting: STUDENT IN AN ORGANIZED HEALTH CARE EDUCATION/TRAINING PROGRAM
Payer: COMMERCIAL

## 2024-05-08 VITALS
OXYGEN SATURATION: 98 % | BODY MASS INDEX: 48.37 KG/M2 | SYSTOLIC BLOOD PRESSURE: 121 MMHG | HEIGHT: 63 IN | HEART RATE: 88 BPM | TEMPERATURE: 98 F | RESPIRATION RATE: 18 BRPM | DIASTOLIC BLOOD PRESSURE: 66 MMHG | WEIGHT: 273 LBS

## 2024-05-08 DIAGNOSIS — G89.29 CHRONIC PAIN: ICD-10-CM

## 2024-05-08 DIAGNOSIS — M54.12 CERVICAL RADICULOPATHY: ICD-10-CM

## 2024-05-08 DIAGNOSIS — M50.30 DDD (DEGENERATIVE DISC DISEASE), CERVICAL: Primary | ICD-10-CM

## 2024-05-08 LAB
GLUCOSE SERPL-MCNC: 126 MG/DL (ref 70–110)
POCT GLUCOSE: 126 MG/DL (ref 70–110)

## 2024-05-08 PROCEDURE — 63600175 PHARM REV CODE 636 W HCPCS: Performed by: STUDENT IN AN ORGANIZED HEALTH CARE EDUCATION/TRAINING PROGRAM

## 2024-05-08 PROCEDURE — 62321 NJX INTERLAMINAR CRV/THRC: CPT | Mod: ,,, | Performed by: STUDENT IN AN ORGANIZED HEALTH CARE EDUCATION/TRAINING PROGRAM

## 2024-05-08 PROCEDURE — 82962 GLUCOSE BLOOD TEST: CPT | Performed by: STUDENT IN AN ORGANIZED HEALTH CARE EDUCATION/TRAINING PROGRAM

## 2024-05-08 PROCEDURE — 25000003 PHARM REV CODE 250: Performed by: STUDENT IN AN ORGANIZED HEALTH CARE EDUCATION/TRAINING PROGRAM

## 2024-05-08 PROCEDURE — 25500020 PHARM REV CODE 255: Performed by: STUDENT IN AN ORGANIZED HEALTH CARE EDUCATION/TRAINING PROGRAM

## 2024-05-08 PROCEDURE — 62321 NJX INTERLAMINAR CRV/THRC: CPT | Performed by: STUDENT IN AN ORGANIZED HEALTH CARE EDUCATION/TRAINING PROGRAM

## 2024-05-08 RX ORDER — SODIUM CHLORIDE 9 MG/ML
INJECTION, SOLUTION INTRAVENOUS CONTINUOUS
Status: DISCONTINUED | OUTPATIENT
Start: 2024-05-08 | End: 2024-05-08 | Stop reason: HOSPADM

## 2024-05-08 RX ORDER — MIDAZOLAM HYDROCHLORIDE 1 MG/ML
INJECTION INTRAMUSCULAR; INTRAVENOUS
Status: DISCONTINUED | OUTPATIENT
Start: 2024-05-08 | End: 2024-05-08 | Stop reason: HOSPADM

## 2024-05-08 RX ORDER — LIDOCAINE HYDROCHLORIDE 20 MG/ML
INJECTION, SOLUTION EPIDURAL; INFILTRATION; INTRACAUDAL; PERINEURAL
Status: DISCONTINUED | OUTPATIENT
Start: 2024-05-08 | End: 2024-05-08 | Stop reason: HOSPADM

## 2024-05-08 RX ORDER — FENTANYL CITRATE 50 UG/ML
INJECTION, SOLUTION INTRAMUSCULAR; INTRAVENOUS
Status: DISCONTINUED | OUTPATIENT
Start: 2024-05-08 | End: 2024-05-08 | Stop reason: HOSPADM

## 2024-05-08 RX ORDER — DEXAMETHASONE SODIUM PHOSPHATE 10 MG/ML
INJECTION INTRAMUSCULAR; INTRAVENOUS
Status: DISCONTINUED | OUTPATIENT
Start: 2024-05-08 | End: 2024-05-08 | Stop reason: HOSPADM

## 2024-05-08 NOTE — OP NOTE
Cervical Interlaminar Epidural Steroid Injection under Fluoroscopic Guidance    The procedure, risks, benefits, and options were discussed with the patient. There are no contraindications to the procedure. The patent expressed understanding and agreed to the procedure. Informed written consent was obtained prior to the start of the procedure and can be found in the patient's chart.     PATIENT NAME: Barrera Siddiqui   MRN: [id number]     DATE OF PROCEDURE: 05/08/2024    PROCEDURE: Cervical Interlaminar Epidural Steroid Injection C7/T1 under Fluoroscopic Guidance    PRE-OP DIAGNOSIS: Cervical radiculopathy [M54.12] Cervical radiculopathy [M54.12]    POST-OP DIAGNOSIS: Same    PHYSICIAN: Tiera Murphy DO     ASSISTANTS: None    MEDICATIONS INJECTED: Preservative-free Decadron 10mg and PFNS     LOCAL ANESTHETIC INJECTED: Xylocaine 2%     SEDATION: Versed 2mg and Fentanyl 50mcg                                                                                                                                                                                     Conscious sedation ordered by M.D. Patient re-evaluation prior to administration of conscious sedation. No changes noted in patient's status from initial evaluation. The patient's vital signs were monitored by RN and patient remained hemodynamically stable throughout the procedure.    Event Time In   Sedation Start 0934   Sedation End 0940       ESTIMATED BLOOD LOSS: None    COMPLICATIONS: None    TECHNIQUE: Time-out was performed to identify the patient and procedure to be performed. With the patient laying in a prone position, the surgical area was prepped and draped in the usual sterile fashion using ChloraPrep and a fenestrated drape. The level was determined under fluoroscopy guidance. Skin anesthesia was achieved by injecting Lidocaine 2% over the injection site.  The interlaminar space was then approached with a 20 gauge, 5 inch Tuohy needle that was introduced under  fluoroscopic guidance with AP, lateral and/or contralateral oblique imaging. Once the Ligamentum flavum was encountered loss of resistance to saline was used to enter the epidural space. With positive loss of resistance and negative aspiration for CSF or Blood, contrast dye  Omnipaque (300mg/mL) was injected to confirm placement and there was no vascular runoff. Then 4 mL of the medication mixture listed above was then injected slowly. Displacement of the radio opaque contrast after injection of the medication confirmed that the medication went into the area of the epidural space. The needles were removed, and bleeding was nil. A sterile dressing was applied. No specimens collected. The patient tolerated the procedure well.       The patient was monitored after the procedure in the recovery area. They were given post-procedure and discharge instructions to follow at home. The patient was discharged in a stable condition.      Tiera Murphy DO

## 2024-05-08 NOTE — DISCHARGE INSTRUCTIONS
Home Care Instructions Pain Management:     1.  DIET:     You may resume your normal diet today.     2.  BATHING:     You may shower with luke warm water.     3.  DRESSING:     You may remove your bandage today.     4.  ACTIVITY LEVEL:       You may resume your normal activities 24 hours after your procedure.     5.  MEDICATIONS:     You may resume your normal medications today.     6.  SPECIAL INSTRUCTIONS:     No heat to the injection site for 24 hours including bath or shower, heating pad, moist heat or hot tubs.     Use an ice pack to the injection site for any pain or discomfort.  Apply ice packs for 20 minute intervals as needed.     If you have received any sedatives by mouth today, you can not drive for 12 hours.     If you have received sedation through an IV, you can not drive for 24 hours.     PLEASE CALL YOUR DOCTOR FOR THE FOLLOWIN.  Redness or swelling around the injection site.  2.  Fever of 101 degrees.  3.  Drainage (pus) from the injection site.  4.  For any continuous bleeding (some dried blood over the incision is normal.)     FOR EMERGENCIES:     If any unusual problems or difficulties occur during clinic hours, call [phone] or dial [phone].     Follow up with with your physician in 2-3 weeks.

## 2024-05-11 ENCOUNTER — HOSPITAL ENCOUNTER (EMERGENCY)
Facility: HOSPITAL | Age: 59
Discharge: HOME | End: 2024-05-11
Attending: EMERGENCY MEDICINE

## 2024-05-11 VITALS
DIASTOLIC BLOOD PRESSURE: 78 MMHG | HEART RATE: 96 BPM | OXYGEN SATURATION: 95 % | SYSTOLIC BLOOD PRESSURE: 122 MMHG | RESPIRATION RATE: 18 BRPM | TEMPERATURE: 98 F

## 2024-05-11 DIAGNOSIS — R51.9 ACUTE NONINTRACTABLE HEADACHE, UNSPECIFIED HEADACHE TYPE: Primary | ICD-10-CM

## 2024-05-11 LAB
BUN SERPL-MCNC: 24 MG/DL (ref 6–30)
CHLORIDE SERPL-SCNC: 104 MMOL/L (ref 95–110)
CREAT SERPL-MCNC: 1 MG/DL (ref 0.5–1.4)
GLUCOSE SERPL-MCNC: 94 MG/DL (ref 70–110)
HCT VFR BLD CALC: 42 %PCV (ref 36–54)
HCV AB SERPL QL IA: NORMAL
HIV 1+2 AB+HIV1 P24 AG SERPL QL IA: NORMAL
POC IONIZED CALCIUM: 1.1 MMOL/L (ref 1.06–1.42)
POC TCO2 (MEASURED): 24 MMOL/L (ref 23–29)
POTASSIUM BLD-SCNC: 3.7 MMOL/L (ref 3.5–5.1)
SAMPLE: NORMAL
SODIUM BLD-SCNC: 139 MMOL/L (ref 136–145)

## 2024-05-11 PROCEDURE — 87389 HIV-1 AG W/HIV-1&-2 AB AG IA: CPT | Performed by: PHYSICIAN ASSISTANT

## 2024-05-11 PROCEDURE — 86803 HEPATITIS C AB TEST: CPT | Performed by: PHYSICIAN ASSISTANT

## 2024-05-11 PROCEDURE — 96375 TX/PRO/DX INJ NEW DRUG ADDON: CPT | Mod: 59

## 2024-05-11 PROCEDURE — 63600175 PHARM REV CODE 636 W HCPCS: Performed by: EMERGENCY MEDICINE

## 2024-05-11 PROCEDURE — 96361 HYDRATE IV INFUSION ADD-ON: CPT

## 2024-05-11 PROCEDURE — 25500020 PHARM REV CODE 255: Performed by: EMERGENCY MEDICINE

## 2024-05-11 PROCEDURE — 96374 THER/PROPH/DIAG INJ IV PUSH: CPT | Mod: 59

## 2024-05-11 PROCEDURE — 80047 BASIC METABLC PNL IONIZED CA: CPT

## 2024-05-11 PROCEDURE — 99285 EMERGENCY DEPT VISIT HI MDM: CPT | Mod: 25

## 2024-05-11 RX ORDER — METOCLOPRAMIDE HYDROCHLORIDE 5 MG/ML
10 INJECTION INTRAMUSCULAR; INTRAVENOUS
Status: COMPLETED | OUTPATIENT
Start: 2024-05-11 | End: 2024-05-11

## 2024-05-11 RX ORDER — DROPERIDOL 2.5 MG/ML
1.25 INJECTION, SOLUTION INTRAMUSCULAR; INTRAVENOUS
Status: COMPLETED | OUTPATIENT
Start: 2024-05-11 | End: 2024-05-11

## 2024-05-11 RX ORDER — DIPHENHYDRAMINE HYDROCHLORIDE 50 MG/ML
25 INJECTION INTRAMUSCULAR; INTRAVENOUS
Status: COMPLETED | OUTPATIENT
Start: 2024-05-11 | End: 2024-05-11

## 2024-05-11 RX ADMIN — DIPHENHYDRAMINE HYDROCHLORIDE 25 MG: 50 INJECTION INTRAMUSCULAR; INTRAVENOUS at 04:05

## 2024-05-11 RX ADMIN — DROPERIDOL 1.25 MG: 2.5 INJECTION, SOLUTION INTRAMUSCULAR; INTRAVENOUS at 05:05

## 2024-05-11 RX ADMIN — IOHEXOL 100 ML: 350 INJECTION, SOLUTION INTRAVENOUS at 06:05

## 2024-05-11 RX ADMIN — SODIUM CHLORIDE, POTASSIUM CHLORIDE, SODIUM LACTATE AND CALCIUM CHLORIDE 1000 ML: 600; 310; 30; 20 INJECTION, SOLUTION INTRAVENOUS at 04:05

## 2024-05-11 RX ADMIN — METOCLOPRAMIDE 10 MG: 5 INJECTION, SOLUTION INTRAMUSCULAR; INTRAVENOUS at 04:05

## 2024-05-11 NOTE — ED TRIAGE NOTES
Barrera Siddiqui, a 58 y.o. female presents to the ED w/ complaint of headache, treated for pinched nerve on Wednesday headache since, arrived via EMS    Triage note:  Chief Complaint   Patient presents with    Headache     Injection for pinched nerve on Wednesday having a headache ever since, tried tylenol and Excedrin with no relief, also having NV x 1 episode     Review of patient's allergies indicates:  No Known Allergies  Past Medical History:   Diagnosis Date    Depression     Diabetes mellitus     High cholesterol     Hypertension     Stroke     TIA (transient ischemic attack)          APPEARANCE: awake and alert in NAD. PAIN  10/10  SKIN: warm, dry and intact. No breakdown or bruising.  MUSCULOSKELETAL: Patient moving all extremities spontaneously, no obvious swelling or deformities noted. Ambulates independently.  RESPIRATORY: Denies shortness of breath.Respirations unlabored.   CARDIAC: Denies CP, 2+ distal pulses; no peripheral edema  ABDOMEN: S/ND/NT, Denies nausea  : voids spontaneously, denies difficulty  Neurologic: AAO x 4; follows commands equal strength in all extremities; denies numbness/tingling. Denies dizziness

## 2024-05-11 NOTE — ED NOTES
I-STAT Chem-8+ Results:   Value Reference Range   Sodium 139 136-145 mmol/L   Potassium  3.7 3.5-5.1 mmol/L   Chloride 104  mmol/L   Ionized Calcium 1.10 1.06-1.42 mmol/L   CO2 (measured) 24 23-29 mmol/L   Glucose 94  mg/dL   BUN 24 6-30 mg/dL   Creatinine 1.0 0.5-1.4 mg/dL   Hematocrit 42 36-54%

## 2024-05-11 NOTE — ED PROVIDER NOTES
Encounter Date: 5/11/2024       History     Chief Complaint   Patient presents with    Headache     Injection for pinched nerve on Wednesday having a headache ever since, tried tylenol and Excedrin with no relief, also having NV x 1 episode     59 yo W with extensive pmhx including CVA, NIDDM, HTN, HLD, obesity, EDGAR, tobacco use, anxiety, depression, cervicalgia, 3 days s/p epidural steroid infection C7-T1 presents with a chief complaint of headache.  She notes procedure went uncomplicated.  The following morning, 2 days ago she awoke up with headache.  Headache has been constant since onset.  It was present throughout the left head most severely but radiation to the generalized head.  It was not severe at onset but worsened today and became severe.  No weakness, numbness, change in vision.  No chest pain.  She did have an episode of nausea and vomiting x1.  She is on Lyrica but did not help her headache.  She notes a fall occurred last year and after the fall she had a similar headache.  She has migraines but never this severe.  No fevers.  No neck stiffness. Of note, 2 weeks prior, on 4/29, pt had a MRI brain that revealed mild nonspecific white matter changes, likely related to chronic microvascular ischemia unchanged from previous.      The history is provided by the patient.     Review of patient's allergies indicates:  No Known Allergies  Past Medical History:   Diagnosis Date    Depression     Diabetes mellitus     High cholesterol     Hypertension     Stroke     TIA (transient ischemic attack)      Past Surgical History:   Procedure Laterality Date    CHOLECYSTECTOMY      COLONOSCOPY N/A 10/18/2017    Procedure: COLONOSCOPY;  Surgeon: Donald Wright Jr., MD;  [facility] ENDO;  Service: Endoscopy;  Laterality: N/A;    EPIDURAL STEROID INJECTION INTO CERVICAL SPINE N/A 5/8/2024    Procedure: C7-T1 GRACIE (AIM LEFT);  Surgeon: Tiera Murphy DO;  Location: [facility] PAIN MANAGEMENT;  Service: Pain  Management;  Laterality: N/A;  20 mins    KNEE SURGERY      TUBAL LIGATION      WRIST SURGERY       Family History   Problem Relation Name Age of Onset    Breast cancer Mother      Liver disease Maternal Grandmother       Social History     Tobacco Use    Smoking status: Never    Smokeless tobacco: Never   Substance Use Topics    Alcohol use: Yes     Comment: rare    Drug use: No     Review of Systems    Physical Exam     Initial Vitals [05/11/24 1435]   BP Pulse Resp Temp SpO2   129/81 101 18 98 °F (36.7 °C) 97 %      MAP       --         Physical Exam    Nursing note and vitals reviewed.  Constitutional: She appears well-developed and well-nourished. She is not diaphoretic. She appears distressed.   Tearful, lying in right lateral decubitus in stretcher   HENT:   Head: Normocephalic and atraumatic.   Mouth/Throat: Oropharynx is clear and moist.   Eyes: EOM are normal. Right eye exhibits no discharge. Left eye exhibits no discharge. No scleral icterus.   Neck: Neck supple. No JVD present.   No midline cervical tenderness  No evidence of steroid injection  No meningeal signs   Normal range of motion.  Cardiovascular:  Normal rate, regular rhythm and normal heart sounds.     Exam reveals no gallop and no friction rub.       No murmur heard.  Pulmonary/Chest: Breath sounds normal. No respiratory distress. She has no wheezes. She has no rhonchi. She has no rales.   Abdominal: Abdomen is soft. She exhibits no distension and no mass. There is no abdominal tenderness. There is no rebound and no guarding.   Musculoskeletal:         General: No tenderness or edema. Normal range of motion.      Cervical back: Normal range of motion and neck supple.     Neurological: She is alert and oriented to person, place, and time. She has normal strength. No cranial nerve deficit or sensory deficit.   Skin: Skin is warm and dry. Capillary refill takes less than 2 seconds.   Psychiatric: Thought content normal.         ED Course    Procedures  Labs Reviewed   HIV 1 / 2 ANTIBODY    Narrative:     Release to patient->Immediate   HEPATITIS C ANTIBODY    Narrative:     Release to patient->Immediate   ISTAT PROCEDURE          Imaging Results    None          Medications   lactated ringers bolus 1,000 mL (1,000 mLs Intravenous New Bag 5/11/24 1624)   droPERidol injection 1.25 mg (has no administration in time range)   metoclopramide injection 10 mg (10 mg Intravenous Given 5/11/24 1617)   diphenhydrAMINE injection 25 mg (25 mg Intravenous Given 5/11/24 1610)     Medical Decision Making  57 yo W with extensive pmhx including CVA, NIDDM, HTN, HLD, obesity, EDGAR, tobacco use, anxiety, depression, cervicalgia, 3 days s/p epidural steroid infection C7-T1 presents with a chief complaint of 48 hours of worsening left-sided headache.     Differential includes, but is not limited to:  Migraine, ICH, tension headache    No fever or meningeal signs to suggest meningitis.  She had an MRI 2 weeks ago, do not suspect brain tumor.  Will obtain Chem 8 and obtain CTA brain.  Will administer migraine cocktail of Reglan, Benadryl, IV fluids.    Reassessment: Chem 8 normal.  On repeat assessment, patient appears much more comfortable.  She was no longer crying.  She notes pain has improved but has not resolved.  Will administer droperidol.  At this time, my shift is coming to a close and the patient was signed out to incoming MD. patient awaiting CTA brain and reassessment.  Very low suspicion for ICH given time course so if pain improved on CT negative, would not recommend LP to rule out bleed.  Ambulatory referral to Neurology provided.  Script for Excedrin migraine sent to pharmacy.    Amount and/or Complexity of Data Reviewed  Radiology: ordered.    Risk  Prescription drug management.                                      Clinical Impression:  Final diagnoses:  [R51.9] Acute nonintractable headache, unspecified headache type (Primary)                 Bladimir Ang  MD DESTINEY  05/11/24 1878

## 2024-05-11 NOTE — DISCHARGE INSTRUCTIONS
You may follow up with Neurology for further evaluation if you have recurrent headaches.  Try using Excedrin migraine as needed for pain.  Return to the ER for any severe pain, weakness, numbness, changes in vision, neck stiffness, fever, or other concerning symptoms.

## 2024-05-12 NOTE — PROVIDER PROGRESS NOTES - EMERGENCY DEPT.
ED Physician Hand-off Note:    ED Course: I assumed care of patient from off-going ED physician, Dr. Ang.  Briefly, Patient is presented for headache.    At the time of signout plan was pending results of CTA head.    CTA brain without acute abnormalities.  Discussed results with the patient.  Feeling improved after medications in the ED for headache.  Discussed referral placed for Neurology.  Discussed need for outpatient follow up with PCP.  Given return precautions.    Patient comfortable with plan. Patient counseled regarding exam, results, diagnosis, treatment, and plan.    Disposition: Discharge    Impression:   Final diagnoses:  [R51.9] Acute nonintractable headache, unspecified headache type (Primary)

## 2024-05-13 ENCOUNTER — TELEPHONE (OUTPATIENT)
Dept: PAIN MEDICINE | Facility: CLINIC | Age: 59
End: 2024-05-13

## 2024-05-13 RX ORDER — BUTALBITAL, ACETAMINOPHEN AND CAFFEINE 50; 325; 40 MG/1; MG/1; MG/1
1 TABLET ORAL EVERY 6 HOURS PRN
Qty: 20 TABLET | Refills: 0 | Status: SHIPPED | OUTPATIENT
Start: 2024-05-13 | End: 2024-05-18

## 2024-05-13 NOTE — TELEPHONE ENCOUNTER
Called pt. to inform her of Dr. Aviles message about not taking medicine that she sent and medication ER proscribed her. Patient had verbal understanding.    ----- Message from Tiera Murphy DO sent at 5/13/2024  4:23 PM CDT -----  Please tell her that she cannot take this medication with the prescription she received in the ER - they are too similar. Thanks!  ----- Message -----  From: Asha Nguyen MA  Sent: 5/13/2024   4:15 PM CDT  To: Tiera Murphy DO    Pt. said yes please send the fiorcet to her local pharmacy.  ----- Message -----  From: Tiera Murphy DO  Sent: 5/13/2024   3:49 PM CDT  To: Asha Nguyen MA    I can call in fioricet for her headaches.  This is not a typical side effect of the injection. Thanks  ----- Message -----  From: Asha Nguyen MA  Sent: 5/13/2024  11:26 AM CDT  To: Tiera Murphy DO    Pt. Wants to know if this is normal? Wants to know if you can call something in ?  ----- Message -----  From: Carrie Campbell  Sent: 5/13/2024  10:39 AM CDT  To: Jeffrey Mott Staff    Type:  Needs Medical Advice    Who Called: Pt   Symptoms (please be specific): severe headache    How long has patient had these symptoms:    Pharmacy name and phone #:  [facility] [address] [place] [address] [facility]  Phone: [phone]  Fax: [phone]  Would the patient rather a call back or a response via [facility]? Call back   Best Call Back Number:  [phone]  Additional Information: had injection on Wed. Slept all day Thursday and Friday and woke up with headache on Saturday .. Went to the ER ...and headache is still severe today

## 2024-05-20 ENCOUNTER — TELEPHONE (OUTPATIENT)
Dept: PAIN MEDICINE | Facility: CLINIC | Age: 59
End: 2024-05-20
Payer: COMMERCIAL

## 2024-05-20 NOTE — TELEPHONE ENCOUNTER
I attempted to contact pt in regards to message in the portal. Pt didn't answer. I left a vm stating pt will have to contact her PCP in regards to filling out her disability paper work. Call ended. ----- Message from Ansley Eason sent at 5/20/2024  3:19 PM CDT -----  Type:  Needs Medical Advice    Who Called: release point  Would the patient rather a call back or a response via [facility]? call  Best Call Back Number: [phone] fax # [phone]  Additional Information: calling in regards to disability form needing to be completed for pt  Ref # [id number]

## 2024-05-21 ENCOUNTER — TELEPHONE (OUTPATIENT)
Dept: PAIN MEDICINE | Facility: CLINIC | Age: 59
End: 2024-05-21
Payer: COMMERCIAL

## 2024-06-19 NOTE — PROGRESS NOTES
DATE: 6/24/2024  PATIENT: Barrera Siddiqui    Attending Physician: Kwasi Burrell MD    HISTORY:  Barrera Siddiqui is a 59 y.o. female who returns to me today for follow up.  She was last seen by me 4/11/2024.  Today she is doing well but notes she had a cervical GRACIE on 5/8/24 with minimal relief. She continues to have pain in her entire left arm, especially her left hand, and her left 4th finger often gets stuck.    The Patient denies myelopathic symptoms such as handwriting changes or difficulty with buttons/coins/keys. Denies perineal paresthesias, bowel/bladder dysfunction.    PMH/PSH/FamHx/SocHx:  Unchanged from prior visit    ROS:  REVIEW OF SYSTEMS:  Constitution: Negative. Negative for chills, fever and night sweats.   HENT: Negative for congestion and headaches.    Eyes: Negative for blurred vision, left vision loss and right vision loss.   Cardiovascular: Negative for chest pain and syncope.   Respiratory: Negative for cough and shortness of breath.    Endocrine: Negative for polydipsia, polyphagia and polyuria.   Hematologic/Lymphatic: Negative for bleeding problem. Does not bruise/bleed easily.   Skin: Negative for dry skin, itching and rash.   Musculoskeletal: Negative for falls and muscle weakness.   Gastrointestinal: Negative for abdominal pain and bowel incontinence.   Allergic/Immunologic: Negative for hives and persistent infections.  Genitourinary: Negative for urinary retention/incontinence and nocturia.   Neurological: negative for disturbances in coordination, no myelopathic symptoms such as handwriting changes or difficulty with buttons, coins, keys or small objects. No loss of balance and seizures.   Psychiatric/Behavioral: Negative for depression. The patient does not have insomnia.   Denies myelopathic symptoms, perineal paresthesias, bowel or bladder incontinence    EXAM:  LMP 07/29/2018 (Exact Date)     My physical examination was notable for the following findings:     Musculoskeletal and neuro exam  stable.     IMAGING:  No new imaging today.    Today I personally re-reviewed AP, Lat and Flex/Ex  upright C-spine films that demonstrate mild degenerative changes with straightening of normal lordosis.      MRI cervical demonstrates multilevel degenerative changes of the cervical spine are most pronounced at C4-C5 with minor spinal canal stenosis and mild left-sided neural foraminal stenosis.    EMG demonstrates mild severity bilateral carpal tunnel syndrome (median neuropathy at the wrist),   Bilateral cubital tunnel syndrome (ulnar neuropathy across the elbows) with secondary denervation in the left FDI muscle. The right upper extremity was not evaluated with concentric needle because the patient denied symptoms on that side.     There is no height or weight on file to calculate BMI.    Hemoglobin A1C   Date Value Ref Range Status   04/11/2024 9.6 (H) 4.0 - 5.6 % Final     Comment:     ADA Screening Guidelines:  5.7-6.4%  Consistent with prediabetes  >or=6.5%  Consistent with diabetes    High levels of fetal hemoglobin interfere with the HbA1C  assay. Heterozygous hemoglobin variants (HbS, HgC, etc)do  not significantly interfere with this assay.   However, presence of multiple variants may affect accuracy.     08/15/2023 9.1 (H) 4.0 - 5.6 % Final     Comment:     ADA Screening Guidelines:  5.7-6.4%  Consistent with prediabetes  >or=6.5%  Consistent with diabetes    High levels of fetal hemoglobin interfere with the HbA1C  assay. Heterozygous hemoglobin variants (HbS, HgC, etc)do  not significantly interfere with this assay.   However, presence of multiple variants may affect accuracy.     12/31/2022 10.0 (H) 4.0 - 5.6 % Final     Comment:     ADA Screening Guidelines:  5.7-6.4%  Consistent with prediabetes  >or=6.5%  Consistent with diabetes    High levels of fetal hemoglobin interfere with the HbA1C  assay. Heterozygous hemoglobin variants (HbS, HgC, etc)do  not significantly interfere with this assay.    However, presence of multiple variants may affect accuracy.           ASSESSMENT/PLAN:    There are no diagnoses linked to this encounter.    Today we discussed at length all of the different treatment options including anti-inflammatories, acetaminophen, rest, ice, heat, physical therapy including strengthening and stretching exercises, home exercises, ROM, aerobic conditioning, aqua therapy, other modalities including ultrasound, massage, and dry needling, epidural steroid injections and finally surgical intervention.      Pt presents with chronic left arm pain. MRI cervical largely unremarkable/minimal improvement with cervical GRACIE. Will refer to hand for further evaluation given EMG results. Will send medrol dose pack to pharmacy

## 2024-06-24 ENCOUNTER — PATIENT MESSAGE (OUTPATIENT)
Dept: ORTHOPEDICS | Facility: CLINIC | Age: 59
End: 2024-06-24

## 2024-06-24 ENCOUNTER — LAB VISIT (OUTPATIENT)
Dept: LAB | Facility: HOSPITAL | Age: 59
End: 2024-06-24
Payer: COMMERCIAL

## 2024-06-24 ENCOUNTER — OFFICE VISIT (OUTPATIENT)
Dept: ORTHOPEDICS | Facility: CLINIC | Age: 59
End: 2024-06-24
Payer: COMMERCIAL

## 2024-06-24 ENCOUNTER — TELEPHONE (OUTPATIENT)
Dept: ORTHOPEDICS | Facility: CLINIC | Age: 59
End: 2024-06-24
Payer: COMMERCIAL

## 2024-06-24 VITALS — WEIGHT: 277.31 LBS | BODY MASS INDEX: 49.14 KG/M2 | HEIGHT: 63 IN

## 2024-06-24 DIAGNOSIS — E11.69 TYPE 2 DIABETES MELLITUS WITH OTHER SPECIFIED COMPLICATION, WITHOUT LONG-TERM CURRENT USE OF INSULIN: ICD-10-CM

## 2024-06-24 DIAGNOSIS — E11.69 TYPE 2 DIABETES MELLITUS WITH OTHER SPECIFIED COMPLICATION, WITHOUT LONG-TERM CURRENT USE OF INSULIN: Primary | ICD-10-CM

## 2024-06-24 DIAGNOSIS — R52 PAIN: Primary | ICD-10-CM

## 2024-06-24 LAB
ESTIMATED AVG GLUCOSE: 183 MG/DL (ref 68–131)
HBA1C MFR BLD: 8 % (ref 4–5.6)

## 2024-06-24 PROCEDURE — 3052F HG A1C>EQUAL 8.0%<EQUAL 9.0%: CPT | Mod: CPTII,S$GLB,, | Performed by: ORTHOPAEDIC SURGERY

## 2024-06-24 PROCEDURE — 36415 COLL VENOUS BLD VENIPUNCTURE: CPT | Performed by: ORTHOPAEDIC SURGERY

## 2024-06-24 PROCEDURE — 1159F MED LIST DOCD IN RCRD: CPT | Mod: CPTII,S$GLB,, | Performed by: ORTHOPAEDIC SURGERY

## 2024-06-24 PROCEDURE — 99999 PR PBB SHADOW E&M-EST. PATIENT-LVL III: CPT | Mod: PBBFAC,,, | Performed by: ORTHOPAEDIC SURGERY

## 2024-06-24 PROCEDURE — 83036 HEMOGLOBIN GLYCOSYLATED A1C: CPT | Performed by: ORTHOPAEDIC SURGERY

## 2024-06-24 PROCEDURE — 3008F BODY MASS INDEX DOCD: CPT | Mod: CPTII,S$GLB,, | Performed by: ORTHOPAEDIC SURGERY

## 2024-06-24 PROCEDURE — 99213 OFFICE O/P EST LOW 20 MIN: CPT | Mod: S$GLB,,, | Performed by: ORTHOPAEDIC SURGERY

## 2024-06-24 PROCEDURE — 4010F ACE/ARB THERAPY RXD/TAKEN: CPT | Mod: CPTII,S$GLB,, | Performed by: ORTHOPAEDIC SURGERY

## 2024-06-24 RX ORDER — METHYLPREDNISOLONE 4 MG/1
TABLET ORAL
Qty: 1 EACH | Refills: 0 | Status: SHIPPED | OUTPATIENT
Start: 2024-06-24 | End: 2024-07-15

## 2024-06-26 ENCOUNTER — OFFICE VISIT (OUTPATIENT)
Dept: ORTHOPEDICS | Facility: CLINIC | Age: 59
End: 2024-06-26
Payer: COMMERCIAL

## 2024-06-26 ENCOUNTER — HOSPITAL ENCOUNTER (OUTPATIENT)
Dept: RADIOLOGY | Facility: OTHER | Age: 59
Discharge: HOME | End: 2024-06-26
Attending: SPECIALIST/TECHNOLOGIST
Payer: COMMERCIAL

## 2024-06-26 VITALS — BODY MASS INDEX: 49.14 KG/M2 | HEIGHT: 63 IN | WEIGHT: 277.31 LBS

## 2024-06-26 DIAGNOSIS — G56.23 CUBITAL TUNNEL SYNDROME OF BOTH UPPER EXTREMITIES: Primary | ICD-10-CM

## 2024-06-26 DIAGNOSIS — G56.03 BILATERAL CARPAL TUNNEL SYNDROME: ICD-10-CM

## 2024-06-26 DIAGNOSIS — M65.342 TRIGGER RING FINGER OF LEFT HAND: ICD-10-CM

## 2024-06-26 DIAGNOSIS — R52 PAIN: Primary | ICD-10-CM

## 2024-06-26 DIAGNOSIS — R52 PAIN: ICD-10-CM

## 2024-06-26 PROCEDURE — 73130 X-RAY EXAM OF HAND: CPT | Mod: TC,FY,LT

## 2024-06-26 PROCEDURE — 73130 X-RAY EXAM OF HAND: CPT | Mod: 26,LT,, | Performed by: RADIOLOGY

## 2024-06-26 PROCEDURE — 99999 PR PBB SHADOW E&M-EST. PATIENT-LVL III: CPT | Mod: PBBFAC,,, | Performed by: SPECIALIST/TECHNOLOGIST

## 2024-06-26 NOTE — PROGRESS NOTES
Subjective:       Patient ID: Barrera Siddiqui is a 59 y.o. female.    Chief Complaint: Pain and Swelling of the Left Hand    HPI  6/26/24  59-year-old right-hand dominant female presents to clinic today with bilateral hand pain.  She states her pain is worse on her left side.  She states that her pain has been going on for about 1 month.  She states that she has numbness and tingling within the ulnar nerve distribution.  She does note that she is occasional numbness and tingling as well through the median nerve distribution.  She states that she had previous carpal tunnel surgeries about 20 years ago done at [facility].  She states that she has not tried any bracing.  She was recently started on a Medrol Dosepak to aid in her symptoms.  She also states that she has locking sensation of the left ring finger.  She notes tenderness over the A1 pulley.      Past Medical History:   Diagnosis Date    Depression     Diabetes mellitus     High cholesterol     Hypertension     Stroke     TIA (transient ischemic attack)      Past Surgical History:   Procedure Laterality Date    CHOLECYSTECTOMY      COLONOSCOPY N/A 10/18/2017    Procedure: COLONOSCOPY;  Surgeon: Donald Wright Jr., MD;  Location: [facility] ENDO;  Service: Endoscopy;  Laterality: N/A;    EPIDURAL STEROID INJECTION INTO CERVICAL SPINE N/A 5/8/2024    Procedure: C7-T1 GRACIE (AIM LEFT);  Surgeon: Tiera Murphy DO;  Location: [facility] PAIN MANAGEMENT;  Service: Pain Management;  Laterality: N/A;  20 mins    KNEE SURGERY      TUBAL LIGATION      WRIST SURGERY       Family History   Problem Relation Name Age of Onset    Breast cancer Mother      Liver disease Maternal Grandmother       Social History     Socioeconomic History    Marital status: Single   Tobacco Use    Smoking status: Never    Smokeless tobacco: Never   Substance and Sexual Activity    Alcohol use: Yes     Comment: rare    Drug use: No    Sexual activity: Yes     Partners: Male     Social  Determinants of Health     Financial Resource Strain: Medium Risk (11/24/2023)    Overall Financial Resource Strain (CARDIA)     Difficulty of Paying Living Expenses: Somewhat hard   Food Insecurity: Food Insecurity Present (11/24/2023)    Hunger Vital Sign     Worried About Running Out of Food in the Last Year: Sometimes true     Ran Out of Food in the Last Year: Sometimes true   Transportation Needs: No Transportation Needs (11/24/2023)    PRAPARE - Transportation     Lack of Transportation (Medical): No     Lack of Transportation (Non-Medical): No   Physical Activity: Inactive (11/24/2023)    Exercise Vital Sign     Days of Exercise per Week: 0 days     Minutes of Exercise per Session: 30 min   Stress: No Stress Concern Present (11/24/2023)    [employer] [place] [employer] - Occupational Stress Questionnaire     Feeling of Stress : Only a little   Housing Stability: High Risk (11/24/2023)    Housing Stability Vital Sign     Unable to Pay for Housing in the Last Year: Yes     Number of Places Lived in the Last Year: 1     Unstable Housing in the Last Year: No       Current Outpatient Medications   Medication Sig Dispense Refill    amLODIPine (NORVASC) 10 MG tablet Take 1 tablet (10 mg total) by mouth once daily. 90 tablet 3    aspirin (ECOTRIN) 81 MG EC tablet Take 1 tablet (81 mg total) by mouth once daily. 90 tablet 3    atorvastatin (LIPITOR) 20 MG tablet Take 1 tablet (20 mg total) by mouth once daily. 90 tablet 3    furosemide (LASIX) 40 MG tablet Take 1 tablet (40 mg total) by mouth 2 (two) times daily. 60 tablet 11    glipiZIDE (GLUCOTROL) 10 MG tablet Take 10 mg by mouth 2 (two) times daily.      losartan (COZAAR) 100 MG tablet Take 1 tablet (100 mg total) by mouth once daily. 90 tablet 3    metFORMIN (GLUCOPHAGE-XR) 500 MG ER 24hr tablet Take 500 mg by mouth 2 (two) times daily.      methylPREDNISolone (MEDROL DOSEPACK) 4 mg tablet use as directed 1 each 0    multivitamin (THERAGRAN) per tablet  "Take 1 tablet by mouth once daily.      OZEMPIC 0.25 mg or 0.5 mg (2 mg/3 mL) pen injector Inject into the skin.      potassium chloride (KLOR-CON) 10 MEQ TbSR Take 10 mEq by mouth 2 (two) times daily.      pregabalin (LYRICA) 100 MG capsule Take 1 capsule (100 mg total) by mouth 2 (two) times daily. 60 capsule 5     No current facility-administered medications for this visit.     Review of patient's allergies indicates:  No Known Allergies    Review of Systems        Objective:      Vitals:    06/26/24 0751   Weight: 125.8 kg (277 lb 5.4 oz)   Height: 5' 3" (1.6 m)     Physical Exam  Cardiovascular:      Pulses:           Radial pulses are Normal on the right side.   Skin:     General: Skin is intact.   Psychiatric:         Mood and Affect: Mood and affect normal.       Hand/Wrist Musculoskeletal Exam    Inspection    Right      Erythema: none      Ecchymosis: none      Edema: none      Deformity: none      Wrist - prior incision: none    Left      Erythema: none      Ecchymosis: none      Edema: none      Deformity: none      Hand - prior incision: none      Wrist - prior incision: none    Palpation    Left      Triggering: ring      Ring tenderness to palpation: A1 pulley    Range of Motion      Right Wrist      Right wrist range of motion is normal.      Left Wrist      Left wrist range of motion is normal.      Neurovascular    Right       Radial pulse: normal      Capillary refill: brisk and <3 sec      Ulnar nerve sensory distribution: normal      Median nerve sensory distribution: normal      Superficial radial nerve sensory distribution: normal    Left       Capillary refill: brisk and <3 sec      Ulnar nerve sensory distribution: normal      Median nerve sensory distribution: normal      Superficial radial nerve sensory distribution: normal    Special Tests    Right      Phalen's: positive      Carpal compression test: positive      Elbow flexion: positive      Tinel's - carpal tunnel: positive      " Tinel's - cubital tunnel: positive    Left      Phalen's: positive      Carpal compression test: positive      Elbow flexion: positive      Tinel's - carpal tunnel: positive      Tinel's - cubital tunnel: positive    General    Labored breathing: no    Psychiatric: normal mood and affect    Neurological: oriented x3    Skin: intact      Diagnostics Review: X-Ray: Reviewed  Left hand x-ray   06/26/2024   Personal interpretation of the x-ray reveals no signs of fractures dislocations.       Assessment:       1. Cubital tunnel syndrome of both upper extremities    2. Trigger ring finger of left hand    3. Bilateral carpal tunnel syndrome        Plan:       We discussed with the patient at length all the different treatment options available including anti-inflammatories, acetaminophen, rest, ice, physical therapy to include strengthening, range of motion exercise, splinting, occasional cortisone injections for temporary relief, or possible surgical interventions.     Discussed the nature of cubital tunnel as well as carpal tunnel and trigger finger.  Educated patient on the pathophysiology of these conditions.  Recommend patient wear wrist braces at night as well as elbow braces to protect the ulnar and median nerve.  Due to patient having a previous carpal tunnel surgery we will have patient follow up with Dr. Nuñez to discuss potential revision.  Patient is taking Medrol Dosepak currently so we will defer injection.  Patient acknowledged understanding of her condition and had no further questions.             Roberto Moore PA-C, ATC  Hand and Upper Extremity   [facility]      Please be aware that this note has been generated with the assistance of MModal voice-to-text.  Please excuse any spelling or grammatical errors.

## 2024-06-27 ENCOUNTER — HOSPITAL ENCOUNTER (EMERGENCY)
Facility: HOSPITAL | Age: 59
Discharge: HOME | End: 2024-06-27
Attending: EMERGENCY MEDICINE
Payer: COMMERCIAL

## 2024-06-27 VITALS
RESPIRATION RATE: 20 BRPM | SYSTOLIC BLOOD PRESSURE: 158 MMHG | TEMPERATURE: 98 F | HEART RATE: 84 BPM | DIASTOLIC BLOOD PRESSURE: 94 MMHG | WEIGHT: 277 LBS | BODY MASS INDEX: 49.07 KG/M2 | OXYGEN SATURATION: 98 %

## 2024-06-27 DIAGNOSIS — R10.12 LEFT UPPER QUADRANT ABDOMINAL PAIN: Primary | ICD-10-CM

## 2024-06-27 DIAGNOSIS — L02.91 ABSCESS: ICD-10-CM

## 2024-06-27 DIAGNOSIS — R11.2 NAUSEA AND VOMITING, UNSPECIFIED VOMITING TYPE: ICD-10-CM

## 2024-06-27 LAB
ALBUMIN SERPL BCP-MCNC: 4.6 G/DL (ref 3.5–5.2)
ALP SERPL-CCNC: 113 U/L (ref 38–126)
ALT SERPL W/O P-5'-P-CCNC: 63 U/L (ref 10–44)
ANION GAP SERPL CALC-SCNC: 13 MMOL/L (ref 8–16)
AST SERPL-CCNC: 80 U/L (ref 15–46)
BACTERIA #/AREA URNS AUTO: ABNORMAL /HPF
BASOPHILS # BLD AUTO: 0.04 K/UL (ref 0–0.2)
BASOPHILS NFR BLD: 0.4 % (ref 0–1.9)
BILIRUB SERPL-MCNC: 0.6 MG/DL (ref 0.1–1)
BILIRUB UR QL STRIP: NEGATIVE
CALCIUM SERPL-MCNC: 9.2 MG/DL (ref 8.7–10.5)
CHLORIDE SERPL-SCNC: 107 MMOL/L (ref 95–110)
CLARITY UR REFRACT.AUTO: CLEAR
CO2 SERPL-SCNC: 24 MMOL/L (ref 23–29)
COLOR UR AUTO: YELLOW
CREAT SERPL-MCNC: 0.47 MG/DL (ref 0.5–1.4)
DIFFERENTIAL METHOD BLD: ABNORMAL
EOSINOPHIL # BLD AUTO: 0.1 K/UL (ref 0–0.5)
EOSINOPHIL NFR BLD: 0.9 % (ref 0–8)
ERYTHROCYTE [DISTWIDTH] IN BLOOD BY AUTOMATED COUNT: 14.2 % (ref 11.5–14.5)
EST. GFR  (NO RACE VARIABLE): >60 ML/MIN/1.73 M^2
GLUCOSE SERPL-MCNC: 171 MG/DL (ref 70–110)
GLUCOSE UR QL STRIP: NEGATIVE
HCT VFR BLD AUTO: 36.1 % (ref 37–48.5)
HGB BLD-MCNC: 11.5 G/DL (ref 12–16)
HGB UR QL STRIP: ABNORMAL
HYALINE CASTS UR QL AUTO: 0 /LPF
IMM GRANULOCYTES # BLD AUTO: 0.07 K/UL (ref 0–0.04)
IMM GRANULOCYTES NFR BLD AUTO: 0.6 % (ref 0–0.5)
KETONES UR QL STRIP: NEGATIVE
LEUKOCYTE ESTERASE UR QL STRIP: NEGATIVE
LIPASE SERPL-CCNC: 165 U/L (ref 23–300)
LYMPHOCYTES # BLD AUTO: 3.5 K/UL (ref 1–4.8)
LYMPHOCYTES NFR BLD: 31.8 % (ref 18–48)
MCH RBC QN AUTO: 26.6 PG (ref 27–31)
MCHC RBC AUTO-ENTMCNC: 31.9 G/DL (ref 32–36)
MCV RBC AUTO: 84 FL (ref 82–98)
MICROSCOPIC COMMENT: ABNORMAL
MONOCYTES # BLD AUTO: 0.6 K/UL (ref 0.3–1)
MONOCYTES NFR BLD: 5.3 % (ref 4–15)
NEUTROPHILS # BLD AUTO: 6.8 K/UL (ref 1.8–7.7)
NEUTROPHILS NFR BLD: 61 % (ref 38–73)
NITRITE UR QL STRIP: NEGATIVE
NRBC BLD-RTO: 0 /100 WBC
PH UR STRIP: 6 [PH] (ref 5–8)
PLATELET # BLD AUTO: 254 K/UL (ref 150–450)
PMV BLD AUTO: 11.5 FL (ref 9.2–12.9)
POTASSIUM SERPL-SCNC: 3.8 MMOL/L (ref 3.5–5.1)
PROT SERPL-MCNC: 9 G/DL (ref 6–8.4)
PROT UR QL STRIP: ABNORMAL
RBC # BLD AUTO: 4.32 M/UL (ref 4–5.4)
RBC #/AREA URNS AUTO: 2 /HPF (ref 0–4)
SODIUM SERPL-SCNC: 144 MMOL/L (ref 136–145)
SP GR UR STRIP: 1.02 (ref 1–1.03)
SQUAMOUS #/AREA URNS AUTO: ABNORMAL /HPF
URN SPEC COLLECT METH UR: ABNORMAL
UROBILINOGEN UR STRIP-ACNC: NEGATIVE EU/DL
UUN UR-MCNC: 11 MG/DL (ref 7–17)
WBC # BLD AUTO: 11.11 K/UL (ref 3.9–12.7)
WBC #/AREA URNS AUTO: 1 /HPF (ref 0–5)

## 2024-06-27 PROCEDURE — 80053 COMPREHEN METABOLIC PANEL: CPT | Mod: ER | Performed by: PHYSICIAN ASSISTANT

## 2024-06-27 PROCEDURE — 81000 URINALYSIS NONAUTO W/SCOPE: CPT | Mod: ER | Performed by: PHYSICIAN ASSISTANT

## 2024-06-27 PROCEDURE — 99284 EMERGENCY DEPT VISIT MOD MDM: CPT | Mod: 25,ER

## 2024-06-27 PROCEDURE — 85025 COMPLETE CBC W/AUTO DIFF WBC: CPT | Mod: ER | Performed by: PHYSICIAN ASSISTANT

## 2024-06-27 PROCEDURE — 25000003 PHARM REV CODE 250: Mod: ER | Performed by: PHYSICIAN ASSISTANT

## 2024-06-27 PROCEDURE — 63600175 PHARM REV CODE 636 W HCPCS: Mod: ER | Performed by: PHYSICIAN ASSISTANT

## 2024-06-27 PROCEDURE — 83690 ASSAY OF LIPASE: CPT | Mod: ER | Performed by: PHYSICIAN ASSISTANT

## 2024-06-27 RX ORDER — ONDANSETRON HYDROCHLORIDE 2 MG/ML
4 INJECTION, SOLUTION INTRAVENOUS
Status: COMPLETED | OUTPATIENT
Start: 2024-06-27 | End: 2024-06-27

## 2024-06-27 RX ORDER — KETOROLAC TROMETHAMINE 30 MG/ML
15 INJECTION, SOLUTION INTRAMUSCULAR; INTRAVENOUS
Status: COMPLETED | OUTPATIENT
Start: 2024-06-27 | End: 2024-06-27

## 2024-06-27 RX ORDER — DOXYCYCLINE 100 MG/1
100 CAPSULE ORAL 2 TIMES DAILY
Qty: 14 CAPSULE | Refills: 0 | Status: SHIPPED | OUTPATIENT
Start: 2024-06-27 | End: 2024-07-04

## 2024-06-27 RX ORDER — ONDANSETRON 4 MG/1
4 TABLET, ORALLY DISINTEGRATING ORAL EVERY 6 HOURS PRN
Qty: 20 TABLET | Refills: 0 | Status: SHIPPED | OUTPATIENT
Start: 2024-06-27

## 2024-06-27 RX ADMIN — ONDANSETRON 4 MG: 2 INJECTION INTRAMUSCULAR; INTRAVENOUS at 10:06

## 2024-06-27 RX ADMIN — KETOROLAC TROMETHAMINE 15 MG: 30 INJECTION, SOLUTION INTRAMUSCULAR at 10:06

## 2024-06-27 RX ADMIN — SODIUM CHLORIDE 500 ML: 9 INJECTION, SOLUTION INTRAVENOUS at 10:06

## 2024-06-27 NOTE — ED PROVIDER NOTES
"Encounter Date: 2024       History     Chief Complaint   Patient presents with    Abdominal Pain     PT reports abd pain and emesis that started last night. PT also reports boil to right side of vaginal    Abscess     59-year-old female, PMH stroke, TIA, HTN, HLD, dm, presents ED with concern of nausea, vomiting and abdominal pain that began yesterday evening around 5:00 p.m..  She reports she noticed pain to left upper and mid abdomen followed by total of 3 episodes NBNB emesis and continued nausea.  Denying diarrhea but does report history of chronic constipation with last bowel movement 4 days ago.  No dysuria, changes in urine color or frequency, chest pain, cough, shortness of breath, fevers or chills, vaginal bleeding or discharge.  Previous abdominal surgery includes  and cholecystectomy.  She also reports concern for abscess to right side of vagina that she 1st noticed this morning.  She also reports an "old" abscess to right mid back.  Patient does report history of HS.  No recent antibiotics.  No other acute complaints at this time    The history is provided by the patient.     Review of patient's allergies indicates:  No Known Allergies  Past Medical History:   Diagnosis Date    Depression     Diabetes mellitus     High cholesterol     Hypertension     Stroke     TIA (transient ischemic attack)      Past Surgical History:   Procedure Laterality Date    CHOLECYSTECTOMY      COLONOSCOPY N/A 10/18/2017    Procedure: COLONOSCOPY;  Surgeon: Donald Wrigth Jr., MD;  Location: [facility] ENDO;  Service: Endoscopy;  Laterality: N/A;    EPIDURAL STEROID INJECTION INTO CERVICAL SPINE N/A 2024    Procedure: C7-T1 GRACIE (AIM LEFT);  Surgeon: Tiera Murphy DO;  Location: [facility] PAIN MANAGEMENT;  Service: Pain Management;  Laterality: N/A;  20 mins    KNEE SURGERY      TUBAL LIGATION      WRIST SURGERY       Family History   Problem Relation Name Age of Onset    Breast cancer Mother      Liver disease " Maternal Grandmother       Social History     Tobacco Use    Smoking status: Never    Smokeless tobacco: Never   Substance Use Topics    Alcohol use: Yes     Comment: rare    Drug use: No     Review of Systems   Constitutional:  Negative for appetite change, chills, fatigue and fever.   HENT:  Negative for congestion.    Respiratory:  Negative for cough and shortness of breath.    Cardiovascular:  Negative for chest pain.   Gastrointestinal:  Positive for abdominal pain, constipation, diarrhea and nausea. Negative for rectal pain and vomiting.   Genitourinary:  Negative for dysuria and frequency.   Musculoskeletal:  Negative for myalgias, neck pain and neck stiffness.   Skin:  Positive for wound.       Physical Exam     Initial Vitals [06/27/24 0924]   BP Pulse Resp Temp SpO2   (!) 158/94 84 20 98.1 °F (36.7 °C) 98 %      MAP       --         Physical Exam    Vitals reviewed.  Constitutional: Vital signs are normal. She appears well-developed and well-nourished. She is Obese . She is cooperative. She does not have a sickly appearance. She does not appear ill. No distress.   HENT:   Head: Normocephalic and atraumatic.   Eyes: EOM are normal.   Neck:   Normal range of motion.  Cardiovascular:  Normal rate and regular rhythm.           Pulmonary/Chest: Effort normal and breath sounds normal.   Abdominal: There is abdominal tenderness in the epigastric area and left upper quadrant.   Abdominal tenderness predominantly towards left upper quadrant with tenderness radiating towards epigastric region.  No lower abdominal tenderness.  No guarding.  No acute skin changes.  Denying CVA tenderness.   Musculoskeletal:      Cervical back: Normal range of motion.     Neurological: She is alert and oriented to person, place, and time. GCS eye subscore is 4. GCS verbal subscore is 5. GCS motor subscore is 6.   Skin:        Small, 2 cm, firm, indurated, not fluctuant abscess formation to right labia.  No drainage.  No erythema or  warmth.  Mildly tender.  Small, 2 cm mildly fluctuant abscess noted to right mid back with small amount of purulent drainage.  No surrounding erythema or warmth.     Psychiatric: She has a normal mood and affect. Her speech is normal and behavior is normal.         ED Course   Procedures  Labs Reviewed   CBC W/ AUTO DIFFERENTIAL - Abnormal; Notable for the following components:       Result Value    Hemoglobin 11.5 (*)     Hematocrit 36.1 (*)     MCH 26.6 (*)     MCHC 31.9 (*)     Immature Granulocytes 0.6 (*)     Immature Grans (Abs) 0.07 (*)     All other components within normal limits   COMPREHENSIVE METABOLIC PANEL - Abnormal; Notable for the following components:    Glucose 171 (*)     Creatinine 0.47 (*)     Total Protein 9.0 (*)     AST 80 (*)     ALT 63 (*)     All other components within normal limits   URINALYSIS, REFLEX TO URINE CULTURE - Abnormal; Notable for the following components:    Protein, UA 2+ (*)     Occult Blood UA Trace (*)     All other components within normal limits    Narrative:     Preferred Collection Type->Urine, Clean Catch  Specimen Source->Urine   URINALYSIS MICROSCOPIC - Abnormal; Notable for the following components:    Bacteria Few (*)     All other components within normal limits    Narrative:     Preferred Collection Type->Urine, Clean Catch  Specimen Source->Urine   LIPASE          Imaging Results              CT Abdomen Pelvis  Without Contrast (Final result)  Result time 06/27/24 11:02:40      Final result by Cheyv Youngblood MD (06/27/24 11:02:40)                   Impression:      1.  Negative for acute process involving the abdomen or pelvis.    2. Numerous stable findings as noted above.    All CT scans at this facility are performed  using dose modulation techniques as appropriate to performed exam including the following:  automated exposure control; adjustment of mA and/or kV according to the patients size (this includes techniques or standardized protocols for  targeted exams where dose is matched to indication/reason for exam: i.e. extremities or head);  iterative reconstruction technique.      Electronically signed by: Chevy Youngblood MD  Date:    06/27/2024  Time:    11:02               Narrative:    EXAMINATION:  CT ABDOMEN PELVIS WITHOUT CONTRAST, multiplanar reconstructions    CLINICAL HISTORY:  Epigastric pain;    TECHNIQUE:  Axial images through the abdomen and pelvis were obtained without the use of IV contrast.  Sagittal and coronal reconstructions are provided for review.  Oral contrast was not utilized.    COMPARISON:  December 30, 2022    FINDINGS:  LUNG BASES: Mild dependent atelectasis in the lung bases.  Lung bases are otherwise clear.  Negative for pleural or pericardial effusions. The distal esophagus is normal.  Coronary artery calcifications, mainly along the circumflex artery, again seen.    ABDOMEN: Stable cholecystectomy clips.  Stable fatty infiltration of the liver with hepatomegaly.  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  The liver and spleen otherwise appear normal.  The pancreas is unremarkable.  Kidneys and adrenal glands are otherwise normal.  The biliary tree is normal.    Negative for adenopathy, ascites or inflammatory change noted within the abdomen or pelvis.  Negative for vascular abnormalities.    Normal appendix in this patient who has a mobile cecum.  There are a few scattered colonic diverticula.  The large and small bowel loops are otherwise normal.  The stomach is normal in appearance.  Negative for free air.    PELVIS: The urinary bladder is contracted.   The female pelvic organs are normal. There are pelvic phleboliths.    Stable laxity of the linea alba with a fat filled umbilical hernia.  The abdominal wall is otherwise intact.    No significant osseous abnormality is identified.  Negative for significant spinal canal stenosis. Stable lower lumbar spine degenerative facet arthropathy with vacuum phenomenon.  Stable  marginal spondylosis mainly involving the thoracic region.    Negative for groin adenopathy.                                       Medications   ketorolac injection 15 mg (15 mg Intravenous Given 6/27/24 1020)   ondansetron injection 4 mg (4 mg Intravenous Given 6/27/24 1020)   sodium chloride 0.9% bolus 500 mL 500 mL (500 mLs Intravenous New Bag 6/27/24 1020)     Medical Decision Making  Patient presents with concern of abdominal pain, nausea and vomiting that began yesterday evening.  She also reports concern for abscess.  No fevers or chills.  No urinary complaints.  Does have chronic constipation.  Afebrile.  Abdominal tenderness to left upper quadrant with tenderness radiating towards epigastric region.  Small, firm, indurated abscess noted to right labia minora along with small fluctuant abscess to right mid back    DDx:  Including but not limited to abscess, wound check, cellulitis, hidradenitis, viral, GERD, intestinal spasm, gastroenteritis, gastritis, ulcer, cholecystitis, cholelithiasis, gallstones, pancreatitis, ileus, small bowel obstruction, appendicitis, diverticulitis, colitis, constipation, intestinal gas pain, UTI    Amount and/or Complexity of Data Reviewed  Labs: ordered. Decision-making details documented in ED Course.  Radiology: ordered.    Risk  Prescription drug management.               ED Course as of 06/27/24 1156   Thu Jun 27, 2024   1007 Abscess treatment options were discussed with patient.  She does report history of HS.  She agrees to defer I&D of abscess sites at this time, encouraging warm compress and close monitoring.  Will give prescription for doxycycline. [KS]   1020 CBC W/ AUTO DIFFERENTIAL(!)  No elevation in WBC [KS]   1024 Comp. Metabolic Panel(!)  Slight elevation LFTs, appearing near baseline [KS]   1024 Lipase  WNL [KS]   1144 Urinalysis, Reflex to Urine Culture Urine, Clean Catch(!)  Unremarkable.  No significant evidence of urinary infection [KS]   1154 CT Abdomen  Pelvis  Without Contrast  CT per Radiology review showing no acute intra-abdominal findings [KS]   1154 Patient was reassessed, and is now reporting improvement of her pain symptoms.  With careful consideration and with today's findings, low concern for acute abdomen or need for emergent intervention/hospitalization.  Will continue with supportive care for her symptoms.  Prescription written for Zofran.  Encouraged soft diet, close monitoring, hydration and outpatient follow-up.  ED return precautions were discussed at length.  Patient states her understanding and agrees with plan. [KS]      ED Course User Index  [KS] Boogie Bernard PA-C                             Clinical Impression:  Final diagnoses:  [L02.91] Abscess  [R10.12] Left upper quadrant abdominal pain (Primary)  [R11.2] Nausea and vomiting, unspecified vomiting type          ED Disposition Condition    Discharge Stable          ED Prescriptions       Medication Sig Dispense Start Date End Date Auth. Provider    doxycycline (VIBRAMYCIN) 100 MG Cap Take 1 capsule (100 mg total) by mouth 2 (two) times daily. for 7 days 14 capsule 6/27/2024 7/4/2024 Boogie Bernard PA-C    ondansetron (ZOFRAN-ODT) 4 MG TbDL Take 1 tablet (4 mg total) by mouth every 6 (six) hours as needed (nausea). 20 tablet 6/27/2024 -- Boogie Bernard PA-C          Follow-up Information       Follow up With Specialties Details Why Contact Info    Evelin Guevara, NP Family Medicine   [address] [place], [place] [address]  [address]  [facility]  [place] [address]  [phone]               Boogie Bernard PA-C  06/27/24 4106

## 2024-07-05 ENCOUNTER — TELEPHONE (OUTPATIENT)
Dept: ORTHOPEDICS | Facility: CLINIC | Age: 59
End: 2024-07-05
Payer: COMMERCIAL

## 2024-07-05 NOTE — TELEPHONE ENCOUNTER
LVM c pt. Attempting to confirm appt location & time c Dr. Nuñez   with XR. Requested a call back to the [facility] at [phone] with any questions, concerns or need for a different appointment time.

## 2024-07-11 ENCOUNTER — HOSPITAL ENCOUNTER (OUTPATIENT)
Dept: RADIOLOGY | Facility: OTHER | Age: 59
Discharge: HOME | End: 2024-07-11
Attending: SPECIALIST/TECHNOLOGIST
Payer: COMMERCIAL

## 2024-07-11 ENCOUNTER — OFFICE VISIT (OUTPATIENT)
Dept: ORTHOPEDICS | Facility: CLINIC | Age: 59
End: 2024-07-11
Payer: COMMERCIAL

## 2024-07-11 VITALS — BODY MASS INDEX: 49.06 KG/M2 | HEIGHT: 63 IN | WEIGHT: 276.88 LBS

## 2024-07-11 DIAGNOSIS — R52 PAIN: ICD-10-CM

## 2024-07-11 DIAGNOSIS — M65.342 TRIGGER RING FINGER OF LEFT HAND: ICD-10-CM

## 2024-07-11 DIAGNOSIS — G56.23 CUBITAL TUNNEL SYNDROME OF BOTH UPPER EXTREMITIES: Primary | ICD-10-CM

## 2024-07-11 DIAGNOSIS — R52 PAIN: Primary | ICD-10-CM

## 2024-07-11 PROCEDURE — 3052F HG A1C>EQUAL 8.0%<EQUAL 9.0%: CPT | Mod: CPTII,S$GLB,, | Performed by: ORTHOPAEDIC SURGERY

## 2024-07-11 PROCEDURE — 73080 X-RAY EXAM OF ELBOW: CPT | Mod: TC,50,FY

## 2024-07-11 PROCEDURE — 1159F MED LIST DOCD IN RCRD: CPT | Mod: CPTII,S$GLB,, | Performed by: ORTHOPAEDIC SURGERY

## 2024-07-11 PROCEDURE — 3008F BODY MASS INDEX DOCD: CPT | Mod: CPTII,S$GLB,, | Performed by: ORTHOPAEDIC SURGERY

## 2024-07-11 PROCEDURE — 4010F ACE/ARB THERAPY RXD/TAKEN: CPT | Mod: CPTII,S$GLB,, | Performed by: ORTHOPAEDIC SURGERY

## 2024-07-11 PROCEDURE — 99214 OFFICE O/P EST MOD 30 MIN: CPT | Mod: S$GLB,,, | Performed by: ORTHOPAEDIC SURGERY

## 2024-07-11 PROCEDURE — 73080 X-RAY EXAM OF ELBOW: CPT | Mod: 26,,, | Performed by: RADIOLOGY

## 2024-07-11 PROCEDURE — 99999 PR PBB SHADOW E&M-EST. PATIENT-LVL III: CPT | Mod: PBBFAC,,, | Performed by: ORTHOPAEDIC SURGERY

## 2024-07-11 NOTE — H&P (VIEW-ONLY)
Subjective:       Patient ID: Barrera Siddiqui is a 59 y.o. female.    Chief Complaint: Pain and Swelling of the Left Hand    HPI  6/26/24  59-year-old right-hand dominant female presents to clinic today with bilateral hand pain.  She states her pain is worse on her left side.  She states that her pain has been going on for about 1 month.  She states that she has numbness and tingling within the ulnar nerve distribution.  She does note that she is occasional numbness and tingling as well through the median nerve distribution.  She states that she had previous carpal tunnel surgeries about 20 years ago done at [facility].  She states that she has not tried any bracing.  She was recently started on a Medrol Dosepak to aid in her symptoms.  She also states that she has locking sensation of the left ring finger.  She notes tenderness over the A1 pulley.    Interval Update 07/11/2024  Pt presents in clinic today with right hand numbness and tingling and left hand and arm pain > right. Pt is hesitant to make a composite fist. Pts left ring finger actively triggered upon evaluation and TTP at the A1 pulley of the left ring finger and left thumb. She does note that she is occasional numbness and tingling as well through the median nerve distribution.  She states that she had previous carpal tunnel surgeries about 20 years ago done at [facility].  She states that she has been bracing at night, sometimes alternating as she has difficulty wearing both at the same time.       Past Medical History:   Diagnosis Date    Depression     Diabetes mellitus     High cholesterol     Hypertension     Stroke     TIA (transient ischemic attack)      Past Surgical History:   Procedure Laterality Date    CHOLECYSTECTOMY      COLONOSCOPY N/A 10/18/2017    Procedure: COLONOSCOPY;  Surgeon: Donald Wright Jr., MD;  Location: [facility];  Service: Endoscopy;  Laterality: N/A;    EPIDURAL STEROID INJECTION INTO CERVICAL  SPINE N/A 5/8/2024    Procedure: C7-T1 GRACIE (AIM LEFT);  Surgeon: Tiera Murphy DO;  Location: [facility] PAIN MANAGEMENT;  Service: Pain Management;  Laterality: N/A;  20 mins    KNEE SURGERY      TUBAL LIGATION      WRIST SURGERY       Family History   Problem Relation Name Age of Onset    Breast cancer Mother      Liver disease Maternal Grandmother       Social History     Socioeconomic History    Marital status: Single   Tobacco Use    Smoking status: Never    Smokeless tobacco: Never   Substance and Sexual Activity    Alcohol use: Yes     Comment: rare    Drug use: No    Sexual activity: Yes     Partners: Male     Social Determinants of Health     Financial Resource Strain: Medium Risk (11/24/2023)    Overall Financial Resource Strain (CARDIA)     Difficulty of Paying Living Expenses: Somewhat hard   Food Insecurity: Food Insecurity Present (11/24/2023)    Hunger Vital Sign     Worried About Running Out of Food in the Last Year: Sometimes true     Ran Out of Food in the Last Year: Sometimes true   Transportation Needs: No Transportation Needs (11/24/2023)    PRAPARE - Transportation     Lack of Transportation (Medical): No     Lack of Transportation (Non-Medical): No   Physical Activity: Inactive (11/24/2023)    Exercise Vital Sign     Days of Exercise per Week: 0 days     Minutes of Exercise per Session: 30 min   Stress: No Stress Concern Present (11/24/2023)    [employer] - Occupational Stress Questionnaire     Feeling of Stress : Only a little   Housing Stability: High Risk (11/24/2023)    Housing Stability Vital Sign     Unable to Pay for Housing in the Last Year: Yes     Number of Places Lived in the Last Year: 1     Unstable Housing in the Last Year: No       Current Outpatient Medications   Medication Sig Dispense Refill    amLODIPine (NORVASC) 10 MG tablet Take 1 tablet (10 mg total) by mouth once daily. 90 tablet 3    aspirin (ECOTRIN) 81 MG EC tablet Take 1 tablet (81 mg  "total) by mouth once daily. 90 tablet 3    atorvastatin (LIPITOR) 20 MG tablet Take 1 tablet (20 mg total) by mouth once daily. 90 tablet 3    furosemide (LASIX) 40 MG tablet Take 1 tablet (40 mg total) by mouth 2 (two) times daily. 60 tablet 11    glipiZIDE (GLUCOTROL) 10 MG tablet Take 10 mg by mouth 2 (two) times daily.      losartan (COZAAR) 100 MG tablet Take 1 tablet (100 mg total) by mouth once daily. 90 tablet 3    metFORMIN (GLUCOPHAGE-XR) 500 MG ER 24hr tablet Take 500 mg by mouth 2 (two) times daily.      methylPREDNISolone (MEDROL DOSEPACK) 4 mg tablet use as directed 1 each 0    multivitamin (THERAGRAN) per tablet Take 1 tablet by mouth once daily.      OZEMPIC 0.25 mg or 0.5 mg (2 mg/3 mL) pen injector Inject into the skin.      potassium chloride (KLOR-CON) 10 MEQ TbSR Take 10 mEq by mouth 2 (two) times daily.      pregabalin (LYRICA) 100 MG capsule Take 1 capsule (100 mg total) by mouth 2 (two) times daily. 60 capsule 5     No current facility-administered medications for this visit.     Review of patient's allergies indicates:  No Known Allergies    Review of Systems        Objective:      Vitals:    06/26/24 0751   Weight: 125.8 kg (277 lb 5.4 oz)   Height: 5' 3" (1.6 m)     Physical Exam  Cardiovascular:      Pulses:           Radial pulses are Normal on the right side.   Skin:     General: Skin is intact.   Psychiatric:         Mood and Affect: Mood and affect normal.       Hand/Wrist Musculoskeletal Exam    Inspection    Right      Erythema: none      Ecchymosis: none      Edema: none      Deformity: none      Wrist - prior incision: none    Left      Erythema: none      Ecchymosis: none      Edema: none      Deformity: none      Hand - prior incision: none      Wrist - prior incision: none    Palpation    Left      Triggering: ring      Ring tenderness to palpation: A1 pulley    Range of Motion      Right Wrist      Right wrist range of motion is normal.      Left Wrist      Left wrist range of " motion is normal.      Neurovascular    Right       Radial pulse: normal      Capillary refill: brisk and <3 sec      Ulnar nerve sensory distribution: normal      Median nerve sensory distribution: normal      Superficial radial nerve sensory distribution: normal    Left       Capillary refill: brisk and <3 sec      Ulnar nerve sensory distribution: normal      Median nerve sensory distribution: normal      Superficial radial nerve sensory distribution: normal    Special Tests    Right      Phalen's: positive      Carpal compression test: positive      Elbow flexion: positive      Tinel's - carpal tunnel: positive      Tinel's - cubital tunnel: positive    Left      Phalen's: positive      Carpal compression test: positive      Elbow flexion: positive      Tinel's - carpal tunnel: positive      Tinel's - cubital tunnel: positive    General    Labored breathing: no    Psychiatric: normal mood and affect    Neurological: oriented x3    Skin: intact      Diagnostics Review: X-Ray: Reviewed  Left hand x-ray   06/26/2024   Personal interpretation of the x-ray reveals no signs of fractures dislocations.       EMG 03/15/24    1. Mild severity bilateral carpal tunnel syndrome (median neuropathy at the wrist),   2. Bilateral cubital tunnel syndrome (ulnar neuropathy across the elbows) with secondary denervation in the left FDI muscle. The right upper extremity was not evaluated with concentric needle because the patient denied symptoms on that side.   Assessment:       1. Cubital tunnel syndrome of both upper extremities    2. Trigger ring finger of left hand    3. Bilateral carpal tunnel syndrome        Plan:       - Explained the risks, benefits, and alternatives of the procedure to the patient in great detail.  - Proceed with left ring and thumb trigger finger releases and left ulnar nerve decompression.   - Consents reviewed and signed in clinic.   - All questions answered.

## 2024-07-11 NOTE — PROGRESS NOTES
Subjective:       Patient ID: Barrera Siddiqui is a 59 y.o. female.    Chief Complaint: Pain and Swelling of the Left Hand    HPI  6/26/24  59-year-old right-hand dominant female presents to clinic today with bilateral hand pain.  She states her pain is worse on her left side.  She states that her pain has been going on for about 1 month.  She states that she has numbness and tingling within the ulnar nerve distribution.  She does note that she is occasional numbness and tingling as well through the median nerve distribution.  She states that she had previous carpal tunnel surgeries about 20 years ago done at [facility].  She states that she has not tried any bracing.  She was recently started on a Medrol Dosepak to aid in her symptoms.  She also states that she has locking sensation of the left ring finger.  She notes tenderness over the A1 pulley.    Interval Update 07/11/2024  Pt presents in clinic today with right hand numbness and tingling and left hand and arm pain > right. Pt is hesitant to make a composite fist. Pts left ring finger actively triggered upon evaluation and TTP at the A1 pulley of the left ring finger and left thumb. She does note that she is occasional numbness and tingling as well through the median nerve distribution.  She states that she had previous carpal tunnel surgeries about 20 years ago done at [facility].  She states that she has been bracing at night, sometimes alternating as she has difficulty wearing both at the same time.       Past Medical History:   Diagnosis Date    Depression     Diabetes mellitus     High cholesterol     Hypertension     Stroke     TIA (transient ischemic attack)      Past Surgical History:   Procedure Laterality Date    CHOLECYSTECTOMY      COLONOSCOPY N/A 10/18/2017    Procedure: COLONOSCOPY;  Surgeon: Donald Wright Jr., MD;  Location: [facility];  Service: Endoscopy;  Laterality: N/A;    EPIDURAL STEROID INJECTION INTO CERVICAL  SPINE N/A 5/8/2024    Procedure: C7-T1 GRACIE (AIM LEFT);  Surgeon: Tiera Murphy DO;  Location: [facility] PAIN MANAGEMENT;  Service: Pain Management;  Laterality: N/A;  20 mins    KNEE SURGERY      TUBAL LIGATION      WRIST SURGERY       Family History   Problem Relation Name Age of Onset    Breast cancer Mother      Liver disease Maternal Grandmother       Social History     Socioeconomic History    Marital status: Single   Tobacco Use    Smoking status: Never    Smokeless tobacco: Never   Substance and Sexual Activity    Alcohol use: Yes     Comment: rare    Drug use: No    Sexual activity: Yes     Partners: Male     Social Determinants of Health     Financial Resource Strain: Medium Risk (11/24/2023)    Overall Financial Resource Strain (CARDIA)     Difficulty of Paying Living Expenses: Somewhat hard   Food Insecurity: Food Insecurity Present (11/24/2023)    Hunger Vital Sign     Worried About Running Out of Food in the Last Year: Sometimes true     Ran Out of Food in the Last Year: Sometimes true   Transportation Needs: No Transportation Needs (11/24/2023)    PRAPARE - Transportation     Lack of Transportation (Medical): No     Lack of Transportation (Non-Medical): No   Physical Activity: Inactive (11/24/2023)    Exercise Vital Sign     Days of Exercise per Week: 0 days     Minutes of Exercise per Session: 30 min   Stress: No Stress Concern Present (11/24/2023)    [employer] - Occupational Stress Questionnaire     Feeling of Stress : Only a little   Housing Stability: High Risk (11/24/2023)    Housing Stability Vital Sign     Unable to Pay for Housing in the Last Year: Yes     Number of Places Lived in the Last Year: 1     Unstable Housing in the Last Year: No       Current Outpatient Medications   Medication Sig Dispense Refill    amLODIPine (NORVASC) 10 MG tablet Take 1 tablet (10 mg total) by mouth once daily. 90 tablet 3    aspirin (ECOTRIN) 81 MG EC tablet Take 1 tablet (81 mg  "total) by mouth once daily. 90 tablet 3    atorvastatin (LIPITOR) 20 MG tablet Take 1 tablet (20 mg total) by mouth once daily. 90 tablet 3    furosemide (LASIX) 40 MG tablet Take 1 tablet (40 mg total) by mouth 2 (two) times daily. 60 tablet 11    glipiZIDE (GLUCOTROL) 10 MG tablet Take 10 mg by mouth 2 (two) times daily.      losartan (COZAAR) 100 MG tablet Take 1 tablet (100 mg total) by mouth once daily. 90 tablet 3    metFORMIN (GLUCOPHAGE-XR) 500 MG ER 24hr tablet Take 500 mg by mouth 2 (two) times daily.      methylPREDNISolone (MEDROL DOSEPACK) 4 mg tablet use as directed 1 each 0    multivitamin (THERAGRAN) per tablet Take 1 tablet by mouth once daily.      OZEMPIC 0.25 mg or 0.5 mg (2 mg/3 mL) pen injector Inject into the skin.      potassium chloride (KLOR-CON) 10 MEQ TbSR Take 10 mEq by mouth 2 (two) times daily.      pregabalin (LYRICA) 100 MG capsule Take 1 capsule (100 mg total) by mouth 2 (two) times daily. 60 capsule 5     No current facility-administered medications for this visit.     Review of patient's allergies indicates:  No Known Allergies    Review of Systems        Objective:      Vitals:    06/26/24 0751   Weight: 125.8 kg (277 lb 5.4 oz)   Height: 5' 3" (1.6 m)     Physical Exam  Cardiovascular:      Pulses:           Radial pulses are Normal on the right side.   Skin:     General: Skin is intact.   Psychiatric:         Mood and Affect: Mood and affect normal.       Hand/Wrist Musculoskeletal Exam    Inspection    Right      Erythema: none      Ecchymosis: none      Edema: none      Deformity: none      Wrist - prior incision: none    Left      Erythema: none      Ecchymosis: none      Edema: none      Deformity: none      Hand - prior incision: none      Wrist - prior incision: none    Palpation    Left      Triggering: ring      Ring tenderness to palpation: A1 pulley    Range of Motion      Right Wrist      Right wrist range of motion is normal.      Left Wrist      Left wrist range of " motion is normal.      Neurovascular    Right       Radial pulse: normal      Capillary refill: brisk and <3 sec      Ulnar nerve sensory distribution: normal      Median nerve sensory distribution: normal      Superficial radial nerve sensory distribution: normal    Left       Capillary refill: brisk and <3 sec      Ulnar nerve sensory distribution: normal      Median nerve sensory distribution: normal      Superficial radial nerve sensory distribution: normal    Special Tests    Right      Phalen's: positive      Carpal compression test: positive      Elbow flexion: positive      Tinel's - carpal tunnel: positive      Tinel's - cubital tunnel: positive    Left      Phalen's: positive      Carpal compression test: positive      Elbow flexion: positive      Tinel's - carpal tunnel: positive      Tinel's - cubital tunnel: positive    General    Labored breathing: no    Psychiatric: normal mood and affect    Neurological: oriented x3    Skin: intact      Diagnostics Review: X-Ray: Reviewed  Left hand x-ray   06/26/2024   Personal interpretation of the x-ray reveals no signs of fractures dislocations.       EMG 03/15/24    1. Mild severity bilateral carpal tunnel syndrome (median neuropathy at the wrist),   2. Bilateral cubital tunnel syndrome (ulnar neuropathy across the elbows) with secondary denervation in the left FDI muscle. The right upper extremity was not evaluated with concentric needle because the patient denied symptoms on that side.   Assessment:       1. Cubital tunnel syndrome of both upper extremities    2. Trigger ring finger of left hand    3. Bilateral carpal tunnel syndrome        Plan:       - Explained the risks, benefits, and alternatives of the procedure to the patient in great detail.  - Proceed with left ring and thumb trigger finger releases and left ulnar nerve decompression.   - Consents reviewed and signed in clinic.   - All questions answered.

## 2024-07-13 ENCOUNTER — ANESTHESIA EVENT (OUTPATIENT)
Dept: SURGERY | Facility: OTHER | Age: 59
End: 2024-07-13
Payer: COMMERCIAL

## 2024-07-13 NOTE — ANESTHESIA PREPROCEDURE EVALUATION
07/13/2024  Barrera Siddiqui is a 59 y.o., female.      Pre-op Assessment    I have reviewed the Patient Summary Reports.     I have reviewed the Nursing Notes. I have reviewed the NPO Status.   I have reviewed the Medications.     Review of Systems  Anesthesia Hx:  No problems with previous Anesthesia             Denies Family Hx of Anesthesia complications.    Denies Personal Hx of Anesthesia complications.                    Social:  Non-Smoker, Social Alcohol Use       Hematology/Oncology:    Oncology Normal    -- Anemia:               Hematology Comments: Hgb 11.5                    Cardiovascular:     Hypertension, poorly controlled       CHF    hyperlipidemia    11/2023 EF 55%    Neg stress 2018                         Pulmonary:      Shortness of breath  Sleep Apnea Pulmonary evaluation for SOB suspect from obesity and EDGAR               Renal/:  Renal/ Normal                 Hepatic/GI:      Denies GERD. Liver Disease,            Musculoskeletal:         Spine Disorders: cervical Degenerative disease           Neurological:  TIA, CVA, no residual symptoms   Headaches                                 Endocrine:  Diabetes   Ozempic - last dose 7/7/2024      Morbid Obesity / BMI > 40  Psych:   anxiety depression              Physical Exam  General: Well nourished and Cooperative    Airway:  Mallampati: III   Mouth Opening: Normal  TM Distance: Normal  Neck ROM: Normal ROM    Dental:  Intact    Anesthesia Plan  Type of Anesthesia, risks & benefits discussed:    Anesthesia Type: Gen ETT  Intra-op Monitoring Plan: Standard ASA Monitors  Post Op Pain Control Plan: multimodal analgesia and peripheral nerve block  Induction:  IV  Airway Plan: Video  Informed Consent: Informed consent signed with the Patient and all parties understand the risks and agree with anesthesia plan.  All questions answered.   ASA Score:  3  Day of Surgery Review of History & Physical: H&P Update referred to the surgeon/provider.  Anesthesia Plan Notes: Recent labs in [facility]  Had cards f/u 2/2024; note in epic  Ozempic last dose 7/7/2024  Post op nerve block ordered by surgeon    Ready For Surgery From Anesthesia Perspective.     .

## 2024-07-19 ENCOUNTER — HOSPITAL ENCOUNTER (OUTPATIENT)
Dept: PREADMISSION TESTING | Facility: OTHER | Age: 59
Discharge: HOME | End: 2024-07-19
Attending: ORTHOPAEDIC SURGERY
Payer: COMMERCIAL

## 2024-07-19 VITALS
WEIGHT: 269 LBS | SYSTOLIC BLOOD PRESSURE: 145 MMHG | TEMPERATURE: 97 F | OXYGEN SATURATION: 99 % | DIASTOLIC BLOOD PRESSURE: 87 MMHG | HEART RATE: 92 BPM | BODY MASS INDEX: 47.66 KG/M2 | HEIGHT: 63 IN

## 2024-07-19 RX ORDER — SODIUM CHLORIDE, SODIUM LACTATE, POTASSIUM CHLORIDE, CALCIUM CHLORIDE 600; 310; 30; 20 MG/100ML; MG/100ML; MG/100ML; MG/100ML
INJECTION, SOLUTION INTRAVENOUS CONTINUOUS
Status: CANCELLED | OUTPATIENT
Start: 2024-07-19

## 2024-07-19 RX ORDER — ACETAMINOPHEN 500 MG
1000 TABLET ORAL
Status: CANCELLED | OUTPATIENT
Start: 2024-07-19 | End: 2024-07-19

## 2024-07-19 NOTE — DISCHARGE INSTRUCTIONS
Information to Prepare you for your Surgery    PRE-ADMIT TESTING -  [phone]    [address]  [facility]          Your surgery has been scheduled at [facility]. We are pleased to have the opportunity to serve you. For Further Information please call [phone].    On the day of surgery please report to the Information Desk on the 1st floor.    CONTACT YOUR PHYSICIAN'S OFFICE THE DAY PRIOR TO YOUR SURGERY TO OBTAIN YOUR ARRIVAL TIME.     The evening before surgery do not eat anything after 9 p.m. ( this includes hard candy, chewing gum and mints).  You may only have GATORADE, POWERADE AND WATER  from 9 p.m. until you leave your home.   DO NOT DRINK ANY LIQUIDS ON THE WAY TO THE HOSPITAL.      Why does your anesthesiologist allow you to drink Gatorade/Powerade before surgery?  Gatorade/Powerade helps to increase your comfort before surgery and to decrease your nausea after surgery. The carbohydrates in Gatorade/Powerade help reduce your body's stress response to surgery.  If you are a diabetic-drink only water prior to surgery.    Outpatient Surgery- May allow 2 adult (18 and older) Support Persons (1 being the designated ) for all surgical/procedural patients. A breastfeeding mother will be allowed her infant and 2 adult Support Persons. No one under the age of 18 will be allowed in the building. No swapping out of visitors in the [facility].      SPECIAL MEDICATION INSTRUCTIONS: TAKE medications checked off by the Anesthesiologist on your Medication List.    Angiogram Patients: Take medications as instructed by your physician, including aspirin.     Surgery Patients:    If you take ASPIRIN - Your PHYSICIAN/SURGEON will need to inform you IF/OR when you need to stop taking aspirin prior to your surgery.     The week prior to surgery do not ot take any medications containing IBUPROFEN or NSAIDS ( Advil, Motrin, Goodys, BC, Aleve, Naproxen etc)  If you are not sure if you should take a medicine please call your surgeon's office.  Ok to take Tylenol    Do Not Wear any make-up (especially eye make-up) to surgery. Please remove any false eyelashes or eyelash extensions. If you arrive the day of surgery with makeup/eyelashes on you will be required to remove prior to surgery. (There is a risk of corneal abrasions if eye makeup/eyelash extensions are not removed)      Leave all valuables at home.   Do Not wear any jewelry or watches, including any metal in body piercings. Jewelry must be removed prior to coming to the hospital.  There is a possibility that rings that are unable to be removed may be cut off if they are on the surgical extremity.    Please remove all hair extensions, wigs, clips and any other metal accessories/ ornaments from your hair.  These items may pose a flammable/fire risk in Surgery and must be removed.    Do not shave your surgical area at least 5 days prior to your surgery. The surgical prep will be performed at the hospital according to Infection Control regulations.    Contact Lens must be removed before surgery. Either do not wear the contact lens or bring a case and solution for storage.  Please bring a container for eyeglasses or dentures as required.  Bring any paperwork your physician has provided, such as consent forms,  history and physicals, doctor's orders, etc.   Bring comfortable clothes that are loose fitting to wear upon discharge. Take into consideration the type of surgery being performed.  Maintain your diet as advised per your physician the day prior to surgery.      Adequate rest the night before surgery is advised.   Park in the Parking lot behind the hospital or in the [place] [facility] across the street from the parking lot. Parking is complimentary.  If you will be discharged the same day as your procedure, please arrange for a responsible adult to drive you home or to accompany you if traveling by taxi.    YOU WILL NOT BE PERMITTED TO DRIVE OR TO LEAVE THE HOSPITAL ALONE AFTER SURGERY.   If you are being discharged the same day, it is strongly recommended that you arrange for someone to remain with you for the first 24 hrs following your surgery.    The Surgeon will speak to your family/visitor after your surgery regarding the outcome of your surgery and post op care.  The Surgeon may speak to you after your surgery, but there is a possibility you may not remember the details.  Please check with your family members regarding the conversation with the Surgeon.    We strongly recommend whoever is bringing you home be present for discharge instructions.  This will ensure a thorough understanding for your post op home care.          Thank you for your cooperation.  The Staff of [facility].            Bathing Instructions with Hibiclens    Shower the evening before and morning of your procedure with Chlorhexidine (Hibiclens)  do not use Chlorhexidine on your face or genitals. Do not get in your eyes.  Wash your face with water and your regular face wash/soap  Use your regular shampoo  Apply Chlorhexidine (Hibiclens) directly on your skin or on a wet washcloth and wash gently. When showering: Move away from the shower stream when applying Chlorhexidine (Hibiclens) to avoid rinsing off too soon.  Rinse thoroughly with warm water  Do not dilute Chlorhexidine (Hibiclens)   Dry off as usual, do not use any deodorant, powder, body lotions, perfume, after shave or cologne.

## 2024-07-22 RX ORDER — ONDANSETRON 4 MG/1
4 TABLET, ORALLY DISINTEGRATING ORAL EVERY 6 HOURS PRN
Qty: 14 TABLET | Refills: 0 | Status: SHIPPED | OUTPATIENT
Start: 2024-07-22

## 2024-07-22 RX ORDER — TRAMADOL HYDROCHLORIDE 50 MG/1
50 TABLET ORAL EVERY 6 HOURS PRN
Qty: 10 TABLET | Refills: 0 | Status: SHIPPED | OUTPATIENT
Start: 2024-07-22

## 2024-07-23 ENCOUNTER — TELEPHONE (OUTPATIENT)
Dept: ORTHOPEDICS | Facility: CLINIC | Age: 59
End: 2024-07-23
Payer: COMMERCIAL

## 2024-07-23 PROBLEM — G56.23 CUBITAL TUNNEL SYNDROME OF BOTH UPPER EXTREMITIES: Status: ACTIVE | Noted: 2024-07-23

## 2024-07-23 PROBLEM — M65.342 TRIGGER RING FINGER OF LEFT HAND: Status: ACTIVE | Noted: 2024-07-23

## 2024-07-23 NOTE — TELEPHONE ENCOUNTER
Good morning, your arrival time for Wednesday is 7:00 am at [address] [place] [address]. Nothing to eat after 9:00 pm but you can have water and Gatorade in the morning. Removal all jewelry and nail polish. Make sure you do not have any open wounds or rashes on the surgical arm.

## 2024-07-24 ENCOUNTER — ANESTHESIA (OUTPATIENT)
Dept: SURGERY | Facility: OTHER | Age: 59
End: 2024-07-24
Payer: COMMERCIAL

## 2024-07-24 ENCOUNTER — HOSPITAL ENCOUNTER (OUTPATIENT)
Facility: OTHER | Age: 59
Discharge: HOME | End: 2024-07-24
Attending: ORTHOPAEDIC SURGERY | Admitting: ORTHOPAEDIC SURGERY
Payer: COMMERCIAL

## 2024-07-24 DIAGNOSIS — G56.23 CUBITAL TUNNEL SYNDROME OF BOTH UPPER EXTREMITIES: Primary | ICD-10-CM

## 2024-07-24 LAB — POCT GLUCOSE: 181 MG/DL (ref 70–110)

## 2024-07-24 PROCEDURE — 63600175 PHARM REV CODE 636 W HCPCS: Performed by: STUDENT IN AN ORGANIZED HEALTH CARE EDUCATION/TRAINING PROGRAM

## 2024-07-24 PROCEDURE — 71000039 HC RECOVERY, EACH ADD'L HOUR: Performed by: ORTHOPAEDIC SURGERY

## 2024-07-24 PROCEDURE — 36000706: Performed by: ORTHOPAEDIC SURGERY

## 2024-07-24 PROCEDURE — 26055 INCISE FINGER TENDON SHEATH: CPT | Mod: 51,FA,, | Performed by: ORTHOPAEDIC SURGERY

## 2024-07-24 PROCEDURE — 25000003 PHARM REV CODE 250: Performed by: STUDENT IN AN ORGANIZED HEALTH CARE EDUCATION/TRAINING PROGRAM

## 2024-07-24 PROCEDURE — 63600175 PHARM REV CODE 636 W HCPCS: Performed by: ANESTHESIOLOGY

## 2024-07-24 PROCEDURE — 25000003 PHARM REV CODE 250: Performed by: ANESTHESIOLOGY

## 2024-07-24 PROCEDURE — 71000016 HC POSTOP RECOV ADDL HR: Performed by: ORTHOPAEDIC SURGERY

## 2024-07-24 PROCEDURE — 36000707: Performed by: ORTHOPAEDIC SURGERY

## 2024-07-24 PROCEDURE — 37000009 HC ANESTHESIA EA ADD 15 MINS: Performed by: ORTHOPAEDIC SURGERY

## 2024-07-24 PROCEDURE — 37000008 HC ANESTHESIA 1ST 15 MINUTES: Performed by: ORTHOPAEDIC SURGERY

## 2024-07-24 PROCEDURE — 71000033 HC RECOVERY, INTIAL HOUR: Performed by: ORTHOPAEDIC SURGERY

## 2024-07-24 PROCEDURE — 71000015 HC POSTOP RECOV 1ST HR: Performed by: ORTHOPAEDIC SURGERY

## 2024-07-24 PROCEDURE — 82962 GLUCOSE BLOOD TEST: CPT | Performed by: ORTHOPAEDIC SURGERY

## 2024-07-24 PROCEDURE — 64718 REVISE ULNAR NERVE AT ELBOW: CPT | Mod: LT,,, | Performed by: ORTHOPAEDIC SURGERY

## 2024-07-24 PROCEDURE — 25000003 PHARM REV CODE 250: Performed by: ORTHOPAEDIC SURGERY

## 2024-07-24 PROCEDURE — 64415 NJX AA&/STRD BRCH PLXS IMG: CPT | Performed by: ANESTHESIOLOGY

## 2024-07-24 DEVICE — MEMBRANE CLARIX 1K 2.5CMX2.5CM: Type: IMPLANTABLE DEVICE | Site: ELBOW | Status: FUNCTIONAL

## 2024-07-24 RX ORDER — DIPHENHYDRAMINE HYDROCHLORIDE 50 MG/ML
25 INJECTION INTRAMUSCULAR; INTRAVENOUS EVERY 6 HOURS PRN
Status: DISCONTINUED | OUTPATIENT
Start: 2024-07-24 | End: 2024-07-24 | Stop reason: HOSPADM

## 2024-07-24 RX ORDER — MEPERIDINE HYDROCHLORIDE 25 MG/ML
12.5 INJECTION INTRAMUSCULAR; INTRAVENOUS; SUBCUTANEOUS ONCE AS NEEDED
Status: DISCONTINUED | OUTPATIENT
Start: 2024-07-24 | End: 2024-07-24 | Stop reason: HOSPADM

## 2024-07-24 RX ORDER — DEXAMETHASONE SODIUM PHOSPHATE 4 MG/ML
INJECTION, SOLUTION INTRA-ARTICULAR; INTRALESIONAL; INTRAMUSCULAR; INTRAVENOUS; SOFT TISSUE
Status: DISCONTINUED | OUTPATIENT
Start: 2024-07-24 | End: 2024-07-24

## 2024-07-24 RX ORDER — ROPIVACAINE HYDROCHLORIDE 5 MG/ML
INJECTION, SOLUTION EPIDURAL; INFILTRATION; PERINEURAL
Status: DISCONTINUED | OUTPATIENT
Start: 2024-07-24 | End: 2024-07-24

## 2024-07-24 RX ORDER — DEXMEDETOMIDINE HYDROCHLORIDE 100 UG/ML
INJECTION, SOLUTION INTRAVENOUS
Status: DISCONTINUED | OUTPATIENT
Start: 2024-07-24 | End: 2024-07-24

## 2024-07-24 RX ORDER — ONDANSETRON HYDROCHLORIDE 2 MG/ML
INJECTION, SOLUTION INTRAVENOUS
Status: DISCONTINUED | OUTPATIENT
Start: 2024-07-24 | End: 2024-07-24

## 2024-07-24 RX ORDER — ROCURONIUM BROMIDE 10 MG/ML
INJECTION, SOLUTION INTRAVENOUS
Status: DISCONTINUED | OUTPATIENT
Start: 2024-07-24 | End: 2024-07-24

## 2024-07-24 RX ORDER — SUCCINYLCHOLINE CHLORIDE 20 MG/ML
INJECTION INTRAMUSCULAR; INTRAVENOUS
Status: DISCONTINUED | OUTPATIENT
Start: 2024-07-24 | End: 2024-07-24

## 2024-07-24 RX ORDER — HYDROMORPHONE HYDROCHLORIDE 2 MG/ML
0.4 INJECTION, SOLUTION INTRAMUSCULAR; INTRAVENOUS; SUBCUTANEOUS EVERY 5 MIN PRN
Status: DISCONTINUED | OUTPATIENT
Start: 2024-07-24 | End: 2024-07-24 | Stop reason: HOSPADM

## 2024-07-24 RX ORDER — GLUCAGON 1 MG
1 KIT INJECTION
Status: DISCONTINUED | OUTPATIENT
Start: 2024-07-24 | End: 2024-07-24 | Stop reason: HOSPADM

## 2024-07-24 RX ORDER — BACITRACIN ZINC 500 UNIT/G
OINTMENT (GRAM) TOPICAL
Status: DISCONTINUED | OUTPATIENT
Start: 2024-07-24 | End: 2024-07-24 | Stop reason: HOSPADM

## 2024-07-24 RX ORDER — SODIUM CHLORIDE, SODIUM LACTATE, POTASSIUM CHLORIDE, CALCIUM CHLORIDE 600; 310; 30; 20 MG/100ML; MG/100ML; MG/100ML; MG/100ML
INJECTION, SOLUTION INTRAVENOUS CONTINUOUS
Status: DISCONTINUED | OUTPATIENT
Start: 2024-07-24 | End: 2024-07-24 | Stop reason: HOSPADM

## 2024-07-24 RX ORDER — SODIUM CHLORIDE 0.9 % (FLUSH) 0.9 %
3 SYRINGE (ML) INJECTION
Status: DISCONTINUED | OUTPATIENT
Start: 2024-07-24 | End: 2024-07-24 | Stop reason: HOSPADM

## 2024-07-24 RX ORDER — FENTANYL CITRATE 50 UG/ML
INJECTION, SOLUTION INTRAMUSCULAR; INTRAVENOUS
Status: DISCONTINUED | OUTPATIENT
Start: 2024-07-24 | End: 2024-07-24

## 2024-07-24 RX ORDER — PHENYLEPHRINE HYDROCHLORIDE 10 MG/ML
INJECTION INTRAVENOUS
Status: DISCONTINUED | OUTPATIENT
Start: 2024-07-24 | End: 2024-07-24

## 2024-07-24 RX ORDER — MUPIROCIN 20 MG/G
OINTMENT TOPICAL
Status: DISCONTINUED | OUTPATIENT
Start: 2024-07-24 | End: 2024-07-24 | Stop reason: HOSPADM

## 2024-07-24 RX ORDER — LIDOCAINE HYDROCHLORIDE 20 MG/ML
INJECTION INTRAVENOUS
Status: DISCONTINUED | OUTPATIENT
Start: 2024-07-24 | End: 2024-07-24

## 2024-07-24 RX ORDER — KETOROLAC TROMETHAMINE 30 MG/ML
INJECTION, SOLUTION INTRAMUSCULAR; INTRAVENOUS
Status: DISCONTINUED | OUTPATIENT
Start: 2024-07-24 | End: 2024-07-24

## 2024-07-24 RX ORDER — OXYCODONE HYDROCHLORIDE 5 MG/1
5 TABLET ORAL
Status: DISCONTINUED | OUTPATIENT
Start: 2024-07-24 | End: 2024-07-24 | Stop reason: HOSPADM

## 2024-07-24 RX ORDER — KETAMINE HCL IN 0.9 % NACL 50 MG/5 ML
SYRINGE (ML) INTRAVENOUS
Status: DISCONTINUED | OUTPATIENT
Start: 2024-07-24 | End: 2024-07-24

## 2024-07-24 RX ORDER — PROCHLORPERAZINE EDISYLATE 5 MG/ML
5 INJECTION INTRAMUSCULAR; INTRAVENOUS EVERY 30 MIN PRN
Status: DISCONTINUED | OUTPATIENT
Start: 2024-07-24 | End: 2024-07-24 | Stop reason: HOSPADM

## 2024-07-24 RX ORDER — ACETAMINOPHEN 500 MG
1000 TABLET ORAL
Status: COMPLETED | OUTPATIENT
Start: 2024-07-24 | End: 2024-07-24

## 2024-07-24 RX ORDER — PROPOFOL 10 MG/ML
VIAL (ML) INTRAVENOUS
Status: DISCONTINUED | OUTPATIENT
Start: 2024-07-24 | End: 2024-07-24

## 2024-07-24 RX ADMIN — DEXMEDETOMIDINE HYDROCHLORIDE 8 MCG: 100 INJECTION, SOLUTION, CONCENTRATE INTRAVENOUS at 10:07

## 2024-07-24 RX ADMIN — Medication 50 MG: at 09:07

## 2024-07-24 RX ADMIN — PHENYLEPHRINE HYDROCHLORIDE 100 MCG: 10 INJECTION INTRAVENOUS at 10:07

## 2024-07-24 RX ADMIN — CEFAZOLIN 3 G: 2 INJECTION, POWDER, FOR SOLUTION INTRAMUSCULAR; INTRAVENOUS at 09:07

## 2024-07-24 RX ADMIN — KETOROLAC TROMETHAMINE 30 MG: 30 INJECTION, SOLUTION INTRAMUSCULAR; INTRAVENOUS at 11:07

## 2024-07-24 RX ADMIN — PHENYLEPHRINE HYDROCHLORIDE 200 MCG: 10 INJECTION INTRAVENOUS at 10:07

## 2024-07-24 RX ADMIN — DEXAMETHASONE SODIUM PHOSPHATE 4 MG: 4 INJECTION, SOLUTION INTRAMUSCULAR; INTRAVENOUS at 09:07

## 2024-07-24 RX ADMIN — SUGAMMADEX 200 MG: 100 INJECTION, SOLUTION INTRAVENOUS at 11:07

## 2024-07-24 RX ADMIN — MUPIROCIN: 20 OINTMENT TOPICAL at 07:07

## 2024-07-24 RX ADMIN — SODIUM CHLORIDE, SODIUM LACTATE, POTASSIUM CHLORIDE, AND CALCIUM CHLORIDE: 600; 310; 30; 20 INJECTION, SOLUTION INTRAVENOUS at 10:07

## 2024-07-24 RX ADMIN — SODIUM CHLORIDE, SODIUM LACTATE, POTASSIUM CHLORIDE, AND CALCIUM CHLORIDE: 600; 310; 30; 20 INJECTION, SOLUTION INTRAVENOUS at 09:07

## 2024-07-24 RX ADMIN — SUCCINYLCHOLINE CHLORIDE 200 MG: 20 INJECTION, SOLUTION INTRAMUSCULAR; INTRAVENOUS at 09:07

## 2024-07-24 RX ADMIN — OXYCODONE HYDROCHLORIDE 5 MG: 5 TABLET ORAL at 02:07

## 2024-07-24 RX ADMIN — ROCURONIUM BROMIDE 30 MG: 10 SOLUTION INTRAVENOUS at 09:07

## 2024-07-24 RX ADMIN — ROPIVACAINE HYDROCHLORIDE 30 ML: 5 INJECTION, SOLUTION EPIDURAL; INFILTRATION; PERINEURAL at 12:07

## 2024-07-24 RX ADMIN — PROPOFOL 50 MG: 10 INJECTION, EMULSION INTRAVENOUS at 10:07

## 2024-07-24 RX ADMIN — FENTANYL CITRATE 100 MCG: 50 INJECTION, SOLUTION INTRAMUSCULAR; INTRAVENOUS at 09:07

## 2024-07-24 RX ADMIN — ACETAMINOPHEN 1000 MG: 500 TABLET ORAL at 07:07

## 2024-07-24 RX ADMIN — ROCURONIUM BROMIDE 20 MG: 10 SOLUTION INTRAVENOUS at 09:07

## 2024-07-24 RX ADMIN — LIDOCAINE HYDROCHLORIDE 100 MG: 20 INJECTION, SOLUTION INTRAVENOUS at 09:07

## 2024-07-24 RX ADMIN — PROPOFOL 30 MG: 10 INJECTION, EMULSION INTRAVENOUS at 11:07

## 2024-07-24 RX ADMIN — ONDANSETRON HYDROCHLORIDE 4 MG: 2 INJECTION INTRAMUSCULAR; INTRAVENOUS at 09:07

## 2024-07-24 RX ADMIN — DEXMEDETOMIDINE HYDROCHLORIDE 12 MCG: 100 INJECTION, SOLUTION, CONCENTRATE INTRAVENOUS at 10:07

## 2024-07-24 RX ADMIN — PROPOFOL 200 MG: 10 INJECTION, EMULSION INTRAVENOUS at 09:07

## 2024-07-24 RX ADMIN — ROCURONIUM BROMIDE 20 MG: 10 SOLUTION INTRAVENOUS at 10:07

## 2024-07-24 NOTE — ANESTHESIA PROCEDURE NOTES
Peripheral Block    Patient location during procedure: post-op   Block not for primary anesthetic.  Reason for block: at surgeon's request and post-op pain management   Post-op Pain Location: left elbow pain    End time: 7/24/2024 12:02 PM    Staffing  Authorizing Provider: Stephen Chisholm MD  Performing Provider: Antwon Tyler MD    Staffing  Performed by: Antwon Tyler MD  Authorized by: Stephen Chisholm MD    Preanesthetic Checklist  Completed: patient identified, IV checked, site marked, risks and benefits discussed, surgical consent, monitors and equipment checked, pre-op evaluation and timeout performed  Peripheral Block  Patient position: supine  Prep: ChloraPrep  Patient monitoring: heart rate, cardiac monitor, continuous pulse ox, continuous capnometry and frequent blood pressure checks  Block type: supraclavicular  Laterality: left  Injection technique: single shot  Needle  Needle type: Stimuplex   Needle gauge: 22 G  Needle length: 2 in  Needle localization: anatomical landmarks and ultrasound guidance   -ultrasound image captured on disc.  Assessment  Injection assessment: negative aspiration, negative parasthesia and local visualized surrounding nerve  Paresthesia pain: none  Heart rate change: no  Slow fractionated injection: yes  Pain Tolerance: comfortable throughout block and no complaints  Medications:    Medications: ropivacaine (NAROPIN) injection 0.5% - Perineural   30 mL - 7/24/2024 12:02:00 PM    Additional Notes  VSS.  [facility] RN monitoring vitals throughout procedure.  Patient tolerated procedure well.

## 2024-07-24 NOTE — BRIEF OP NOTE
[facility] - Surgery ([place])  Brief Operative Note    Surgery Date: 7/24/2024     Surgeons and Role:     * Brandie Monteiro MD - Primary    Assisting Surgeon: None    Pre-op Diagnosis:  Cubital tunnel syndrome of both upper extremities [G56.23]  Trigger ring finger of left hand [M65.342]    Post-op Diagnosis:  Post-Op Diagnosis Codes:     * Cubital tunnel syndrome of both upper extremities [G56.23]     * Trigger ring finger of left hand [M65.342]    Procedure(s) (LRB):  LEFT ELBOW DECOMPRESSION, NERVE, ULNAR (Left)  LEFT THUMB AND RING  TRIGGER FINGER RELEASE (Left)    Anesthesia: General    Operative Findings: Left cubital tunnel release, left thumb and ring finger trigger finger release    Estimated Blood Loss: 5cc    Specimens:   Specimen (24h ago, onward)      None              Discharge Note    OUTCOME: Patient tolerated treatment/procedure well without complication and is now ready for discharge.    DISPOSITION: Home or Self Care    FINAL DIAGNOSIS:  Cubital tunnel syndrome of both upper extremities    FOLLOWUP: In clinic    DISCHARGE INSTRUCTIONS:    Discharge Procedure Orders   Ambulatory referral/consult to Physical/Occupational Therapy   Standing Status: Future   Referral Priority: Routine Referral Type: Physical Medicine   Referral Reason: Specialty Services Required   Requested Specialty: Occupational Therapy   Number of Visits Requested: 1     Sponge bath only until clinic visit     Keep surgical extremity elevated     No driving until:   Order Comments: Clinic visit     Leave dressing on - Keep it clean, dry, and intact until clinic visit     Notify your health care provider if you experience any of the following:  temperature >100.4     Notify your health care provider if you experience any of the following:  persistent nausea and vomiting or diarrhea     Notify your health care provider if you experience any of the following:  severe uncontrolled pain     Activity as tolerated     Weight  bearing restrictions (specify):

## 2024-07-24 NOTE — INTERVAL H&P NOTE
The patient has been examined and the H&P has been reviewed:    I concur with the findings and no changes have occurred since H&P was written.    Surgery risks, benefits and alternative options discussed and understood by patient/family.          Active Hospital Problems    Diagnosis  POA    *Cubital tunnel syndrome of both upper extremities [G56.23]  Yes    Trigger ring finger of left hand [M65.342]  Yes      Resolved Hospital Problems   No resolved problems to display.

## 2024-07-24 NOTE — OR NURSING
VSS on RA. Pt denies pain. Post-op nerve block in PACU. Dressing and PIV C/D/I. Meets Phase I discharge criteria. Ready for transfer to Phase II. Family notified.

## 2024-07-24 NOTE — ANESTHESIA PROCEDURE NOTES
Intubation    Date/Time: 7/24/2024 9:47 AM    Performed by: Mykel Schroeder CRNA  Authorized by: Stephen Chisholm MD    Intubation:     Induction:  Rapid sequence induction    Intubated:  Postinduction    Mask Ventilation:  Not attempted    Attempts:  1    Attempted By:  Student    Method of Intubation:  Video laryngoscopy    Blade:  Cerna 3    Laryngeal View Grade: Grade I - full view of cords      Difficult Airway Encountered?: No      Airway Device:  Oral endotracheal tube    Airway Device Size:  7.5    Style/Cuff Inflation:  Cuffed (inflated to minimal occlusive pressure)    Tube secured:  21    Secured at:  The teeth    Placement Verified By:  Capnometry and Revisualization with laryngoscopy    Complicating Factors:  Obesity, short neck, small mouth and oropharyngeal edema or fat    Findings Post-Intubation:  BS equal bilateral and atraumatic/condition of teeth unchanged

## 2024-07-24 NOTE — PLAN OF CARE
Barrera Siddiqui has met all discharge criteria from Phase II. Vital Signs are stable, ambulating  without difficulty. Discharge instructions given, patient verbalized understanding. Discharged from facility via wheelchair in stable condition.

## 2024-07-24 NOTE — ANESTHESIA POSTPROCEDURE EVALUATION
Anesthesia Post Evaluation    Patient: Barrera Siddiqui    Procedure(s) Performed: Procedure(s) (LRB):  LEFT ELBOW DECOMPRESSION, NERVE, ULNAR (Left)  LEFT THUMB AND RING  TRIGGER FINGER RELEASE (Left)    Final Anesthesia Type: general      Patient location during evaluation: PACU  Patient participation: Yes- Able to Participate  Level of consciousness: awake and alert  Post-procedure vital signs: reviewed and stable  Pain management: adequate  Airway patency: patent  EDGAR mitigation strategies: Extubation while patient is awake  PONV status at discharge: No PONV  Anesthetic complications: no      Cardiovascular status: hemodynamically stable  Respiratory status: unassisted  Hydration status: euvolemic  Follow-up not needed.              Vitals Value Taken Time   /72 07/24/24 1345   Temp 36.8 °C (98.3 °F) 07/24/24 1245   Pulse 92 07/24/24 1345   Resp 22 07/24/24 1345   SpO2 94 % 07/24/24 1345         Event Time   Out of Recovery 12:37:00         Pain/Nola Score: Pain Rating Prior to Med Admin: 0 (7/24/2024  7:45 AM)  Nola Score: 9 (7/24/2024  1:45 PM)

## 2024-07-24 NOTE — TRANSFER OF CARE
"Anesthesia Transfer of Care Note    Patient: Barrera Siddiqui    Procedure(s) Performed: Procedure(s) (LRB):  LEFT ELBOW DECOMPRESSION, NERVE, ULNAR (Left)  LEFT THUMB AND RING  TRIGGER FINGER RELEASE (Left)    Patient location: PACU    Anesthesia Type: general    Transport from OR: Transported from OR on 2-3 L/min O2 by NC with adequate spontaneous ventilation    Post pain: adequate analgesia    Post assessment: no apparent anesthetic complications and tolerated procedure well    Post vital signs: stable    Level of consciousness: awake and responds to stimulation    Nausea/Vomiting: no nausea/vomiting    Complications: none    Transfer of care protocol was followed      Last vitals: Visit Vitals  /75 (BP Location: Right arm, Patient Position: Sitting)   Pulse 92   Temp 37 °C (98.6 °F) (Oral)   Resp 18   Ht 5' 3" (1.6 m)   Wt 122 kg (269 lb)   LMP 07/29/2018 (Exact Date)   SpO2 96%   Breastfeeding No   BMI 47.65 kg/m²     "

## 2024-07-25 VITALS
BODY MASS INDEX: 47.66 KG/M2 | HEIGHT: 63 IN | WEIGHT: 269 LBS | DIASTOLIC BLOOD PRESSURE: 76 MMHG | TEMPERATURE: 98 F | OXYGEN SATURATION: 96 % | HEART RATE: 100 BPM | RESPIRATION RATE: 20 BRPM | SYSTOLIC BLOOD PRESSURE: 127 MMHG

## 2024-07-25 NOTE — OP NOTE
[facility] Surgery ([facility]  Surgery Department  Operative Note    SUMMARY     Date of Procedure: 7/24/2024     Procedure: Procedure(s) (LRB):  LEFT ELBOW DECOMPRESSION, NERVE, ULNAR (Left)  LEFT THUMB AND RING  TRIGGER FINGER RELEASE (Left)   Tenosynovectomy left thumb and ring finger flexor tendon  Surgeons and Role:     * Brandie Monteiro MD - Primary    Assisting Surgeon:     Pre-Operative Diagnosis: Cubital tunnel syndrome of both upper extremities [G56.23]  Trigger ring finger of left hand [M65.342]    Post-Operative Diagnosis: Post-Op Diagnosis Codes:     * Cubital tunnel syndrome of both upper extremities [G56.23]     * Trigger ring finger of left hand [M65.342]    Anesthesia: General    Technical Procedures Used: surgery    Description of the Findings of the Procedure:  Indication for procedure Ms. Mercado is a 59-year-old female who failed conservative treatment for her left ulnar nerve compression at the elbow as well as trigger finger of her ring and thumb fingers after much discussion we elected for surgical intervention EMG nerve conduction study were done as well    Procedure in detail the correct site was marked with the patient's participation the holding area the patient was brought to the operating placed supine position underwent general anesthesia left upper extremity was prepped draped normal sterile fashion a well-padded sterile tourniquet was placed on the left upper extremity time-out was conducted for the correct procedure to be indicated IV antibiotics given patient preoperatively incision was marked out posterior to the medial epicondyle as well as a longitudinal over the A1 pulley the ring finger transverse over the A1 pulley of the transverse the thumb the arm was hanging the needed with an Esmarch tourniquet was insufflated 250 mmHg attention was 1st turned to the ulnar nerve incision was made careful dissection down to the ulnar nerve of note there was abundance of subcutaneous  tissue that made this process more difficult but after dissection was carried out it did show that her ulnar nerve was very compressed around the cubital tunnel almost scarred in it was completely decompressed it was bulbous in nature after it was released the elbow was then placed through range of motion showed it did not sublux the areas irrigated copious amounts normal saline clear X sutured into position Vicryl Monocryl Dermabond closed the skin our attention was then turned to the trigger finger incisions were made for both trigger fingers at the same time careful dissection 1st to the A1 pulley of the ring finger A1 pulley was completely released a portion of it was excised the tendon was retracted out the tendon sheath showed it did not trigger our attention was then turned to the thumb trigger finger all the while protecting the radial sensory nerve A1 pulley was completely released a portion of it was excised was very thick in nature the tendon had some fraying as well as the flexor tendon of the ring finger had fraying this was debrided once this was completed the areas irrigated copious amounts normal saline nylon closed the skin sterile dressing was applied patient was placed in a well-padded splint tolerated suture was brought to cover area in stable condition     Postop plans patient keep the dressing clean dry and intact will see the patient back in 2 weeks time sutures out therapy to be initiated at that time  Ulnar nerve as well as median nerve radial nerve were checked at the end of the procedure and the whole holding area patient had all intact  Significant Surgical Tasks Conducted by the Assistant(s), if Applicable: retraction    Complications: No    Estimated Blood Loss (EBL): * No values recorded between 7/24/2024 10:11 AM and 7/24/2024 11:29 AM *           Implants:   Implant Name Type Inv. Item Serial No.  Lot No. LRB No. Used Action   MEMBRANE CLARIX 1K 2.5CMX2.5CM -  [id number]  MEMBRANE CLARIX 1K 2.5CMX2.5CM [id number] [id number] Left 1 Implanted       Specimens:   Specimen (24h ago, onward)      None                    Condition: Good    Disposition: PACU - hemodynamically stable.    Attestation: I performed the procedure.    Discharge Note    SUMMARY     Admit Date: 7/24/2024    Discharge Date and Time: 7/24/2024  4:05 PM    Hospital Course (synopsis of major diagnoses, care, treatment, and services provided during the course of the hospital stay): surgery     Final Diagnosis: Post-Op Diagnosis Codes:     * Cubital tunnel syndrome of both upper extremities [G56.23]     * Trigger ring finger of left hand [M65.342]    Disposition: Home or Self Care    Follow Up/Patient Instructions:     Medications:  Reconciled Home Medications:      Medication List        START taking these medications      ondansetron 4 MG Tbdl  Commonly known as: ZOFRAN-ODT  Take 1 tablet (4 mg total) by mouth every 6 (six) hours as needed (For Nausea).     traMADoL 50 mg tablet  Commonly known as: ULTRAM  Take 1 tablet (50 mg total) by mouth every 6 (six) hours as needed for Pain.            CONTINUE taking these medications      amLODIPine 10 MG tablet  Commonly known as: NORVASC  Take 1 tablet (10 mg total) by mouth once daily.     aspirin 81 MG EC tablet  Commonly known as: ECOTRIN  Take 1 tablet (81 mg total) by mouth once daily.     atorvastatin 20 MG tablet  Commonly known as: LIPITOR  Take 1 tablet (20 mg total) by mouth once daily.     furosemide 40 MG tablet  Commonly known as: LASIX  Take 1 tablet (40 mg total) by mouth 2 (two) times daily.     glipiZIDE 10 MG tablet  Commonly known as: GLUCOTROL  Take 10 mg by mouth 2 (two) times daily.     losartan 100 MG tablet  Commonly known as: COZAAR  Take 1 tablet (100 mg total) by mouth once daily.     metFORMIN 500 MG ER 24hr tablet  Commonly known as: GLUCOPHAGE-XR  Take 500 mg by mouth 2 (two) times daily.      multivitamin per tablet  Commonly known as: THERAGRAN  Take 1 tablet by mouth once daily.     OZEMPIC 0.25 mg or 0.5 mg (2 mg/3 mL) pen injector  Generic drug: semaglutide  Inject into the skin.     pregabalin 100 MG capsule  Commonly known as: LYRICA  Take 1 capsule (100 mg total) by mouth 2 (two) times daily.            Discharge Procedure Orders   Ambulatory referral/consult to Physical/Occupational Therapy   Standing Status: Future   Referral Priority: Routine Referral Type: Physical Medicine   Referral Reason: Specialty Services Required   Requested Specialty: Occupational Therapy   Number of Visits Requested: 1     Sponge bath only until clinic visit     Keep surgical extremity elevated     No driving until:   Order Comments: Clinic visit     Leave dressing on - Keep it clean, dry, and intact until clinic visit     Notify your health care provider if you experience any of the following:  temperature >100.4     Notify your health care provider if you experience any of the following:  persistent nausea and vomiting or diarrhea     Notify your health care provider if you experience any of the following:  severe uncontrolled pain     Activity as tolerated     Weight bearing restrictions (specify):

## 2024-08-02 ENCOUNTER — TELEPHONE (OUTPATIENT)
Dept: ORTHOPEDICS | Facility: CLINIC | Age: 59
End: 2024-08-02
Payer: COMMERCIAL

## 2024-08-07 ENCOUNTER — OFFICE VISIT (OUTPATIENT)
Dept: ORTHOPEDICS | Facility: CLINIC | Age: 59
End: 2024-08-07
Payer: COMMERCIAL

## 2024-08-07 DIAGNOSIS — G56.23 CUBITAL TUNNEL SYNDROME OF BOTH UPPER EXTREMITIES: ICD-10-CM

## 2024-08-07 DIAGNOSIS — M65.312 TRIGGER FINGER OF LEFT THUMB: ICD-10-CM

## 2024-08-07 DIAGNOSIS — Z98.890 POST-OPERATIVE STATE: Primary | ICD-10-CM

## 2024-08-07 DIAGNOSIS — M65.342 TRIGGER RING FINGER OF LEFT HAND: ICD-10-CM

## 2024-08-07 PROCEDURE — 1159F MED LIST DOCD IN RCRD: CPT | Mod: CPTII,S$GLB,, | Performed by: SPECIALIST/TECHNOLOGIST

## 2024-08-07 PROCEDURE — 97110 THERAPEUTIC EXERCISES: CPT | Mod: S$GLB,,, | Performed by: SPECIALIST/TECHNOLOGIST

## 2024-08-07 PROCEDURE — 3052F HG A1C>EQUAL 8.0%<EQUAL 9.0%: CPT | Mod: CPTII,S$GLB,, | Performed by: SPECIALIST/TECHNOLOGIST

## 2024-08-07 PROCEDURE — 99999 PR PBB SHADOW E&M-EST. PATIENT-LVL II: CPT | Mod: PBBFAC,,, | Performed by: SPECIALIST/TECHNOLOGIST

## 2024-08-07 PROCEDURE — 4010F ACE/ARB THERAPY RXD/TAKEN: CPT | Mod: CPTII,S$GLB,, | Performed by: SPECIALIST/TECHNOLOGIST

## 2024-08-07 PROCEDURE — 99024 POSTOP FOLLOW-UP VISIT: CPT | Mod: S$GLB,,, | Performed by: SPECIALIST/TECHNOLOGIST

## 2024-08-13 ENCOUNTER — TELEPHONE (OUTPATIENT)
Dept: ORTHOPEDICS | Facility: CLINIC | Age: 59
End: 2024-08-13
Payer: COMMERCIAL

## 2024-08-13 ENCOUNTER — DOCUMENTATION ONLY (OUTPATIENT)
Dept: ORTHOPEDICS | Facility: CLINIC | Age: 59
End: 2024-08-13
Payer: COMMERCIAL

## 2024-08-13 NOTE — TELEPHONE ENCOUNTER
LVM for Dr Lainez stating that our office does not handle long term disability that he needs to call Evelin Guevara her -[phone]----- Message from Yas Ruffin sent at 8/13/2024  1:08 PM CDT -----  Regarding: Peer to Peer  Contact: Dr. Richards @ [phone]  Dr. Susie wilkerson/Tien Jordan is calling to schedule peer to peer,  to discuss long term disability, please call Dr. Richards @ [phone]

## 2024-08-13 NOTE — PROGRESS NOTES
I tried reaching out left  to call back to schedule p2p Dr. Susie wilkerson/Tien Jordan is calling to schedule peer to peer,  to discuss long term disability, please call Dr. Richards @ [phone]

## 2024-08-14 ENCOUNTER — CLINICAL SUPPORT (OUTPATIENT)
Dept: REHABILITATION | Facility: HOSPITAL | Age: 59
End: 2024-08-14
Payer: COMMERCIAL

## 2024-08-14 DIAGNOSIS — M79.642 PAIN IN LEFT HAND: ICD-10-CM

## 2024-08-14 DIAGNOSIS — M25.522 PAIN IN LEFT ELBOW: ICD-10-CM

## 2024-08-14 DIAGNOSIS — M62.81 MUSCLE WEAKNESS: Primary | ICD-10-CM

## 2024-08-14 DIAGNOSIS — M25.60 STIFFNESS IN JOINT: ICD-10-CM

## 2024-08-14 PROCEDURE — 97165 OT EVAL LOW COMPLEX 30 MIN: CPT | Mod: PO

## 2024-08-14 PROCEDURE — 97530 THERAPEUTIC ACTIVITIES: CPT | Mod: PO

## 2024-08-14 PROCEDURE — 97018 PARAFFIN BATH THERAPY: CPT | Mod: PO

## 2024-08-14 NOTE — PLAN OF CARE
[facility] Occupational Therapy  Initial Evaluation     Date: 8/14/2024  Patient: Barrera Siddiqui  Chart Number: [id number]  Referring Physician: Brandie Monteiro, *  Therapy Diagnosis:   1. Muscle weakness        2. Stiffness in joint        3. Pain in left elbow        4. Pain in left hand            Physician Orders: OT eval/treat   Medical Diagnosis: G56.23 (ICD-10-CM) - Cubital tunnel syndrome of both upper extremities  Evaluation Date: 8/14/2024  Plan of Care Certification Date: 11/6/24  Authorization Period: 7/24/24 - 12/31/24  Surgery Date and Procedure: Procedure(s) (LRB):  LEFT ELBOW DECOMPRESSION, NERVE, ULNAR (Left)  LEFT THUMB AND RING  TRIGGER FINGER RELEASE (Left)   Tenosynovectomy left thumb and ring finger flexor tendon7/24/24  Date of Return to MD: 9/6/24    Visit #: 1 of 1  Time In: 1400  Time Out: 1500  Total Billable Time: 60    Precautions: Standard ,     Subjective     History of Current Condition: Pt states that a year ago she passed out at her job at [employer]. When she came to, she was notified that she feel on her left side. Since then, she started to have pain in her left arm and 2 of her fingers started triggering. She completed an EMG which showed that she had bilateral cubital tunnel. She was referred to an orthopedic who preformed a cubital tunnel decompression and trigger finger release to her left arm/hand on 7/24/24    Involved Side: L  Dominant Side: R  Date of Onset: August 15th, 2023  Imaging: reviewed  Previous Therapy: Y    Patient's Goals for Therapy:     Pain:  Functional Pain Scale Rating 0-10:   10/10 on average for elbow,5/0 for her fingers   10/10 at best for elbow  10/10 at worst  Location: posterior elbow, thumb and ring finger   Description: stabbing , shocking  Aggravating Factors: comes and goes   Easing Factors: compression, tylenol     Occupation:  [employer]   Working presently: LT-disability  Duties: n/a    Functional Limitations/Social  History:    Prior Level of Function: Independent   Current Level of Function:Modified Independent. Patient has trouble doing her hair and hooking her bra     Home/Living environment : lives with their son  Home Access: One stair to enter [facility]  DME: none     Leisure: none affected     Driving: Y    Past Medical History/Physical Systems Review:   Barrera Siddiqui  has a past medical history of Depression, Diabetes mellitus, High cholesterol, Hypertension, Stroke, and TIA (transient ischemic attack).    Barrera Siddiqui  has a past surgical history that includes Tubal ligation; Knee surgery; Cholecystectomy; Colonoscopy (N/A, 10/18/2017); Epidural steroid injection into cervical spine (N/A, 05/08/2024); Carpal tunnel release (Bilateral); decompression, nerve, ulnar (Left, 7/24/2024); and Trigger finger release (Left, 7/24/2024).    Barrera has a current medication list which includes the following prescription(s): amlodipine, aspirin, atorvastatin, furosemide, glipizide, losartan, metformin, multivitamin, ondansetron, ozempic, pregabalin, and tramadol.    Review of patient's allergies indicates:  No Known Allergies       Objective     Mental status: alert, oriented x3    Observation:   Pt incision sites are clean and healing well      Sensation: Pt states she has pins and needles in her small finger     Range of Motion:   Left      Elbow Left  8/14/2024 Right  8/14/2024   Flexion 130 140   Extension 0 0       Left Hand Finger ROM INDEX   8/14/2024 LONG   8/14/2024 RING   8/14/2024 SMALL   8/14/2024   MP  60 60 60 64   PIP  90 95 95 100   DIP  75 75 75 85     Right Hand Finger ROM INDEX   8/14/2024 LONG   8/14/2024 RING   8/14/2024 SMALL   8/14/2024   MP  90 85 90 90   PIP  110 105 100 100   DIP  80 80 80 90      Left Thumb range of motion  8/14/2024 Right Thumb range of motion  8/14/2024   MP 80 75   IP 85 75   Radial Abduction 70 70   Palmar Abduction 60 60   Opposition Middle of SF DPC        Strength: (ZULEYKA Dynamometer in psi.)       8/14/2024 8/14/2024    Left Right   Rung II 15 60       Pinch Strength (Measured in psi)     8/14/2024 8/14/2024    Left Right   Key Pinch 4  10   3pt Pinch 6 11   2pt Pinch 4 8       CMS Impairment/Limitation/Restriction for FOTO Hand/Wrist/Finger Survey    Therapist reviewed FOTO scores for Barrera Siddiqui on 8/14/2024.   FOTO documents entered into [facility] - see Media section.    Intake Score: 28%  Category: Self Care         Treatment     Treatment Time In: 1435  Treatment Time Out: 1500  Total Treatment time separate from Evaluation time:25      Barrera received the following direct contact modalities after being cleared for contraindications for 10 minutes:  -paraffin wax and moist hot pack to increase extensibility    Barrera participated in dynamic functional therapeutic activities to improve functional performance for 15  minutes, including:  -joint blocking on digits 2-5  - demonstration and explanation of HEP    Home Exercise Program/Education:  Issued HEP (see patient instructions in EMR) and educated on modality use for pain management . Exercises were reviewed and Barrera was able to demonstrate them prior to the end of the session.   Pt received a written copy of exercises to perform at home. Barrera demonstrated good  understanding of the education provided.  Pt was advised to perform these exercises free of pain, and to stop performing them if pain occurs.    Patient/Family Education: role of OT, goals for OT, scheduling/cancellations - pt verbalized understanding. Discussed insurance limitations with patient.    Assessment     Barrera Siddiqui is a 59 y.o. female presents with limitations as described in problem list. Patient can benefit from Occupational Therapy services for Iontophoresis, ultrasound, moist heat, therapeutic exercises, home exercise program provied with written instructions, ice and strengthening and orthotics, if deemed necessary . The following goals were discussed with the patient and she is in  agreement with them as to be addressed in the treatment plan.    The patient's rehab potential is Good.     Anticipated barriers to occupational therapy: none at this time  Pt has no cultural, educational or language barriers to learning provided.    Profile and History Assessment of Occupational Performance Level of Clinical Decision Making Complexity Score   Occupational Profile:   Barrera Siddiqui is a 59 y.o. female who is on disability Barrera Siddiqui has difficulty with  ADLs and IADLs as listed previously, which  affecting his/her daily functional abilities.      Comorbidities:    has a past medical history of Depression, Diabetes mellitus, High cholesterol, Hypertension, Stroke, and TIA (transient ischemic attack).    Medical and Therapy History Review:   Expanded               Performance Deficits    Physical:  Joint Mobility  Joint Stability  Muscle Power/Strength  Muscle Endurance  Skin Integrity/Scar Formation  Edema   Strength  Pinch Strength  Pain    Cognitive:  No Deficits    Psychosocial:    Habits  Routines  Rituals     Clinical Decision Making:  low    Assessment Process:  Problem-Focused Assessments    Modification/Need for Assistance:  Minimal-Moderate Modifications/Assistance    Intervention Selection:  Several Treatment Options       low  Based on PMHX, co morbidities , data from assessments and functional level of assistance required with task and clinical presentation directly impacting function.         Goals:    LTG's (12 weeks):  1)   Increase ROM to 140 degrees in left elbow flexion to increase functional hand use for ADLs, IADLs, and leisure tasks  2)   Increase ROM to 85 degrees in left hand MP joints to increase functional hand use for ADLs, IADLs, and leisure tasks  3)   Increase ROM so that opposition in left thumb is to the DPC  to increase functional hand use for ADLs, IADLs, and leisure tasks  4)   Increase  strength to 50 lbs. to grasp for ADLs, IADLs and leisure task  5)   Increase  key pinch to 8 psis to increase independence with button and FM coordination  6)   Decrease complaints of pain to  1 out of 10 at worst to increase functional hand use for ADL/work/leisure activities.  7)   Patient to score at 55% or more on FOTO to demonstrate improved perception of functional left UE use.  8)   Pt will return to near to prior level of function for ADLs and household management reporting I or Mod I with ADLs (dressing, feeding, grooming, toileting).     STG's (6 weeks)  1)   Patient to be IND with HEP and modalities for pain/edema managment.  2)   Increase ROM to 135 degrees in left elbow flexion to increase functional hand use for ADLs, IADLs, and leisure tasks  3)   Increase ROM to 75 degrees in left hand MP joints to increase functional hand use for ADLs, IADLs, and leisure tasks  4)   Increase ROM so that opposition in left thumb is to the base of her SF  to increase functional hand use for ADLs, IADLs, and leisure tasks  5)   Increase  strength to 25 lbs. to grasp for ADLs, IADLs and leisure task  6)   Increase key pinch to 6 psis to increase independence with button and FM coordination  7)   Patient to be IND wiht Orthotic use, wear and care precautions.   8)   Decrease complaints of pain to  5 out of 10 at worst to increase functional hand use for ADL/work/leisure activities.    Plan     Pt to be treated by Occupational Therapy 2 times per week for 12 weeks during the certification period from 8/14/2024 to 11/6/24 to achieve the established goals.     Treatment to include: Paraffin, Fluidotherapy, Manual therapy/joint mobilizations, Modalities for pain management, US 3 mhz, Therapeutic exercises/activities., Iontophoresis with 2.0 cc Dexamethasone, Strengthening, Orthotic Fabrication/Fit/Training, Edema Control, Scar Management, Wound Care, Electrical Modalities, Joint Protection, and Energy Conservation, as well as any other treatments deemed necessary based on the patient's needs or  progress.     Nancy Causey ,OT

## 2024-08-14 NOTE — PATIENT INSTRUCTIONS
"  [facility], OCCUPATIONAL THERAPY  HOME EXERCISE PROGRAM      Complete massage 2-3 minutes 4 times a day          Complete 10 repetitions of each exercise 4 times a day:                        AROM: DIP Flexion / Extension  Pinch middle knuckle to prevent bending.   Bend end knuckle until stretch is felt. Hold   3 seconds. Relax. Straighten finger as far as possible.      AROM: PIP Flexion / Extension  Pinch bottom knuckle  to prevent bending.   Actively bend middle knuckle until stretch is felt.   Hold 3 seconds. Relax. Straighten finger as far as possible.      AROM: Isolated MCP Flexion / Extension ("Wave")   Bend only your large, bottom knuckles. Hold 3 seconds.   Keep the tips of your fingers straight. Straighten fingers.      AROM: Isolated IPJ Flexion / Extension ("Hook")   Bend only your middle and end knuckles. Hold 3 seconds.   Straighten your fingers.       AROM: MCP and PIP Flexion / Extension ("Straight Fist")  Bend your bottom and middle knuckles, keeping the tips of your fingers straight.   Try to touch the pads of your fingers on your palm. Hold 3 seconds.   Straighten your fingers.       AROM: Composite Flexion / Extension ("Full Fist")  Bend every joint in your hand into a fist. Hold 3 seconds.   Straighten your fingers.         AROM: Composte Extension ("Finger Lifts")  Lift your finger off of the table one at a time. Hold 3 seconds.   Relax your finger.      AROM: Abduction / Adduction  With hand flat on table, spread all fingers apart,   then bring them together as close as possible.      AROM: Thumb IP Flexion / Extension  Brace thumb below tip joint. Bend joint as far as   possible then straighten.      AROM: Composite Flexion   Bend both joints of thumb as far as possible.   Try to touch base of little finger.      AROM: Radial Adduction / Abduction  Place your palm flat on the table. Move thumb out   to side. Move back alongside index finger.                                 " "      AROM: Palmar Adduction / Abduction   Rest your small finger on the table. Move thumb   sideways, out and away from   palm. Move back to rest along palm.                        AROM: Opposition   Touch tip of thumb to nail tip of each  finger in turn, making an "O" shape.      AROM: Composite Movement Circumduction  Make clockwise circles with thumb. Reverse and make counterclockwise   circles   with thumb.                                    AROM: Composite Flesion ("Pinky Slides")  Touch thumb to tip of small finger. Slide thumb down   small finger into palm.         AROM: MP Extension   With palm on table, lift thumb up.   Hold 3 seconds. Relax and lower thumb.      Therapist: Nancy Causey, OT       "

## 2024-08-15 ENCOUNTER — DOCUMENTATION ONLY (OUTPATIENT)
Dept: ORTHOPEDICS | Facility: CLINIC | Age: 59
End: 2024-08-15
Payer: COMMERCIAL

## 2024-08-15 NOTE — PROGRESS NOTES
Tried calling Dr. Susie wilkerson/[employer] is calling to schedule peer to peer,  to discuss long term disability, please call Dr. Richards @ [phone] no answer I left a vm with lanette as call back

## 2024-08-19 ENCOUNTER — TELEPHONE (OUTPATIENT)
Dept: ORTHOPEDICS | Facility: CLINIC | Age: 59
End: 2024-08-19
Payer: COMMERCIAL

## 2024-08-19 ENCOUNTER — DOCUMENTATION ONLY (OUTPATIENT)
Dept: ORTHOPEDICS | Facility: CLINIC | Age: 59
End: 2024-08-19
Payer: COMMERCIAL

## 2024-08-20 ENCOUNTER — CLINICAL SUPPORT (OUTPATIENT)
Dept: REHABILITATION | Facility: HOSPITAL | Age: 59
End: 2024-08-20
Payer: COMMERCIAL

## 2024-08-20 DIAGNOSIS — M79.642 PAIN IN LEFT HAND: ICD-10-CM

## 2024-08-20 DIAGNOSIS — M25.522 PAIN IN LEFT ELBOW: ICD-10-CM

## 2024-08-20 DIAGNOSIS — M25.60 STIFFNESS IN JOINT: ICD-10-CM

## 2024-08-20 DIAGNOSIS — M62.81 MUSCLE WEAKNESS: Primary | ICD-10-CM

## 2024-08-20 PROCEDURE — 97530 THERAPEUTIC ACTIVITIES: CPT | Mod: PO

## 2024-08-20 PROCEDURE — 97018 PARAFFIN BATH THERAPY: CPT | Mod: PO

## 2024-08-20 NOTE — PROGRESS NOTES
Occupational Therapy Daily Treatment Note     Name: Barrera Siddiqui  [facility] Number: [id number]    Therapy Diagnosis:   Encounter Diagnoses   Name Primary?    Muscle weakness Yes    Stiffness in joint     Pain in left elbow     Pain in left hand      Physician: Brandie Monteiro, *    Visit Date: 8/20/2024    Physician Orders: OT eval/treat   Medical Diagnosis: G56.23 (ICD-10-CM) - Cubital tunnel syndrome of both upper extremities  Evaluation Date: 8/14/2024  Plan of Care Certification Date: 11/6/24  Authorization Period: 7/24/24 - 12/31/24  Surgery Date and Procedure: Procedure(s) (LRB):  LEFT ELBOW DECOMPRESSION, NERVE, ULNAR (Left)  LEFT THUMB AND RING  TRIGGER FINGER RELEASE (Left)   Tenosynovectomy left thumb and ring finger flexor tendon7/24/24  Date of Return to MD: 9/6/24  Visit # / Visits authorized: 2/ 20  FOTO Completion: 1/3    Time In:1400  Time Out: 1500  Total Billable Time: 60 minutes    Precautions:  Standard      Subjective     Pt reports: She has been completing her exercises; however, she continues to have a constant burning sensation in her elbow  she was compliant with home exercise program given last session.   Response to previous treatment:1st after   Functional change: 1st after     Pain: 8/10  Location: left arms     Objective       Mental status: alert, oriented x3     Observation:   Pt incision sites are clean and healing well        Sensation: Pt states she has pins and needles in her small finger      Range of Motion:   Left        Elbow Left  8/14/2024 Right  8/14/2024   Flexion 130 140   Extension 0 0         Left Hand Finger ROM INDEX   8/14/2024 LONG   8/14/2024 RING   8/14/2024 SMALL   8/14/2024   MP  60 60 60 64   PIP  90 95 95 100   DIP  75 75 75 85      Right Hand Finger ROM INDEX   8/14/2024 LONG   8/14/2024 RING   8/14/2024 SMALL   8/14/2024   MP  90 85 90 90   PIP  110 105 100 100   DIP  80 80 80 90        Left Thumb range of motion  8/14/2024 Right Thumb range of  motion  8/14/2024   MP 80 75   IP 85 75   Radial Abduction 70 70   Palmar Abduction 60 60   Opposition Middle of SF DPC          Strength: (ZULEYKA Dynamometer in psi.)        8/14/2024 8/14/2024     Left Right   Rung II 15 60         Pinch Strength (Measured in psi)       8/14/2024 8/14/2024     Left Right   Key Pinch 4  10   3pt Pinch 6 11   2pt Pinch 4 8         CMS Impairment/Limitation/Restriction for FOTO Hand/Wrist/Finger Survey     Therapist reviewed FOTO scores for Barrera Siddiqui on 8/14/2024.   FOTO documents entered into [facility] - see Media section.     Intake Score: 28%  Category: Self Care           Treatment       Barrera received the following direct contact modalities after being cleared for contraindications for 15 minutes:  -paraffin wax and moist hot pack to hand to increase extensibility   - moist hot pack to elbow to increase extensibility       Barrera participated in dynamic functional therapeutic activities to improve functional performance for 45  minutes, including:  -TGE's x 20  - finger lifts x 20  -finger spreads x 20  - elbow 3 ways with 1# weight 2/15  -scar desensitization with mini vibrator to elbow and trigger finger incision  - pom pom  with gripper on the 1st rung  - isospheres x 3 minutes   - wrist maze x 3 minutes       Home Exercises and Education Provided     Education provided:     - Progress towards goals     Written Home Exercises Provided: Patient instructed to cont prior HEP.  Exercises were reviewed and Barrera was able to demonstrate them prior to the end of the session.  Barrera demonstrated good  understanding of the HEP provided.   .   See EMR under Patient Instructions for exercises provided prior visit.        Assessment     Pt would continue to benefit from skilled OT. Pt tolerated added therapeutic activities well without complaints of additional pain. She tolerated scar desenPt is motivated. Will progress as tolerated.     Barrera is progressing well towards her goals and  there are no updates to goals at this time. Pt prognosis is Good.     Pt will continue to benefit from skilled outpatient occupational therapy to address the deficits listed in the problem list on initial evaluation provide pt/family education and to maximize pt's level of independence in the home and community environment.     Anticipated barriers to occupational therapy: None at this time     Pt's spiritual, cultural and educational needs considered and pt agreeable to plan of care and goals.      Goals:    LTG's (12 weeks):  1)   Increase ROM to 140 degrees in left elbow flexion to increase functional hand use for ADLs, IADLs, and leisure tasks Ongoing  2)   Increase ROM to 85 degrees in left hand MP joints to increase functional hand use for ADLs, IADLs, and leisure tasksOngoing  3)   Increase ROM so that opposition in left thumb is to the DPC  to increase functional hand use for ADLs, IADLs, and leisure tasksOngoing  4)   Increase  strength to 50 lbs. to grasp for ADLs, IADLs and leisure taskOngoing  5)   Increase key pinch to 8 psis to increase independence with button and FM coordinationOngoing  6)   Decrease complaints of pain to  1 out of 10 at worst to increase functional hand use for ADL/work/leisure activities.Ongoing  7)   Patient to score at 55% or more on FOTO to demonstrate improved perception of functional left UE use.Ongoing  8)   Pt will return to near to prior level of function for ADLs and household management reporting I or Mod I with ADLs (dressing, feeding, grooming, toileting). Ongoing     STG's (6 weeks)  1)   Patient to be IND with HEP and modalities for pain/edema managment.Ongoing  2)   Increase ROM to 135 degrees in left elbow flexion to increase functional hand use for ADLs, IADLs, and leisure tasksOngoing  3)   Increase ROM to 75 degrees in left hand MP joints to increase functional hand use for ADLs, IADLs, and leisure tasksOngoing  4)   Increase ROM so that opposition in left  thumb is to the base of her SF  to increase functional hand use for ADLs, IADLs, and leisure tasksOngoing  5)   Increase  strength to 25 lbs. to grasp for ADLs, IADLs and leisure taskOngoing  6)   Increase key pinch to 6 psis to increase independence with button and FM coordinationOngoing  7)   Patient to be IND wiht Orthotic use, wear and care precautions. Ongoing  8)   Decrease complaints of pain to  5 out of 10 at worst to increase functional hand use for ADL/work/leisure activities.Ongoing     Plan      Pt to be treated by Occupational Therapy 2 times per week for 12 weeks during the certification period from 8/14/2024 to 11/6/24 to achieve the established goals.      Treatment to include: Paraffin, Fluidotherapy, Manual therapy/joint mobilizations, Modalities for pain management, US 3 mhz, Therapeutic exercises/activities., Iontophoresis with 2.0 cc Dexamethasone, Strengthening, Orthotic Fabrication/Fit/Training, Edema Control, Scar Management, Wound Care, Electrical Modalities, Joint Protection, and Energy Conservation, as well as any other treatments deemed necessary based on the patient's needs or progress.        Updates/Grading for next session: Continue with current plan of care       Nancy Causey, OT

## 2024-08-22 ENCOUNTER — CLINICAL SUPPORT (OUTPATIENT)
Dept: REHABILITATION | Facility: HOSPITAL | Age: 59
End: 2024-08-22
Payer: COMMERCIAL

## 2024-08-22 DIAGNOSIS — M25.60 STIFFNESS IN JOINT: ICD-10-CM

## 2024-08-22 DIAGNOSIS — M79.642 PAIN IN LEFT HAND: ICD-10-CM

## 2024-08-22 DIAGNOSIS — M62.81 MUSCLE WEAKNESS: Primary | ICD-10-CM

## 2024-08-22 DIAGNOSIS — M25.522 PAIN IN LEFT ELBOW: ICD-10-CM

## 2024-08-22 PROCEDURE — 97140 MANUAL THERAPY 1/> REGIONS: CPT | Mod: PO

## 2024-08-22 PROCEDURE — 97110 THERAPEUTIC EXERCISES: CPT | Mod: PO

## 2024-08-22 PROCEDURE — 97018 PARAFFIN BATH THERAPY: CPT | Mod: PO

## 2024-08-22 NOTE — PROGRESS NOTES
"  Occupational Therapy Daily Treatment Note     Name: Barrera Siddiqui  [facility] Number: [id number]    Therapy Diagnosis:   Encounter Diagnoses   Name Primary?    Muscle weakness Yes    Stiffness in joint     Pain in left elbow     Pain in left hand      Physician: Brandie Monteiro, *    Visit Date: 8/22/2024    Physician Orders: OT eval/treat   Medical Diagnosis: G56.23 (ICD-10-CM) - Cubital tunnel syndrome of both upper extremities  Evaluation Date: 8/14/2024  Plan of Care Certification Date: 11/6/24  Authorization Period: 7/24/24 - 12/31/24  Surgery Date and Procedure: Procedure(s) (LRB):  LEFT ELBOW DECOMPRESSION, NERVE, ULNAR (Left)  LEFT THUMB AND RING  TRIGGER FINGER RELEASE (Left)   Tenosynovectomy left thumb and ring finger flexor tendon7/24/24  Date of Return to MD: 9/6/24  Visit # / Visits authorized: 2/ 20 (plus initial evaluation)   FOTO Completion: 1/3    Time In:1425  Time Out: 1525  Total Billable Time: 60 minutes (1P, 2MT, 1TE)     Precautions:  Standard      Subjective     Pt reports: She has been completing her exercises; however, she continues to have a constant burning sensation in her elbow.  "My little finger has a mind of its own."      she was compliant with home exercise program given last session.   Response to previous treatment: soreness that did not last past one day    Functional change: None reported at this time.      Pain: 8/10 at the elbow; 4/10 at hand    Location: left arms     Objective       Mental status: alert, oriented x3     Observation:   Pt incision sites are clean and healing well        Sensation: Pt states she has pins and needles in her small finger      Range of Motion:   Left        Elbow Left  8/14/2024 Right  8/14/2024   Flexion 130 140   Extension 0 0         Left Hand Finger ROM INDEX   8/14/2024 LONG   8/14/2024 RING   8/14/2024 SMALL   8/14/2024   MP  60 60 60 64   PIP  90 95 95 100   DIP  75 75 75 85      Right Hand Finger ROM INDEX   8/14/2024 LONG   8/14/2024 " RING   8/14/2024 SMALL   8/14/2024   MP  90 85 90 90   PIP  110 105 100 100   DIP  80 80 80 90        Left Thumb range of motion  8/14/2024 Right Thumb range of motion  8/14/2024   MP 80 75   IP 85 75   Radial Abduction 70 70   Palmar Abduction 60 60   Opposition Middle of SF DPC          Strength: (ZULEYKA Dynamometer in psi.)        8/14/2024 8/14/2024     Left Right   Rung II 15 60         Pinch Strength (Measured in psi)       8/14/2024 8/14/2024     Left Right   Key Pinch 4  10   3pt Pinch 6 11   2pt Pinch 4 8         CMS Impairment/Limitation/Restriction for FOTO Hand/Wrist/Finger Survey     Therapist reviewed FOTO scores for Barrera Siddiqui on 8/14/2024.   FOTO documents entered into [employer] - see Media section.     Intake Score: 28%  Category: Self Care           Treatment       Barrera received the following direct contact modalities after being cleared for contraindications for 8 minutes:  - paraffin wax and moist hot pack to hand to increase extensibility   - moist hot pack to elbow to increase extensibility (simultaneously with paraffin application)     Manual therapy techniques: Myofacial release, Soft tissue Mobilization, and scar massage were applied to the: left hand and left elbow for 25 minutes, including:  - use of hand techniques, cupping, IASTM tools, and scar pump to increase blood flow/circulation, improve soft tissue pliability and decrease pain.    -scar massage to elbow with use of hand techniques only to decrease tissue adhesions and pain, and increase tissue extensibility   -intrinsic stretches to digits #2-5 5 reps X 5 sec hold       Barrera participated in dynamic functional therapeutic activities to improve functional performance for 27 minutes, including:  Bolded performed today.    -TGE's x 20   - thumb MP/IP flexion with blocking X 20 each   - ring finger PIP/DIP with blocking X 20 each   - finger lifts x 20  - finger spreads x 20  - elbow 3 ways with 1# weight 2/15  -scar desensitization with  mini vibrator to elbow and trigger finger incision  - pom pom  with gripper on the 1st rung  - isospheres x 3 minutes   - wrist maze x 3 minutes       Home Exercises and Education Provided     Education provided:     - Progress towards goals     Written Home Exercises Provided: Patient instructed to cont prior HEP.  Exercises were reviewed and Barrera was able to demonstrate them prior to the end of the session.  Barrera demonstrated good  understanding of the HEP provided.   .   See EMR under Patient Instructions for exercises provided prior visit.        Assessment     Pt would continue to benefit from skilled OT. Scar tissue at palmar incision sites is moderately dense with minimal adhesions beneath.  Scar tissue at medial elbow is minimal; no hypertrophic scarring noted at this time.  She tolerated soft tissue mobilization well with no complaints of hypersensitivity.  Pt tolerated added therapeutic activities well without complaints of additional pain.  Pt is motivated. Will continue to progress as tolerated.     Barrera is progressing well towards her goals and there are no updates to goals at this time. Pt prognosis is Good.     Pt will continue to benefit from skilled outpatient occupational therapy to address the deficits listed in the problem list on initial evaluation provide pt/family education and to maximize pt's level of independence in the home and community environment.     Anticipated barriers to occupational therapy: None at this time     Pt's spiritual, cultural and educational needs considered and pt agreeable to plan of care and goals.      Goals:    LTG's (12 weeks):  1)   Increase ROM to 140 degrees in left elbow flexion to increase functional hand use for ADLs, IADLs, and leisure tasks Ongoing  2)   Increase ROM to 85 degrees in left hand MP joints to increase functional hand use for ADLs, IADLs, and leisure tasksOngoing  3)   Increase ROM so that opposition in left thumb is to the DPC  to  increase functional hand use for ADLs, IADLs, and leisure tasksOngoing  4)   Increase  strength to 50 lbs. to grasp for ADLs, IADLs and leisure taskOngoing  5)   Increase key pinch to 8 psis to increase independence with button and FM coordinationOngoing  6)   Decrease complaints of pain to  1 out of 10 at worst to increase functional hand use for ADL/work/leisure activities.Ongoing  7)   Patient to score at 55% or more on FOTO to demonstrate improved perception of functional left UE use.Ongoing  8)   Pt will return to near to prior level of function for ADLs and household management reporting I or Mod I with ADLs (dressing, feeding, grooming, toileting). Ongoing     STG's (6 weeks)  1)   Patient to be IND with HEP and modalities for pain/edema managment.Ongoing  2)   Increase ROM to 135 degrees in left elbow flexion to increase functional hand use for ADLs, IADLs, and leisure tasksOngoing  3)   Increase ROM to 75 degrees in left hand MP joints to increase functional hand use for ADLs, IADLs, and leisure tasksOngoing  4)   Increase ROM so that opposition in left thumb is to the base of her SF  to increase functional hand use for ADLs, IADLs, and leisure tasksOngoing  5)   Increase  strength to 25 lbs. to grasp for ADLs, IADLs and leisure taskOngoing  6)   Increase key pinch to 6 psis to increase independence with button and FM coordinationOngoing  7)   Patient to be IND wiht Orthotic use, wear and care precautions. Ongoing  8)   Decrease complaints of pain to  5 out of 10 at worst to increase functional hand use for ADL/work/leisure activities.Ongoing     Plan      Pt to be treated by Occupational Therapy 2 times per week for 12 weeks during the certification period from 8/14/2024 to 11/6/24 to achieve the established goals.      Treatment to include: Paraffin, Fluidotherapy, Manual therapy/joint mobilizations, Modalities for pain management, US 3 mhz, Therapeutic exercises/activities., Iontophoresis with  2.0 cc Dexamethasone, Strengthening, Orthotic Fabrication/Fit/Training, Edema Control, Scar Management, Wound Care, Electrical Modalities, Joint Protection, and Energy Conservation, as well as any other treatments deemed necessary based on the patient's needs or progress.        Updates/Grading for next session: Continue with current plan of care       Herrera Lovett OT

## 2024-08-23 ENCOUNTER — HOSPITAL ENCOUNTER (OUTPATIENT)
Dept: RADIOLOGY | Facility: HOSPITAL | Age: 59
Discharge: HOME | End: 2024-08-23
Attending: NURSE PRACTITIONER
Payer: COMMERCIAL

## 2024-08-23 DIAGNOSIS — Z12.31 OTHER SCREENING MAMMOGRAM: ICD-10-CM

## 2024-08-23 DIAGNOSIS — Z12.31 OTHER SCREENING MAMMOGRAM: Primary | ICD-10-CM

## 2024-08-23 PROCEDURE — 77067 SCR MAMMO BI INCL CAD: CPT | Mod: TC

## 2024-08-23 PROCEDURE — 77063 BREAST TOMOSYNTHESIS BI: CPT | Mod: 26,,, | Performed by: RADIOLOGY

## 2024-08-23 PROCEDURE — 77067 SCR MAMMO BI INCL CAD: CPT | Mod: 26,,, | Performed by: RADIOLOGY

## 2024-08-27 ENCOUNTER — CLINICAL SUPPORT (OUTPATIENT)
Dept: REHABILITATION | Facility: HOSPITAL | Age: 59
End: 2024-08-27
Payer: COMMERCIAL

## 2024-08-27 DIAGNOSIS — M25.60 STIFFNESS IN JOINT: ICD-10-CM

## 2024-08-27 DIAGNOSIS — M62.81 MUSCLE WEAKNESS: Primary | ICD-10-CM

## 2024-08-27 DIAGNOSIS — M79.642 PAIN IN LEFT HAND: ICD-10-CM

## 2024-08-27 DIAGNOSIS — M25.522 PAIN IN LEFT ELBOW: ICD-10-CM

## 2024-08-27 PROCEDURE — 97140 MANUAL THERAPY 1/> REGIONS: CPT | Mod: PO

## 2024-08-27 PROCEDURE — 97530 THERAPEUTIC ACTIVITIES: CPT | Mod: PO

## 2024-08-27 PROCEDURE — 97018 PARAFFIN BATH THERAPY: CPT | Mod: PO

## 2024-08-27 NOTE — PROGRESS NOTES
Occupational Therapy Daily Treatment Note     Name: Barrera Siddiqui  [facility] Number: [id number]    Therapy Diagnosis:   Encounter Diagnoses   Name Primary?    Muscle weakness Yes    Stiffness in joint     Pain in left elbow     Pain in left hand      Physician: Brandie Monteiro, *    Visit Date: 8/27/2024    Physician Orders: OT eval/treat   Medical Diagnosis: G56.23 (ICD-10-CM) - Cubital tunnel syndrome of both upper extremities  Evaluation Date: 8/14/2024  Plan of Care Certification Date: 11/6/24  Authorization Period: 7/24/24 - 12/31/24  Surgery Date and Procedure: Procedure(s) (LRB):  LEFT ELBOW DECOMPRESSION, NERVE, ULNAR (Left)  LEFT THUMB AND RING  TRIGGER FINGER RELEASE (Left)   Tenosynovectomy left thumb and ring finger flexor tendon7/24/24  Date of Return to MD: 9/6/24  Visit # / Visits authorized: 3/ 20 (plus initial evaluation)   FOTO Completion: 1/3    Time In:1400  Time Out: 1500  Total Billable Time: 60 minutes     Precautions:  Standard      Subjective     Pt reports: She states that her pain is tolerable, but her elbow continues to bother her and be sensitive    she was compliant with home exercise program given last session.   Response to previous treatment: soreness that did not last past one day    Functional change: None reported at this time.      Pain: 8/10 at the elbow; 4/10 at hand    Location: left arms     Objective       Mental status: alert, oriented x3     Observation:   Pt incision sites are clean and healing well        Sensation: Pt states she has pins and needles in her small finger      Range of Motion:   Left        Elbow Left  8/14/2024 Right  8/14/2024   Flexion 130 140   Extension 0 0         Left Hand Finger ROM INDEX   8/14/2024 LONG   8/14/2024 RING   8/14/2024 SMALL   8/14/2024   MP  60 60 60 64   PIP  90 95 95 100   DIP  75 75 75 85      Right Hand Finger ROM INDEX   8/14/2024 LONG   8/14/2024 RING   8/14/2024 SMALL   8/14/2024   MP  90 85 90 90   PIP  110 105 100 100   DIP   80 80 80 90        Left Thumb range of motion  8/14/2024 Right Thumb range of motion  8/14/2024   MP 80 75   IP 85 75   Radial Abduction 70 70   Palmar Abduction 60 60   Opposition Middle of SF DPC          Strength: (ZULEYKA Dynamometer in psi.)        8/14/2024 8/14/2024     Left Right   Rung II 15 60         Pinch Strength (Measured in psi)       8/14/2024 8/14/2024     Left Right   Key Pinch 4  10   3pt Pinch 6 11   2pt Pinch 4 8         CMS Impairment/Limitation/Restriction for FOTO Hand/Wrist/Finger Survey     Therapist reviewed FOTO scores for Barrera Siddiqui on 8/14/2024.   FOTO documents entered into [employer] - see Media section.     Intake Score: 28%  Category: Self Care           Treatment       Barrera received the following direct contact modalities after being cleared for contraindications for 8 minutes:  - paraffin wax and moist hot pack to hand to increase extensibility   - moist hot pack to elbow to increase extensibility (simultaneously with paraffin application)     Manual therapy techniques: Myofacial release, Soft tissue Mobilization, and scar massage were applied to the: left hand and left elbow for 15 minutes, including:  - use of hand techniques, cupping, IASTM tools, and scar pump to increase blood flow/circulation, improve soft tissue pliability and decrease pain.    -scar massage to elbow with use of hand techniques only to decrease tissue adhesions and pain, and increase tissue extensibility   -intrinsic stretches to digits #2-5 5 reps X 5 sec hold       Barrera participated in dynamic functional therapeutic activities to improve functional performance for 37 minutes, including:  Bolded performed today.    -TGE's x 20   - thumb MP/IP flexion with blocking X 20 each   - ring finger PIP/DIP with blocking X 20 each   - finger lifts x 20  - finger spreads x 20  - elbow 3 ways with 1# weight 2/15  -scar desensitization with mini vibrator to elbow and trigger finger incision  - pom pom  with gripper  on the 1st rung  - isospheres x 3 minutes   - wrist maze x 3 minutes   - Wrist wheel 2 minutes in flex/ext & sup/pro  -red flex bar smiles/frowns/twists    Home Exercises and Education Provided     Education provided:     - Progress towards goals     Written Home Exercises Provided: Patient instructed to cont prior HEP.  Exercises were reviewed and Barrera was able to demonstrate them prior to the end of the session.  Barrera demonstrated good  understanding of the HEP provided.   .   See EMR under Patient Instructions for exercises provided prior visit.        Assessment     Pt would continue to benefit from skilled OT.   She tolerated soft tissue mobilization well; however she continues to have sensitivity at her elbow.  Pt tolerated added therapeutic activities well without complaints of additional pain.  Pt is motivated. Will continue to progress as tolerated.     Barrera is progressing well towards her goals and there are no updates to goals at this time. Pt prognosis is Good.     Pt will continue to benefit from skilled outpatient occupational therapy to address the deficits listed in the problem list on initial evaluation provide pt/family education and to maximize pt's level of independence in the home and community environment.     Anticipated barriers to occupational therapy: None at this time     Pt's spiritual, cultural and educational needs considered and pt agreeable to plan of care and goals.      Goals:    LTG's (12 weeks):  1)   Increase ROM to 140 degrees in left elbow flexion to increase functional hand use for ADLs, IADLs, and leisure tasks Ongoing  2)   Increase ROM to 85 degrees in left hand MP joints to increase functional hand use for ADLs, IADLs, and leisure tasksOngoing  3)   Increase ROM so that opposition in left thumb is to the DPC  to increase functional hand use for ADLs, IADLs, and leisure tasksOngoing  4)   Increase  strength to 50 lbs. to grasp for ADLs, IADLs and leisure taskOngoing  5)    Increase key pinch to 8 psis to increase independence with button and FM coordinationOngoing  6)   Decrease complaints of pain to  1 out of 10 at worst to increase functional hand use for ADL/work/leisure activities.Ongoing  7)   Patient to score at 55% or more on FOTO to demonstrate improved perception of functional left UE use.Ongoing  8)   Pt will return to near to prior level of function for ADLs and household management reporting I or Mod I with ADLs (dressing, feeding, grooming, toileting). Ongoing     STG's (6 weeks)  1)   Patient to be IND with HEP and modalities for pain/edema managment.Ongoing  2)   Increase ROM to 135 degrees in left elbow flexion to increase functional hand use for ADLs, IADLs, and leisure tasksOngoing  3)   Increase ROM to 75 degrees in left hand MP joints to increase functional hand use for ADLs, IADLs, and leisure tasksOngoing  4)   Increase ROM so that opposition in left thumb is to the base of her SF  to increase functional hand use for ADLs, IADLs, and leisure tasksOngoing  5)   Increase  strength to 25 lbs. to grasp for ADLs, IADLs and leisure taskOngoing  6)   Increase key pinch to 6 psis to increase independence with button and FM coordinationOngoing  7)   Patient to be IND wiht Orthotic use, wear and care precautions. Ongoing  8)   Decrease complaints of pain to  5 out of 10 at worst to increase functional hand use for ADL/work/leisure activities.Ongoing     Plan      Pt to be treated by Occupational Therapy 2 times per week for 12 weeks during the certification period from 8/14/2024 to 11/6/24 to achieve the established goals.      Treatment to include: Paraffin, Fluidotherapy, Manual therapy/joint mobilizations, Modalities for pain management, US 3 mhz, Therapeutic exercises/activities., Iontophoresis with 2.0 cc Dexamethasone, Strengthening, Orthotic Fabrication/Fit/Training, Edema Control, Scar Management, Wound Care, Electrical Modalities, Joint Protection, and  Energy Conservation, as well as any other treatments deemed necessary based on the patient's needs or progress.        Updates/Grading for next session: Continue with current plan of care       Nancy Causey, OT

## 2024-08-29 ENCOUNTER — CLINICAL SUPPORT (OUTPATIENT)
Dept: REHABILITATION | Facility: HOSPITAL | Age: 59
End: 2024-08-29
Payer: COMMERCIAL

## 2024-08-29 DIAGNOSIS — M25.522 PAIN IN LEFT ELBOW: ICD-10-CM

## 2024-08-29 DIAGNOSIS — M79.642 PAIN IN LEFT HAND: ICD-10-CM

## 2024-08-29 DIAGNOSIS — M25.60 STIFFNESS IN JOINT: ICD-10-CM

## 2024-08-29 DIAGNOSIS — M62.81 MUSCLE WEAKNESS: Primary | ICD-10-CM

## 2024-08-29 PROCEDURE — 97110 THERAPEUTIC EXERCISES: CPT | Mod: PO

## 2024-08-29 PROCEDURE — 97018 PARAFFIN BATH THERAPY: CPT | Mod: PO

## 2024-08-29 PROCEDURE — 97140 MANUAL THERAPY 1/> REGIONS: CPT | Mod: PO

## 2024-08-29 NOTE — PROGRESS NOTES
Occupational Therapy Daily Treatment Note     Name: Barrera Siddiqui  [facility] Number: [id number]    Therapy Diagnosis:   Encounter Diagnoses   Name Primary?    Muscle weakness Yes    Stiffness in joint     Pain in left elbow     Pain in left hand      Physician: Brandie Monteiro, *    Visit Date: 8/29/2024    Physician Orders: OT eval/treat   Medical Diagnosis: G56.23 (ICD-10-CM) - Cubital tunnel syndrome of both upper extremities  Evaluation Date: 8/14/2024  Plan of Care Certification Date: 11/6/24  Authorization Period: 7/24/24 - 12/31/24  Surgery Date and Procedure: Procedure(s) (LRB):  LEFT ELBOW DECOMPRESSION, NERVE, ULNAR (Left)  LEFT THUMB AND RING  TRIGGER FINGER RELEASE (Left)   Tenosynovectomy left thumb and ring finger flexor tendon 7/24/24  Date of Return to MD: 9/6/24  Visit # / Visits authorized: 4 / 20 (plus initial evaluation)   FOTO Completion: 1/3    Time In:1425  Time Out: 1525  Total Billable Time: 54 minutes (1P, 2MT, 1TE)     Precautions:  Standard      Subjective     Pt reports: She states that her pain is tolerable.  She continues to have more sensitivity in her elbow, but it is trending down slowly but steadily.        she was compliant with home exercise program given last session.   Response to previous treatment: soreness that did not last past one day    Functional change: None reported at this time.      Pain: 6/10 at the elbow; 4/10 at hand    Location: left arms     Objective       Mental status: alert, oriented x3     Observation:   Pt incision sites are clean and healing well        Sensation: Pt states she has pins and needles in her small finger      Range of Motion:   Left        Elbow Left  8/14/2024 Right  8/14/2024   Flexion 130 140   Extension 0 0         Left Hand Finger ROM INDEX   8/14/2024 LONG   8/14/2024 RING   8/14/2024 SMALL   8/14/2024   MP  60 60 60 64   PIP  90 95 95 100   DIP  75 75 75 85      Right Hand Finger ROM INDEX   8/14/2024 LONG   8/14/2024 RING   8/14/2024  SMALL   8/14/2024   MP  90 85 90 90   PIP  110 105 100 100   DIP  80 80 80 90        Left Thumb range of motion  8/14/2024 Right Thumb range of motion  8/14/2024   MP 80 75   IP 85 75   Radial Abduction 70 70   Palmar Abduction 60 60   Opposition Middle of SF DPC          Strength: (ZULEYKA Dynamometer in psi.)        8/14/2024 8/14/2024     Left Right   Rung II 15 60         Pinch Strength (Measured in psi)       8/14/2024 8/14/2024     Left Right   Key Pinch 4  10   3pt Pinch 6 11   2pt Pinch 4 8         CMS Impairment/Limitation/Restriction for FOTO Hand/Wrist/Finger Survey     Therapist reviewed FOTO scores for Barrera Siddiqui on 8/14/2024.   FOTO documents entered into [employer] - see Media section.     Intake Score: 28%  Category: Self Care           Treatment       Barrera received the following direct contact modalities after being cleared for contraindications for 8 minutes:  - paraffin wax and moist hot pack to hand to increase extensibility   - moist hot pack to elbow to increase extensibility  applied post session today    Manual therapy techniques: Myofacial release, Soft tissue Mobilization, and scar massage were applied to the: left hand and left elbow for 25 minutes, including:  - use of hand techniques, cupping, IASTM tools, and scar pump to increase blood flow/circulation, improve soft tissue pliability and decrease pain.    -scar massage to elbow with use of hand techniques only to decrease tissue adhesions and pain, and increase tissue extensibility   -intrinsic stretches to digits #2-5 5 reps X 5 sec hold       Barrera participated in dynamic functional therapeutic activities to improve functional performance for 21 minutes, including:  Bolded performed today.    -TGE's x 20   - thumb MP/IP flexion with blocking X 20 each   - ring finger PIP/DIP with blocking X 20 each   - thumb retropulsion X 20 reps   - finger lifts x 20   - finger spreads with tan RB x 20   - elbow 3 ways with 1# weight 2/15  -scar  desensitization with mini vibrator to elbow and trigger finger incision  - pom pom  with gripper on the 1st rung  - isospheres x 3 minutes   - wrist maze x 3 minutes   - Wrist wheel 2 minutes in flex/ext & sup/pro   -red flex bar smiles/frowns/twists X 2/15 reps       Home Exercises and Education Provided     Education provided:     - Progress towards goals     Written Home Exercises Provided: Patient instructed to cont prior HEP.  Exercises were reviewed and Barrera was able to demonstrate them prior to the end of the session.  Barrera demonstrated good  understanding of the HEP provided.   .   See EMR under Patient Instructions for exercises provided prior visit.        Assessment     Pt would continue to benefit from skilled OT.   She tolerated soft tissue mobilization well; however she continues to have sensitivity at her elbow.  Pt tolerated added therapeutic activities well without complaints of additional pain.  She was encouraged to continue scar massage at healed incision sites of thumb and ring finger.  Pt is motivated. Will continue to progress as tolerated.     Barrera is progressing well towards her goals and there are no updates to goals at this time. Pt prognosis is Good.     Pt will continue to benefit from skilled outpatient occupational therapy to address the deficits listed in the problem list on initial evaluation provide pt/family education and to maximize pt's level of independence in the home and community environment.     Anticipated barriers to occupational therapy: None at this time     Pt's spiritual, cultural and educational needs considered and pt agreeable to plan of care and goals.      Goals:    LTG's (12 weeks):  1)   Increase ROM to 140 degrees in left elbow flexion to increase functional hand use for ADLs, IADLs, and leisure tasks Ongoing  2)   Increase ROM to 85 degrees in left hand MP joints to increase functional hand use for ADLs, IADLs, and leisure tasksOngoing  3)   Increase  ROM so that opposition in left thumb is to the DPC  to increase functional hand use for ADLs, IADLs, and leisure tasksOngoing  4)   Increase  strength to 50 lbs. to grasp for ADLs, IADLs and leisure taskOngoing  5)   Increase key pinch to 8 psis to increase independence with button and FM coordinationOngoing  6)   Decrease complaints of pain to  1 out of 10 at worst to increase functional hand use for ADL/work/leisure activities.Ongoing  7)   Patient to score at 55% or more on FOTO to demonstrate improved perception of functional left UE use.Ongoing  8)   Pt will return to near to prior level of function for ADLs and household management reporting I or Mod I with ADLs (dressing, feeding, grooming, toileting). Ongoing     STG's (6 weeks)  1)   Patient to be IND with HEP and modalities for pain/edema managment.Ongoing  2)   Increase ROM to 135 degrees in left elbow flexion to increase functional hand use for ADLs, IADLs, and leisure tasksOngoing  3)   Increase ROM to 75 degrees in left hand MP joints to increase functional hand use for ADLs, IADLs, and leisure tasksOngoing  4)   Increase ROM so that opposition in left thumb is to the base of her SF  to increase functional hand use for ADLs, IADLs, and leisure tasksOngoing  5)   Increase  strength to 25 lbs. to grasp for ADLs, IADLs and leisure taskOngoing  6)   Increase key pinch to 6 psis to increase independence with button and FM coordinationOngoing  7)   Patient to be IND wiht Orthotic use, wear and care precautions. Ongoing  8)   Decrease complaints of pain to  5 out of 10 at worst to increase functional hand use for ADL/work/leisure activities.Ongoing     Plan      Pt to be treated by Occupational Therapy 2 times per week for 12 weeks during the certification period from 8/14/2024 to 11/6/24 to achieve the established goals.      Treatment to include: Paraffin, Fluidotherapy, Manual therapy/joint mobilizations, Modalities for pain management, US 3 mhz,  Therapeutic exercises/activities., Iontophoresis with 2.0 cc Dexamethasone, Strengthening, Orthotic Fabrication/Fit/Training, Edema Control, Scar Management, Wound Care, Electrical Modalities, Joint Protection, and Energy Conservation, as well as any other treatments deemed necessary based on the patient's needs or progress.        Updates/Grading for next session: Continue with current plan of care       Herrera Lovett, OT

## 2024-09-03 ENCOUNTER — CLINICAL SUPPORT (OUTPATIENT)
Dept: REHABILITATION | Facility: HOSPITAL | Age: 59
End: 2024-09-03
Payer: COMMERCIAL

## 2024-09-03 DIAGNOSIS — M25.522 PAIN IN LEFT ELBOW: ICD-10-CM

## 2024-09-03 DIAGNOSIS — M25.60 STIFFNESS IN JOINT: ICD-10-CM

## 2024-09-03 DIAGNOSIS — M79.642 PAIN IN LEFT HAND: ICD-10-CM

## 2024-09-03 DIAGNOSIS — M62.81 MUSCLE WEAKNESS: Primary | ICD-10-CM

## 2024-09-03 PROCEDURE — 97140 MANUAL THERAPY 1/> REGIONS: CPT | Mod: PO

## 2024-09-03 PROCEDURE — 97018 PARAFFIN BATH THERAPY: CPT | Mod: PO

## 2024-09-03 PROCEDURE — 97530 THERAPEUTIC ACTIVITIES: CPT | Mod: PO

## 2024-09-03 NOTE — PROGRESS NOTES
Occupational Therapy Daily Treatment Note     Name: Barrera Siddiqui  [facility] Number: [id number]    Therapy Diagnosis:   Encounter Diagnoses   Name Primary?    Muscle weakness Yes    Stiffness in joint     Pain in left elbow     Pain in left hand      Physician: Brandie Monteiro, *    Visit Date: 9/3/2024    Physician Orders: OT eval/treat   Medical Diagnosis: G56.23 (ICD-10-CM) - Cubital tunnel syndrome of both upper extremities  Evaluation Date: 8/14/2024  Plan of Care Certification Date: 11/6/24  Authorization Period: 7/24/24 - 12/31/24  Surgery Date and Procedure: Procedure(s) (LRB):  LEFT ELBOW DECOMPRESSION, NERVE, ULNAR (Left)  LEFT THUMB AND RING  TRIGGER FINGER RELEASE (Left)   Tenosynovectomy left thumb and ring finger flexor tendon 7/24/24  Date of Return to MD: 9/6/24  Visit # / Visits authorized: 5 / 20 (plus initial evaluation)   FOTO Completion: 1/3    Time In:1400  Time Out: 1500  Total Billable Time: 60 minutes     Precautions:  Standard      Subjective     Pt reports: She has some pain in her elbow, but it is getting better   she was compliant with home exercise program given last session.   Response to previous treatment: soreness that did not last past one day    Functional change: None reported at this time.      Pain: 5/10 at the elbow; 2/10 at hand    Location: left arms   Objective       Mental status: alert, oriented x3     Observation:   Pt incision sites are clean and healing well        Sensation: Pt states she has pins and needles in her small finger      Range of Motion:   Left        Elbow Left  8/14/2024 Right  8/14/2024   Flexion 130 140   Extension 0 0         Left Hand Finger ROM INDEX   8/14/2024 LONG   8/14/2024 RING   8/14/2024 SMALL   8/14/2024   MP  60 60 60 64   PIP  90 95 95 100   DIP  75 75 75 85      Right Hand Finger ROM INDEX   8/14/2024 LONG   8/14/2024 RING   8/14/2024 SMALL   8/14/2024   MP  90 85 90 90   PIP  110 105 100 100   DIP  80 80 80 90        Left Thumb range of  motion  8/14/2024 Right Thumb range of motion  8/14/2024   MP 80 75   IP 85 75   Radial Abduction 70 70   Palmar Abduction 60 60   Opposition Middle of SF DPC          Strength: (ZULEYKA Dynamometer in psi.)        8/14/2024 8/14/2024     Left Right   Rung II 15 60         Pinch Strength (Measured in psi)       8/14/2024 8/14/2024     Left Right   Key Pinch 4  10   3pt Pinch 6 11   2pt Pinch 4 8         CMS Impairment/Limitation/Restriction for FOTO Hand/Wrist/Finger Survey     Therapist reviewed FOTO scores for Barrera Siddiqui on 8/14/2024.   FOTO documents entered into [employer] - see Media section.     Intake Score: 28%  Category: Self Care           Treatment       Barrera received the following direct contact modalities after being cleared for contraindications for 8 minutes:  - paraffin wax and moist hot pack to hand to increase extensibility   - moist hot pack to elbow to increase extensibility  applied post session today    Manual therapy techniques: Myofacial release, Soft tissue Mobilization, and scar massage were applied to the: left hand and left elbow for 25 minutes, including:  - use of hand techniques, cupping, IASTM tools, and scar pump to increase blood flow/circulation, improve soft tissue pliability and decrease pain.    -scar massage to elbow with use of hand techniques only to decrease tissue adhesions and pain, and increase tissue extensibility   -intrinsic stretches to digits #2-5 5 reps X 5 sec hold       Barrera participated in dynamic functional therapeutic activities to improve functional performance for 27 minutes, including:  Bolded performed today.    -TGE's x 20   - thumb MP/IP flexion with blocking X 20 each   - ring finger PIP/DIP with blocking X 20 each   - thumb retropulsion X 20 reps   - finger lifts x 20   - finger spreads with tan RB x 20   - elbow 3 ways with 3# weight 2/15  -scar desensitization with mini vibrator to elbow and trigger finger incision  - pom pom  with gripper with  yellow clothespin  - isospheres x 3 minutes   - wrist maze x 3 minutes   - Wrist wheel 2 minutes in flex/ext & sup/pro   -red flex bar smiles/frowns/twists X 2/15 reps       Home Exercises and Education Provided     Education provided:     - Progress towards goals     Written Home Exercises Provided: Patient instructed to cont prior HEP.  Exercises were reviewed and Barrera was able to demonstrate them prior to the end of the session.  Barrera demonstrated good  understanding of the HEP provided.   .   See EMR under Patient Instructions for exercises provided prior visit.        Assessment     Pt would continue to benefit from skilled OT.   She tolerated soft tissue mobilization well; however she continues to have sensitivity at her elbow.  Pt tolerated added therapeutic activities well without complaints of additional pain.  Pt is motivated. Will continue to progress as tolerated.     Barrera is progressing well towards her goals and there are no updates to goals at this time. Pt prognosis is Good.     Pt will continue to benefit from skilled outpatient occupational therapy to address the deficits listed in the problem list on initial evaluation provide pt/family education and to maximize pt's level of independence in the home and community environment.     Anticipated barriers to occupational therapy: None at this time     Pt's spiritual, cultural and educational needs considered and pt agreeable to plan of care and goals.      Goals:    LTG's (12 weeks):  1)   Increase ROM to 140 degrees in left elbow flexion to increase functional hand use for ADLs, IADLs, and leisure tasks Ongoing  2)   Increase ROM to 85 degrees in left hand MP joints to increase functional hand use for ADLs, IADLs, and leisure tasksOngoing  3)   Increase ROM so that opposition in left thumb is to the DPC  to increase functional hand use for ADLs, IADLs, and leisure tasksOngoing  4)   Increase  strength to 50 lbs. to grasp for ADLs, IADLs and  leisure taskOngoing  5)   Increase key pinch to 8 psis to increase independence with button and FM coordinationOngoing  6)   Decrease complaints of pain to  1 out of 10 at worst to increase functional hand use for ADL/work/leisure activities.Ongoing  7)   Patient to score at 55% or more on FOTO to demonstrate improved perception of functional left UE use.Ongoing  8)   Pt will return to near to prior level of function for ADLs and household management reporting I or Mod I with ADLs (dressing, feeding, grooming, toileting). Ongoing     STG's (6 weeks)  1)   Patient to be IND with HEP and modalities for pain/edema managment.Ongoing  2)   Increase ROM to 135 degrees in left elbow flexion to increase functional hand use for ADLs, IADLs, and leisure tasksOngoing  3)   Increase ROM to 75 degrees in left hand MP joints to increase functional hand use for ADLs, IADLs, and leisure tasksOngoing  4)   Increase ROM so that opposition in left thumb is to the base of her SF  to increase functional hand use for ADLs, IADLs, and leisure tasksOngoing  5)   Increase  strength to 25 lbs. to grasp for ADLs, IADLs and leisure taskOngoing  6)   Increase key pinch to 6 psis to increase independence with button and FM coordinationOngoing  7)   Patient to be IND wiht Orthotic use, wear and care precautions. Ongoing  8)   Decrease complaints of pain to  5 out of 10 at worst to increase functional hand use for ADL/work/leisure activities.Ongoing     Plan      Pt to be treated by Occupational Therapy 2 times per week for 12 weeks during the certification period from 8/14/2024 to 11/6/24 to achieve the established goals.      Treatment to include: Paraffin, Fluidotherapy, Manual therapy/joint mobilizations, Modalities for pain management, US 3 mhz, Therapeutic exercises/activities., Iontophoresis with 2.0 cc Dexamethasone, Strengthening, Orthotic Fabrication/Fit/Training, Edema Control, Scar Management, Wound Care, Electrical Modalities,  Joint Protection, and Energy Conservation, as well as any other treatments deemed necessary based on the patient's needs or progress.        Updates/Grading for next session: Continue with current plan of care       Nancy Causey OT

## 2024-09-04 ENCOUNTER — CLINICAL SUPPORT (OUTPATIENT)
Dept: REHABILITATION | Facility: HOSPITAL | Age: 59
End: 2024-09-04
Payer: COMMERCIAL

## 2024-09-04 DIAGNOSIS — M25.60 STIFFNESS IN JOINT: ICD-10-CM

## 2024-09-04 DIAGNOSIS — M79.642 PAIN IN LEFT HAND: ICD-10-CM

## 2024-09-04 DIAGNOSIS — M62.81 MUSCLE WEAKNESS: Primary | ICD-10-CM

## 2024-09-04 DIAGNOSIS — M25.522 PAIN IN LEFT ELBOW: ICD-10-CM

## 2024-09-04 PROCEDURE — 97530 THERAPEUTIC ACTIVITIES: CPT | Mod: PO

## 2024-09-04 PROCEDURE — 97140 MANUAL THERAPY 1/> REGIONS: CPT | Mod: PO

## 2024-09-04 PROCEDURE — 97018 PARAFFIN BATH THERAPY: CPT | Mod: PO

## 2024-09-04 NOTE — PROGRESS NOTES
Occupational Therapy Daily Treatment Note     Name: Barrera Siddiqui  [facility] Number: [id number]    Therapy Diagnosis:   Encounter Diagnoses   Name Primary?    Muscle weakness Yes    Stiffness in joint     Pain in left elbow     Pain in left hand      Physician: Brandie Monteiro, *    Visit Date: 9/4/2024    Physician Orders: OT eval/treat   Medical Diagnosis: G56.23 (ICD-10-CM) - Cubital tunnel syndrome of both upper extremities  Evaluation Date: 8/14/2024  Plan of Care Certification Date: 11/6/24  Authorization Period: 7/24/24 - 12/31/24  Surgery Date and Procedure: Procedure(s) (LRB):  LEFT ELBOW DECOMPRESSION, NERVE, ULNAR (Left)  LEFT THUMB AND RING  TRIGGER FINGER RELEASE (Left)   Tenosynovectomy left thumb and ring finger flexor tendon 7/24/24  Date of Return to MD: 9/6/24  Visit # / Visits authorized: 6 / 20 (plus initial evaluation)   FOTO Completion: 1/3    Time In:1400  Time Out: 1500  Total Billable Time: 60 minutes     Precautions:  Standard      Subjective     Pt reports: She has some pain in her elbow last night, but it is getting better  she was compliant with home exercise program given last session.   Response to previous treatment: soreness that did not last past one day    Functional change: None reported at this time.      Pain: 5/10 at the elbow; 2/10 at hand    Location: left arms   Objective       Mental status: alert, oriented x3     Observation:   Pt incision sites are clean and healing well        Sensation: Pt states she has pins and needles in her small finger      Range of Motion:   Left        Elbow Left  8/14/2024 Right  8/14/2024   Flexion 130 140   Extension 0 0         Left Hand Finger ROM INDEX   8/14/2024 LONG   8/14/2024 RING   8/14/2024 SMALL   8/14/2024   MP  60 60 60 64   PIP  90 95 95 100   DIP  75 75 75 85      Right Hand Finger ROM INDEX   8/14/2024 LONG   8/14/2024 RING   8/14/2024 SMALL   8/14/2024   MP  90 85 90 90   PIP  110 105 100 100   DIP  80 80 80 90        Left Thumb  range of motion  8/14/2024 Right Thumb range of motion  8/14/2024   MP 80 75   IP 85 75   Radial Abduction 70 70   Palmar Abduction 60 60   Opposition Middle of SF DPC          Strength: (ZULEYKA Dynamometer in psi.)        8/14/2024 8/14/2024     Left Right   Rung II 15 60         Pinch Strength (Measured in psi)       8/14/2024 8/14/2024     Left Right   Key Pinch 4  10   3pt Pinch 6 11   2pt Pinch 4 8         CMS Impairment/Limitation/Restriction for FOTO Hand/Wrist/Finger Survey     Therapist reviewed FOTO scores for Barrera Siddiqui on 8/14/2024.   FOTO documents entered into [employer] - see Media section.     Intake Score: 28%  Category: Self Care           Treatment       Barrera received the following direct contact modalities after being cleared for contraindications for 8 minutes:  - paraffin wax and moist hot pack to hand to increase extensibility   - moist hot pack to elbow to increase extensibility  applied post session today    Manual therapy techniques: Myofacial release, Soft tissue Mobilization, and scar massage were applied to the: left hand and left elbow for 25 minutes, including:  - use of hand techniques, cupping, IASTM tools, and scar pump to increase blood flow/circulation, improve soft tissue pliability and decrease pain.    -scar massage to elbow with use of hand techniques only to decrease tissue adhesions and pain, and increase tissue extensibility   -intrinsic stretches to digits #2-5 5 reps X 5 sec hold       Barrera participated in dynamic functional therapeutic activities to improve functional performance for 27 minutes, including:  Bolded performed today.    -TGE's x 20   - thumb MP/IP flexion with blocking X 20 each   - ring finger PIP/DIP with blocking X 20 each   - thumb retropulsion X 20 reps   - finger lifts x 20   - finger spreads with tan RB x 20   - elbow 3 ways with 3# weight 2/15  -scar desensitization with mini vibrator to elbow and trigger finger incision  - pom pom  with gripper  on 1st rung  - isospheres x 3 minutes   - wrist maze x 3 minutes   - Wrist wheel 2 minutes in flex/ext & sup/pro   -red flex bar smiles/frowns/twists X 2/15 reps       Home Exercises and Education Provided     Education provided:     - Progress towards goals     Written Home Exercises Provided: Patient instructed to cont prior HEP.  Exercises were reviewed and Barrera was able to demonstrate them prior to the end of the session.  Barrera demonstrated good  understanding of the HEP provided.   .   See EMR under Patient Instructions for exercises provided prior visit.        Assessment     Pt would continue to benefit from skilled OT.   She tolerated soft tissue mobilization well; however she continues to have sensitivity at her elbow.  Pt tolerated added therapeutic activities well without complaints of additional pain.  Pt is motivated. Will continue to progress as tolerated.     Barrera is progressing well towards her goals and there are no updates to goals at this time. Pt prognosis is Good.     Pt will continue to benefit from skilled outpatient occupational therapy to address the deficits listed in the problem list on initial evaluation provide pt/family education and to maximize pt's level of independence in the home and community environment.     Anticipated barriers to occupational therapy: None at this time     Pt's spiritual, cultural and educational needs considered and pt agreeable to plan of care and goals.      Goals:    LTG's (12 weeks):  1)   Increase ROM to 140 degrees in left elbow flexion to increase functional hand use for ADLs, IADLs, and leisure tasks Ongoing  2)   Increase ROM to 85 degrees in left hand MP joints to increase functional hand use for ADLs, IADLs, and leisure tasksOngoing  3)   Increase ROM so that opposition in left thumb is to the DPC  to increase functional hand use for ADLs, IADLs, and leisure tasksOngoing  4)   Increase  strength to 50 lbs. to grasp for ADLs, IADLs and leisure  taskOngoing  5)   Increase key pinch to 8 psis to increase independence with button and FM coordinationOngoing  6)   Decrease complaints of pain to  1 out of 10 at worst to increase functional hand use for ADL/work/leisure activities.Ongoing  7)   Patient to score at 55% or more on FOTO to demonstrate improved perception of functional left UE use.Ongoing  8)   Pt will return to near to prior level of function for ADLs and household management reporting I or Mod I with ADLs (dressing, feeding, grooming, toileting). Ongoing     STG's (6 weeks)  1)   Patient to be IND with HEP and modalities for pain/edema managment.Ongoing  2)   Increase ROM to 135 degrees in left elbow flexion to increase functional hand use for ADLs, IADLs, and leisure tasksOngoing  3)   Increase ROM to 75 degrees in left hand MP joints to increase functional hand use for ADLs, IADLs, and leisure tasksOngoing  4)   Increase ROM so that opposition in left thumb is to the base of her SF  to increase functional hand use for ADLs, IADLs, and leisure tasksOngoing  5)   Increase  strength to 25 lbs. to grasp for ADLs, IADLs and leisure taskOngoing  6)   Increase key pinch to 6 psis to increase independence with button and FM coordinationOngoing  7)   Patient to be IND wiht Orthotic use, wear and care precautions. Ongoing  8)   Decrease complaints of pain to  5 out of 10 at worst to increase functional hand use for ADL/work/leisure activities.Ongoing     Plan      Pt to be treated by Occupational Therapy 2 times per week for 12 weeks during the certification period from 8/14/2024 to 11/6/24 to achieve the established goals.      Treatment to include: Paraffin, Fluidotherapy, Manual therapy/joint mobilizations, Modalities for pain management, US 3 mhz, Therapeutic exercises/activities., Iontophoresis with 2.0 cc Dexamethasone, Strengthening, Orthotic Fabrication/Fit/Training, Edema Control, Scar Management, Wound Care, Electrical Modalities, Joint  Protection, and Energy Conservation, as well as any other treatments deemed necessary based on the patient's needs or progress.        Updates/Grading for next session: Continue with current plan of care       Nancy Causey OT

## 2024-09-06 ENCOUNTER — OFFICE VISIT (OUTPATIENT)
Dept: ORTHOPEDICS | Facility: CLINIC | Age: 59
End: 2024-09-06
Payer: COMMERCIAL

## 2024-09-06 VITALS — WEIGHT: 268.94 LBS | HEIGHT: 63 IN | BODY MASS INDEX: 47.65 KG/M2

## 2024-09-06 DIAGNOSIS — M65.312 TRIGGER FINGER OF LEFT THUMB: ICD-10-CM

## 2024-09-06 DIAGNOSIS — M65.342 TRIGGER RING FINGER OF LEFT HAND: ICD-10-CM

## 2024-09-06 DIAGNOSIS — G56.23 CUBITAL TUNNEL SYNDROME OF BOTH UPPER EXTREMITIES: ICD-10-CM

## 2024-09-06 DIAGNOSIS — Z98.890 POST-OPERATIVE STATE: Primary | ICD-10-CM

## 2024-09-06 PROCEDURE — 99999 PR PBB SHADOW E&M-EST. PATIENT-LVL III: CPT | Mod: PBBFAC,,, | Performed by: SPECIALIST/TECHNOLOGIST

## 2024-09-06 NOTE — PROGRESS NOTES
"9/6/24  Reports 6 weeks status post left ulnar nerve decompression and left thumb and ring finger trigger finger release.  She notes that she has been attending therapy regularly as well as performing home exercise program.  She does note that she has been doing scar massage but that has not been aggressive.  She notes that her numbness and pain has been improving.  She does note that still has some numbness within the small finger.    8/7/24  Ms. Siddiqui is here today for a post-operative visit.  She is 14 days status post L Ulnar Nerve Decompression and L Thumb and Ring Finger Trigger Finger Release by Dr. Monteiro on 7/24/24. She reports that she is mild pain over the elbow. She states that she does have some tingling in the small finger.  She denies fever, chills, and sweats since the time of the surgery.     Physical exam:    Vitals:    09/06/24 1037   Weight: 122 kg (268 lb 15.4 oz)   Height: 5' 3" (1.6 m)   PainSc:   6   PainLoc: Elbow     Vital signs are stable, patient is afebrile.  Patient is well dressed and well groomed, no acute distress.  Alert and oriented to person, place, and time.  Incision is clean, dry and intact.  There is no erythema or exudate.  There is no sign of any infection. She is NVI.  Able to make a composite fist.  Full range of motion of the elbow.  She does note some hypersensitivity of the ulnar nerve decompression scar.  She does have hypertrophy of scars at the left trigger finger of her ring    Assessment:  s/p L Ulnar Nerve Decompression and L Thumb and Ring Finger Trigger Finger Release        Plan:  Barrera was seen today for pain, pain and tingling.    Diagnoses and all orders for this visit:    Post-operative state    Trigger ring finger of left hand    Cubital tunnel syndrome of both upper extremities    Trigger finger of left thumb        Continue with aggressive scar massage   Continue with range-of-motion exercises of the finger and hand.    Patient can progress " strengthening as tolerated   Patient we will follow up in clinic p.r.n.

## 2024-09-10 ENCOUNTER — CLINICAL SUPPORT (OUTPATIENT)
Dept: REHABILITATION | Facility: HOSPITAL | Age: 59
End: 2024-09-10
Payer: COMMERCIAL

## 2024-09-10 DIAGNOSIS — M25.522 PAIN IN LEFT ELBOW: ICD-10-CM

## 2024-09-10 DIAGNOSIS — M79.642 PAIN IN LEFT HAND: ICD-10-CM

## 2024-09-10 DIAGNOSIS — M62.81 MUSCLE WEAKNESS: Primary | ICD-10-CM

## 2024-09-10 DIAGNOSIS — M25.60 STIFFNESS IN JOINT: ICD-10-CM

## 2024-09-10 PROCEDURE — 97530 THERAPEUTIC ACTIVITIES: CPT | Mod: PO

## 2024-09-10 NOTE — PROGRESS NOTES
Occupational Therapy Daily Treatment Note     Name: Barrera Siddiqui  [facility] Number: [id number]    Therapy Diagnosis:   Encounter Diagnoses   Name Primary?    Muscle weakness Yes    Stiffness in joint     Pain in left elbow     Pain in left hand      Physician: Brandie Monteiro, *    Visit Date: 9/10/2024    Physician Orders: OT eval/treat   Medical Diagnosis: G56.23 (ICD-10-CM) - Cubital tunnel syndrome of both upper extremities  Evaluation Date: 8/14/2024  Plan of Care Certification Date: 11/6/24  Authorization Period: 7/24/24 - 12/31/24  Surgery Date and Procedure: Procedure(s) (LRB):  LEFT ELBOW DECOMPRESSION, NERVE, ULNAR (Left)  LEFT THUMB AND RING  TRIGGER FINGER RELEASE (Left)   Tenosynovectomy left thumb and ring finger flexor tendon 7/24/24  Date of Return to MD: 9/6/24  Visit # / Visits authorized: 7 / 20 (plus initial evaluation)   FOTO Completion: 1/3    Time In:1400  Time Out: 1500  Total Billable Time: 60 minutes     Precautions:  Standard      Subjective     Pt reports: She has some pain in her elbow last night, but it is getting better  she was compliant with home exercise program given last session.   Response to previous treatment: soreness that did not last past one day    Functional change: None reported at this time.      Pain: 5/10 at the elbow; 2/10 at hand    Location: left arms   Objective       Mental status: alert, oriented x3     Observation:   Pt incision sites are clean and healing well        Sensation: Pt states she has pins and needles in her small finger      Range of Motion:   Left        Elbow Left  8/14/2024 Right  8/14/2024   Flexion 130 140   Extension 0 0         Left Hand Finger ROM INDEX   8/14/2024 LONG   8/14/2024 RING   8/14/2024 SMALL   8/14/2024   MP  60 60 60 64   PIP  90 95 95 100   DIP  75 75 75 85      Right Hand Finger ROM INDEX   8/14/2024 LONG   8/14/2024 RING   8/14/2024 SMALL   8/14/2024   MP  90 85 90 90   PIP  110 105 100 100   DIP  80 80 80 90        Left  Thumb range of motion  8/14/2024 Right Thumb range of motion  8/14/2024   MP 80 75   IP 85 75   Radial Abduction 70 70   Palmar Abduction 60 60   Opposition Middle of SF DPC          Strength: (ZULEYKA Dynamometer in psi.)        8/14/2024 8/14/2024     Left Right   Rung II 15 60         Pinch Strength (Measured in psi)       8/14/2024 8/14/2024     Left Right   Key Pinch 4  10   3pt Pinch 6 11   2pt Pinch 4 8         CMS Impairment/Limitation/Restriction for FOTO Hand/Wrist/Finger Survey     Therapist reviewed FOTO scores for Barrera Siddiqui on 8/14/2024.   FOTO documents entered into [employer] - see Media section.     Intake Score: 28%  Category: Self Care           Treatment       Barrera received the following direct contact modalities after being cleared for contraindications for 8 minutes:  - paraffin wax and moist hot pack to hand to increase extensibility   - moist hot pack to elbow to increase extensibility  applied post session today    Manual therapy techniques: Myofacial release, Soft tissue Mobilization, and scar massage were applied to the: left hand and left elbow for 25 minutes, including:  - use of hand techniques, cupping, IASTM tools, and scar pump to increase blood flow/circulation, improve soft tissue pliability and decrease pain.    -scar massage to elbow with use of hand techniques only to decrease tissue adhesions and pain, and increase tissue extensibility   -intrinsic stretches to digits #2-5 5 reps X 5 sec hold       Barrera participated in dynamic functional therapeutic activities to improve functional performance for 27 minutes, including:  Bolded performed today.    -TGE's x 20   - thumb MP/IP flexion with blocking X 20 each   - ring finger PIP/DIP with blocking X 20 each   - thumb retropulsion X 20 reps   - finger lifts x 20   - finger spreads with tan RB x 20   - elbow 3 ways with 3# weight 2/15  -scar desensitization with mini vibrator to elbow and trigger finger incision  - pom pom  with  gripper on 1st rung  - isospheres x 3 minutes   - wrist maze x 3 minutes   - Wrist wheel 2 minutes in flex/ext & sup/pro   -red flex bar smiles/frowns/twists X 2/15 reps       Home Exercises and Education Provided     Education provided:     - Progress towards goals     Written Home Exercises Provided: Patient instructed to cont prior HEP.  Exercises were reviewed and Barrera was able to demonstrate them prior to the end of the session.  Barrera demonstrated good  understanding of the HEP provided.   .   See EMR under Patient Instructions for exercises provided prior visit.        Assessment     Pt would continue to benefit from skilled OT.   Pt continues to have sensitivity in her elbow. She is confused by this due to the fact that she had no elbow pain before her surgery. Pt was educated on the purpose of her surgery and on nerve healing. Pt demonstrated good understanding. Pt tolerated added therapeutic activities well without complaints of additional pain.  Pt is motivated. Will continue to progress as tolerated.     Barrera is progressing well towards her goals and there are no updates to goals at this time. Pt prognosis is Good.     Pt will continue to benefit from skilled outpatient occupational therapy to address the deficits listed in the problem list on initial evaluation provide pt/family education and to maximize pt's level of independence in the home and community environment.     Anticipated barriers to occupational therapy: None at this time     Pt's spiritual, cultural and educational needs considered and pt agreeable to plan of care and goals.      Goals:    LTG's (12 weeks):  1)   Increase ROM to 140 degrees in left elbow flexion to increase functional hand use for ADLs, IADLs, and leisure tasks Ongoing  2)   Increase ROM to 85 degrees in left hand MP joints to increase functional hand use for ADLs, IADLs, and leisure tasksOngoing  3)   Increase ROM so that opposition in left thumb is to the DPC  to  increase functional hand use for ADLs, IADLs, and leisure tasksOngoing  4)   Increase  strength to 50 lbs. to grasp for ADLs, IADLs and leisure taskOngoing  5)   Increase key pinch to 8 psis to increase independence with button and FM coordinationOngoing  6)   Decrease complaints of pain to  1 out of 10 at worst to increase functional hand use for ADL/work/leisure activities.Ongoing  7)   Patient to score at 55% or more on FOTO to demonstrate improved perception of functional left UE use.Ongoing  8)   Pt will return to near to prior level of function for ADLs and household management reporting I or Mod I with ADLs (dressing, feeding, grooming, toileting). Ongoing     STG's (6 weeks)  1)   Patient to be IND with HEP and modalities for pain/edema managment.Ongoing  2)   Increase ROM to 135 degrees in left elbow flexion to increase functional hand use for ADLs, IADLs, and leisure tasksOngoing  3)   Increase ROM to 75 degrees in left hand MP joints to increase functional hand use for ADLs, IADLs, and leisure tasksOngoing  4)   Increase ROM so that opposition in left thumb is to the base of her SF  to increase functional hand use for ADLs, IADLs, and leisure tasksOngoing  5)   Increase  strength to 25 lbs. to grasp for ADLs, IADLs and leisure taskOngoing  6)   Increase key pinch to 6 psis to increase independence with button and FM coordinationOngoing  7)   Patient to be IND wiht Orthotic use, wear and care precautions. Ongoing  8)   Decrease complaints of pain to  5 out of 10 at worst to increase functional hand use for ADL/work/leisure activities.Ongoing     Plan      Pt to be treated by Occupational Therapy 2 times per week for 12 weeks during the certification period from 8/14/2024 to 11/6/24 to achieve the established goals.      Treatment to include: Paraffin, Fluidotherapy, Manual therapy/joint mobilizations, Modalities for pain management, US 3 mhz, Therapeutic exercises/activities., Iontophoresis with  2.0 cc Dexamethasone, Strengthening, Orthotic Fabrication/Fit/Training, Edema Control, Scar Management, Wound Care, Electrical Modalities, Joint Protection, and Energy Conservation, as well as any other treatments deemed necessary based on the patient's needs or progress.        Updates/Grading for next session: Continue with current plan of care       Nancy Causey OT

## 2024-09-12 ENCOUNTER — TELEPHONE (OUTPATIENT)
Dept: ORTHOPEDICS | Facility: CLINIC | Age: 59
End: 2024-09-12
Payer: COMMERCIAL

## 2024-09-12 NOTE — TELEPHONE ENCOUNTER
Spoke c pt. Informed pt that Dr. Joaquin Lazo's office does not extend Long Term Disability benefits. Requested that patient reach out to her PCP for further advisement. Patient verbalized understanding and was thankful.

## 2024-09-13 ENCOUNTER — HOSPITAL ENCOUNTER (EMERGENCY)
Facility: HOSPITAL | Age: 59
Discharge: HOME | End: 2024-09-13
Attending: STUDENT IN AN ORGANIZED HEALTH CARE EDUCATION/TRAINING PROGRAM
Payer: COMMERCIAL

## 2024-09-13 VITALS
RESPIRATION RATE: 18 BRPM | WEIGHT: 269 LBS | BODY MASS INDEX: 47.66 KG/M2 | SYSTOLIC BLOOD PRESSURE: 140 MMHG | TEMPERATURE: 98 F | DIASTOLIC BLOOD PRESSURE: 88 MMHG | HEART RATE: 102 BPM | OXYGEN SATURATION: 98 % | HEIGHT: 63 IN

## 2024-09-13 DIAGNOSIS — R09.81 NASAL CONGESTION: Primary | ICD-10-CM

## 2024-09-13 DIAGNOSIS — R04.0 EPISTAXIS: ICD-10-CM

## 2024-09-13 DIAGNOSIS — R04.2 HEMOPTYSIS: ICD-10-CM

## 2024-09-13 DIAGNOSIS — J06.9 VIRAL URI: ICD-10-CM

## 2024-09-13 LAB
INFLUENZA A, MOLECULAR: NEGATIVE
INFLUENZA B, MOLECULAR: NEGATIVE
SARS-COV-2 RDRP RESP QL NAA+PROBE: NEGATIVE
SPECIMEN SOURCE: NORMAL

## 2024-09-13 PROCEDURE — 25000003 PHARM REV CODE 250: Mod: ER | Performed by: STUDENT IN AN ORGANIZED HEALTH CARE EDUCATION/TRAINING PROGRAM

## 2024-09-13 PROCEDURE — 87502 INFLUENZA DNA AMP PROBE: CPT | Mod: ER

## 2024-09-13 PROCEDURE — U0002 COVID-19 LAB TEST NON-CDC: HCPCS | Mod: ER

## 2024-09-13 PROCEDURE — 99283 EMERGENCY DEPT VISIT LOW MDM: CPT | Mod: 25,ER

## 2024-09-13 RX ORDER — CETIRIZINE HYDROCHLORIDE 10 MG/1
20 TABLET ORAL DAILY
Qty: 10 TABLET | Refills: 0 | Status: SHIPPED | OUTPATIENT
Start: 2024-09-13 | End: 2024-09-18

## 2024-09-13 RX ORDER — ACETAMINOPHEN 500 MG
1000 TABLET ORAL
Status: COMPLETED | OUTPATIENT
Start: 2024-09-13 | End: 2024-09-13

## 2024-09-13 RX ORDER — FLUTICASONE PROPIONATE 50 MCG
2 SPRAY, SUSPENSION (ML) NASAL 2 TIMES DAILY PRN
Qty: 15 G | Refills: 0 | Status: SHIPPED | OUTPATIENT
Start: 2024-09-13 | End: 2024-09-18

## 2024-09-13 RX ADMIN — ACETAMINOPHEN 1000 MG: 500 TABLET ORAL at 07:09

## 2024-09-13 NOTE — Clinical Note
"Barrera"Job Siddiqui was seen and treated in our emergency department on 9/13/2024.  She may return to work on 09/16/2024.       If you have any questions or concerns, please don't hesitate to call.      Lakhwinder Esposito MD"

## 2024-09-14 NOTE — DISCHARGE INSTRUCTIONS
Follow-up with your primary care doctor as needed by calling for appointment.  Use medications as prescribed and return to the emergency department if condition worsens in any way.

## 2024-09-14 NOTE — ED PROVIDER NOTES
"Encounter Date: 9/13/2024       History     Chief Complaint   Patient presents with    Headache    sneezing     Pt reports head pain x2 days and sneezing starting today. Reports when she sneezes "blood" comes from her mouth. Denies cough or recent upper respiratory symptoms. Denies head injury or trauma. Denies medication for head pain today.     HPI  59-year-old female with extensive medical history including previous CVA and CAD, no anticoagulants or antiplatelets other than baby aspirin, presents brought in by self through triage for 2 days of headache, associated with 1 day of recurrent sneezing and small amounts of epistaxis, which blood came out of her nose and on 2 occasions she coughed it up.  She denies chest pain difficulty breathing fever vomiting or any other systemic or infectious symptoms or other acute complaints.  Review of patient's allergies indicates:  No Known Allergies  Past Medical History:   Diagnosis Date    Depression     Diabetes mellitus     High cholesterol     Hypertension     Stroke     TIA (transient ischemic attack) 2017    left side weakness resolved after therapy     Past Surgical History:   Procedure Laterality Date    CARPAL TUNNEL RELEASE Bilateral     CHOLECYSTECTOMY      COLONOSCOPY N/A 10/18/2017    Procedure: COLONOSCOPY;  Surgeon: Donald Wright Jr., MD;  Location: [facility] ENDO;  Service: Endoscopy;  Laterality: N/A;    DECOMPRESSION, NERVE, ULNAR Left 7/24/2024    Procedure: LEFT ELBOW DECOMPRESSION, NERVE, ULNAR;  Surgeon: Brandie Monteiro MD;  Location: [facility] OR;  Service: Orthopedics;  Laterality: Left;  REGIONAL AFTER    EPIDURAL STEROID INJECTION INTO CERVICAL SPINE N/A 05/08/2024    Procedure: C7-T1 GRACIE (AIM LEFT);  Surgeon: Tiera Murphy DO;  Location: [facility] PAIN MANAGEMENT;  Service: Pain Management;  Laterality: N/A;  20 mins    KNEE SURGERY      TRIGGER FINGER RELEASE Left 7/24/2024    Procedure: LEFT THUMB AND RING  TRIGGER FINGER RELEASE;  Surgeon: " Brandie Monteiro MD;  Location: [facility];  Service: Orthopedics;  Laterality: Left;    TUBAL LIGATION       Family History   Problem Relation Name Age of Onset    Breast cancer Mother      Liver disease Maternal Grandmother       Social History     Tobacco Use    Smoking status: Never    Smokeless tobacco: Never   Substance Use Topics    Alcohol use: Yes     Comment: rare    Drug use: No   Medical surgical and social history reviewed  Review of Systems  Complete review of systems was conducted and was negative except as per HPI and as marked  Physical Exam     Initial Vitals [09/13/24 1858]   BP Pulse Resp Temp SpO2   (!) 140/88 102 18 98.1 °F (36.7 °C) 98 %      MAP       --         Physical Exam  Physical  General: Awake, alert, no acute distress  Head: Normocephalic, atraumatic.  Bilateral maxillary sinus tenderness  Neck:  No abnormal cervical lymphadenopathy Trachea midline, full range of motion, no meningismus  ENT:  EAC clear and TM shiny bilaterally, bilaterally inflamed nasal turbinates with clear rhinorrhea .  Trace pharyngeal erythema no uvular deviation trismus mass or other evidence of acute invasive pharyngeal process  Atraumatic, Airway Patent  Cardio: Regular Rate, Regular Rhythm, Capillary refill normal  Chest: Atraumatic, No respiratory distress no use of accessory muscles to breath, normal rate,   Abdomen: Soft, Nontender, no involuntary guarding, rigidity, or rebound.  Upper Ext: Atraumatic, Inspection normal, no swelling  Lower Ext: Atraumatic, Inspection normal, no swelling  Neuro: No gross cranial nerve abnormality, no lateralization, no gross sensory or motor deficits  ED Course   Procedures  Labs Reviewed   INFLUENZA A & B BY MOLECULAR       Result Value    Influenza A, Molecular Negative      Influenza B, Molecular Negative      Flu A & B Source Nasal swab     SARS-COV-2 RNA AMPLIFICATION, QUAL    SARS-CoV-2 RNA, Amplification, Qual Negative            Imaging Results               X-Ray Chest AP Portable (Final result)  Result time 09/13/24 20:38:35      Final result by Augustine Montague (KrishnaMD wilfred (09/13/24 20:38:35)                   Impression:      Clear lungs.  No infiltrates.      Electronically signed by: Augustine Montague MD  Date:    09/13/2024  Time:    20:38               Narrative:    EXAMINATION:  XR CHEST AP PORTABLE    CLINICAL HISTORY:  Hemoptysis,    COMPARISON:  Chest, 05/04/2022.    FINDINGS:  One view.  Mild cardiomegaly.  Clear lungs.                                       Medications   acetaminophen tablet 1,000 mg (1,000 mg Oral Given 9/13/24 1909)     Medical Decision Making  Amount and/or Complexity of Data Reviewed  Radiology: ordered.    Risk  OTC drugs.    59-year-old female presents for headache and nasal congestion, some now resolved episodes of epistaxis, no current bleeding.  She is hemodynamically stable in no acute distress, well-appearing and well-hydrated without any evidence of acute invasive intracranial or pharyngeal process such as PTA/RPA, or of meningitis.  It is all consistent with viral URI with viral sinusitis, duration is 2 days, does not meet criteria for antibiotics, no exam findings to suspect bacterial etiology.  Prescribed multimodal sinus therapy.  X-ray done as she had said that she spit up a small amounts of the blood although I suspect this is postnasal drip, the x-ray shows no pertinent findings.  No other EMC by [facility].                                  Clinical Impression:  Final diagnoses:  [R04.2] Hemoptysis - evaluation for, not found  [R09.81] Nasal congestion (Primary)  [R04.0] Epistaxis  [J06.9] Viral URI          ED Disposition Condition    Discharge Stable          ED Prescriptions       Medication Sig Dispense Start Date End Date Auth. Provider    cetirizine (ZYRTEC) 10 MG tablet Take 2 tablets (20 mg total) by mouth once daily. for 5 days 10 tablet 9/13/2024 9/18/2024 Lakhwinder Esposito MD    fluticasone propionate (FLONASE) 50  mcg/actuation nasal spray 2 sprays (100 mcg total) by Each Nostril route 2 (two) times daily as needed for Rhinitis. 15 g 9/13/2024 9/18/2024 Lakhwinder Esposito MD          Follow-up Information    None          Lakhwinder Esposito MD  09/13/24 0387

## 2024-09-17 ENCOUNTER — CLINICAL SUPPORT (OUTPATIENT)
Dept: REHABILITATION | Facility: HOSPITAL | Age: 59
End: 2024-09-17
Payer: COMMERCIAL

## 2024-09-17 DIAGNOSIS — M25.60 STIFFNESS IN JOINT: ICD-10-CM

## 2024-09-17 DIAGNOSIS — M62.81 MUSCLE WEAKNESS: Primary | ICD-10-CM

## 2024-09-17 DIAGNOSIS — M25.522 PAIN IN LEFT ELBOW: ICD-10-CM

## 2024-09-17 DIAGNOSIS — M79.642 PAIN IN LEFT HAND: ICD-10-CM

## 2024-09-17 PROCEDURE — 97140 MANUAL THERAPY 1/> REGIONS: CPT | Mod: PO

## 2024-09-17 PROCEDURE — 97018 PARAFFIN BATH THERAPY: CPT | Mod: PO

## 2024-09-17 PROCEDURE — 97530 THERAPEUTIC ACTIVITIES: CPT | Mod: PO

## 2024-09-17 NOTE — PROGRESS NOTES
Occupational Therapy Daily Treatment Note     Name: Barrera Siddiqui  [facility] Number: [id number]    Therapy Diagnosis:   Encounter Diagnoses   Name Primary?    Muscle weakness Yes    Stiffness in joint     Pain in left elbow     Pain in left hand      Physician: Brandie Monteiro, *    Visit Date: 9/17/2024    Physician Orders: OT eval/treat   Medical Diagnosis: G56.23 (ICD-10-CM) - Cubital tunnel syndrome of both upper extremities  Evaluation Date: 8/14/2024  Plan of Care Certification Date: 11/6/24  Authorization Period: 7/24/24 - 12/31/24  Surgery Date and Procedure: Procedure(s) (LRB):  LEFT ELBOW DECOMPRESSION, NERVE, ULNAR (Left)  LEFT THUMB AND RING  TRIGGER FINGER RELEASE (Left)   Tenosynovectomy left thumb and ring finger flexor tendon 7/24/24  Date of Return to MD: 9/6/24  Visit # / Visits authorized: 8 / 20 (plus initial evaluation)   FOTO Completion: 1/3    Time In:1400  Time Out: 1500  Total Billable Time: 60 minutes     Precautions:  Standard      Subjective     Pt reports: She has some pain in her elbow last night, but it is getting better  she was compliant with home exercise program given last session.   Response to previous treatment: soreness that did not last past one day    Functional change: None reported at this time.      Pain: 5/10 at the elbow; 2/10 at hand    Location: left arms   Objective       Mental status: alert, oriented x3     Observation:   Pt incision sites are clean and healing well        Sensation: Pt states she has pins and needles in her small finger      Range of Motion:   Left        Elbow Left  8/14/2024 Right  8/14/2024   Flexion 130 140   Extension 0 0         Left Hand Finger ROM INDEX   8/14/2024 LONG   8/14/2024 RING   8/14/2024 SMALL   8/14/2024   MP  60 60 60 64   PIP  90 95 95 100   DIP  75 75 75 85      Right Hand Finger ROM INDEX   8/14/2024 LONG   8/14/2024 RING   8/14/2024 SMALL   8/14/2024   MP  90 85 90 90   PIP  110 105 100 100   DIP  80 80 80 90        Left  Thumb range of motion  8/14/2024 Right Thumb range of motion  8/14/2024   MP 80 75   IP 85 75   Radial Abduction 70 70   Palmar Abduction 60 60   Opposition Middle of SF DPC          Strength: (ZULEYKA Dynamometer in psi.)        8/14/2024 8/14/2024     Left Right   Rung II 15 60         Pinch Strength (Measured in psi)       8/14/2024 8/14/2024     Left Right   Key Pinch 4  10   3pt Pinch 6 11   2pt Pinch 4 8         CMS Impairment/Limitation/Restriction for FOTO Hand/Wrist/Finger Survey     Therapist reviewed FOTO scores for Barrera Siddiqui on 8/14/2024.   FOTO documents entered into [employer] - see Media section.     Intake Score: 28%  Category: Self Care           Treatment       Barrera received the following direct contact modalities after being cleared for contraindications for 8 minutes:  - paraffin wax and moist hot pack to hand to increase extensibility   - moist hot pack to elbow to increase extensibility (simultaneously with paraffin application)      Manual therapy techniques: Myofacial release, Soft tissue Mobilization, and scar massage were applied to the: left hand and left elbow for 25 minutes, including:  - use of hand techniques, cupping, IASTM tools, and scar pump to increase blood flow/circulation, improve soft tissue pliability and decrease pain.    -scar massage to elbow with use of hand techniques only to decrease tissue adhesions and pain, and increase tissue extensibility   -intrinsic stretches to digits #2-5 5 reps X 5 sec hold       Barrera participated in dynamic functional therapeutic activities to improve functional performance for 27 minutes, including:  Bolded performed today.    -TGE's x 20   - thumb MP/IP flexion with blocking X 20 each   - ring finger PIP/DIP with blocking X 20 each   - thumb retropulsion X 20 reps   - finger lifts x 20   - finger spreads with tan RB x 20   - elbow 3 ways with 3# weight 2/15  -scar desensitization with mini vibrator to elbow and trigger finger incision  - pom  pom  with gripper on 1st rung  - isospheres x 3 minutes   - wrist maze x 3 minutes   - Wrist wheel 2 minutes in flex/ext & sup/pro   -red flex bar smiles/frowns/twists X 2/15 reps       Home Exercises and Education Provided     Education provided:     - Progress towards goals     Written Home Exercises Provided: Patient instructed to cont prior HEP.  Exercises were reviewed and Barrera was able to demonstrate them prior to the end of the session.  Barrera demonstrated good  understanding of the HEP provided.   .   See EMR under Patient Instructions for exercises provided prior visit.        Assessment     Pt would continue to benefit from skilled OT.   Pt continues to have sensitivity in her elbow.  Pt tolerated added therapeutic activities well without complaints of additional pain.  Pt is motivated. Will continue to progress as tolerated.     Barrera is progressing well towards her goals and there are no updates to goals at this time. Pt prognosis is Good.     Pt will continue to benefit from skilled outpatient occupational therapy to address the deficits listed in the problem list on initial evaluation provide pt/family education and to maximize pt's level of independence in the home and community environment.     Anticipated barriers to occupational therapy: None at this time     Pt's spiritual, cultural and educational needs considered and pt agreeable to plan of care and goals.      Goals:    LTG's (12 weeks):  1)   Increase ROM to 140 degrees in left elbow flexion to increase functional hand use for ADLs, IADLs, and leisure tasks Ongoing  2)   Increase ROM to 85 degrees in left hand MP joints to increase functional hand use for ADLs, IADLs, and leisure tasksOngoing  3)   Increase ROM so that opposition in left thumb is to the DPC  to increase functional hand use for ADLs, IADLs, and leisure tasksOngoing  4)   Increase  strength to 50 lbs. to grasp for ADLs, IADLs and leisure taskOngoing  5)   Increase key  pinch to 8 psis to increase independence with button and FM coordinationOngoing  6)   Decrease complaints of pain to  1 out of 10 at worst to increase functional hand use for ADL/work/leisure activities.Ongoing  7)   Patient to score at 55% or more on FOTO to demonstrate improved perception of functional left UE use.Ongoing  8)   Pt will return to near to prior level of function for ADLs and household management reporting I or Mod I with ADLs (dressing, feeding, grooming, toileting). Ongoing     STG's (6 weeks)  1)   Patient to be IND with HEP and modalities for pain/edema managment.Ongoing  2)   Increase ROM to 135 degrees in left elbow flexion to increase functional hand use for ADLs, IADLs, and leisure tasksOngoing  3)   Increase ROM to 75 degrees in left hand MP joints to increase functional hand use for ADLs, IADLs, and leisure tasksOngoing  4)   Increase ROM so that opposition in left thumb is to the base of her SF  to increase functional hand use for ADLs, IADLs, and leisure tasksOngoing  5)   Increase  strength to 25 lbs. to grasp for ADLs, IADLs and leisure taskOngoing  6)   Increase key pinch to 6 psis to increase independence with button and FM coordinationOngoing  7)   Patient to be IND wiht Orthotic use, wear and care precautions. Ongoing  8)   Decrease complaints of pain to  5 out of 10 at worst to increase functional hand use for ADL/work/leisure activities.Ongoing     Plan      Pt to be treated by Occupational Therapy 2 times per week for 12 weeks during the certification period from 8/14/2024 to 11/6/24 to achieve the established goals.      Treatment to include: Paraffin, Fluidotherapy, Manual therapy/joint mobilizations, Modalities for pain management, US 3 mhz, Therapeutic exercises/activities., Iontophoresis with 2.0 cc Dexamethasone, Strengthening, Orthotic Fabrication/Fit/Training, Edema Control, Scar Management, Wound Care, Electrical Modalities, Joint Protection, and Energy  Conservation, as well as any other treatments deemed necessary based on the patient's needs or progress.        Updates/Grading for next session: Continue with current plan of care       Nancy Causey, OT

## 2024-09-18 ENCOUNTER — CLINICAL SUPPORT (OUTPATIENT)
Dept: REHABILITATION | Facility: HOSPITAL | Age: 59
End: 2024-09-18
Payer: COMMERCIAL

## 2024-09-18 DIAGNOSIS — M79.642 PAIN IN LEFT HAND: ICD-10-CM

## 2024-09-18 DIAGNOSIS — M62.81 MUSCLE WEAKNESS: Primary | ICD-10-CM

## 2024-09-18 DIAGNOSIS — M25.522 PAIN IN LEFT ELBOW: ICD-10-CM

## 2024-09-18 DIAGNOSIS — M25.60 STIFFNESS IN JOINT: ICD-10-CM

## 2024-09-18 PROCEDURE — 97530 THERAPEUTIC ACTIVITIES: CPT | Mod: PO

## 2024-09-18 PROCEDURE — 97018 PARAFFIN BATH THERAPY: CPT | Mod: PO

## 2024-09-18 PROCEDURE — 97140 MANUAL THERAPY 1/> REGIONS: CPT | Mod: PO

## 2024-09-18 NOTE — PROGRESS NOTES
Occupational Therapy Daily Treatment Note     Name: Barrera Siddiqui  [facility] Number: [id number]    Therapy Diagnosis:   Encounter Diagnoses   Name Primary?    Muscle weakness Yes    Stiffness in joint     Pain in left elbow     Pain in left hand      Physician: Brandie Monteiro, *    Visit Date: 9/18/2024    Physician Orders: OT eval/treat   Medical Diagnosis: G56.23 (ICD-10-CM) - Cubital tunnel syndrome of both upper extremities  Evaluation Date: 8/14/2024  Plan of Care Certification Date: 11/6/24  Authorization Period: 7/24/24 - 12/31/24  Surgery Date and Procedure: Procedure(s) (LRB):  LEFT ELBOW DECOMPRESSION, NERVE, ULNAR (Left)  LEFT THUMB AND RING  TRIGGER FINGER RELEASE (Left)   Tenosynovectomy left thumb and ring finger flexor tendon 7/24/24  Date of Return to MD: 9/6/24  Visit # / Visits authorized: 8 / 20 (plus initial evaluation)   FOTO Completion: 1/3    Time In:1400  Time Out: 1500  Total Billable Time: 60 minutes     Precautions:  Standard      Subjective     Pt reports: She has some pain in her elbow last night, but it is getting better  she was compliant with home exercise program given last session.   Response to previous treatment: soreness that did not last past one day    Functional change: None reported at this time.      Pain: 5/10 at the elbow; 2/10 at hand    Location: left arms   Objective       Mental status: alert, oriented x3     Observation:   Pt incision sites are clean and healing well        Sensation: Pt states she has pins and needles in her small finger      Range of Motion:   Left        Elbow Left  8/14/2024 Right  8/14/2024   Flexion 130 140   Extension 0 0         Left Hand Finger ROM INDEX   8/14/2024 LONG   8/14/2024 RING   8/14/2024 SMALL   8/14/2024   MP  60 60 60 64   PIP  90 95 95 100   DIP  75 75 75 85      Right Hand Finger ROM INDEX   8/14/2024 LONG   8/14/2024 RING   8/14/2024 SMALL   8/14/2024   MP  90 85 90 90   PIP  110 105 100 100   DIP  80 80 80 90        Left  Thumb range of motion  8/14/2024 Right Thumb range of motion  8/14/2024   MP 80 75   IP 85 75   Radial Abduction 70 70   Palmar Abduction 60 60   Opposition Middle of SF DPC          Strength: (ZULEYKA Dynamometer in psi.)        8/14/2024 8/14/2024     Left Right   Rung II 15 60         Pinch Strength (Measured in psi)       8/14/2024 8/14/2024     Left Right   Key Pinch 4  10   3pt Pinch 6 11   2pt Pinch 4 8         CMS Impairment/Limitation/Restriction for FOTO Hand/Wrist/Finger Survey     Therapist reviewed FOTO scores for Barrera Siddiqui on 8/14/2024.   FOTO documents entered into [employer] - see Media section.     Intake Score: 28%  Category: Self Care           Treatment       Barrera received the following direct contact modalities after being cleared for contraindications for 8 minutes:  - paraffin wax and moist hot pack to hand to increase extensibility   - moist hot pack to elbow to increase extensibility (simultaneously with paraffin application)      Manual therapy techniques: Myofacial release, Soft tissue Mobilization, and scar massage were applied to the: left hand and left elbow for 25 minutes, including:  - use of hand techniques, cupping, IASTM tools, and scar pump to increase blood flow/circulation, improve soft tissue pliability and decrease pain.    -scar massage to elbow with use of hand techniques only to decrease tissue adhesions and pain, and increase tissue extensibility   -intrinsic stretches to digits #2-5 5 reps X 5 sec hold       Barrera participated in dynamic functional therapeutic activities to improve functional performance for 27 minutes, including:  Bolded performed today.    -TGE's x 20   - thumb MP/IP flexion with blocking X 20 each   - ring finger PIP/DIP with blocking X 20 each   - thumb retropulsion X 20 reps   - finger lifts x 20   - finger spreads with tan RB x 20   - elbow 3 ways with 3# weight 2/15  -scar desensitization with mini vibrator to elbow and trigger finger incision  - pom  pom  with gripper on 1st rung  - isospheres x 3 minutes   - wrist maze x 3 minutes   - Wrist wheel 2 minutes in flex/ext & sup/pro   -red flex bar smiles/frowns/twists X 2/15 reps       Home Exercises and Education Provided     Education provided:     - Progress towards goals     Written Home Exercises Provided: Patient instructed to cont prior HEP.  Exercises were reviewed and Barrera was able to demonstrate them prior to the end of the session.  Barrera demonstrated good  understanding of the HEP provided.   .   See EMR under Patient Instructions for exercises provided prior visit.        Assessment     Pt would continue to benefit from skilled OT.   Pt continues to have sensitivity in her elbow.  Pt tolerated added therapeutic activities well without complaints of additional pain.  Pt is motivated. Will continue to progress as tolerated.     Barrera is progressing well towards her goals and there are no updates to goals at this time. Pt prognosis is Good.     Pt will continue to benefit from skilled outpatient occupational therapy to address the deficits listed in the problem list on initial evaluation provide pt/family education and to maximize pt's level of independence in the home and community environment.     Anticipated barriers to occupational therapy: None at this time     Pt's spiritual, cultural and educational needs considered and pt agreeable to plan of care and goals.      Goals:    LTG's (12 weeks):  1)   Increase ROM to 140 degrees in left elbow flexion to increase functional hand use for ADLs, IADLs, and leisure tasks Ongoing  2)   Increase ROM to 85 degrees in left hand MP joints to increase functional hand use for ADLs, IADLs, and leisure tasksOngoing  3)   Increase ROM so that opposition in left thumb is to the DPC  to increase functional hand use for ADLs, IADLs, and leisure tasksOngoing  4)   Increase  strength to 50 lbs. to grasp for ADLs, IADLs and leisure taskOngoing  5)   Increase key  pinch to 8 psis to increase independence with button and FM coordinationOngoing  6)   Decrease complaints of pain to  1 out of 10 at worst to increase functional hand use for ADL/work/leisure activities.Ongoing  7)   Patient to score at 55% or more on FOTO to demonstrate improved perception of functional left UE use.Ongoing  8)   Pt will return to near to prior level of function for ADLs and household management reporting I or Mod I with ADLs (dressing, feeding, grooming, toileting). Ongoing     STG's (6 weeks)  1)   Patient to be IND with HEP and modalities for pain/edema managment.Ongoing  2)   Increase ROM to 135 degrees in left elbow flexion to increase functional hand use for ADLs, IADLs, and leisure tasksOngoing  3)   Increase ROM to 75 degrees in left hand MP joints to increase functional hand use for ADLs, IADLs, and leisure tasksOngoing  4)   Increase ROM so that opposition in left thumb is to the base of her SF  to increase functional hand use for ADLs, IADLs, and leisure tasksOngoing  5)   Increase  strength to 25 lbs. to grasp for ADLs, IADLs and leisure taskOngoing  6)   Increase key pinch to 6 psis to increase independence with button and FM coordinationOngoing  7)   Patient to be IND wiht Orthotic use, wear and care precautions. Ongoing  8)   Decrease complaints of pain to  5 out of 10 at worst to increase functional hand use for ADL/work/leisure activities.Ongoing     Plan      Pt to be treated by Occupational Therapy 2 times per week for 12 weeks during the certification period from 8/14/2024 to 11/6/24 to achieve the established goals.      Treatment to include: Paraffin, Fluidotherapy, Manual therapy/joint mobilizations, Modalities for pain management, US 3 mhz, Therapeutic exercises/activities., Iontophoresis with 2.0 cc Dexamethasone, Strengthening, Orthotic Fabrication/Fit/Training, Edema Control, Scar Management, Wound Care, Electrical Modalities, Joint Protection, and Energy  Conservation, as well as any other treatments deemed necessary based on the patient's needs or progress.        Updates/Grading for next session: Continue with current plan of care       Nancy Causey, OT

## 2024-09-19 ENCOUNTER — DOCUMENTATION ONLY (OUTPATIENT)
Dept: ORTHOPEDICS | Facility: CLINIC | Age: 59
End: 2024-09-19
Payer: COMMERCIAL

## 2024-09-19 ENCOUNTER — OFFICE VISIT (OUTPATIENT)
Dept: PAIN MEDICINE | Facility: CLINIC | Age: 59
End: 2024-09-19
Payer: COMMERCIAL

## 2024-09-19 ENCOUNTER — TELEPHONE (OUTPATIENT)
Dept: PAIN MEDICINE | Facility: CLINIC | Age: 59
End: 2024-09-19
Payer: COMMERCIAL

## 2024-09-19 VITALS
HEIGHT: 63 IN | DIASTOLIC BLOOD PRESSURE: 82 MMHG | HEART RATE: 102 BPM | SYSTOLIC BLOOD PRESSURE: 148 MMHG | BODY MASS INDEX: 47.65 KG/M2

## 2024-09-19 DIAGNOSIS — G44.86 CERVICOGENIC HEADACHE: ICD-10-CM

## 2024-09-19 DIAGNOSIS — M47.812 CERVICAL SPONDYLOSIS: Primary | ICD-10-CM

## 2024-09-19 DIAGNOSIS — M47.812 ARTHRITIS OF FACET JOINT OF CERVICAL SPINE: ICD-10-CM

## 2024-09-19 PROCEDURE — 4010F ACE/ARB THERAPY RXD/TAKEN: CPT | Mod: CPTII,S$GLB,, | Performed by: NURSE PRACTITIONER

## 2024-09-19 PROCEDURE — 1159F MED LIST DOCD IN RCRD: CPT | Mod: CPTII,S$GLB,, | Performed by: NURSE PRACTITIONER

## 2024-09-19 PROCEDURE — 3008F BODY MASS INDEX DOCD: CPT | Mod: CPTII,S$GLB,, | Performed by: NURSE PRACTITIONER

## 2024-09-19 PROCEDURE — 99214 OFFICE O/P EST MOD 30 MIN: CPT | Mod: S$GLB,,, | Performed by: NURSE PRACTITIONER

## 2024-09-19 PROCEDURE — 3077F SYST BP >= 140 MM HG: CPT | Mod: CPTII,S$GLB,, | Performed by: NURSE PRACTITIONER

## 2024-09-19 PROCEDURE — 99999 PR PBB SHADOW E&M-EST. PATIENT-LVL IV: CPT | Mod: PBBFAC,,, | Performed by: NURSE PRACTITIONER

## 2024-09-19 PROCEDURE — 3066F NEPHROPATHY DOC TX: CPT | Mod: CPTII,S$GLB,, | Performed by: NURSE PRACTITIONER

## 2024-09-19 PROCEDURE — 3052F HG A1C>EQUAL 8.0%<EQUAL 9.0%: CPT | Mod: CPTII,S$GLB,, | Performed by: NURSE PRACTITIONER

## 2024-09-19 PROCEDURE — 3079F DIAST BP 80-89 MM HG: CPT | Mod: CPTII,S$GLB,, | Performed by: NURSE PRACTITIONER

## 2024-09-19 PROCEDURE — 3060F POS MICROALBUMINURIA REV: CPT | Mod: CPTII,S$GLB,, | Performed by: NURSE PRACTITIONER

## 2024-09-19 PROCEDURE — 1160F RVW MEDS BY RX/DR IN RCRD: CPT | Mod: CPTII,S$GLB,, | Performed by: NURSE PRACTITIONER

## 2024-09-19 NOTE — TELEPHONE ENCOUNTER
----- Message from HARJINDER Trejo sent at 2024 11:40 AM CDT -----  Regarding: Order for OKSANA KELLY    Patient Name: OKSANA KELLY([id number])  Sex: Female  : 1965      PCP: GISSELLE GARCIA    Center: [facility]     Types of orders made on 2024: Procedure Request    Order Date:2024  Ordering User:ROMMEL ROMAN [[id number]]  Encounter Provider:Rommel Roman FNP [[id number]]  Authorizing Provider: Rommel Roman FNP   [[id number]]  Supervising Provider:NELLI WOOTEN [[id number]]  Type of Supervision:Collaborating Physician  Department:[facility] PAIN MANAGEMENT[[id number]]    Common Order Information  Procedure -> Medial Branch Block (Specify level and laterality) Cmt: Left TON,             C2/3, C4/5    Pre-op Diagnosis -> Spondylosis of cervical spine       -> Facet arthropathy, cervical     Order Specific Information  Order: Procedure Reque  st Order for Pain Management [Custom: [id number]]  Order #:          [id number]: 1 FUTURE    Priority: Routine  Class: Clinic Performed    Future Order Information      Expires on:2025            Expected by:2024                   Associated Diagnoses      M47.812 Cervical spondylosis      G44.86 Cervicogenic headache      M47.812 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 -> No         Facility Name: -> [facility]         Follow-up: -> 2 weeks           Priority: Routine  Class: Clinic Performed    Future Order Information      Expires on:2025            Expected by:2024                   Associated Diagnoses      M47.812 Cervical spondylosis      G44.86 Cervicogenic headache      M47.812 Arthritis of facet joint of cervical spine        Procedure -> Medial Branch Block (Specify level and laterality) Cmt: Left                 TON, C2/3, C4/5        Physician -> Gelter         Is patient on anti-coagulants? -> No         Pre-op Diagnosis -> Spondylosis of cervical spine           -> Facet  arthropathy, cervical         Facility Name: -> [place]         Follow-up: -> 2 weeks

## 2024-09-19 NOTE — PROGRESS NOTES
[facility] - Established  Patient Evaluation    Referred by: No ref. provider found   Reason for referral: Cervical spondylosis     CC:   Chief Complaint   Patient presents with    Neck Pain     Left radiating into shoulder     Headache         9/19/2024    11:10 AM 4/15/2024     1:32 PM   Last 3 PDI Scores   Pain Disability Index (PDI) 48 70     Interval history 09/19/2024:  59 year female that returns to clinic it has been 5 months since she has been seen.  She is complaining of returning left-sided neck pain that radiates into her left shoulder.  Her MRI of the cervical spine did show degenerative changes of the spine most pronounced at C4-5 also complaining of left sided headaches which could involve the 3rd occipital nerve, C2-C3, C3 and C4.  She was provided a cervical GRACIE targeting C7-T1 on 05/08/2024 she does report relief with this injection she also reports having recently had trigger finger surgery as well as elbow surgery which she feels as though where the cause of her left arm radiculopathy.  Her pain primarily begins in the left side of her neck the base of her head radiating into her shoulder with left unilateral headaches.  She denies any recent falls denies profound weakness denies bowel or bladder dysfunction at this time denies dropping things denies inability to write with a pen.    Subjective 04/15/2024:   Barrera Siddiqui is a 59 y.o. female who presents complaining of neck pain with Left sided radicular symptoms. Pain started about 8 months ago after a fall. MRI Cervical showed multilevel degenerative changes of the cervical spine are most pronounced at C4-C5 with minor spinal canal stenosis and mild left-sided neural foraminal stenosis, as well as mild left-sided C2-C3 and C3-C4 neural foraminal stenosis. She was seen by Neurosurgery and a Cervical epidural has been ordered. She was started on Lyrica last week.      Initial Pain Assessment:  Pain Assessment  Pain Score:    8     Patient denies night fever/night sweats, urinary incontinence, bowel incontinence, significant weight loss, and significant motor weakness.    Previous Interventions:  - 05/08/2024 Cervical Interlaminar Epidural Steroid Injection C7/T1     Previous Therapies:  PT/OT: no   Chiropractor:   HEP:   Relevant Surgery: no   Previous Medications:   - NSAIDS:   - Muscle Relaxants: Robaxin    - TCAs:   - SNRIs:   - Topicals:   - Anticonvulsants: Lyrica    - Opioids:   - Adjuvants:     Current Pain Medications:  Lyrica 100 mg BID  Robaxin 500 mg QID prn     Review of Systems:  ROS    GENERAL:  No weight loss, malaise or fevers.  HEENT:   No recent changes in vision or hearing  NECK:  No difficulty with swallowing. No stridor.   RESPIRATORY:  Negative for cough, wheezing or shortness of breath, patient denies any recent URI.  CARDIOVASCULAR:  Negative for chest pain, leg swelling or palpitations.  GI:  Negative for abdominal discomfort, blood in stools or black stools or change in bowel habits.  MUSCULOSKELETAL:  See HPI.  SKIN:  Negative for lesions, rash, and itching.  PSYCH:  No mood disorder or recent psychosocial stressors.    HEMATOLOGY/LYMPHOLOGY:  Negative for prolonged bleeding, bruising easily or swollen nodes.  Patient is not currently taking any anti-coagulants  NEURO:   No history of headaches, syncope, paralysis, seizures or tremors.  All other reviewed and negative other than HPI.    History:  Current medications, allergies, medical history, surgical history,   family history, and social history were reviewed in the chart as marked.    Full Medication List:    Current Outpatient Medications:     amLODIPine (NORVASC) 10 MG tablet, Take 1 tablet (10 mg total) by mouth once daily., Disp: 90 tablet, Rfl: 3    aspirin (ECOTRIN) 81 MG EC tablet, Take 1 tablet (81 mg total) by mouth once daily., Disp: 90 tablet, Rfl: 3    atorvastatin (LIPITOR) 20 MG tablet, Take 1 tablet (20 mg total) by mouth once daily., Disp:  "90 tablet, Rfl: 3    furosemide (LASIX) 40 MG tablet, Take 1 tablet (40 mg total) by mouth 2 (two) times daily., Disp: 60 tablet, Rfl: 11    glipiZIDE (GLUCOTROL) 10 MG tablet, Take 10 mg by mouth 2 (two) times daily., Disp: , Rfl:     losartan (COZAAR) 100 MG tablet, Take 1 tablet (100 mg total) by mouth once daily., Disp: 90 tablet, Rfl: 3    metFORMIN (GLUCOPHAGE-XR) 500 MG ER 24hr tablet, Take 500 mg by mouth 2 (two) times daily., Disp: , Rfl:     multivitamin (THERAGRAN) per tablet, Take 1 tablet by mouth once daily., Disp: , Rfl:     OZEMPIC 0.25 mg or 0.5 mg (2 mg/3 mL) pen injector, Inject into the skin., Disp: , Rfl:     pregabalin (LYRICA) 100 MG capsule, Take 1 capsule (100 mg total) by mouth 2 (two) times daily., Disp: 60 capsule, Rfl: 5    traMADoL (ULTRAM) 50 mg tablet, Take 1 tablet (50 mg total) by mouth every 6 (six) hours as needed for Pain., Disp: 10 tablet, Rfl: 0    cetirizine (ZYRTEC) 10 MG tablet, Take 2 tablets (20 mg total) by mouth once daily. for 5 days, Disp: 10 tablet, Rfl: 0     Allergies:  Patient has no known allergies.     Medical History:   has a past medical history of Depression, Diabetes mellitus, High cholesterol, Hypertension, Stroke, and TIA (transient ischemic attack) (2017).    Surgical History:   has a past surgical history that includes Tubal ligation; Knee surgery; Cholecystectomy; Colonoscopy (N/A, 10/18/2017); Epidural steroid injection into cervical spine (N/A, 05/08/2024); Carpal tunnel release (Bilateral); decompression, nerve, ulnar (Left, 7/24/2024); and Trigger finger release (Left, 7/24/2024).    Family History:  family history includes Breast cancer in her mother; Liver disease in her maternal grandmother.    Social History:   reports that she has never smoked. She has never used smokeless tobacco. She reports current alcohol use. She reports that she does not use drugs.    Physical Exam:  Vitals:    09/19/24 1109   BP: (!) 148/82   Pulse: 102   Height: 5' 3" " (1.6 m)   PainSc:   8   PainLoc: Neck       GENERAL: Well appearing, in no acute distress, alert and oriented x3.  PSYCH:  Mood and affect appropriate.  SKIN: Skin color, texture, turgor normal, no rashes or lesions.  HEAD/FACE:  Normocephalic, atraumatic. Cranial nerves grossly intact.  NECK: Decreased ROM 2/2 pain. +pain to palpation over the cervical paraspinous muscles and b/l trapezius mm. Spurling positive on the left.  CV: RRR with palpation of the radial artery.  PULM: No evidence of respiratory difficulty, symmetric chest rise.  GI:  Soft and non-distended.  MSK: No obvious deformities, edema, or skin discoloration.  No atrophy or tone abnormalities are noted.   NEURO: Bilateral upper and lower extremity coordination and strength is symmetric. Subjective numbness over the left thumb and 4th digit.  Fitzpatrick Negative.   MENTAL STATUS: A x O x 3, good concentration, speech is fluent and goal directed  MOTOR: 5/5 in all muscle groups  GAIT: Normal. Ambulates unassisted.    Imaging:  XR CERVICAL SPINE AP LAT WITH FLEX EXTEN     CLINICAL HISTORY:  Other cervical disc degeneration, unspecified cervical region     TECHNIQUE:  Three views of the cervical spine plus flexion and extension views were performed.     COMPARISON:  CTA head and neck and MR cervical spine 2023     FINDINGS:  Mild DJD anterior C1-C2, airway neck soft tissues normal.  Mild degenerative disc spondylosis C3-visualize C5, C 6 T1 obscured on lateral view by overlying shoulder structures without significant abnormality noted on CTA neck 2023. straightening usual lordotic curve, neck muscle spasm.     Impression:     No fracture or subluxation.  Additional findings above.      Electronically signed by:Andrew Abdullahi MD  Date:                                            04/11/2024      MRI CERVICAL SPINE WITHOUT CONTRAST     CLINICAL HISTORY:  Spinal stenosis, cervical; Spinal stenosis, cervical region     TECHNIQUE:  Multiplanar, multisequence MR  images of the cervical spine were performed without the administration of contrast.     COMPARISON:  None.     FINDINGS:  Alignment: Straightening of the normal cervical lordosis.     Vertebrae: Cervical vertebral body heights are maintained.  No abnormal osseous edema.  No evidence of acute fracture.  Marrow signal is within normal limits.     Discs: Disc heights appear maintained.     Cord: No cord signal abnormality.     Skull base and craniocervical junction: Within normal limits.     Degenerative findings:     C2-C3: Asymmetric left disc osteophyte complex.  No ventral cord contact or spinal canal stenosis.  Mild left-sided neural foraminal stenosis.     C3-C4: Mild broad-based posterior disc osteophyte complex contacts and slightly flattens the ventral cord without significant spinal canal stenosis.  Left greater than right uncovertebral joint spurring contributing to mild left-sided neural foraminal stenosis.     C4-C5: Tiny central disc osteophyte complex contacts and deforms the ventral cord contributing to minor spinal canal stenosis.  Left-sided uncovertebral joint spurring contributes to mild left-sided neural foraminal stenosis.     C5-C6: No significant posterior disc osteophyte complex, spinal canal stenosis or neural foraminal stenosis.     C6-C7: No significant posterior disc osteophyte complex, spinal canal stenosis or neural foraminal stenosis.     C7-T1: No significant posterior disc osteophyte complex, spinal canal stenosis or neural foraminal stenosis.     T1-T2: No significant posterior disc osteophyte complex or spinal canal stenosis.  Right greater than left facet arthropathy with mild right-sided neural foraminal stenosis.     Paraspinal muscles & soft tissues: Within normal limits.     Impression:     Multilevel degenerative changes of the cervical spine are most pronounced at C4-C5 with minor spinal canal stenosis and mild left-sided neural foraminal stenosis.     Mild left-sided C2-C3  and C3-C4 neural foraminal stenosis.        Electronically signed by:Arcenio Clark  Date:                                            12/11/2023    Labs:  BMP  Lab Results   Component Value Date     08/23/2024    K 3.7 08/23/2024    CL 99 08/23/2024    CO2 28 08/23/2024    BUN 10 08/23/2024    CREATININE 0.67 08/23/2024    CALCIUM 10.3 08/23/2024    ANIONGAP 18 (H) 08/23/2024    EGFRNORACEVR >60.0 08/23/2024     Lab Results   Component Value Date    ALT 49 (H) 08/23/2024    AST 81 (H) 08/23/2024    ALKPHOS 124 08/23/2024    BILITOT 0.5 08/23/2024     Lab Results   Component Value Date    WBC 11.11 06/27/2024    HGB 11.5 (L) 06/27/2024    HCT 36.1 (L) 06/27/2024    MCV 84 06/27/2024     06/27/2024       Assessment:  Problem List Items Addressed This Visit          Neuro    Headache    Relevant Orders    Procedure Request Order for Pain Management     Other Visit Diagnoses       Cervical spondylosis    -  Primary    Relevant Orders    Procedure Request Order for Pain Management    Arthritis of facet joint of cervical spine        Relevant Orders    Procedure Request Order for Pain Management          04/15/2024: Barrera Siddiqui is a 59 y.o. female who  has a past medical history of Depression, Diabetes mellitus, High cholesterol, Hypertension, Stroke, and TIA (transient ischemic attack) (2017).  By history and examination this patient has chronic neck pain with with left radiculopathy.  The underlying cause is facet arthritis, degenerative disc disease, foraminal stenosis, and central canal stenosis.  Pathology is confirmed by imaging.  MRI Cervical showed multilevel degenerative changes of the cervical spine are most pronounced at C4-C5 with minor spinal canal stenosis and mild left-sided neural foraminal stenosis, as well as mild left-sided C2-C3 and C3-C4 neural foraminal stenosis.  We discussed the underlying diagnoses and multiple treatment options including non-opioid medications, interventional  procedures, physical therapy, and home exercise.  Cervical GRACIE has been ordered by Neurosurgery.  The risks and benefits of each treatment option were discussed and all questions were answered.      09/19[birth year][id number]-howh-old pleasant female that presents with chronic left-sided neck pain that radiates into her left shoulder.  MRI is significant for multilevel degenerative changes most pronounced at C4-5 where she has minor spinal canal stenosis and mild left neural foraminal narrowing as well as mild left-sided C2-3 and C3-4 foraminal narrowing.  Based on history and exam the patient left-sided neck pain is likely related to cervical facet arthritis and spurring in the above-mentioned levels ultimately affecting the 3rd occipital nerve causing unilateral left-sided headaches.  Today I recommended a cervical MBB +TON left only x2 and RFA if appropriate.  I have explained the procedure in detail with the patient she verbalized understanding and would like to move forward.    Treatment Plan:   Procedures: s/f  a Left  Cervical Diagnostic MBB targeting C3,4,5 x2 and RFA if appropriate.   PT/OT/HEP: I have stressed the importance of physical activity and a home exercise plan to help with pain and improve health. Referral placed to PT.   Medications:    - Lyrica per Neurosurgery   -  Reviewed and consistent with medication use as prescribed.  Imaging: MRI reviewed and discussed with the patient  Follow Up:  1-2 days after the procedure to report relief KATIE Welch  Interventional Pain Management    Disclaimer: This note was partly generated using dictation software which may occasionally result in transcription errors.

## 2024-09-23 ENCOUNTER — TELEPHONE (OUTPATIENT)
Dept: ORTHOPEDICS | Facility: CLINIC | Age: 59
End: 2024-09-23
Payer: COMMERCIAL

## 2024-09-23 NOTE — TELEPHONE ENCOUNTER
Spoke c pt seeking clarification of LTD vs STD. Pt stated that she is seeking Long term disability. Pt was advised that our office only handles short term and that request would have to be directed to her PCP. Patient verbalized understanding and was thankful.

## 2024-09-24 ENCOUNTER — CLINICAL SUPPORT (OUTPATIENT)
Dept: REHABILITATION | Facility: HOSPITAL | Age: 59
End: 2024-09-24
Payer: COMMERCIAL

## 2024-09-24 DIAGNOSIS — M25.522 PAIN IN LEFT ELBOW: ICD-10-CM

## 2024-09-24 DIAGNOSIS — M79.642 PAIN IN LEFT HAND: ICD-10-CM

## 2024-09-24 DIAGNOSIS — M25.60 STIFFNESS IN JOINT: ICD-10-CM

## 2024-09-24 DIAGNOSIS — M62.81 MUSCLE WEAKNESS: Primary | ICD-10-CM

## 2024-09-24 PROCEDURE — 97018 PARAFFIN BATH THERAPY: CPT | Mod: PO

## 2024-09-24 PROCEDURE — 97140 MANUAL THERAPY 1/> REGIONS: CPT | Mod: PO

## 2024-09-24 PROCEDURE — 97110 THERAPEUTIC EXERCISES: CPT | Mod: PO

## 2024-09-24 NOTE — PROGRESS NOTES
"  Occupational Therapy Daily Treatment Note     Name: Barrera Siddiqui  [facility] Number: [id number]    Therapy Diagnosis:   Encounter Diagnoses   Name Primary?    Muscle weakness Yes    Stiffness in joint     Pain in left elbow     Pain in left hand        Physician: Brandie Monteiro, *    Visit Date: 9/24/2024    Physician Orders: OT eval/treat   Medical Diagnosis: G56.23 (ICD-10-CM) - Cubital tunnel syndrome of both upper extremities  Evaluation Date: 8/14/2024  Plan of Care Certification Date: 11/6/24  Authorization Period: 7/24/24 - 12/31/24   Surgery Date and Procedure: Procedure(s) (LRB):  LEFT ELBOW DECOMPRESSION, NERVE, ULNAR (Left)  LEFT THUMB AND RING  TRIGGER FINGER RELEASE (Left)   Tenosynovectomy left thumb and ring finger flexor tendon 7/24/24  Date of Return to MD: none noted in chart at this time   Visit # / Visits authorized: 10 / 20 (plus initial evaluation)   FOTO Completion: 1/3     Time In:1425  Time Out: 1525  Total Billable Time: 60 minutes (1P, 1MT, 2TE)     Precautions:  Standard      Subjective     Pt reports: She continues to have some elbow pain at night, but it is getting better.  She had increased soreness in elbow following last session which lasted for 3 days. She feels the ring finger scar gets "tight."      she was compliant with home exercise program given last session.   Response to previous treatment: soreness for 3 days in elbow following last session    Functional change: None reported at this time.      Pain: 5/10 at the elbow; 2/10 at hand    Location: left arms   Objective       Mental status: alert, oriented x3     Observation:   Pt incision sites are clean and healing well        Sensation: Pt states she has pins and needles in her small finger      Range of Motion:   Left        Elbow Left  8/14/2024 Right  8/14/2024   Flexion 130 140   Extension 0 0         Left Hand Finger ROM INDEX   8/14/2024 LONG   8/14/2024 RING   8/14/2024 SMALL   8/14/2024   MP  60 60 60 64   PIP  90 95 " 95 100   DIP  75 75 75 85      Right Hand Finger ROM INDEX   8/14/2024 LONG   8/14/2024 RING   8/14/2024 SMALL   8/14/2024   MP  90 85 90 90   PIP  110 105 100 100   DIP  80 80 80 90        Left Thumb range of motion  8/14/2024 Right Thumb range of motion  8/14/2024   MP 80 75   IP 85 75   Radial Abduction 70 70   Palmar Abduction 60 60   Opposition Middle of SF DPC          Strength: (ZULEYKA Dynamometer in psi.)        8/14/2024 8/14/2024     Left Right   Rung II 15 60         Pinch Strength (Measured in psi)       8/14/2024 8/14/2024     Left Right   Key Pinch 4  10   3pt Pinch 6 11   2pt Pinch 4 8         CMS Impairment/Limitation/Restriction for FOTO Hand/Wrist/Finger Survey     Therapist reviewed FOTO scores for Barrera Siddiqui on 8/14/2024.   FOTO documents entered into [employer] - see Media section.     Intake Score: 28%  Category: Self Care           Treatment       Barrera received the following direct contact modalities after being cleared for contraindications for 8 minutes:  - paraffin wax and moist hot pack to hand to increase extensibility   - moist hot pack to elbow to increase extensibility (simultaneously with paraffin application)      Manual therapy techniques: Myofacial release, Soft tissue Mobilization, and scar massage were applied to the: left hand and left elbow for 23 minutes, including:  - use of hand techniques, cupping, IASTM tools, and scar pump to increase blood flow/circulation, improve soft tissue pliability and decrease pain.    -scar massage to elbow with use of hand techniques only to decrease tissue adhesions and pain, and increase tissue extensibility   -intrinsic stretches to digits #2-5 5 reps X 5 sec hold       Therapeutic exercises to develop strength, endurance, ROM, and flexibility for 29 minutes, including:   Bolded performed today.    -TGE's x 20   - thumb MP/IP flexion with blocking X 20 each   - ring finger PIP/DIP with blocking X 20 each   - thumb retropulsion X 10 reps   -  finger lifts, isolated and composite x 10   - finger spreads with (2) tan RB x 20   - elbow 3 ways with 2# weight 2/15   - wrist 3 ways with 1# weight 2/15   -scar desensitization with mini vibrator to elbow and trigger finger incision  - pom pom  with gripper on 1st rung  - isospheres x 3 minutes   - wrist maze x 3 minutes   - Wrist wheel 2 minutes in flex/ext & sup/pro   -red flex bar smiles/frowns/twists X 2/15 reps       Home Exercises and Education Provided     Education provided:     - Progress towards goals     Written Home Exercises Provided: Patient instructed to cont prior HEP.  Exercises were reviewed and Barrera was able to demonstrate them prior to the end of the session.  Barrera demonstrated good  understanding of the HEP provided.   .   See EMR under Patient Instructions for exercises provided prior visit.        Assessment     Pt would continue to benefit from skilled OT.   Pt continues to have sensitivity in her elbow.  Reduced elbow 3 ways from 3# to 2# due to patient report of soreness X 3 days following weight progression last session.  Patient tolerated all therapeutic exercises without significant pain or difficulty.  Pt is motivated. Will continue to progress as tolerated.     Barrera is progressing well towards her goals and there are no updates to goals at this time. Pt prognosis is Good.     Pt will continue to benefit from skilled outpatient occupational therapy to address the deficits listed in the problem list on initial evaluation provide pt/family education and to maximize pt's level of independence in the home and community environment.     Anticipated barriers to occupational therapy: None at this time     Pt's spiritual, cultural and educational needs considered and pt agreeable to plan of care and goals.      Goals:    LTG's (12 weeks):  1)   Increase ROM to 140 degrees in left elbow flexion to increase functional hand use for ADLs, IADLs, and leisure tasks Ongoing  2)   Increase  ROM to 85 degrees in left hand MP joints to increase functional hand use for ADLs, IADLs, and leisure tasksOngoing  3)   Increase ROM so that opposition in left thumb is to the DPC  to increase functional hand use for ADLs, IADLs, and leisure tasksOngoing  4)   Increase  strength to 50 lbs. to grasp for ADLs, IADLs and leisure taskOngoing  5)   Increase key pinch to 8 psis to increase independence with button and FM coordinationOngoing  6)   Decrease complaints of pain to  1 out of 10 at worst to increase functional hand use for ADL/work/leisure activities.Ongoing  7)   Patient to score at 55% or more on FOTO to demonstrate improved perception of functional left UE use.Ongoing  8)   Pt will return to near to prior level of function for ADLs and household management reporting I or Mod I with ADLs (dressing, feeding, grooming, toileting). Ongoing     STG's (6 weeks)  1)   Patient to be IND with HEP and modalities for pain/edema managment.Ongoing  2)   Increase ROM to 135 degrees in left elbow flexion to increase functional hand use for ADLs, IADLs, and leisure tasksOngoing  3)   Increase ROM to 75 degrees in left hand MP joints to increase functional hand use for ADLs, IADLs, and leisure tasksOngoing  4)   Increase ROM so that opposition in left thumb is to the base of her SF  to increase functional hand use for ADLs, IADLs, and leisure tasksOngoing  5)   Increase  strength to 25 lbs. to grasp for ADLs, IADLs and leisure taskOngoing  6)   Increase key pinch to 6 psis to increase independence with button and FM coordinationOngoing  7)   Patient to be IND wiht Orthotic use, wear and care precautions. Ongoing  8)   Decrease complaints of pain to  5 out of 10 at worst to increase functional hand use for ADL/work/leisure activities.Ongoing     Plan      Pt to be treated by Occupational Therapy 2 times per week for 12 weeks during the certification period from 8/14/2024 to 11/6/24 to achieve the established goals.       Treatment to include: Paraffin, Fluidotherapy, Manual therapy/joint mobilizations, Modalities for pain management, US 3 mhz, Therapeutic exercises/activities., Iontophoresis with 2.0 cc Dexamethasone, Strengthening, Orthotic Fabrication/Fit/Training, Edema Control, Scar Management, Wound Care, Electrical Modalities, Joint Protection, and Energy Conservation, as well as any other treatments deemed necessary based on the patient's needs or progress.        Updates/Grading for next session: Continue with current plan of care       Herrera Lovett OT

## 2024-09-25 ENCOUNTER — OFFICE VISIT (OUTPATIENT)
Dept: NEUROLOGY | Facility: CLINIC | Age: 59
End: 2024-09-25
Payer: COMMERCIAL

## 2024-09-25 VITALS — SYSTOLIC BLOOD PRESSURE: 178 MMHG | DIASTOLIC BLOOD PRESSURE: 85 MMHG | HEART RATE: 98 BPM

## 2024-09-25 DIAGNOSIS — R55 SYNCOPE, UNSPECIFIED SYNCOPE TYPE: Primary | ICD-10-CM

## 2024-09-25 PROCEDURE — 3079F DIAST BP 80-89 MM HG: CPT | Mod: CPTII,S$GLB,, | Performed by: PSYCHIATRY & NEUROLOGY

## 2024-09-25 PROCEDURE — 3066F NEPHROPATHY DOC TX: CPT | Mod: CPTII,S$GLB,, | Performed by: PSYCHIATRY & NEUROLOGY

## 2024-09-25 PROCEDURE — 3077F SYST BP >= 140 MM HG: CPT | Mod: CPTII,S$GLB,, | Performed by: PSYCHIATRY & NEUROLOGY

## 2024-09-25 PROCEDURE — 99999 PR PBB SHADOW E&M-EST. PATIENT-LVL III: CPT | Mod: PBBFAC,,, | Performed by: PSYCHIATRY & NEUROLOGY

## 2024-09-25 PROCEDURE — 4010F ACE/ARB THERAPY RXD/TAKEN: CPT | Mod: CPTII,S$GLB,, | Performed by: PSYCHIATRY & NEUROLOGY

## 2024-09-25 PROCEDURE — 3060F POS MICROALBUMINURIA REV: CPT | Mod: CPTII,S$GLB,, | Performed by: PSYCHIATRY & NEUROLOGY

## 2024-09-25 PROCEDURE — 99213 OFFICE O/P EST LOW 20 MIN: CPT | Mod: S$GLB,,, | Performed by: PSYCHIATRY & NEUROLOGY

## 2024-09-25 PROCEDURE — 3052F HG A1C>EQUAL 8.0%<EQUAL 9.0%: CPT | Mod: CPTII,S$GLB,, | Performed by: PSYCHIATRY & NEUROLOGY

## 2024-09-25 PROCEDURE — 1159F MED LIST DOCD IN RCRD: CPT | Mod: CPTII,S$GLB,, | Performed by: PSYCHIATRY & NEUROLOGY

## 2024-09-25 NOTE — PROGRESS NOTES
Barrera Siddiqui is a 59 y.o. year old female that  presents for syncope evaluation.    HPI:  Ms Siddiqui has HTN, HLD, DM, morbid obesity, TIA, multilevel degenerative changes of the cervical spine most pronounced at C4-C5, depression, chronic HA, and recurrent syncope.  Stated that since her last office visit she had sustained two or three episodes of sudden onset of witnessed loss of consciousness no preceded by warning symptoms and with rapid regaining of consciousness. No reported abnormal movements, bladder incontinence, or tongue biting during these episodes but indicated that her family told her that when she is out is like she is having a seizure.   Stressed the fact that so far she had total of five or six of such episodes, all of them very similar, occurring when she is sitting or standing.  Cardiac evaluation for syncope was unrevealing and previous MRI brain and CTA also unremarkable.  Except for HA, she reports no vertigo, double vision, imbalance, tremors, focal weakness, slurred speech, language or visual impairment.      Past Medical History:   Diagnosis Date    Depression     Diabetes mellitus     High cholesterol     Hypertension     Stroke     TIA (transient ischemic attack) 2017    left side weakness resolved after therapy     Social History     Socioeconomic History    Marital status: Single   Tobacco Use    Smoking status: Never    Smokeless tobacco: Never   Substance and Sexual Activity    Alcohol use: Yes     Comment: rare    Drug use: No    Sexual activity: Yes     Partners: Male     Social Determinants of Health     Financial Resource Strain: Medium Risk (11/24/2023)    Overall Financial Resource Strain (CARDIA)     Difficulty of Paying Living Expenses: Somewhat hard   Food Insecurity: Food Insecurity Present (11/24/2023)    Hunger Vital Sign     Worried About Running Out of Food in the Last Year: Sometimes true     Ran Out of Food in the Last Year: Sometimes true   Transportation Needs: No  Transportation Needs (11/24/2023)    PRAPARE - Transportation     Lack of Transportation (Medical): No     Lack of Transportation (Non-Medical): No   Physical Activity: Unknown (11/24/2023)    Exercise Vital Sign     Days of Exercise per Week: 0 days   Recent Concern: Physical Activity - Inactive (11/24/2023)    Exercise Vital Sign     Days of Exercise per Week: 0 days     Minutes of Exercise per Session: 30 min   Stress: No Stress Concern Present (11/24/2023)    [employer] [place] [employer] - Occupational Stress Questionnaire     Feeling of Stress : Only a little   Housing Stability: High Risk (11/24/2023)    Housing Stability Vital Sign     Unable to Pay for Housing in the Last Year: Yes     Number of Places Lived in the Last Year: 1     Unstable Housing in the Last Year: No     Past Surgical History:   Procedure Laterality Date    CARPAL TUNNEL RELEASE Bilateral     CHOLECYSTECTOMY      COLONOSCOPY N/A 10/18/2017    Procedure: COLONOSCOPY;  Surgeon: Donald Wright Jr., MD;  Location: [facility] ENDO;  Service: Endoscopy;  Laterality: N/A;    DECOMPRESSION, NERVE, ULNAR Left 7/24/2024    Procedure: LEFT ELBOW DECOMPRESSION, NERVE, ULNAR;  Surgeon: Brandie Monteiro MD;  Location: [facility] OR;  Service: Orthopedics;  Laterality: Left;  REGIONAL AFTER    EPIDURAL STEROID INJECTION INTO CERVICAL SPINE N/A 05/08/2024    Procedure: C7-T1 GRACIE (AIM LEFT);  Surgeon: Tiera Murphy DO;  Location: [facility];  Service: Pain Management;  Laterality: N/A;  20 mins    KNEE SURGERY      TRIGGER FINGER RELEASE Left 7/24/2024    Procedure: LEFT THUMB AND RING  TRIGGER FINGER RELEASE;  Surgeon: Brandie Monteiro MD;  Location: [facility] OR;  Service: Orthopedics;  Laterality: Left;    TUBAL LIGATION       Family History   Problem Relation Name Age of Onset    Breast cancer Mother      Liver disease Maternal Grandmother             Review of Systems  General ROS: negative for chills, fever or weight  loss  Psychological ROS: negative for hallucination, positive for depression  Ophthalmic ROS: negative for blurry vision, photophobia or eye pain  ENT ROS: negative for epistaxis, sore throat or rhinorrhea  Respiratory ROS: no cough, shortness of breath, or wheezing  Cardiovascular ROS: no chest pain or dyspnea on exertion  Gastrointestinal ROS: no abdominal pain, change in bowel habits, or black/ bloody stools  Genito-Urinary ROS: no dysuria, trouble voiding, or hematuria  Musculoskeletal ROS: negative for gait disturbance or muscular weakness  Neurological ROS: no syncope or seizures; no ataxia  Dermatological ROS: negative for pruritis, rash and jaundice      Physical Exam:  BP (!) 178/85 (BP Location: Right forearm, Patient Position: Sitting, BP Method: Large (Automatic))   Pulse 98   LMP 07/29/2018 (Exact Date)   General appearance: alert, cooperative, no distress  Constitutional:Oriented to person, place, and time.appears well-developed and well-nourished.   HEENT: Normocephalic, atraumatic, neck symmetrical, no nasal discharge   Eyes: conjunctivae/corneas clear, PERRL, EOM's intact  Lungs: clear to auscultation bilaterally, no dullness to percussion bilaterally  Heart: regular rate and rhythm without rub; no displacement of the PMI   Abdomen: soft, non-tender; bowel sounds normoactive; no organomegaly  Extremities: extremities symmetric; no clubbing, cyanosis, or edema  Integument: Skin color, texture, turgor normal; no rashes; hair distrubution normal  Neurologic: Alert and oriented X 3, normal strength, normal coordination and gait  Psychiatric: no pressured speech; normal affect; no evidence of impaired cognition     LABS:    Complete Blood Count  Lab Results   Component Value Date    RBC 4.32 06/27/2024    HGB 11.5 (L) 06/27/2024    HCT 36.1 (L) 06/27/2024    MCV 84 06/27/2024    MCH 26.6 (L) 06/27/2024    MCHC 31.9 (L) 06/27/2024    RDW 14.2 06/27/2024     06/27/2024    MPV 11.5 06/27/2024     GRAN 6.8 06/27/2024    GRAN 61.0 06/27/2024    LYMPH 3.5 06/27/2024    LYMPH 31.8 06/27/2024    MONO 0.6 06/27/2024    MONO 5.3 06/27/2024    EOS 0.1 06/27/2024    BASO 0.04 06/27/2024    EOSINOPHIL 0.9 06/27/2024    BASOPHIL 0.4 06/27/2024    DIFFMETHOD Automated 06/27/2024       Comprehensive Metabolic Panel  Lab Results   Component Value Date     (H) 08/23/2024    BUN 10 08/23/2024    CREATININE 0.67 08/23/2024     08/23/2024    K 3.7 08/23/2024    CL 99 08/23/2024    PROT 9.2 (H) 08/23/2024    ALBUMIN 4.7 08/23/2024    BILITOT 0.5 08/23/2024    AST 81 (H) 08/23/2024    ALKPHOS 124 08/23/2024    CO2 28 08/23/2024    ALT 49 (H) 08/23/2024    ANIONGAP 18 (H) 08/23/2024    EGFRNONAA >60.0 06/18/2022    ESTGFRAFRICA >60.0 06/18/2022       TSH  Lab Results   Component Value Date    TSH 2.860 08/15/2023         Assessment: 58 y/o with HTN, HLD, DM, morbid obesity, TIA, multilevel degenerative changes of the cervical spine most pronounced at C4-C5,   depression, chronic HA, and recurrent syncope with negative cardiac work up and unrevealing MRI brain and vascular imaging.  Unclear etiology. Will obtain EEG for now but will also consider PSG and tilt table test.          ICD-10-CM ICD-9-CM    1. Syncope, unspecified syncope type  R55 780.2 EEG,w/awake & asleep record        The encounter diagnosis was Syncope, unspecified syncope type.      Plan:  1) Recurrent syncope: as above  2) HTN  3) HLD  4) DM  5) Morbid obesity  6) TIA  7) Depression  8) Chronic head/neck pain  9) Multilevel degenerative changes of the cervical spine most pronounced at C4-C5           Orders Placed This Encounter   Procedures    EEG,w/awake & asleep record           Alok Batista MD     Complex Repair And Modified Advancement Flap Text: The defect edges were debeveled with a #15 scalpel blade.  The primary defect was closed partially with a complex linear closure.  Given the location of the remaining defect, shape of the defect and the proximity to free margins a modified advancement flap was deemed most appropriate for complete closure of the defect.  Using a sterile surgical marker, an appropriate advancement flap was drawn incorporating the defect and placing the expected incisions within the relaxed skin tension lines where possible.    The area thus outlined was incised deep to adipose tissue with a #15 scalpel blade.  The skin margins were undermined to an appropriate distance in all directions utilizing iris scissors.

## 2024-10-01 ENCOUNTER — CLINICAL SUPPORT (OUTPATIENT)
Dept: REHABILITATION | Facility: HOSPITAL | Age: 59
End: 2024-10-01
Payer: COMMERCIAL

## 2024-10-01 DIAGNOSIS — M79.642 PAIN IN LEFT HAND: ICD-10-CM

## 2024-10-01 DIAGNOSIS — M62.81 MUSCLE WEAKNESS: Primary | ICD-10-CM

## 2024-10-01 DIAGNOSIS — M25.522 PAIN IN LEFT ELBOW: ICD-10-CM

## 2024-10-01 DIAGNOSIS — M25.60 STIFFNESS IN JOINT: ICD-10-CM

## 2024-10-01 PROCEDURE — 97110 THERAPEUTIC EXERCISES: CPT | Mod: PO

## 2024-10-01 PROCEDURE — 97018 PARAFFIN BATH THERAPY: CPT | Mod: PO

## 2024-10-01 PROCEDURE — 97140 MANUAL THERAPY 1/> REGIONS: CPT | Mod: PO

## 2024-10-01 NOTE — PROGRESS NOTES
"  Occupational Therapy Daily Treatment Note     Name: Barrera Sididqui  [facility] Number: [id number]    Therapy Diagnosis:   Encounter Diagnoses   Name Primary?    Muscle weakness Yes    Stiffness in joint     Pain in left elbow     Pain in left hand        Physician: Brandie Monteiro, *    Visit Date: 10/1/2024    Physician Orders: OT eval/treat   Medical Diagnosis: G56.23 (ICD-10-CM) - Cubital tunnel syndrome of both upper extremities  Evaluation Date: 8/14/2024  Plan of Care Certification Date: 11/6/24  Authorization Period: 7/24/24 - 12/31/24   Surgery Date and Procedure: Procedure(s) (LRB):  LEFT ELBOW DECOMPRESSION, NERVE, ULNAR (Left)  LEFT THUMB AND RING  TRIGGER FINGER RELEASE (Left)   Tenosynovectomy left thumb and ring finger flexor tendon 7/24/24  Date of Return to MD: none noted in chart at this time   Visit # / Visits authorized: 11 / 20 (plus initial evaluation)   FOTO Completion: 1/3     Time In:1425   Time Out: 1525  Total Billable Time: 60 minutes (1P, 1MT, 2TE)     Precautions:  Standard      Subjective     Pt reports: She continues to have some elbow pain at night and in am;  describes as "achy" type pain     she was compliant with home exercise program given last session.   Response to previous treatment: soreness for 2 days in elbow following last session    Functional change: None reported at this time.      Pain: 5/10 at the elbow; 2/10 at hand    Location: left arms   Objective       Mental status: alert, oriented x3     Observation:   Pt incision sites are clean and healing well        Sensation: Pt states she has pins and needles in her small finger      Range of Motion:   Left        Elbow Left  8/14/2024 Right  8/14/2024   Flexion 130 140   Extension 0 0         Left Hand Finger ROM INDEX   8/14/2024 LONG   8/14/2024 RING   8/14/2024 SMALL   8/14/2024   MP  60 60 60 64   PIP  90 95 95 100   DIP  75 75 75 85      Right Hand Finger ROM INDEX   8/14/2024 LONG   8/14/2024 RING   8/14/2024 SMALL   " 8/14/2024   MP  90 85 90 90   PIP  110 105 100 100   DIP  80 80 80 90        Left Thumb range of motion  8/14/2024 Right Thumb range of motion  8/14/2024   MP 80 75   IP 85 75   Radial Abduction 70 70   Palmar Abduction 60 60   Opposition Middle of SF DPC          Strength: (ZULEYKA Dynamometer in psi.)        8/14/2024 8/14/2024     Left Right   Rung II 15 60         Pinch Strength (Measured in psi)       8/14/2024 8/14/2024     Left Right   Key Pinch 4  10   3pt Pinch 6 11   2pt Pinch 4 8         CMS Impairment/Limitation/Restriction for FOTO Hand/Wrist/Finger Survey     Therapist reviewed FOTO scores for Barrera Siddiqui on 8/14/2024.   FOTO documents entered into [employer] - see Media section.     Intake Score: 28%  Category: Self Care           Treatment       Barrera received the following direct contact modalities after being cleared for contraindications for 8 minutes:  - paraffin wax and moist hot pack to hand to increase extensibility   - moist hot pack to elbow to increase extensibility (simultaneously with paraffin application)      Manual therapy techniques: Myofacial release, Soft tissue Mobilization, and scar massage were applied to the: left hand and left elbow for 23 minutes, including:  - use of hand techniques, cupping, IASTM tools, and scar pump to increase blood flow/circulation, improve soft tissue pliability and decrease pain.    -scar massage to elbow with use of hand techniques only to decrease tissue adhesions and pain, and increase tissue extensibility   -intrinsic stretches to digits #2-5 5 reps X 5 sec hold       Therapeutic exercises to develop strength, endurance, ROM, and flexibility for 29 minutes, including:   Bolded performed today.    -TGE's x 20   - thumb MP/IP flexion with blocking X 20 each   - ring finger PIP/DIP with blocking X 20 each   - thumb retropulsion X 10 reps   - finger lifts, isolated and composite x 10   - finger spreads with (2) tan RB x 20   - elbow 3 ways with 2# weight 2/15  "  - wrist 3 ways with 1# weight 2/15   -scar desensitization with mini vibrator to elbow and trigger finger incision  - pom pom  with gripper on 1st rung  - isospheres x 3 minutes   - wrist maze x 3 minutes   - Wrist wheel 2 minutes in flex/ext & sup/pro   -red flex bar smiles/frowns/twists X 2/15 reps     Therputty (yellow/green mix):  -molding 2 min  -gripping X 5 with 5 sec hold   -thumb flexion X 5 with 5 sec hold   -pancake and bloom X 5   -log rolls X 6   -lateral/tripod/tip to tip pinches 2 log seach   -finger scrapes, flexion/extension 2 pancakes each  -pinch/pull with digits #4 & #5 X 5     Home Exercises and Education Provided     Education provided:   -recommend towel wrap to keep elbow extended at night to decrease "achy" pain in elbow   -Issued/reviewed theraputty HEP; issued yellow/green theraputty mix     - Progress towards goals     Written Home Exercises Provided: Patient instructed to cont prior HEP.  Exercises were reviewed and Barrera was able to demonstrate them prior to the end of the session.  Barrera demonstrated good  understanding of the HEP provided.   .   See EMR under Patient Instructions for exercises provided prior visit.        Assessment     Pt would continue to benefit from skilled OT.   Pt continues to have sensitivity in her elbow, but reports that is it decreasing a little.  She mostly has this at night or upon waking in am.  Towel wrap recommended at night to keep elbow somewhat extended; will monitor effects per patient report next session.  Patient tolerated all therapeutic exercises without significant pain or difficulty.  Pt is motivated. Will continue to progress as tolerated.     Barrera is progressing well towards her goals and there are no updates to goals at this time. Pt prognosis is Good.     Pt will continue to benefit from skilled outpatient occupational therapy to address the deficits listed in the problem list on initial evaluation provide pt/family education and to " maximize pt's level of independence in the home and community environment.     Anticipated barriers to occupational therapy: None at this time     Pt's spiritual, cultural and educational needs considered and pt agreeable to plan of care and goals.      Goals:    LTG's (12 weeks):  1)   Increase ROM to 140 degrees in left elbow flexion to increase functional hand use for ADLs, IADLs, and leisure tasks Ongoing  2)   Increase ROM to 85 degrees in left hand MP joints to increase functional hand use for ADLs, IADLs, and leisure tasksOngoing  3)   Increase ROM so that opposition in left thumb is to the DPC  to increase functional hand use for ADLs, IADLs, and leisure tasksOngoing  4)   Increase  strength to 50 lbs. to grasp for ADLs, IADLs and leisure taskOngoing  5)   Increase key pinch to 8 psis to increase independence with button and FM coordinationOngoing  6)   Decrease complaints of pain to  1 out of 10 at worst to increase functional hand use for ADL/work/leisure activities.Ongoing  7)   Patient to score at 55% or more on FOTO to demonstrate improved perception of functional left UE use.Ongoing  8)   Pt will return to near to prior level of function for ADLs and household management reporting I or Mod I with ADLs (dressing, feeding, grooming, toileting). Ongoing     STG's (6 weeks)  1)   Patient to be IND with HEP and modalities for pain/edema managment.Ongoing  2)   Increase ROM to 135 degrees in left elbow flexion to increase functional hand use for ADLs, IADLs, and leisure tasksOngoing  3)   Increase ROM to 75 degrees in left hand MP joints to increase functional hand use for ADLs, IADLs, and leisure tasksOngoing  4)   Increase ROM so that opposition in left thumb is to the base of her SF  to increase functional hand use for ADLs, IADLs, and leisure tasksOngoing  5)   Increase  strength to 25 lbs. to grasp for ADLs, IADLs and leisure taskOngoing  6)   Increase key pinch to 6 psis to increase  independence with button and FM coordinationOngoing  7)   Patient to be IND wiht Orthotic use, wear and care precautions. Ongoing  8)   Decrease complaints of pain to  5 out of 10 at worst to increase functional hand use for ADL/work/leisure activities.Ongoing     Plan      Pt to be treated by Occupational Therapy 2 times per week for 12 weeks during the certification period from 8/14/2024 to 11/6/24 to achieve the established goals.      Treatment to include: Paraffin, Fluidotherapy, Manual therapy/joint mobilizations, Modalities for pain management, US 3 mhz, Therapeutic exercises/activities., Iontophoresis with 2.0 cc Dexamethasone, Strengthening, Orthotic Fabrication/Fit/Training, Edema Control, Scar Management, Wound Care, Electrical Modalities, Joint Protection, and Energy Conservation, as well as any other treatments deemed necessary based on the patient's needs or progress.        Updates/Grading for next session: Continue with current plan of care       Herrera Lovett, OT

## 2024-10-03 NOTE — PLAN OF CARE
Gita Roy APNP at 10/3/2024  8:22 AM    Status: Signed   Procedure: 10709/[id number] - Trigger finger release and possible synovectomy right ring finger  Tentative Date:  TBD   Duration of Procedure: 20 min  Hospital: Any  Anesthesia: Local  All patient's having Local anesthesia at [facility] are to arrive 45 minutes before the procedure time.  Length of Stay: Day Surgery  Equipment:  None  Pre-Op Done By: Not Needed  Consent: To be signed at hospital  -------------------  Hospital Orders: 1% Lidocaine with Epi 1:100,000     [id number]: m65.341          BMI: 22.68  DX: Trigger Ring Finger Of Right Hand (Primary)    Problem: Patient Care Overview  Goal: Plan of Care Review  Outcome: Ongoing (interventions implemented as appropriate)  Pt. on room air.  Will cont to monitor

## 2024-10-08 ENCOUNTER — CLINICAL SUPPORT (OUTPATIENT)
Dept: REHABILITATION | Facility: HOSPITAL | Age: 59
End: 2024-10-08
Payer: COMMERCIAL

## 2024-10-08 DIAGNOSIS — M25.522 PAIN IN LEFT ELBOW: ICD-10-CM

## 2024-10-08 DIAGNOSIS — M62.81 MUSCLE WEAKNESS: Primary | ICD-10-CM

## 2024-10-08 DIAGNOSIS — M25.60 STIFFNESS IN JOINT: ICD-10-CM

## 2024-10-08 DIAGNOSIS — M79.642 PAIN IN LEFT HAND: ICD-10-CM

## 2024-10-08 PROCEDURE — 97018 PARAFFIN BATH THERAPY: CPT | Mod: PO

## 2024-10-08 PROCEDURE — 97140 MANUAL THERAPY 1/> REGIONS: CPT | Mod: PO

## 2024-10-08 PROCEDURE — 97110 THERAPEUTIC EXERCISES: CPT | Mod: PO

## 2024-10-08 NOTE — PROGRESS NOTES
"  Occupational Therapy Daily Treatment Note     Name: Barrera Siddiqui  [facility] Number: [id number]    Therapy Diagnosis:   Encounter Diagnoses   Name Primary?    Muscle weakness Yes    Stiffness in joint     Pain in left elbow     Pain in left hand        Physician: Brandie Monteiro, *    Visit Date: 10/8/2024    Physician Orders: OT eval/treat   Medical Diagnosis: G56.23 (ICD-10-CM) - Cubital tunnel syndrome of both upper extremities  Evaluation Date: 8/14/2024  Plan of Care Certification Date: 11/6/24  Authorization Period: 7/24/24 - 12/31/24   Surgery Date and Procedure: Procedure(s) (LRB):  LEFT ELBOW DECOMPRESSION, NERVE, ULNAR (Left)  LEFT THUMB AND RING  TRIGGER FINGER RELEASE (Left)   Tenosynovectomy left thumb and ring finger flexor tendon 7/24/24  Date of Return to MD: none noted in chart at this time   Visit # / Visits authorized: 12 / 20 (plus initial evaluation)   FOTO Completion: 1/3     Time In:1415  Time Out: 1515  Total Billable Time: 60 minutes (1P, 1MT, 2TE)     Precautions:  Standard      Subjective     Pt reports: She continues to have some elbow pain at night and in am;  describes as "achy" type pain.  She tried towel wrap on elbow at night, but reports it did not help.  Today she reports an increase in elbow and hand pain, but thinks it may be attributed to her neck pain that is radiating into her arm.  She reports that she is going for a "nerve block" procedure for this next week.      she was compliant with home exercise program given last session.   Response to previous treatment: soreness for 2 days in elbow following last session    Functional change: None reported at this time.      Pain: 9/10 at the elbow; 4/10 at hand    Location: left arms   Objective       Mental status: alert, oriented x3     Observation:   Pt incision sites are clean and healing well        Sensation: Pt states she has pins and needles in her small finger      Range of Motion:   Left        Elbow Left  8/14/2024 " Right  8/14/2024   Flexion 130 140   Extension 0 0         Left Hand Finger ROM INDEX   8/14/2024 LONG   8/14/2024 RING   8/14/2024 SMALL   8/14/2024   MP  60 60 60 64   PIP  90 95 95 100   DIP  75 75 75 85      Right Hand Finger ROM INDEX   8/14/2024 LONG   8/14/2024 RING   8/14/2024 SMALL   8/14/2024   MP  90 85 90 90   PIP  110 105 100 100   DIP  80 80 80 90        Left Thumb range of motion  8/14/2024 Right Thumb range of motion  8/14/2024   MP 80 75   IP 85 75   Radial Abduction 70 70   Palmar Abduction 60 60   Opposition Middle of SF DPC          Strength: (ZULEYKA Dynamometer in psi.)        8/14/2024 8/14/2024     Left Right   Rung II 15 60         Pinch Strength (Measured in psi)       8/14/2024 8/14/2024     Left Right   Key Pinch 4  10   3pt Pinch 6 11   2pt Pinch 4 8         CMS Impairment/Limitation/Restriction for FOTO Hand/Wrist/Finger Survey     Therapist reviewed FOTO scores for Barrera Siddiqui on 8/14/2024.   FOTO documents entered into [employer] - see Media section.     Intake Score: 28%  Category: Self Care           Treatment       Barrera received the following direct contact modalities after being cleared for contraindications for 8 minutes:  - paraffin wax and moist hot pack to hand to increase extensibility   - moist hot pack to elbow to increase extensibility (simultaneously with paraffin application)      Manual therapy techniques: Myofacial release, Soft tissue Mobilization, and scar massage were applied to the: left hand and left elbow for 23 minutes, including:  - use of hand techniques, cupping, IASTM tools, and scar pump to increase blood flow/circulation, improve soft tissue pliability and decrease pain.    -scar massage to elbow with use of hand techniques only to decrease tissue adhesions and pain, and increase tissue extensibility   -intrinsic stretches to digits #2-5 5 reps X 5 sec hold       Therapeutic exercises to develop strength, endurance, ROM, and flexibility for 29 minutes,  "including:   Bolded performed today.    -TGE's x 20   - thumb MP/IP flexion with blocking X 20 each   - ring finger PIP/DIP with blocking X 20 each   - thumb retropulsion X 10 reps   - finger lifts, isolated and composite x 10   - finger spreads with (2) tan RB x 20   - elbow 3 ways with 2# weight 2/15   - wrist 3 ways with 3# weight 2/15   -scar desensitization with mini vibrator to elbow and trigger finger incision  - pom pom  with gripper on 1st rung  - isospheres x 3 minutes   - wrist maze x 3 minutes   - Wrist wheel 2 minutes in flex/ext & sup/pro   -green flex bar smiles/frowns/twists X 2/15 reps   -digiflex, red, FA supinated/pronated/neutral FA 2/10   -ulnar nerve glide X 10     Therputty (yellow/green mix):   -molding 2 min  -gripping X 5 with 5 sec hold   -thumb flexion X 5 with 5 sec hold   -pancake and bloom X 5   -log rolls X 6   -lateral/tripod/tip to tip pinches 2 log seach   -finger scrapes, flexion/extension 2 pancakes each  -pinch/pull with digits #4 & #5 X 5     Home Exercises and Education Provided     Education provided:   -ulnar nerve glide 10/8/2024  -recommend towel wrap to keep elbow extended at night to decrease "achy" pain in elbow   -Issued/reviewed theraputty HEP; issued yellow/green theraputty mix     - Progress towards goals     Written Home Exercises Provided: Patient instructed to cont prior HEP.  Exercises were reviewed and Barrera was able to demonstrate them prior to the end of the session.  Barrera demonstrated good  understanding of the HEP provided.   .   See EMR under Patient Instructions for exercises provided prior visit.        Assessment     Pt would continue to benefit from skilled OT.   Pt continues to have sensitivity in her elbow and is reporting increase today in addition to neck pain that is radiating into her arm.  She was able to tolerate advanced exercises without significant pain or difficulty.  She did require increased time due to fatigue requiring rest " breaks.  Pt is motivated. Will continue to progress as tolerated.     Barrera is progressing well towards her goals and there are no updates to goals at this time. Pt prognosis is Good.     Pt will continue to benefit from skilled outpatient occupational therapy to address the deficits listed in the problem list on initial evaluation provide pt/family education and to maximize pt's level of independence in the home and community environment.     Anticipated barriers to occupational therapy: None at this time     Pt's spiritual, cultural and educational needs considered and pt agreeable to plan of care and goals.      Goals:    LTG's (12 weeks):  1)   Increase ROM to 140 degrees in left elbow flexion to increase functional hand use for ADLs, IADLs, and leisure tasks Ongoing  2)   Increase ROM to 85 degrees in left hand MP joints to increase functional hand use for ADLs, IADLs, and leisure tasksOngoing  3)   Increase ROM so that opposition in left thumb is to the DPC  to increase functional hand use for ADLs, IADLs, and leisure tasksOngoing  4)   Increase  strength to 50 lbs. to grasp for ADLs, IADLs and leisure taskOngoing  5)   Increase key pinch to 8 psis to increase independence with button and FM coordinationOngoing  6)   Decrease complaints of pain to  1 out of 10 at worst to increase functional hand use for ADL/work/leisure activities.Ongoing  7)   Patient to score at 55% or more on FOTO to demonstrate improved perception of functional left UE use.Ongoing  8)   Pt will return to near to prior level of function for ADLs and household management reporting I or Mod I with ADLs (dressing, feeding, grooming, toileting). Ongoing     STG's (6 weeks)  1)   Patient to be IND with HEP and modalities for pain/edema managment.Ongoing  2)   Increase ROM to 135 degrees in left elbow flexion to increase functional hand use for ADLs, IADLs, and leisure tasksOngoing  3)   Increase ROM to 75 degrees in left hand MP joints to  increase functional hand use for ADLs, IADLs, and leisure tasksOngoing  4)   Increase ROM so that opposition in left thumb is to the base of her SF  to increase functional hand use for ADLs, IADLs, and leisure tasksOngoing  5)   Increase  strength to 25 lbs. to grasp for ADLs, IADLs and leisure taskOngoing  6)   Increase key pinch to 6 psis to increase independence with button and FM coordinationOngoing  7)   Patient to be IND wiht Orthotic use, wear and care precautions. Ongoing  8)   Decrease complaints of pain to  5 out of 10 at worst to increase functional hand use for ADL/work/leisure activities.Ongoing     Plan      Pt to be treated by Occupational Therapy 2 times per week for 12 weeks during the certification period from 8/14/2024 to 11/6/24 to achieve the established goals.      Treatment to include: Paraffin, Fluidotherapy, Manual therapy/joint mobilizations, Modalities for pain management, US 3 mhz, Therapeutic exercises/activities., Iontophoresis with 2.0 cc Dexamethasone, Strengthening, Orthotic Fabrication/Fit/Training, Edema Control, Scar Management, Wound Care, Electrical Modalities, Joint Protection, and Energy Conservation, as well as any other treatments deemed necessary based on the patient's needs or progress.        Updates/Grading for next session: Continue with current plan of care       Herrera Lovett OT

## 2024-10-09 ENCOUNTER — TELEPHONE (OUTPATIENT)
Dept: PAIN MEDICINE | Facility: CLINIC | Age: 59
End: 2024-10-09
Payer: COMMERCIAL

## 2024-10-09 NOTE — TELEPHONE ENCOUNTER
Spoke to pt confirmed procedure with Dr. Murphy for 10/16. Pt not on abx last two weeks.     ND

## 2024-10-14 ENCOUNTER — HOSPITAL ENCOUNTER (OUTPATIENT)
Dept: NEUROLOGY | Facility: CLINIC | Age: 59
Discharge: HOME | End: 2024-10-14
Payer: COMMERCIAL

## 2024-10-14 DIAGNOSIS — R55 SYNCOPE, UNSPECIFIED SYNCOPE TYPE: ICD-10-CM

## 2024-10-14 PROCEDURE — 95819 EEG AWAKE AND ASLEEP: CPT | Mod: S$GLB,,, | Performed by: STUDENT IN AN ORGANIZED HEALTH CARE EDUCATION/TRAINING PROGRAM

## 2024-10-14 NOTE — PROCEDURES
VIDEO ELECTROENCEPHALOGRAM  REPORT    DATE OF SERVICE:10/14/24  EEG NUMBER: OC   REQUESTED BY:  Dr Batista  LOCATION OF SERVICE:  outpatient    METHODOLOGY   Electroencephalographic (EEG) recording is with electrodes placed according to the International 10-20 placement system.  Thirty two (32) channels of digital signal (sampling rate of 512/sec) including T1 and T2 was simultaneously recorded from the scalp and may include  EKG, EMG, and/or eye monitors.  Recording band pass was 0.1 to 512 hz.  Digital video recording of the patient is simultaneously recorded with the EEG.  The patient is instructed report clinical symptoms which may occur during the recording session.  EEG and video recording is stored and archived in digital format.  Activation procedures which include photic stimulation, hyperventilation and instructing patients to perform simple task are done in selected patients.   The EEG is displayed on a monitor screen and can be reviewed using different montages.  Computer assisted analysis is employed to detect spike and electrographic seizure activity.   The entire record is submitted for computer analysis.  The entire recording is visually reviewed and the times identified by computer analysis as being spikes or seizures are reviewed again.  Compresses spectral analysis (CSA) is also performed on the activity recorded from each individual channel.  This is displayed as a power display of frequencies from 0 to 30 Hz over time.   The CSA is reviewed looking for asymmetries in power between homologous areas of the scalp and then compared with the original EEG recording.     [employer] software was also utilized in the review of this study.  This software suite analyzes the EEG recording in multiple domains.  Coherence and rhythmicity is computed to identify EEG sections which may contain organized seizures.  Each channel undergoes analysis to detect presence of spike and sharp waves which have special  and morphological characteristic of epileptic activity.  The routine EEG recording is converted from spacial into frequency domain.  This is then displayed comparing homologous areas to identify areas of significant asymmetry.  Algorithm to identify non-cortically generated artifact is used to separate eye movement, EMG and other artifact from the EEG.      ELECTROENCEPHALOGRAM:    Indication: 59 year old female with HTN, HLD, T2DM, prior TIAs presenting for evaluation of syncope.     State of Consciousness:   Awake and asleep    Background:   The background is well organized, symmetric and continuous.  There is a normal anterior to posterior gradient consisting of 5-10 mcV amplitude beta activity in the frontal region and well defined alpha activity in the posterior region.   At maximum alertness, there is a well developed 9-10 Hz posterior dominant rhythm that is symmetric and reactive to eye opening and closure noted.  There is intermittent polymorphic delta range slowing of both temporal regions, usually seen during light sleep.    Sleep:   Transition into stage 1 sleep is characterized by attenuation of the posterior dominant rhythm, bilateral theta activity, and vertex waves.  There is also transition into stage II sleep with symmetric vertex waves, sleep spindles, and k complexes noted.     Epileptiform Abnormalities  None    Seizures/Events:   None    EKG:   Regular rate and rhythm on single lead EKG    Activating procedures:   - Hyperventilation: not performed  - Photic stimulation: no abnormalities noted    Impression:   This is an abnormal awake and sleep EEG due to the presence of intermittent bilateral temporal slowing consistent with subcortical/deep midline dysfunction. Although nonspecific with regards to etiology, this finding is frequently seen in the setting of vascular disease in this age group.  No epileptiform activity is seen.      Abril Dozier MD  [facility]   Department of  Neurology/Epilepsy

## 2024-10-15 ENCOUNTER — CLINICAL SUPPORT (OUTPATIENT)
Dept: REHABILITATION | Facility: HOSPITAL | Age: 59
End: 2024-10-15
Payer: COMMERCIAL

## 2024-10-15 DIAGNOSIS — M79.642 PAIN IN LEFT HAND: ICD-10-CM

## 2024-10-15 DIAGNOSIS — M62.81 MUSCLE WEAKNESS: Primary | ICD-10-CM

## 2024-10-15 DIAGNOSIS — M25.60 STIFFNESS IN JOINT: ICD-10-CM

## 2024-10-15 DIAGNOSIS — M25.522 PAIN IN LEFT ELBOW: ICD-10-CM

## 2024-10-15 PROCEDURE — 97018 PARAFFIN BATH THERAPY: CPT | Mod: PO

## 2024-10-15 PROCEDURE — 97110 THERAPEUTIC EXERCISES: CPT | Mod: PO

## 2024-10-15 PROCEDURE — 97140 MANUAL THERAPY 1/> REGIONS: CPT | Mod: PO

## 2024-10-15 NOTE — PROGRESS NOTES
"  Occupational Therapy Daily Treatment Note     Name: Barrera Siddiqui  [facility] Number: [id number]    Therapy Diagnosis:   Encounter Diagnoses   Name Primary?    Muscle weakness Yes    Stiffness in joint     Pain in left elbow     Pain in left hand        Physician: Brandie Monteiro, *    Visit Date: 10/15/2024    Physician Orders: OT eval/treat   Medical Diagnosis: G56.23 (ICD-10-CM) - Cubital tunnel syndrome of both upper extremities  Evaluation Date: 8/14/2024  Plan of Care Certification Date: 11/6/24  Authorization Period: 7/24/24 - 12/31/24   Surgery Date and Procedure: Procedure(s) (LRB):  LEFT ELBOW DECOMPRESSION, NERVE, ULNAR (Left)  LEFT THUMB AND RING  TRIGGER FINGER RELEASE (Left)   Tenosynovectomy left thumb and ring finger flexor tendon 7/24/24  Date of Return to MD: none noted in chart at this time   Visit # / Visits authorized: 13 / 20 (plus initial evaluation)   FOTO Completion: 1/3     Time In:1420  Time Out: 1520  Total Billable Time: 55 minutes (1P, 1MT, 2TE)     Precautions:  Standard      Subjective     Pt reports: She continues to have some elbow pain at night and in am;  describes as "achy" type pain.  She continues to report radiating neck pain; she is going for a "nerve block" procedure tomorrow.  Her hand is very sore; she has been performing theraputty exercises 3 times per day. Saturday or Sunday she woke up with aching/swelling at the wrist near the carpal tunnel.       she was compliant with home exercise program given last session.   Response to previous treatment: no soreness elbow following last session     Functional change: None reported at this time.      Pain: 7/10 at the elbow; 10/10 at hand    Location: left arms   Objective       Mental status: alert, oriented x3     Observation:   Pt incision sites are clean and healing well        Sensation: Pt states she has pins and needles in her small finger      Range of Motion:   Left        Elbow Left  8/14/2024 Right  8/14/2024   Flexion " 130 140   Extension 0 0         Left Hand Finger ROM INDEX   8/14/2024 LONG   8/14/2024 RING   8/14/2024 SMALL   8/14/2024   MP  60 60 60 64   PIP  90 95 95 100   DIP  75 75 75 85      Right Hand Finger ROM INDEX   8/14/2024 LONG   8/14/2024 RING   8/14/2024 SMALL   8/14/2024   MP  90 85 90 90   PIP  110 105 100 100   DIP  80 80 80 90        Left Thumb range of motion  8/14/2024 Right Thumb range of motion  8/14/2024   MP 80 75   IP 85 75   Radial Abduction 70 70   Palmar Abduction 60 60   Opposition Middle of SF DPC          Strength: (ZULEYKA Dynamometer in psi.)        8/14/2024 8/14/2024     Left Right   Rung II 15 60         Pinch Strength (Measured in psi)       8/14/2024 8/14/2024     Left Right   Key Pinch 4  10   3pt Pinch 6 11   2pt Pinch 4 8         CMS Impairment/Limitation/Restriction for FOTO Hand/Wrist/Finger Survey     Therapist reviewed FOTO scores for Barrera Siddiqui on 8/14/2024.   FOTO documents entered into [employer] - see Media section.     Intake Score: 28%  Category: Self Care           Treatment       Barrera received the following direct contact modalities after being cleared for contraindications for 8 minutes:  - paraffin wax and moist hot pack to hand to increase extensibility   - moist hot pack to elbow to increase extensibility (simultaneously with paraffin application)      Manual therapy techniques: Myofacial release, Soft tissue Mobilization, and scar massage were applied to the: left hand and left elbow for 23 minutes, including:  - use of hand techniques, cupping, IASTM tools, and scar pump to increase blood flow/circulation, improve soft tissue pliability and decrease pain.    -scar massage to elbow with use of hand techniques only to decrease tissue adhesions and pain, and increase tissue extensibility   -intrinsic stretches to digits #2-5 5 reps X 5 sec hold       Therapeutic exercises to develop strength, endurance, ROM, and flexibility for 24 minutes, including:   Bolded performed today.  "   -TGE's x 20   - thumb MP/IP flexion with blocking X 20 each   - ring finger PIP/DIP with blocking X 20 each   - thumb retropulsion X 10 reps   - finger lifts, isolated and composite x 10   - finger spreads with (2) tan RB x 20   - elbow 3 ways with 3# weight 2/15   - wrist 3 ways with 3# weight 2/15   - manual scar desensitization to elbow and trigger finger incisions  - pom pom  with gripper on 1st rung  - isospheres x 3 minutes   - wrist maze x 3 minutes   - Wrist wheel 2 minutes in flex/ext & sup/pro   -green flex bar smiles/frowns/twists X 2/15 reps   -digiflex, red, FA supinated/pronated/neutral FA 2/10   -ulnar nerve glide X 10     Therputty (yellow/green mix):   -molding 2 min  -gripping X 5 with 5 sec hold   -thumb flexion X 5 with 5 sec hold   -pancake and bloom X 5   -log rolls X 6   -lateral/tripod/tip to tip pinches 2 log seach   -finger scrapes, flexion/extension 2 pancakes each  -pinch/pull with digits #4 & #5 X 5     Patient received cold pack x 5 minutes to decrease pain/inflammation and edema following treatment session.      Home Exercises and Education Provided     Education provided:   -ulnar nerve glide 10/8/2024  -recommend towel wrap to keep elbow extended at night to decrease "achy" pain in elbow   -Issued/reviewed theraputty HEP; issued yellow/green theraputty mix     - Progress towards goals     Written Home Exercises Provided: Patient instructed to cont prior HEP.  Exercises were reviewed and Barrera was able to demonstrate them prior to the end of the session.  Barrera demonstrated good  understanding of the HEP provided.   .   See EMR under Patient Instructions for exercises provided prior visit.        Assessment     Pt would continue to benefit from skilled OT.   Pt continues to have sensitivity in her elbow and is reporting increase today in addition to neck pain that is radiating into her arm.  She is reporting increased soreness at carpal tunnel and there is edema present on " ulnar side of incision.  She reported that she has been performing theraputty program 3-4 times per day since last visit and noticed the swelling and soreness Saturday or Sunday.  Exercises modified to avoid repetitive tendon glides and  today.  She was advised to discontinue use of theraputty until next visit.  Patient tolerated all performed therapeutic exercises without significant pain or difficulty.  Pt is motivated. Will continue to progress as tolerated.        Barrera is progressing well towards her goals and there are no updates to goals at this time. Pt prognosis is Good.     Pt will continue to benefit from skilled outpatient occupational therapy to address the deficits listed in the problem list on initial evaluation provide pt/family education and to maximize pt's level of independence in the home and community environment.     Anticipated barriers to occupational therapy: None at this time     Pt's spiritual, cultural and educational needs considered and pt agreeable to plan of care and goals.      Goals:    LTG's (12 weeks):  1)   Increase ROM to 140 degrees in left elbow flexion to increase functional hand use for ADLs, IADLs, and leisure tasks Ongoing  2)   Increase ROM to 85 degrees in left hand MP joints to increase functional hand use for ADLs, IADLs, and leisure tasksOngoing  3)   Increase ROM so that opposition in left thumb is to the DPC  to increase functional hand use for ADLs, IADLs, and leisure tasksOngoing  4)   Increase  strength to 50 lbs. to grasp for ADLs, IADLs and leisure taskOngoing  5)   Increase key pinch to 8 psis to increase independence with button and FM coordinationOngoing  6)   Decrease complaints of pain to  1 out of 10 at worst to increase functional hand use for ADL/work/leisure activities.Ongoing  7)   Patient to score at 55% or more on FOTO to demonstrate improved perception of functional left UE use.Ongoing  8)   Pt will return to near to prior level of function  for ADLs and household management reporting I or Mod I with ADLs (dressing, feeding, grooming, toileting). Ongoing     STG's (6 weeks)  1)   Patient to be IND with HEP and modalities for pain/edema managment.Ongoing  2)   Increase ROM to 135 degrees in left elbow flexion to increase functional hand use for ADLs, IADLs, and leisure tasksOngoing  3)   Increase ROM to 75 degrees in left hand MP joints to increase functional hand use for ADLs, IADLs, and leisure tasksOngoing  4)   Increase ROM so that opposition in left thumb is to the base of her SF  to increase functional hand use for ADLs, IADLs, and leisure tasksOngoing  5)   Increase  strength to 25 lbs. to grasp for ADLs, IADLs and leisure taskOngoing  6)   Increase key pinch to 6 psis to increase independence with button and FM coordinationOngoing  7)   Patient to be IND wiht Orthotic use, wear and care precautions. Ongoing  8)   Decrease complaints of pain to  5 out of 10 at worst to increase functional hand use for ADL/work/leisure activities.Ongoing     Plan      Pt to be treated by Occupational Therapy 2 times per week for 12 weeks during the certification period from 8/14/2024 to 11/6/24 to achieve the established goals.      Treatment to include: Paraffin, Fluidotherapy, Manual therapy/joint mobilizations, Modalities for pain management, US 3 mhz, Therapeutic exercises/activities., Iontophoresis with 2.0 cc Dexamethasone, Strengthening, Orthotic Fabrication/Fit/Training, Edema Control, Scar Management, Wound Care, Electrical Modalities, Joint Protection, and Energy Conservation, as well as any other treatments deemed necessary based on the patient's needs or progress.        Updates/Grading for next session: Continue with current plan of care       Herrera Lovett, OT

## 2024-10-16 ENCOUNTER — HOSPITAL ENCOUNTER (OUTPATIENT)
Facility: HOSPITAL | Age: 59
Discharge: HOME | End: 2024-10-16
Attending: STUDENT IN AN ORGANIZED HEALTH CARE EDUCATION/TRAINING PROGRAM | Admitting: STUDENT IN AN ORGANIZED HEALTH CARE EDUCATION/TRAINING PROGRAM
Payer: COMMERCIAL

## 2024-10-16 ENCOUNTER — TELEPHONE (OUTPATIENT)
Dept: PAIN MEDICINE | Facility: CLINIC | Age: 59
End: 2024-10-16
Payer: COMMERCIAL

## 2024-10-16 ENCOUNTER — PATIENT MESSAGE (OUTPATIENT)
Dept: NEUROLOGY | Facility: CLINIC | Age: 59
End: 2024-10-16
Payer: COMMERCIAL

## 2024-10-16 VITALS
DIASTOLIC BLOOD PRESSURE: 77 MMHG | OXYGEN SATURATION: 95 % | WEIGHT: 269 LBS | TEMPERATURE: 98 F | RESPIRATION RATE: 17 BRPM | SYSTOLIC BLOOD PRESSURE: 132 MMHG | HEART RATE: 97 BPM | BODY MASS INDEX: 47.66 KG/M2 | HEIGHT: 63 IN

## 2024-10-16 DIAGNOSIS — I10 ESSENTIAL HYPERTENSION: ICD-10-CM

## 2024-10-16 DIAGNOSIS — M47.812 ARTHRITIS OF FACET JOINT OF CERVICAL SPINE: ICD-10-CM

## 2024-10-16 DIAGNOSIS — G44.86 CERVICOGENIC HEADACHE: ICD-10-CM

## 2024-10-16 DIAGNOSIS — M47.812 CERVICAL SPONDYLOSIS: Primary | ICD-10-CM

## 2024-10-16 DIAGNOSIS — G89.29 CHRONIC PAIN: ICD-10-CM

## 2024-10-16 LAB
POCT GLUCOSE: 237 MG/DL (ref 70–110)
POCT GLUCOSE: 244 MG/DL (ref 70–110)

## 2024-10-16 PROCEDURE — 82962 GLUCOSE BLOOD TEST: CPT | Performed by: STUDENT IN AN ORGANIZED HEALTH CARE EDUCATION/TRAINING PROGRAM

## 2024-10-16 RX ORDER — AMLODIPINE BESYLATE 10 MG/1
10 TABLET ORAL
Qty: 90 TABLET | Refills: 0 | Status: SHIPPED | OUTPATIENT
Start: 2024-10-16

## 2024-10-16 RX ORDER — SODIUM CHLORIDE 9 MG/ML
INJECTION, SOLUTION INTRAVENOUS CONTINUOUS
Status: DISCONTINUED | OUTPATIENT
Start: 2024-10-16 | End: 2024-10-16 | Stop reason: HOSPADM

## 2024-10-17 ENCOUNTER — OFFICE VISIT (OUTPATIENT)
Dept: CARDIOLOGY | Facility: CLINIC | Age: 59
End: 2024-10-17
Payer: COMMERCIAL

## 2024-10-17 ENCOUNTER — CLINICAL SUPPORT (OUTPATIENT)
Dept: REHABILITATION | Facility: HOSPITAL | Age: 59
End: 2024-10-17
Payer: COMMERCIAL

## 2024-10-17 VITALS
OXYGEN SATURATION: 97 % | WEIGHT: 268.94 LBS | SYSTOLIC BLOOD PRESSURE: 113 MMHG | BODY MASS INDEX: 47.65 KG/M2 | DIASTOLIC BLOOD PRESSURE: 65 MMHG | HEIGHT: 63 IN | HEART RATE: 97 BPM

## 2024-10-17 DIAGNOSIS — I10 ESSENTIAL HYPERTENSION: Primary | ICD-10-CM

## 2024-10-17 DIAGNOSIS — M25.522 PAIN IN LEFT ELBOW: ICD-10-CM

## 2024-10-17 DIAGNOSIS — R55 SYNCOPE AND COLLAPSE: ICD-10-CM

## 2024-10-17 DIAGNOSIS — M62.81 MUSCLE WEAKNESS: Primary | ICD-10-CM

## 2024-10-17 DIAGNOSIS — R07.9 CHEST PAIN, UNSPECIFIED TYPE: ICD-10-CM

## 2024-10-17 DIAGNOSIS — M79.642 PAIN IN LEFT HAND: ICD-10-CM

## 2024-10-17 DIAGNOSIS — M25.60 STIFFNESS IN JOINT: ICD-10-CM

## 2024-10-17 DIAGNOSIS — R00.2 PALPITATION: ICD-10-CM

## 2024-10-17 PROCEDURE — 3074F SYST BP LT 130 MM HG: CPT | Mod: CPTII,S$GLB,, | Performed by: INTERNAL MEDICINE

## 2024-10-17 PROCEDURE — 99999 PR PBB SHADOW E&M-EST. PATIENT-LVL IV: CPT | Mod: PBBFAC,,, | Performed by: INTERNAL MEDICINE

## 2024-10-17 PROCEDURE — 3008F BODY MASS INDEX DOCD: CPT | Mod: CPTII,S$GLB,, | Performed by: INTERNAL MEDICINE

## 2024-10-17 PROCEDURE — 97140 MANUAL THERAPY 1/> REGIONS: CPT | Mod: PO

## 2024-10-17 PROCEDURE — 99204 OFFICE O/P NEW MOD 45 MIN: CPT | Mod: S$GLB,,, | Performed by: INTERNAL MEDICINE

## 2024-10-17 PROCEDURE — 4010F ACE/ARB THERAPY RXD/TAKEN: CPT | Mod: CPTII,S$GLB,, | Performed by: INTERNAL MEDICINE

## 2024-10-17 PROCEDURE — 1159F MED LIST DOCD IN RCRD: CPT | Mod: CPTII,S$GLB,, | Performed by: INTERNAL MEDICINE

## 2024-10-17 PROCEDURE — 3078F DIAST BP <80 MM HG: CPT | Mod: CPTII,S$GLB,, | Performed by: INTERNAL MEDICINE

## 2024-10-17 PROCEDURE — 93000 ELECTROCARDIOGRAM COMPLETE: CPT | Mod: S$GLB,,, | Performed by: INTERNAL MEDICINE

## 2024-10-17 PROCEDURE — 3052F HG A1C>EQUAL 8.0%<EQUAL 9.0%: CPT | Mod: CPTII,S$GLB,, | Performed by: INTERNAL MEDICINE

## 2024-10-17 PROCEDURE — 3060F POS MICROALBUMINURIA REV: CPT | Mod: CPTII,S$GLB,, | Performed by: INTERNAL MEDICINE

## 2024-10-17 PROCEDURE — 3066F NEPHROPATHY DOC TX: CPT | Mod: CPTII,S$GLB,, | Performed by: INTERNAL MEDICINE

## 2024-10-17 NOTE — PROGRESS NOTES
"  Occupational Therapy Daily Treatment Note     Name: Barrera Siddiqui  [facility] Number: [id number]    Therapy Diagnosis:   Encounter Diagnoses   Name Primary?    Muscle weakness Yes    Stiffness in joint     Pain in left elbow     Pain in left hand        Physician: Brandie Monteiro, *    Visit Date: 10/17/2024    Physician Orders: OT eval/treat   Medical Diagnosis: G56.23 (ICD-10-CM) - Cubital tunnel syndrome of both upper extremities  Evaluation Date: 8/14/2024  Plan of Care Certification Date: 11/6/24  Authorization Period: 7/24/24 - 12/31/24   Surgery Date and Procedure: Procedure(s) (LRB):  LEFT ELBOW DECOMPRESSION, NERVE, ULNAR (Left)  LEFT THUMB AND RING  TRIGGER FINGER RELEASE (Left)   Tenosynovectomy left thumb and ring finger flexor tendon 7/24/24   Date of Return to MD: none noted in chart at this time   Visit # / Visits authorized: 14 / 20 (plus initial evaluation)   FOTO Completion: 1/3     Time In:1425  Time Out: 1445  Total Billable Time: 20 minutes (1MT)     Precautions:  Standard      Subjective     Pt reports: She initially cancelled therapy, but decided to show for appointment but is requesting abbreviated session due to needing to make another appointment.  She was unable to receive her nerve block because her "sugar" levels were too high.  Her hand is less sore, but still still swollen near the carpal tunnel.  She reports compliance with discontinuing theraputty exercises and limiting excessive gripping.  She continues to have some elbow pain at night and in am;  describes as "achy" type pain.      she was compliant with home exercise program given last session.   Response to previous treatment: no soreness elbow following last session     Functional change: None reported at this time.      Pain: 7/10 at the elbow; 8/10 at hand    Location: left arms   Objective       Mental status: alert, oriented x3     Observation:   Pt incision sites are clean and healing well        Sensation: Pt states she has " pins and needles in her small finger      Range of Motion:   Left        Elbow Left  8/14/2024 Left   10/17/2024 Right  8/14/2024   Flexion 130 130 140   Extension 0 0 0         Left Hand Finger ROM INDEX   8/14/2024 INDEX  10/17/  2024 LONG   8/14/2024 LONG  10/17/  2024 RING   8/14/2024 RING  10/17/  2024 SMALL   8/14/2024 SMALL   10/17/  2024   MP  60 85 60 90 60 90 64 95   PIP  90 105 95 105 95 100 100 95   DIP  75 65 80 60 80 80 85 80   *measures taken as composite fist on 10/17/2024       Right Hand Finger ROM INDEX   8/14/2024 LONG   8/14/2024 RING   8/14/2024 SMALL   8/14/2024   MP  90 85 90 90   PIP  110 105 100 100   DIP  80 80 80 90        Left Thumb range of motion  8/14/2024 Left Thumb range of motion  10/17/2024 Right Thumb range of motion  8/14/2024   MP 80 80 75   IP 85 60 75   Radial Abduction 70 45 70   Palmar Abduction 60 45 60   Opposition Middle of SF To MCP of SF  DPC   *MP/IP measures taken as composite thumb flexion on 10/17/2024      Strength: (ZULEYKA Dynamometer in psi.)        8/14/2024 8/14/2024     Left Right   Rung II 15 60         Pinch Strength (Measured in psi)       8/14/2024 8/14/2024     Left Right   Key Pinch 4  10   3pt Pinch 6 11   2pt Pinch 4 8         CMS Impairment/Limitation/Restriction for FOTO Hand/Wrist/Finger Survey     Therapist reviewed FOTO scores for Barrera Siddiqui on 8/14/2024.   FOTO documents entered into [employer] - see Media section.     Intake Score: 28%  Category: Self Care           Treatment       Barrera received the following direct contact modalities after being cleared for contraindications for 0 minutes:  - paraffin wax and moist hot pack to hand to increase extensibility   - moist hot pack to elbow to increase extensibility (simultaneously with paraffin application)      Manual therapy techniques: Myofacial release, Soft tissue Mobilization, and scar massage were applied to the: left hand and left elbow for 20 minutes, including:  - use of hand techniques,  "cupping, IASTM tools, and scar pump to increase blood flow/circulation, improve soft tissue pliability and decrease pain.    -scar massage to elbow with use of hand techniques only to decrease tissue adhesions and pain, and increase tissue extensibility   -intrinsic stretches to digits #2-5 5 reps X 5 sec hold       Therapeutic exercises to develop strength, endurance, ROM, and flexibility for 5 minutes, including:   **Some Objective measures taken today, 10/17/2024.    Bolded performed today.    -TGE's x 20   - thumb MP/IP flexion with blocking X 20 each   - ring finger PIP/DIP with blocking X 20 each   - thumb retropulsion X 10 reps   - finger lifts, isolated and composite x 10   - finger spreads with (2) tan RB x 20   - elbow 3 ways with 3# weight 2/15   - wrist 3 ways with 3# weight 2/15   - manual scar desensitization to elbow and trigger finger incisions  - pom pom  with gripper on 1st rung  - isospheres x 3 minutes   - wrist maze x 3 minutes   - Wrist wheel 2 minutes in flex/ext & sup/pro   -green flex bar smiles/frowns/twists X 2/15 reps   -digiflex, red, FA supinated/pronated/neutral FA 2/10   -ulnar nerve glide X 10     Therputty (yellow/green mix):   -molding 2 min  -gripping X 5 with 5 sec hold   -thumb flexion X 5 with 5 sec hold   -pancake and bloom X 5   -log rolls X 6   -lateral/tripod/tip to tip pinches 2 log seach   -finger scrapes, flexion/extension 2 pancakes each  -pinch/pull with digits #4 & #5 X 5     Patient received cold pack x 5 minutes to decrease pain/inflammation and edema following treatment session.      Home Exercises and Education Provided     Education provided:   -ulnar nerve glide 10/8/2024  -recommend towel wrap to keep elbow extended at night to decrease "achy" pain in elbow   -Issued/reviewed theraputty HEP; issued yellow/green theraputty mix     - Progress towards goals     Written Home Exercises Provided: Patient instructed to cont prior HEP.  Exercises were reviewed " and Barrera was able to demonstrate them prior to the end of the session.  Barrera demonstrated good  understanding of the HEP provided.   .   See EMR under Patient Instructions for exercises provided prior visit.        Assessment     Pt would continue to benefit from skilled OT.   Pt continues to have sensitivity in her elbow, in addition to neck pain that is radiating into her arm.  She requested abbreviated session today due to another appointment.  Edema does persist on ulnar side of carpal tunnel incision.  She reported that she has NOT been performing theraputty program.  She was performing 3-4 times per day after issued last visit and noticed the swelling and soreness develop 3-4 days later.  She was advised to discontinue use of theraputty at this time and apply ice as needed to decrease swelling and soreness.  FDS and FDP tendons are noted to have full excursion and pull through all digits.  Pt is motivated. Will continue to progress as tolerated.        Barrera is progressing well towards her goals and there are no updates to goals at this time. Pt prognosis is Good.     Pt will continue to benefit from skilled outpatient occupational therapy to address the deficits listed in the problem list on initial evaluation provide pt/family education and to maximize pt's level of independence in the home and community environment.     Anticipated barriers to occupational therapy: None at this time     Pt's spiritual, cultural and educational needs considered and pt agreeable to plan of care and goals.      Goals:    LTG's (12 weeks):  1)   Increase ROM to 140 degrees in left elbow flexion to increase functional hand use for ADLs, IADLs, and leisure tasks Ongoing  2)   Increase ROM to 85 degrees in left hand MP joints to increase functional hand use for ADLs, IADLs, and leisure tasksOngoing  3)   Increase ROM so that opposition in left thumb is to the DPC  to increase functional hand use for ADLs, IADLs, and leisure  tasksOngoing  4)   Increase  strength to 50 lbs. to grasp for ADLs, IADLs and leisure taskOngoing  5)   Increase key pinch to 8 psis to increase independence with button and FM coordinationOngoing  6)   Decrease complaints of pain to  1 out of 10 at worst to increase functional hand use for ADL/work/leisure activities.Ongoing  7)   Patient to score at 55% or more on FOTO to demonstrate improved perception of functional left UE use.Ongoing  8)   Pt will return to near to prior level of function for ADLs and household management reporting I or Mod I with ADLs (dressing, feeding, grooming, toileting). Ongoing     STG's (6 weeks)  1)   Patient to be IND with HEP and modalities for pain/edema managment.Ongoing  2)   Increase ROM to 135 degrees in left elbow flexion to increase functional hand use for ADLs, IADLs, and leisure tasksOngoing  3)   Increase ROM to 75 degrees in left hand MP joints to increase functional hand use for ADLs, IADLs, and leisure tasksOngoing  4)   Increase ROM so that opposition in left thumb is to the base of her SF  to increase functional hand use for ADLs, IADLs, and leisure tasksOngoing  5)   Increase  strength to 25 lbs. to grasp for ADLs, IADLs and leisure taskOngoing  6)   Increase key pinch to 6 psis to increase independence with button and FM coordinationOngoing  7)   Patient to be IND wiht Orthotic use, wear and care precautions. Ongoing  8)   Decrease complaints of pain to  5 out of 10 at worst to increase functional hand use for ADL/work/leisure activities.Ongoing     Plan      Pt to be treated by Occupational Therapy 2 times per week for 12 weeks during the certification period from 8/14/2024 to 11/6/24 to achieve the established goals.      Treatment to include: Paraffin, Fluidotherapy, Manual therapy/joint mobilizations, Modalities for pain management, US 3 mhz, Therapeutic exercises/activities., Iontophoresis with 2.0 cc Dexamethasone, Strengthening, Orthotic  Fabrication/Fit/Training, Edema Control, Scar Management, Wound Care, Electrical Modalities, Joint Protection, and Energy Conservation, as well as any other treatments deemed necessary based on the patient's needs or progress.        Updates/Grading for next session: Continue with current plan of care       Herrera Lovett OT

## 2024-10-18 LAB
OHS QRS DURATION: 88 MS
OHS QTC CALCULATION: 452 MS

## 2024-10-21 ENCOUNTER — TELEPHONE (OUTPATIENT)
Dept: PAIN MEDICINE | Facility: CLINIC | Age: 59
End: 2024-10-21
Payer: COMMERCIAL

## 2024-10-23 NOTE — PROGRESS NOTES
Subjective:   @Patient ID:  Barrera Siddiqui is a 59 y.o. female who presents for evaluation of No chief complaint on file.      HPI:        Patient is a 59-year-old female with past medical history of hypertension on losartan and aspirin, hyperlipidemia on Lipitor 20 mg daily, on aspirin therapy, history of possible TIA, negative neurological workup in the past, obesity, depression, diabetes on glipizide, takes Lasix 40 mg twice a day for bilateral swelling of the feet, has had previous issues with syncope for which she is  seeing a neurologist.  Has had extensive cardiac workup done in the past, event monitor did not show any significant tachy or bradyarrhythmias.  Echocardiogram in November 2023 showed normal diastolic parameters and preserved left ventricular ejection fraction, LDL of 66 A1c of 8.5.  Now in Cardiology Clinic for preoperative risk assessment prior to upcoming colonoscopy.    Results for orders placed during the hospital encounter of 11/27/23    Echo Saline Bubble? No    Interpretation Summary    Left Ventricle: The left ventricle is normal in size. Normal wall thickness. There is concentric remodeling. Normal wall motion. There is normal systolic function. Ejection fraction by visual approximation is 55%. There is normal diastolic function.    Right Ventricle: Normal right ventricular cavity size. Wall thickness is normal. Right ventricle wall motion  is normal. Systolic function is normal.    Mitral Valve: There is mild posterior mitral annular calcification present.    Pulmonary Artery: The estimated pulmonary artery systolic pressure is 17 mmHg.    IVC/SVC: Normal venous pressure at 3 mmHg.      No results found for this or any previous visit.      No results found for this or any previous visit.      Please document below the medical necessity for continuous telemetry monitoring or discontinue the current order if appropriate.    Current rhythm from flowsheet:               Patient Active Problem  List    Diagnosis Date Noted    Hemoptysis 09/13/2024    Muscle weakness 08/14/2024    Stiffness in joint 08/14/2024    Pain in left elbow 08/14/2024    Pain in left hand 08/14/2024    Trigger ring finger of left hand 07/23/2024    Cubital tunnel syndrome of both upper extremities 07/23/2024    Sleep apnea 01/03/2024     Very high suspicion for EDGAR given her symptoms of waking up gasping for air.  Sleep medicine referral placed      SOBOE (shortness of breath on exertion) 01/03/2024     CTA negative and lung parenchyma with no evidence of ILD or other issue  I am concerned that her SOB is primarily due to her obesity and EDGAR.   She is established with Dr. Sands now and has a home sleep study scheduled on April second.   She can follow up as needed with me.       Normocytic anemia 08/16/2023    Syncope 08/15/2023    Right ear pain 01/01/2023    Acute cystitis 12/30/2022    Anxiety disorder 01/08/2022    Insomnia related to another mental disorder 01/08/2022    Constipation 01/08/2022    Moderate recurrent major depression 01/08/2022    Vitamin D deficiency 01/08/2022    Decreased range of motion of neck 03/12/2020    Weakness of left upper extremity 03/12/2020    Poor posture 03/12/2020    Hyperglycemia 10/29/2018    Screening for colorectal cancer 10/18/2017    Depression 12/24/2016    History of smoking 12/24/2016    Chest pain 09/27/2016    Right calf pain 05/11/2016    Palpitation 05/11/2016    Snoring 05/11/2016    Daytime somnolence 05/11/2016     Also concern for waking up at night gasping for air  Sleep medicine referral placed      Headache 05/11/2016    Diastolic heart failure 04/29/2016    Morbid obesity with BMI of 45.0-49.9, adult 04/11/2016     This explains the mild restriction noted on her PFTs.  And definitely affects her health in a negative way      Idiopathic hypotension 04/11/2016    Type 2 diabetes mellitus 04/11/2016    Hyperlipidemia 04/11/2016    Essential hypertension 04/09/2016                     LAST HbA1c  Lab Results   Component Value Date    HGBA1C 8.5 (H) 08/23/2024       Lipid panel  Lab Results   Component Value Date    CHOL 144 08/15/2023    CHOL 123 11/21/2020    CHOL 109 (L) 02/08/2020     Lab Results   Component Value Date    HDL 43 08/15/2023    HDL 43 11/21/2020    HDL 34 (L) 02/08/2020     Lab Results   Component Value Date    LDLCALC 66.4 08/15/2023    LDLCALC 42.0 (L) 11/21/2020    LDLCALC 34.0 (L) 02/08/2020     Lab Results   Component Value Date    TRIG 173 (H) 08/15/2023    TRIG 190 (H) 11/21/2020    TRIG 205 (H) 02/08/2020     Lab Results   Component Value Date    CHOLHDL 29.9 08/15/2023    CHOLHDL 35.0 11/21/2020    CHOLHDL 31.2 02/08/2020            Review of Systems   Constitutional: Negative for chills and fever.   HENT:  Negative for hearing loss and nosebleeds.    Eyes:  Negative for blurred vision.   Cardiovascular:  Negative for chest pain, leg swelling and palpitations.   Respiratory:  Negative for hemoptysis and shortness of breath.    Hematologic/Lymphatic: Negative for bleeding problem.   Skin:  Negative for itching.   Musculoskeletal:  Negative for falls.   Gastrointestinal:  Negative for abdominal pain and hematochezia.   Genitourinary:  Negative for hematuria.   Neurological:  Negative for dizziness and loss of balance.   Psychiatric/Behavioral:  Negative for altered mental status and depression.        Objective:   Physical Exam  Constitutional:       Appearance: She is well-developed.   HENT:      Head: Normocephalic and atraumatic.   Eyes:      Conjunctiva/sclera: Conjunctivae normal.   Neck:      Vascular: No carotid bruit or JVD.   Cardiovascular:      Rate and Rhythm: Normal rate and regular rhythm.      Pulses:           Carotid pulses are 2+ on the right side and 2+ on the left side.       Radial pulses are 2+ on the right side and 2+ on the left side.      Heart sounds: Normal heart sounds. No murmur heard.     No friction rub. No gallop.   Pulmonary:       Effort: Pulmonary effort is normal. No respiratory distress.      Breath sounds: Normal breath sounds. No stridor. No wheezing.   Musculoskeletal:      Cervical back: Neck supple.   Skin:     General: Skin is warm and dry.   Neurological:      Mental Status: She is alert and oriented to person, place, and time.   Psychiatric:         Behavior: Behavior normal.         Assessment:     1. Essential hypertension    2. Chest pain, unspecified type    3. Palpitation    4. Syncope and collapse        Plan:     -   Patient has decent exercise capacity and has had extensive cardiac workup done in the past for syncope.  Hemodynamically stable.  EKG shows normal sinus rhythm, EKG unchanged from before, possible anterior infarct mentioned on the EKG but has had previous echocardiogram with normal wall motion  -  patient is at low risk for perioperative adverse cardiovascular outcomes.  No  further cardiac testing is warranted at this point.  -   For completeness of workup since she has had new episodes of syncope since last year I recommend event monitor and repeat echocardiogram.  These tests are not  required for patient's colonoscopy.  Follow up in clinic with test results.    Pertinent cardiac images and EKG reviewed independently.    Continue with current medical plan and lifestyle changes.  Return sooner for concerns or questions. If symptoms persist go to the ED  I have reviewed all pertinent data including patient's medical history in detail and updated the computerized patient record.     Orders Placed This Encounter   Procedures    Cardiac event monitor     Standing Status:   Future     Standing Expiration Date:   10/17/2025     Order Specific Question:   Cardiac Event Monitor     Answer:   Auto Trigger     Order Specific Question:   Release to patient     Answer:   Immediate    IN OFFICE EKG 12-LEAD (to [place])    Echo     Standing Status:   Future     Standing Expiration Date:   10/17/2025     Order Specific  Question:   Release to patient     Answer:   Immediate       Follow up as scheduled.     She expressed verbal understanding and agreed with the plan    Patient's Medications   New Prescriptions    No medications on file   Previous Medications    AMLODIPINE (NORVASC) 10 MG TABLET    Take 1 tablet by mouth once daily    ASPIRIN (ECOTRIN) 81 MG EC TABLET    Take 1 tablet (81 mg total) by mouth once daily.    ATORVASTATIN (LIPITOR) 20 MG TABLET    Take 1 tablet (20 mg total) by mouth once daily.    CETIRIZINE (ZYRTEC) 10 MG TABLET    Take 2 tablets (20 mg total) by mouth once daily. for 5 days    FUROSEMIDE (LASIX) 40 MG TABLET    Take 1 tablet (40 mg total) by mouth 2 (two) times daily.    GLIPIZIDE (GLUCOTROL) 10 MG TABLET    Take 10 mg by mouth 2 (two) times daily.    LOSARTAN (COZAAR) 100 MG TABLET    Take 1 tablet (100 mg total) by mouth once daily.    METFORMIN (GLUCOPHAGE-XR) 500 MG ER 24HR TABLET    Take 500 mg by mouth 2 (two) times daily.    MULTIVITAMIN (THERAGRAN) PER TABLET    Take 1 tablet by mouth once daily.    OZEMPIC 0.25 MG OR 0.5 MG (2 MG/3 ML) PEN INJECTOR    Inject into the skin.    PREGABALIN (LYRICA) 100 MG CAPSULE    Take 1 capsule (100 mg total) by mouth 2 (two) times daily.    TRAMADOL (ULTRAM) 50 MG TABLET    Take 1 tablet (50 mg total) by mouth every 6 (six) hours as needed for Pain.   Modified Medications    No medications on file   Discontinued Medications    No medications on file        Negro Rosen M.D

## 2024-10-28 ENCOUNTER — HOSPITAL ENCOUNTER (OUTPATIENT)
Facility: HOSPITAL | Age: 59
Discharge: HOME | End: 2024-10-28
Attending: STUDENT IN AN ORGANIZED HEALTH CARE EDUCATION/TRAINING PROGRAM | Admitting: STUDENT IN AN ORGANIZED HEALTH CARE EDUCATION/TRAINING PROGRAM
Payer: COMMERCIAL

## 2024-10-28 VITALS
HEART RATE: 104 BPM | BODY MASS INDEX: 49.32 KG/M2 | SYSTOLIC BLOOD PRESSURE: 136 MMHG | HEIGHT: 62 IN | TEMPERATURE: 98 F | RESPIRATION RATE: 17 BRPM | DIASTOLIC BLOOD PRESSURE: 88 MMHG | OXYGEN SATURATION: 95 % | WEIGHT: 268 LBS

## 2024-10-28 DIAGNOSIS — M47.812 CERVICAL SPONDYLOSIS: Primary | ICD-10-CM

## 2024-10-28 DIAGNOSIS — G89.29 CHRONIC PAIN: ICD-10-CM

## 2024-10-28 LAB — POCT GLUCOSE: 132 MG/DL (ref 70–110)

## 2024-10-28 PROCEDURE — 63600175 PHARM REV CODE 636 W HCPCS: Performed by: STUDENT IN AN ORGANIZED HEALTH CARE EDUCATION/TRAINING PROGRAM

## 2024-10-28 PROCEDURE — 82962 GLUCOSE BLOOD TEST: CPT | Performed by: STUDENT IN AN ORGANIZED HEALTH CARE EDUCATION/TRAINING PROGRAM

## 2024-10-28 PROCEDURE — 99152 MOD SED SAME PHYS/QHP 5/>YRS: CPT | Performed by: STUDENT IN AN ORGANIZED HEALTH CARE EDUCATION/TRAINING PROGRAM

## 2024-10-28 PROCEDURE — 64491 INJ PARAVERT F JNT C/T 2 LEV: CPT | Mod: LT | Performed by: STUDENT IN AN ORGANIZED HEALTH CARE EDUCATION/TRAINING PROGRAM

## 2024-10-28 PROCEDURE — 64491 INJ PARAVERT F JNT C/T 2 LEV: CPT | Mod: LT,,, | Performed by: STUDENT IN AN ORGANIZED HEALTH CARE EDUCATION/TRAINING PROGRAM

## 2024-10-28 PROCEDURE — 64490 INJ PARAVERT F JNT C/T 1 LEV: CPT | Mod: LT,,, | Performed by: STUDENT IN AN ORGANIZED HEALTH CARE EDUCATION/TRAINING PROGRAM

## 2024-10-28 PROCEDURE — 64490 INJ PARAVERT F JNT C/T 1 LEV: CPT | Mod: LT | Performed by: STUDENT IN AN ORGANIZED HEALTH CARE EDUCATION/TRAINING PROGRAM

## 2024-10-28 PROCEDURE — 99152 MOD SED SAME PHYS/QHP 5/>YRS: CPT | Mod: ,,, | Performed by: STUDENT IN AN ORGANIZED HEALTH CARE EDUCATION/TRAINING PROGRAM

## 2024-10-28 RX ORDER — LIDOCAINE HYDROCHLORIDE 20 MG/ML
INJECTION, SOLUTION EPIDURAL; INFILTRATION; INTRACAUDAL; PERINEURAL
Status: DISCONTINUED | OUTPATIENT
Start: 2024-10-28 | End: 2024-10-28 | Stop reason: HOSPADM

## 2024-10-28 RX ORDER — BUPIVACAINE HYDROCHLORIDE 2.5 MG/ML
INJECTION, SOLUTION EPIDURAL; INFILTRATION; INTRACAUDAL
Status: DISCONTINUED | OUTPATIENT
Start: 2024-10-28 | End: 2024-10-28 | Stop reason: HOSPADM

## 2024-10-28 RX ORDER — SODIUM CHLORIDE 9 MG/ML
INJECTION, SOLUTION INTRAVENOUS CONTINUOUS
Status: DISCONTINUED | OUTPATIENT
Start: 2024-10-28 | End: 2024-10-28 | Stop reason: HOSPADM

## 2024-10-28 RX ORDER — MIDAZOLAM HYDROCHLORIDE 1 MG/ML
INJECTION INTRAMUSCULAR; INTRAVENOUS
Status: DISCONTINUED | OUTPATIENT
Start: 2024-10-28 | End: 2024-10-28 | Stop reason: HOSPADM

## 2024-10-29 DIAGNOSIS — M47.812 CERVICAL SPONDYLOSIS: Primary | ICD-10-CM

## 2024-10-30 ENCOUNTER — PATIENT MESSAGE (OUTPATIENT)
Dept: ORTHOPEDICS | Facility: CLINIC | Age: 59
End: 2024-10-30
Payer: COMMERCIAL

## 2024-10-30 ENCOUNTER — TELEPHONE (OUTPATIENT)
Dept: PAIN MEDICINE | Facility: CLINIC | Age: 59
End: 2024-10-30
Payer: COMMERCIAL

## 2024-10-30 ENCOUNTER — PATIENT MESSAGE (OUTPATIENT)
Dept: NEUROLOGY | Facility: CLINIC | Age: 59
End: 2024-10-30
Payer: COMMERCIAL

## 2024-10-30 DIAGNOSIS — M47.812 CERVICAL SPONDYLOSIS: Primary | ICD-10-CM

## 2024-11-03 DIAGNOSIS — M54.2 NECK PAIN: Primary | ICD-10-CM

## 2024-11-04 ENCOUNTER — PATIENT MESSAGE (OUTPATIENT)
Dept: CARDIOLOGY | Facility: CLINIC | Age: 59
End: 2024-11-04
Payer: COMMERCIAL

## 2024-11-04 RX ORDER — PREGABALIN 100 MG/1
100 CAPSULE ORAL 2 TIMES DAILY
Qty: 60 CAPSULE | Refills: 0 | Status: SHIPPED | OUTPATIENT
Start: 2024-11-04

## 2024-11-05 DIAGNOSIS — R55 SYNCOPE, UNSPECIFIED SYNCOPE TYPE: Primary | ICD-10-CM

## 2024-11-06 ENCOUNTER — PATIENT MESSAGE (OUTPATIENT)
Dept: CARDIOLOGY | Facility: CLINIC | Age: 59
End: 2024-11-06
Payer: COMMERCIAL

## 2024-11-06 DIAGNOSIS — R55 SYNCOPE AND COLLAPSE: Primary | ICD-10-CM

## 2024-11-06 NOTE — TELEPHONE ENCOUNTER
Patient has had syncope episode in the past for which she sees a neurologist.  We will proceed with tilt-table testing for further evaluation of syncope.

## 2024-11-08 DIAGNOSIS — I10 ESSENTIAL HYPERTENSION: ICD-10-CM

## 2024-11-08 RX ORDER — LOSARTAN POTASSIUM 100 MG/1
100 TABLET ORAL
Qty: 90 TABLET | Refills: 0 | Status: SHIPPED | OUTPATIENT
Start: 2024-11-08

## 2024-11-12 ENCOUNTER — CLINICAL SUPPORT (OUTPATIENT)
Dept: REHABILITATION | Facility: HOSPITAL | Age: 59
End: 2024-11-12
Payer: COMMERCIAL

## 2024-11-12 ENCOUNTER — OFFICE VISIT (OUTPATIENT)
Dept: ORTHOPEDICS | Facility: CLINIC | Age: 59
End: 2024-11-12
Payer: COMMERCIAL

## 2024-11-12 DIAGNOSIS — M62.81 MUSCLE WEAKNESS: Primary | ICD-10-CM

## 2024-11-12 DIAGNOSIS — M25.522 PAIN IN LEFT ELBOW: ICD-10-CM

## 2024-11-12 DIAGNOSIS — G56.23 CUBITAL TUNNEL SYNDROME OF BOTH UPPER EXTREMITIES: Primary | ICD-10-CM

## 2024-11-12 DIAGNOSIS — M79.642 PAIN IN LEFT HAND: ICD-10-CM

## 2024-11-12 DIAGNOSIS — M65.30 TRIGGER FINGER OF RIGHT HAND, UNSPECIFIED FINGER: ICD-10-CM

## 2024-11-12 DIAGNOSIS — M25.60 STIFFNESS IN JOINT: ICD-10-CM

## 2024-11-12 PROCEDURE — 3052F HG A1C>EQUAL 8.0%<EQUAL 9.0%: CPT | Mod: CPTII,S$GLB,, | Performed by: ORTHOPAEDIC SURGERY

## 2024-11-12 PROCEDURE — 99999 PR PBB SHADOW E&M-EST. PATIENT-LVL II: CPT | Mod: PBBFAC,,, | Performed by: ORTHOPAEDIC SURGERY

## 2024-11-12 PROCEDURE — 97035 APP MDLTY 1+ULTRASOUND EA 15: CPT | Mod: PO

## 2024-11-12 PROCEDURE — 3066F NEPHROPATHY DOC TX: CPT | Mod: CPTII,S$GLB,, | Performed by: ORTHOPAEDIC SURGERY

## 2024-11-12 PROCEDURE — 97140 MANUAL THERAPY 1/> REGIONS: CPT | Mod: PO

## 2024-11-12 PROCEDURE — 1159F MED LIST DOCD IN RCRD: CPT | Mod: CPTII,S$GLB,, | Performed by: ORTHOPAEDIC SURGERY

## 2024-11-12 PROCEDURE — 97110 THERAPEUTIC EXERCISES: CPT | Mod: PO

## 2024-11-12 PROCEDURE — 4010F ACE/ARB THERAPY RXD/TAKEN: CPT | Mod: CPTII,S$GLB,, | Performed by: ORTHOPAEDIC SURGERY

## 2024-11-12 PROCEDURE — 99214 OFFICE O/P EST MOD 30 MIN: CPT | Mod: S$GLB,,, | Performed by: ORTHOPAEDIC SURGERY

## 2024-11-12 PROCEDURE — 3060F POS MICROALBUMINURIA REV: CPT | Mod: CPTII,S$GLB,, | Performed by: ORTHOPAEDIC SURGERY

## 2024-11-12 NOTE — PLAN OF CARE
"[facility]  Occupational Therapy Plan of Care Note     Name: Barrera Siddiqui  Clinic Number: [id number]    Therapy Diagnosis:   Encounter Diagnoses   Name Primary?    Muscle weakness Yes    Stiffness in joint     Pain in left elbow     Pain in left hand      Physician: Brandie Monteiro, *    Visit Date: 11/12/2024    Physician Orders: OT eval/treat   Medical Diagnosis: G56.23 (ICD-10-CM) - Cubital tunnel syndrome of both upper extremities  Evaluation Date: 8/14/2024  Authorization Period Expiration: 7/24/24 - 12/31/24   Plan of Care Expiration: 11/6/24  Progress Note Due: 12/10/2024   Visit # / Visits authorized: 15 / 20 (plus initial evaluation)   FOTO Completion: 2/3  Initial=28 / Goal=33  /Updated=55 (11/12/2024)       Precautions: Standard  Functional Level Prior to Evaluation:  independent     SUBJECTIVE     Update: She had follow up with hand surgeon today and reports that hand surgeon is pleased with her progress.  She does continue to have some "achy" elbow pain in her elbow.  Barrera reports she is scheduled for surgery December 11 for right elbow and right ring trigger finger.  She would like to continue therapy until next surgery for strengthening to left elbow/wrist/hand.      OBJECTIVE     Range of Motion:   Left        Elbow Left  8/14/2024 Left   10/17/2024 Left   11/12/2024 Right  8/14/2024   Flexion 130 130 140 (+10)   140   Extension 0 0 0 0         Left Hand Finger ROM INDEX   8/14/2024 INDEX  10/17/  2024 INDEX  11/12/2024 LONG   8/14/2024 LONG   10/17/  2024 LONG  11/12/2024  RING   8/14/2024 RING  10/17/  2024 RING  11/12/2024 SMALL   8/14/2024 SMALL   10/17/  2024 SMALL  11/12/2024   MP  60 85 NT  60 90 NT   60 90 NT  64 95 NT    PIP  90 105    NT  95 105 NT  95 100 NT  100 95 NT    DIP  75 65 Touch to DPC  80 60 Touch to DPC  80 80 Touch to DPC  85 80 Touch to DPC    *measures taken as composite fist on 11/12/2024.  Patient can compositely fist to DPC.         Right Hand " Finger ROM INDEX   8/14/2024 LONG   8/14/2024 RING   8/14/2024 SMALL   8/14/2024   MP  90 85 90 90   PIP  110 105 100 100   DIP  80 80 80 90        Left Thumb range of motion  8/14/2024 Left Thumb range of motion  10/17/2024 Left Thumb range of motion  11/12/2024 Right Thumb range of motion  8/14/2024   MP 80 80 70  75   IP 85 60 75 75   Radial Abduction 70 45 65 70   Palmar Abduction 60 45 50 60   Opposition Middle of SF To MCP of SF  To SF MCP  DPC   *MP/IP measures taken as composite thumb flexion on 11/12/2024.  Patient can touch to 5th MCP with flexion/opposition.  She has improved thumb radail/palmar abduction.         Strength: (ZULEYKA Dynamometer in psi.)        8/14/2024 11/12/2024 8/14/2024     Left Left  Right   Rung II 15 44 (+29)   60         Pinch Strength (Measured in psi)       8/14/2024 11/12/2024 8/14/2024     Left Left  Right   Key Pinch 4  8 (+4)   10   3pt Pinch 6 6 (=)   11   2pt Pinch 4 7 (+3)    8        ASSESSMENT     Update: Patient has made slow steady progress towards pain, range of motion, and strength goals.  Patient continues to report moderate pain levels at the elbow and continues to demonstrate deficits in overall endurance of left hand/wrist/elbow for functional activities.  She would benefit from continued occupational therapy to address aforementioned deficits to maximize performance with ADLs/IADLs.     Previous Short Term Goals Status:   Patient has currently met 7/8 STGs   New Short Term Goals Status:   continue with unmet STGs  Long Term Goal Status: continue with unmet LTGs 2per initial plan of care.  Reasons for Recertification of Therapy:   Update plan of care      GOALS   LTG's (12 weeks):  1)   Increase ROM to 140 degrees in left elbow flexion to increase functional hand use for ADLs, IADLs, and leisure tasks .-Met 11/12/2024  2)   Increase ROM to 85 degrees in left hand MP joints to increase functional hand use for ADLs, IADLs, and leisure tasks.-Met 11/12/2024  3)    Increase ROM so that opposition in left thumb is to the DPC  to increase functional hand use for ADLs, IADLs, and leisure tasks.-Met 11/12/2024  4)   Increase  strength to 50 lbs. to grasp for ADLs, IADLs and leisure taskOngoing  5)   Increase key pinch to 8 psis to increase independence with button and FM coordination.-Met 11/12/2024  6)   Decrease complaints of pain to  1 out of 10 at worst to increase functional hand use for ADL/work/leisure activities.Ongoing  7)   Patient to score at 55% or more on FOTO to demonstrate improved perception of functional left UE use.Ongoing  8)   Pt will return to near to prior level of function for ADLs and household management reporting I or Mod I with ADLs (dressing, feeding, grooming, toileting). Ongoing     STG's (6 weeks)  1)   Patient to be IND with HEP and modalities for pain/edema managment.-Met 11/12/2024  2)   Increase ROM to 135 degrees in left elbow flexion to increase functional hand use for ADLs, IADLs, and leisure tasks.-Met 11/12/2024  3)   Increase ROM to 75 degrees in left hand MP joints to increase functional hand use for ADLs, IADLs, and leisure tasks.-Met 11/12/2024  4)   Increase ROM so that opposition in left thumb is to the base of her SF  to increase functional hand use for ADLs, IADLs, and leisure tasks-Met 11/12/2024  5)   Increase  strength to 25 lbs. to grasp for ADLs, IADLs and leisure task.-Met 11/12/2024  6)   Increase key pinch to 6 psis to increase independence with button and FM coordination.-Met 11/12/2024  7)   Patient to be IND wiht Orthotic use, wear and care precautions. .-Met 11/12/2024  8)   Decrease complaints of pain to  5 out of 10 at worst to increase functional hand use for ADL/work/leisure activities.Ongoing       PLAN     Updated Certification Period: 1/13/2024 to 12/10/2024   Recommended Treatment Plan: 2 times per week for 4 weeks:  Manual Therapy, Moist Heat/ Ice, Paraffin, Therapeutic Activities, Therapeutic Exercise,  and Ultrasound      Herrera Lovett, OT

## 2024-11-12 NOTE — H&P (VIEW-ONLY)
11/12/24  Pt states her right hand is bothering her a lot now. She wakes up at night with numbness/tingling in ulnar 2 digits. She has A1 ttenderness and triggering of right ring finger. She did have emg showing b/l cubital and carpal tunnel. She states her left side has improved considerably and mild incisional numbness present    9/6/24  Reports 6 weeks status post left ulnar nerve decompression and left thumb and ring finger trigger finger release.  She notes that she has been attending therapy regularly as well as performing home exercise program.  She does note that she has been doing scar massage but that has not been aggressive.  She notes that her numbness and pain has been improving.  She does note that still has some numbness within the small finger.    8/7/24  Ms. Siddiqui is here today for a post-operative visit.  She is 14 days status post L Ulnar Nerve Decompression and L Thumb and Ring Finger Trigger Finger Release by Dr. Monteiro on 7/24/24. She reports that she is mild pain over the elbow. She states that she does have some tingling in the small finger.  She denies fever, chills, and sweats since the time of the surgery.     Physical exam:    Vitals:    11/12/24 0810   PainSc:   8     Vital signs are stable, patient is afebrile.  Patient is well dressed and well groomed, no acute distress.  Alert and oriented to person, place, and time.  Incision is clean, dry and intact.  There is no erythema or exudate.  There is no sign of any infection. She is NVI.  Able to make a composite fist.  Full range of motion of the elbow.  She does note some hypersensitivity of the ulnar nerve decompression scar.  She does have hypertrophy of scars at the left trigger finger of her ring    Tinnels along Right cubital. A1 TTP over right ring. Nottas nodule present    Assessment:  s/p L Ulnar Nerve Decompression and L Thumb and Ring Finger Trigger Finger Release  Right cubital tunnel syndrome and right ring trigger  finger        Plan:    - consent for right cubital tunnel release and right ring trigger finger release  - Risks versus benefits and alternative treatment options were discussed with the patient. The patient understood options and all questions answered. The patient was agreeable to proceed with surgery and will be scheduled.    We have discussed risks, which include but are not limited to bleeding, hematoma, seroma, infection, damage to nearby structures such as nerves, blood vessels, tendons, muscle, infection, deep vein thrombosis, pulmonary embolus, need for further surgeries or procedures, delayed wound healing/recovery, and chronic pain. Additionally there are anesthesia related complications such as nausea, emesis,side effects of anesthetics, and inappropriate reactions which could lead to prolonged intubation, myocardial depression, and even stoke or death.      Rudolph Bauman MD  Plastic and Reconstructive Surgery Fellow

## 2024-11-12 NOTE — PROGRESS NOTES
"  Occupational Therapy Daily Treatment Note     Name: Barrera Siddiqui  Clinic Number: [id number]    Therapy Diagnosis:   Encounter Diagnoses   Name Primary?    Muscle weakness Yes    Stiffness in joint     Pain in left elbow     Pain in left hand        Physician: Brandie Monteiro, *    Visit Date: 11/12/2024    Physician Orders: OT eval/treat   Medical Diagnosis: G56.23 (ICD-10-CM) - Cubital tunnel syndrome of both upper extremities  Evaluation Date: 8/14/2024  Plan of Care Certification Date: 11/6/24   Authorization Period: 7/24/24 - 12/31/24    Surgery Date and Procedure: Procedure(s) (LRB):  LEFT ELBOW DECOMPRESSION, NERVE, ULNAR (Left)  LEFT THUMB AND RING  TRIGGER FINGER RELEASE (Left)   Tenosynovectomy left thumb and ring finger flexor tendon 7/24/24   Date of Return to MD: none noted in chart at this time   Visit # / Visits authorized: 15 / 20 (plus initial evaluation)   FOTO Completion: 2/3  Initial=28 / Goal=33  /Updated=55 (11/12/2024)     Time In:1030   Time Out: 1130   Total Billable Time: 60 minutes (1US, 1MT, 2TE)     Precautions:  Standard    Subjective     Pt reports:  She had follow up with hand surgeon today and reports that hand surgeon is pleased with her progress.  She does continue to have some "achy" elbow pain in her elbow.  Barrera reports she is scheduled for surgery December 11 for right elbow and right ring trigger finger.  She would like to continue therapy until next surgery for strengthening to left elbow/wrist/hand.      she was compliant with home exercise program given last session.   Response to previous treatment: no soreness elbow following last session     Functional change: None reported at this time.      Pain: 6/10 at the elbow; 3-4/10 at hand    Location: left arm   Objective   Objective Measures updated today, 11/12/2204. Please see updated plan of care.      Range of Motion:   Left        Elbow Left  8/14/2024 Left   10/17/2024 Left   11/12/2024 Right  8/14/2024   Flexion 130 " 130 140 (+10)   140   Extension 0 0 0 0         Left Hand Finger ROM INDEX   8/14/2024 INDEX  10/17/  2024 INDEX  11/12/2024 LONG   8/14/2024 LONG   10/17/  2024 LONG  11/12/2024  RING   8/14/2024 RING  10/17/  2024 RING  11/12/2024 SMALL   8/14/2024 SMALL   10/17/  2024 SMALL  11/12/2024   MP  60 85 NT  60 90 NT   60 90 NT  64 95 NT    PIP  90 105    NT  95 105 NT  95 100 NT  100 95 NT    DIP  75 65 Touch to DPC  80 60 Touch to DPC  80 80 Touch to DPC  85 80 Touch to DPC    *measures taken as composite fist on 11/12/2024.  Patient can compositely fist to DPC.         Right Hand Finger ROM INDEX   8/14/2024 LONG   8/14/2024 RING   8/14/2024 SMALL   8/14/2024   MP  90 85 90 90   PIP  110 105 100 100   DIP  80 80 80 90        Left Thumb range of motion  8/14/2024 Left Thumb range of motion  10/17/2024 Left Thumb range of motion  11/12/2024 Right Thumb range of motion  8/14/2024   MP 80 80 70  75   IP 85 60 75 75   Radial Abduction 70 45 65 70   Palmar Abduction 60 45 50 60   Opposition Middle of SF To MCP of SF  To SF MCP  DPC   *MP/IP measures taken as composite thumb flexion on 11/12/2024.  Patient can touch to 5th MCP with flexion/opposition.  She has improved thumb radail/palmar abduction.         Strength: (ZULEYKA Dynamometer in psi.)        8/14/2024 11/12/2024 8/14/2024     Left Left  Right   Rung II 15 44 (+29)   60         Pinch Strength (Measured in psi)       8/14/2024 11/12/2024 8/14/2024     Left Left  Right   Key Pinch 4  8 (+4)   10   3pt Pinch 6 6 (=)   11   2pt Pinch 4 7 (+3)    8         CMS Impairment/Limitation/Restriction for FOTO Hand/Wrist/Finger Survey     Therapist reviewed FOTO scores for Barrera Siddiqui on 8/14/2024.   FOTO documents entered into [employer] - see Media section.     Intake Score: 28%  Category: Self Care           Treatment       Barrera received the following direct contact modalities after being cleared for contraindications for 8 minutes:  - paraffin wax and moist hot pack to hand to  increase extensibility   - moist hot pack to elbow to increase extensibility (simultaneously with paraffin application)      Direct contact modalities after being cleared for contraindications: Ultrasound - 3.0 MHz, 0.8W/cm2, 50% pulsed, 8 min to volar wrist healed incisional scar and surrounding area, to decrease pain & edema, increase circulation and tissue extensibility. Patient tolerated treatment well without adverse effects. Therapist was in attendance throughout intervention.      Manual therapy techniques: Myofacial release, Soft tissue Mobilization, and scar massage were applied to the: left hand and left elbow for 21 minutes, including:  - use of hand techniques, cupping, IASTM tools, and scar pump to increase blood flow/circulation, improve soft tissue pliability and decrease pain.    -scar massage to elbow with use of hand techniques only to decrease tissue adhesions and pain, and increase tissue extensibility   -intrinsic stretches to digits #2-5 5 reps X 5 sec hold       Therapeutic exercises to develop strength, endurance, ROM, and flexibility for 23 minutes, including:   Objective measures taken today, 11/12/2024.    Bolded performed today.    -TGE's x 20   - thumb MP/IP flexion with blocking X 20 each   - ring finger PIP/DIP with blocking X 20 each   - thumb retropulsion X 10 reps   - finger lifts, isolated and composite x 10   - finger spreads with (2) tan RB x 20   - elbow 3 ways with 3# weight 2/15   - wrist 3 ways with 3# weight 2/15   - manual scar desensitization to elbow and trigger finger incisions  - pom pom  with gripper on 1st rung  - isospheres x 3 minutes   - wrist maze x 3 minutes   - Wrist wheel 2 minutes in flex/ext & sup/pro   -green flex bar smiles/frowns/twists X 2/15 reps   -PHG setting #3, 2/25 reps   -digiflex, red, FA supinated/pronated/neutral FA 2/10   -ulnar nerve glide X 10     Therputty (yellow/green mix):   -molding 2 min  -gripping X 5 with 5 sec hold   -thumb  "flexion X 5 with 5 sec hold   -pancake and bloom X 5   -log rolls X 6   -lateral/tripod/tip to tip pinches 2 log seach   -finger scrapes, flexion/extension 2 pancakes each  -pinch/pull with digits #4 & #5 X 5          Home Exercises and Education Provided     Education provided:   -ulnar nerve glide 10/8/2024  -recommend towel wrap to keep elbow extended at night to decrease "achy" pain in elbow   -Issued/reviewed theraputty HEP; issued yellow/green theraputty mix     - Progress towards goals     Written Home Exercises Provided: Patient instructed to cont prior HEP.  Exercises were reviewed and Barrera was able to demonstrate them prior to the end of the session.  Barrera demonstrated good  understanding of the HEP provided.   .   See EMR under Patient Instructions for exercises provided prior visit.        Assessment     Pt would continue to benefit from skilled OT.   Pt continues to have sensitivity in her elbow, in addition to neck pain.  She is having second shot for neck pain soon.  Edema has decreased on ulnar side of carpal tunnel incision; tissue is minimally to moderately dense.  Tissue density improved following ultrasound to the area. She was able to resume performed progressed therapeutic exercises today without issues, but did report fatigue.  Pt is motivated. Will continue to progress as tolerated.      See Updated Plan of Care for details on patient's progress and status of goals.      Plan      See Updated Plan of Care for details and recommendations for treatment plan.     Herrera Lovett, OT                          "

## 2024-11-12 NOTE — PROGRESS NOTES
Patient ID: Barrera Siddiqui is a 59 y.o. female.    Chief Complaint: No chief complaint on file.    History of Present Illness    CHIEF COMPLAINT:  Ms. Siddiqui presents today for follow-up evaluation of right hand numbness, tingling, and pain, with additional complaint of trigger finger in the right ring finger.    HPI:  Ms. Siddiqui presents with right-sided discomfort, specifically in her middle finger. She reports a trigger finger condition in her right middle finger, with a large nodule present. She also complains of numbness and tingling, primarily affecting her pinky and ring fingers. Her right side is bothering her now. The pain in her ring finger has been constant for the last three days, describing it as intermittent prior to that.    She had a previous nerve test in March that showed bilateral cubital tunnel syndrome and mild carpal tunnel syndrome on the left side. She had previous surgery on her left side for similar issues.    Ms. Siddiqui denies numbness or tingling in her thumb, pointer finger, or middle finger. She reports pain in her ring finger, suggesting it might be related to the triggering of the middle finger, stating that it may be due to the adjacent finger's triggering. Ms. Siddiqui denies any median nerve distribution symptoms.    SURGICAL HISTORY:  Ms. Siddiqui has a history of surgical interventions on the left side for cubital tunnel syndrome and trigger finger.      ROS:  General: denies fever, denies chills, denies fatigue, denies weight gain, denies weight loss  Eyes: denies vision changes, denies redness, denies discharge  ENT: denies ear pain, denies nasal congestion, denies sore throat  Cardiovascular: denies chest pain, denies palpitations, denies lower extremity edema  Respiratory: denies cough, denies shortness of breath  Gastrointestinal: denies abdominal pain, denies nausea, denies vomiting, denies diarrhea, denies constipation, denies blood in stool  Genitourinary: denies dysuria, denies hematuria,  denies frequency  Musculoskeletal: denies joint pain, denies muscle pain  Skin: denies rash, denies lesion  Neurological: denies headache, denies dizziness, reports numbness, reports tingling  Psychiatric: denies anxiety, denies depression, denies sleep difficulty         Physical Exam    MSK: Elbow - Left: Positive Tinel's sign at cubital tunnel. Positive provocative exam for cubital tunnel.         Assessment & Plan     Recommend cubital tunnel surgery and trigger finger release for the right hand.   Explained that the cubital tunnel surgery would take about 30 minutes under general anesthesia, with the trigger finger release adding approximately 5 minutes to the procedure.   Discussed that hand therapy would be required after the surgery.              No follow-ups on file.    This note was generated with the assistance of ambient listening technology. Verbal consent was obtained by the patient and accompanying visitor(s) for the recording of patient appointment to facilitate this note. I attest to having reviewed and edited the generated note for accuracy, though some syntax or spelling errors may persist. Please contact the author of this note for any clarification.

## 2024-11-13 ENCOUNTER — TELEPHONE (OUTPATIENT)
Dept: PAIN MEDICINE | Facility: CLINIC | Age: 59
End: 2024-11-13
Payer: COMMERCIAL

## 2024-11-13 DIAGNOSIS — R52 PAIN: ICD-10-CM

## 2024-11-13 DIAGNOSIS — M65.30 TRIGGER FINGER OF RIGHT HAND, UNSPECIFIED FINGER: ICD-10-CM

## 2024-11-13 DIAGNOSIS — G56.23 CUBITAL TUNNEL SYNDROME OF BOTH UPPER EXTREMITIES: Primary | ICD-10-CM

## 2024-11-18 NOTE — PRE-PROCEDURE INSTRUCTIONS
Unable to reach pt via phone.  Left voicemail with arrival time also informing pt of need for responsible  accompaniment and instructing pt to follow pre-procedure instructions provided via [facility] portal.  The following message was sent to pt's portal.      Dear Barrera,     Please read over the following pre-procedure instructions in it's entirety as there is helpful information here to get you well prepared for your upcoming procedure.     You are scheduled for a procedure with Dr. Murphy on 11/20/2024.     [facility] at the corner of [facility] and [facility]. It is in the [place] next to [employer]. The address is: [address] [place] [address]. Take the elevator to the 2nd floor.       Registration check in time: 10:15 am  Scheduled procedure time: 11:15 am     If you are receiving sedation, you CANNOT drive yourself and must have a responsible friend or family member (no rideshare) to drive you home.        You should take any medications that you routinely take for blood pressure, heart medications, thyroid, cholesterol, etc.      The fasting restrictions are dependent on whether or not you are receiving sedation. Sedation is not available for all procedures.      Your fasting instructions/Sedation type are as follow:  IV sedation.   Nothing to eat after midnight the night prior to procedure.   Patients are encouraged to consume clear liquids up to 2 hours prior to scheduled arrival time.  -Clear liquids include Gatorade, water, soda, black coffee or tea (no milk or creamer), and clear juices. - Clear liquids do NOT include anything with pulp or food particles (chicken broth, ice cream, yogurt, Jello, etc.) You CANNOT drive yourself and must have a .              If you are on blood thinners, you need to follow the anticoagulation instructions that had been discussed previously. You should only stop the blood thinners if it was approved by your primary care physician or  your cardiologist. In the event that you are not able to stop your blood thinners, a blood thinner was not listed on your medication list, or we were not able to get clearance from your cardiologist, then the procedure may have to be postponed/canceled.      IF you were told to stop your blood thinners, this is how long you should generally hold some of the more common ones. Remember that stopping blood thinners is only necessary for certain procedures. If you are unsure of your instructions, please call us.   Aspirin - 5 days  Plavix/Clopidogrel - 7 days  Warfarin / Coumadin - 5 days  Eliquis - 3 days  Pradaxa/Dabigatran - 4 days  Xarelto/Rivaroxaban - 3 days        HOLD all non-insulin injections (shots) until after surgery (Ozempic, Mounjaro, Trulicity, Victoza, Byetta, Wegovy and Adlyxin) (Total of 7 days prior)        If you are a diabetic, do not take your medication if you will be fasting, but bring it with you. Please plan on being here for roughly 2-3 hours.     Please call us if you have been sick (running fever, having any flu-like symptoms) or have been taking ANTIBIOTICS in the past 2 weeks or had any outpatient procedures other than with us (colonoscopy, endoscopy, OBGYN, dental, etc.).      If you have been previously COVID positive, you will need to hold off on your procedure until you are symptom free for 10 days. If you did not have any symptoms, you can have your procedure 10 days from your positive test result.         On the morning of your procedure:  *HOLD ALL VITAMINS, MINERALS, HERBS (INCLUDING HERBAL TEAS) AND SUPPLEMENTS  *SHOWER WITH ANTIBACTERIAL SOAP (EX. DIAL) NIGHT BEFORE AND MORNING OF PROCEDURE  *DO NOT APPLY ANY LOTIONS, OILS, POWDERS, PERFUME/COLOGNE, OINTMENTS, GELS, CREAMS, MAKEUP OR DEODORANT TO YOUR SKIN MORNING OF PROCEDURE  *LEAVE JEWELRY AND ANY VALUABLES AT HOME  *WEAR LOOSE COMFORTABLE CLOTHING         Please reply to this portal message as receipt of delivery.     Thank  you,  [facility]  [place], LPN [facility]  Pre-Admit

## 2024-11-20 ENCOUNTER — TELEPHONE (OUTPATIENT)
Dept: PAIN MEDICINE | Facility: CLINIC | Age: 59
End: 2024-11-20
Payer: COMMERCIAL

## 2024-11-20 ENCOUNTER — HOSPITAL ENCOUNTER (OUTPATIENT)
Facility: HOSPITAL | Age: 59
Discharge: HOME | End: 2024-11-20
Attending: STUDENT IN AN ORGANIZED HEALTH CARE EDUCATION/TRAINING PROGRAM | Admitting: STUDENT IN AN ORGANIZED HEALTH CARE EDUCATION/TRAINING PROGRAM
Payer: COMMERCIAL

## 2024-11-20 VITALS
SYSTOLIC BLOOD PRESSURE: 134 MMHG | TEMPERATURE: 98 F | RESPIRATION RATE: 16 BRPM | HEART RATE: 103 BPM | OXYGEN SATURATION: 95 % | DIASTOLIC BLOOD PRESSURE: 78 MMHG

## 2024-11-20 DIAGNOSIS — M47.812 CERVICAL SPONDYLOSIS: Primary | ICD-10-CM

## 2024-11-20 DIAGNOSIS — G89.29 CHRONIC PAIN: ICD-10-CM

## 2024-11-20 LAB — POCT GLUCOSE: 149 MG/DL (ref 70–110)

## 2024-11-20 PROCEDURE — 64491 INJ PARAVERT F JNT C/T 2 LEV: CPT | Mod: LT,,, | Performed by: STUDENT IN AN ORGANIZED HEALTH CARE EDUCATION/TRAINING PROGRAM

## 2024-11-20 PROCEDURE — 82962 GLUCOSE BLOOD TEST: CPT | Performed by: STUDENT IN AN ORGANIZED HEALTH CARE EDUCATION/TRAINING PROGRAM

## 2024-11-20 PROCEDURE — 64490 INJ PARAVERT F JNT C/T 1 LEV: CPT | Mod: LT,,, | Performed by: STUDENT IN AN ORGANIZED HEALTH CARE EDUCATION/TRAINING PROGRAM

## 2024-11-20 PROCEDURE — 64491 INJ PARAVERT F JNT C/T 2 LEV: CPT | Mod: LT | Performed by: STUDENT IN AN ORGANIZED HEALTH CARE EDUCATION/TRAINING PROGRAM

## 2024-11-20 PROCEDURE — 63600175 PHARM REV CODE 636 W HCPCS: Performed by: STUDENT IN AN ORGANIZED HEALTH CARE EDUCATION/TRAINING PROGRAM

## 2024-11-20 PROCEDURE — 64490 INJ PARAVERT F JNT C/T 1 LEV: CPT | Mod: LT | Performed by: STUDENT IN AN ORGANIZED HEALTH CARE EDUCATION/TRAINING PROGRAM

## 2024-11-20 PROCEDURE — 99152 MOD SED SAME PHYS/QHP 5/>YRS: CPT | Performed by: STUDENT IN AN ORGANIZED HEALTH CARE EDUCATION/TRAINING PROGRAM

## 2024-11-20 PROCEDURE — 99152 MOD SED SAME PHYS/QHP 5/>YRS: CPT | Mod: ,,, | Performed by: STUDENT IN AN ORGANIZED HEALTH CARE EDUCATION/TRAINING PROGRAM

## 2024-11-20 RX ORDER — BUPIVACAINE HYDROCHLORIDE 2.5 MG/ML
INJECTION, SOLUTION EPIDURAL; INFILTRATION; INTRACAUDAL
Status: DISCONTINUED | OUTPATIENT
Start: 2024-11-20 | End: 2024-11-20 | Stop reason: HOSPADM

## 2024-11-20 RX ORDER — LIDOCAINE HYDROCHLORIDE 20 MG/ML
INJECTION, SOLUTION EPIDURAL; INFILTRATION; INTRACAUDAL; PERINEURAL
Status: DISCONTINUED | OUTPATIENT
Start: 2024-11-20 | End: 2024-11-20 | Stop reason: HOSPADM

## 2024-11-20 RX ORDER — SODIUM CHLORIDE 9 MG/ML
INJECTION, SOLUTION INTRAVENOUS CONTINUOUS
Status: DISCONTINUED | OUTPATIENT
Start: 2024-11-20 | End: 2024-11-20 | Stop reason: HOSPADM

## 2024-11-20 NOTE — H&P
HPI  Patient presenting for Procedure(s) (LRB):  Left C3,C4, C5 MBB #2 (Left)     Patient on Anti-coagulation No    No health changes since previous encounter    Past Medical History:   Diagnosis Date    Depression     Diabetes mellitus     High cholesterol     Hypertension     Stroke     TIA (transient ischemic attack) 2017    left side weakness resolved after therapy     Past Surgical History:   Procedure Laterality Date    CARPAL TUNNEL RELEASE Bilateral     CHOLECYSTECTOMY      COLONOSCOPY N/A 10/18/2017    Procedure: COLONOSCOPY;  Surgeon: Donald Wright Jr., MD;  Location: [facility] ENDO;  Service: Endoscopy;  Laterality: N/A;    DECOMPRESSION, NERVE, ULNAR Left 7/24/2024    Procedure: LEFT ELBOW DECOMPRESSION, NERVE, ULNAR;  Surgeon: Brandie Monteiro MD;  Location: [facility] OR;  Service: Orthopedics;  Laterality: Left;  REGIONAL AFTER    EPIDURAL STEROID INJECTION INTO CERVICAL SPINE N/A 05/08/2024    Procedure: C7-T1 GRACIE (AIM LEFT);  Surgeon: Tiera Murphy DO;  Location: [facility];  Service: Pain Management;  Laterality: N/A;  20 mins    INJECTION OF ANESTHETIC AGENT AROUND MEDIAL BRANCH NERVES INNERVATING CERVICAL FACET JOINT Left 10/28/2024    Procedure: MBB#1 LT C3,4,5;  Surgeon: Tiera Murphy DO;  Location: [facility];  Service: Pain Management;  Laterality: Left;  ASA 5d/Ozempic 7d    KNEE SURGERY      TRIGGER FINGER RELEASE Left 7/24/2024    Procedure: LEFT THUMB AND RING  TRIGGER FINGER RELEASE;  Surgeon: Brandie Monteiro MD;  Location: [facility] OR;  Service: Orthopedics;  Laterality: Left;    TUBAL LIGATION       Review of patient's allergies indicates:  No Known Allergies   No current facility-administered medications for this encounter.       PMHx, PSHx, Allergies, Medications reviewed in epic    ROS negative except pain complaints in HPI    OBJECTIVE:    LMP 07/29/2018 (Exact Date)     PHYSICAL EXAMINATION:    GENERAL: Well appearing, in no acute distress, alert  and oriented x3.  PSYCH:  Mood and affect appropriate.  SKIN: Skin color, texture, turgor normal, no rashes or lesions which will impact the procedure.  CV: RRR with palpation of the radial artery.  PULM: No evidence of respiratory difficulty, symmetric chest rise. Clear to auscultation.  NEURO: Cranial nerves grossly intact.    Plan:    Proceed with procedure as planned Procedure(s) (LRB):  Left C3,C4, C5 MBB #2 (Left)    Tiera Murphy  11/20/2024

## 2024-11-20 NOTE — DISCHARGE INSTRUCTIONS
[facility]  Dr. Tiera Murphy  Messaging service # [phone]    MEDIAL BRANCH BLOCK POST-PROCEDURE INSTRUCTIONS:    What you need to do:  Keep a record of your response to the injection you had today.  It is very important that you accurately record how you responded to today's injection.  Please try to separate any soreness from the needle from your usual pain.  We want to know how this affects your usual pain.  Also REMEMBER that today's injection is a DIAGNOSTIC block, the pain relief will only last for a few hours to a few days.  Insurance requires 2 of these blocks before progressing to the ablation.  The sole purpose of this injection is to give us information, and not to treat your pain for an extended period.    Think about the amount of pain that you would have doing the most painful activities (I.e. bending, twisting, walking, standing straight, cleaning, etc...).  Now do those same activities as soon as you get home from the hospital.  Think about how much better you feel doing those activities now that the injection is done.  Does it feel 50%, 80%, 100% better than it would have otherwise?  This is the number you need to send me.    Send me a message through [facility] or leave a message at the phone number above telling me what % of improvement you got from the injection.    If you have difficulty putting a percentage on your pain relief fill this out:  (Rate each question below from 0-10 with 0 being no pain and 10 being the worst imaginable pain)    How bad would your pain get during activities like walking/going up stairs BEFORE the injection? _______    For the first 8 hours AFTER the injection, when you did those same painful activities, what was you best pain score? ____________    Send me those two numbers if you aren't able to give a  percentage.  ---------------------------------------------------------------------------------------------------------------------------------------------------------------------------------------------  What to watch out for:    If you experience any of the following symptoms after your procedure, please notify the messaging service immediately (see above for contact information):   fever (increased oral temperature)   bleeding or swelling at the injection site,    drainage, rash or redness at the injection site    possible signs of infection    increased pain at the injection site   worsening of your usual pain   severe headache   new or worsening numbness    new arm and/or leg weakness, or    changes in bowel and/or bladder function: urinating or defecating on yourself and not knowing that you did it.    PLEASE FOLLOW ALL INSTRUCTIONS CAREFULLY     Do not engage in strenuous activity (e.g., lifting or pushing heavy objects or repeated bending) for 24 hours.     Do not take a bath, swim or use Jacuzzi for 24 hours after procedure. (A shower is fine).   Remove any Band-Aids when you get home.    Use cold/ice, as needed for comfort.  We recommend the use of cold therapy alternating on for 20 minutes, off for 20 minutes.    Do not apply direct heat (heating pad or heat packs) to the injection site for 24 hours.     Resume your usual medications, unless instructed otherwise by your Pain Physician.     If you are on warfarin (Coumadin) or other blood thinner, resume this medication as instructed by your prescribing Physician.    IF AT ANY POINT YOU ARE VERY CONCERNED ABOUT YOUR SYMPTOMS, PLEASE GO TO THE EMERGENCY ROOM.    If you develop worsening pain, weakness, numbness, lose bowel or bladder control (i.e., having an accident where you did not even know you had to go to the bathroom and suddenly noticed you soiled yourself), saddle anesthesia (a loss of sensation restricted to the area of the buttocks, anus and  between the legs -- i.e., those parts of your body that would touch a saddle if you were sitting on one) you need to go immediately to the emergency department for evaluation and treatment.    ----------------------------------------------------------------------------------------------------------------------------------------------------------------  If you received Sedation please read the following instructions:  POST SEDATION INSTRUCTIONS    Today you received intravenous medication (also known as sedation) that was used to help you relax and/or decrease discomfort during your procedure. This medication will be acting in your body for the next 24 hours, so you might feel a little tired or sleepy. This feeling will slowly wear off.   Common side effects associated with these medications include: drowsiness, dizziness, sleepiness, confusion, feeling excited, difficulty remembering things, lack of steadiness with walking or balance, loss of fine muscle control, slowed reflexes, difficulty focusing, and blurred vision.  Some over-the-counter and prescription medications (e.g., muscle relaxants, opioids, mood-altering medications, sedatives/hypnotics, antihistamines) can interact with the intravenous medication you received and cause an increased risk of the side effects listed above in addition to other potentially life threatening side effects. Use extreme caution if you are taking such medications, and consult with your Pain Physician or prescribing physician if you have any questions.  For the next 12-24 hours:    DO NOT--Drive a car, operate machinery or power tools   DO NOT--Drink any alcoholic beverages (not even beer), they may dangerously increase the risk of side effects.    DO NOT--Make any important legal or business decisions or sign important documents.  We advise you to have someone to assist you at home. Move slowly and carefully. Do not make sudden changes in position. Be aware of dizziness or  light-headedness and move accordingly.   If you seek medical treatment within 24 hours, let the nurse or doctor caring for you know that you have received the above medications. If you have any questions or concerns related to your sedation or treatment today please contact us.

## 2024-11-20 NOTE — DISCHARGE SUMMARY
Discharge Note  Short Stay      SUMMARY     Admit Date: 11/20/2024    Attending Physician: Tiera Murphy      Discharge Physician: Tiera Murphy      Discharge Date: 11/20/2024 11:01 AM    Procedure(s) (LRB):  Left C3,C4, C5 MBB #2 (Left)    Final Diagnosis: Cervical spondylosis [M47.812]    Disposition: Home or self care    Patient Instructions:   Current Discharge Medication List        CONTINUE these medications which have NOT CHANGED    Details   amLODIPine (NORVASC) 10 MG tablet Take 1 tablet by mouth once daily  Qty: 90 tablet, Refills: 0    Comments: .  Associated Diagnoses: Essential hypertension      atorvastatin (LIPITOR) 20 MG tablet Take 1 tablet (20 mg total) by mouth once daily.  Qty: 90 tablet, Refills: 3    Associated Diagnoses: Mixed hyperlipidemia      furosemide (LASIX) 40 MG tablet Take 1 tablet (40 mg total) by mouth 2 (two) times daily.  Qty: 60 tablet, Refills: 11    Associated Diagnoses: Chronic diastolic heart failure      glipiZIDE (GLUCOTROL) 10 MG tablet Take 10 mg by mouth 2 (two) times daily.      losartan (COZAAR) 100 MG tablet Take 1 tablet by mouth once daily  Qty: 90 tablet, Refills: 0    Comments: .  Associated Diagnoses: Essential hypertension      metFORMIN (GLUCOPHAGE-XR) 500 MG ER 24hr tablet Take 500 mg by mouth 2 (two) times daily.      pregabalin (LYRICA) 100 MG capsule Take 1 capsule by mouth twice daily  Qty: 60 capsule, Refills: 0    Associated Diagnoses: Neck pain      traMADoL (ULTRAM) 50 mg tablet Take 1 tablet (50 mg total) by mouth every 6 (six) hours as needed for Pain.  Qty: 10 tablet, Refills: 0    Comments: Quantity prescribed more than 7 day supply? No. Bedside [place]      aspirin (ECOTRIN) 81 MG EC tablet Take 1 tablet (81 mg total) by mouth once daily.  Qty: 90 tablet, Refills: 3    Associated Diagnoses: Chronic diastolic heart failure      cetirizine (ZYRTEC) 10 MG tablet Take 2 tablets (20 mg total) by mouth once daily. for 5 days  Qty: 10 tablet,  Refills: 0      multivitamin (THERAGRAN) per tablet Take 1 tablet by mouth once daily.      OZEMPIC 0.25 mg or 0.5 mg (2 mg/3 mL) pen injector Inject into the skin.                 Discharge Diagnosis: Cervical spondylosis [M47.812]  Condition on Discharge: Stable with no complications to procedure   Diet on Discharge: Same as before.  Activity: as per instruction sheet.  Discharge to: Home with a responsible adult.  Follow up: 2-4 weeks       Please call my office or pager at [phone] if experienced any weakness or loss of sensation, fever > 101.5, pain uncontrolled with oral medications, persistent nausea/vomiting/or diarrhea, redness or drainage from the incisions, or any other worrisome concerns. If physician on call was not reached or could not communicate with our office for any reason please go to the nearest emergency department

## 2024-11-20 NOTE — PLAN OF CARE
Pt in preop bay 22, VSS and IV inserted. Pt denies any open wounds on body or the use of any immunizations or antibiotics in the past 2 weeks. Pt needs site marking and consents, otherwise ready to roll.

## 2024-11-20 NOTE — OP NOTE
Diagnostic Cervical Medial Branch Block under Fluoroscopy Guidance    The procedure, risks, benefits, and options were discussed with the patient. There are no contraindications to the procedure. The patent expressed understanding and agreed to the procedure. Informed written consent was obtained prior to the start of the procedure and can be found in the patient's chart.        PATIENT NAME: Barrera Siddiqui   MRN: [id number]     DATE OF PROCEDURE: 11/20/2024                                                             PROCEDURE:  Diagnostic Left C3, C4, and C5 Cervical Medial Branch Block under Fluoroscopy Guidance    PRE-OP DIAGNOSIS: Cervical spondylosis [M47.812] Cervical Spondylosis [M47.812]    POST-OP DIAGNOSIS: Same    PHYSICIAN: Tiera Murphy DO    ASSISTANTS: None    MEDICATIONS INJECTED:  Bupivacaine 0.25%    LOCAL ANESTHETIC INJECTED:   Xylocaine 2%    SEDATION: Versed 2mg and Fentanyl 0mcg                                                                                                                                                                                     Conscious sedation ordered by M.D. Patient re-evaluation prior to administration of conscious sedation. No changes noted in patient's status from initial evaluation. The patient's vital signs were monitored by RN and patient remained hemodynamically stable throughout the procedure.    Event Time In   Sedation Start 1042   Sedation End 1059       ESTIMATED BLOOD LOSS:  None    COMPLICATIONS:  None.    INDICATIONS: Patient has clinical and imaging findings suggestive of facet mediated pain.    TECHNIQUE:   Time-out was performed to identify the patient and procedure to be performed. With the patient laying in a prone position, the surgical area was prepped and draped in the usual sterile fashion using ChloraPrep and fenestrated drape. The levels were determined under fluoroscopic guidance. Skin anesthesia was achieved by injecting Lidocaine 2%  over the injection sites.  A 22 gauge, 5 inch needle was introduced to each level using AP, lateral and/or contralateral oblique fluoroscopic imaging. After negative aspiration for blood or CSF was confirmed, 0.5 mL of the anesthetic listed above was then slowly injected at each site. The needles were removed and bleeding was nil. A sterile dressing was applied. No specimens collected. The patient tolerated the procedure well.      The patient was monitored after the procedure in the recovery area. They were given post-procedure and discharge instructions to follow at home. The patient was discharged in a stable condition.        Tiera Murphy DO

## 2024-11-26 ENCOUNTER — CLINICAL SUPPORT (OUTPATIENT)
Dept: CARDIOLOGY | Facility: HOSPITAL | Age: 59
End: 2024-11-26
Attending: INTERNAL MEDICINE
Payer: COMMERCIAL

## 2024-11-26 ENCOUNTER — CLINICAL SUPPORT (OUTPATIENT)
Dept: REHABILITATION | Facility: HOSPITAL | Age: 59
End: 2024-11-26
Payer: COMMERCIAL

## 2024-11-26 ENCOUNTER — PATIENT MESSAGE (OUTPATIENT)
Dept: PAIN MEDICINE | Facility: CLINIC | Age: 59
End: 2024-11-26
Payer: COMMERCIAL

## 2024-11-26 DIAGNOSIS — M79.642 PAIN IN LEFT HAND: ICD-10-CM

## 2024-11-26 DIAGNOSIS — M62.81 MUSCLE WEAKNESS: Primary | ICD-10-CM

## 2024-11-26 DIAGNOSIS — M25.522 PAIN IN LEFT ELBOW: ICD-10-CM

## 2024-11-26 DIAGNOSIS — M25.60 STIFFNESS IN JOINT: ICD-10-CM

## 2024-11-26 DIAGNOSIS — R07.9 CHEST PAIN, UNSPECIFIED TYPE: ICD-10-CM

## 2024-11-26 PROCEDURE — 97018 PARAFFIN BATH THERAPY: CPT | Mod: PO

## 2024-11-26 PROCEDURE — 93270 REMOTE 30 DAY ECG REV/REPORT: CPT

## 2024-11-26 PROCEDURE — 97110 THERAPEUTIC EXERCISES: CPT | Mod: PO

## 2024-11-26 PROCEDURE — 97140 MANUAL THERAPY 1/> REGIONS: CPT | Mod: PO

## 2024-11-26 NOTE — PROGRESS NOTES
"  Occupational Therapy Daily Treatment Note     Name: Barrera Siddiqui  Clinic Number: [id number]    Therapy Diagnosis:   Encounter Diagnoses   Name Primary?    Muscle weakness Yes    Stiffness in joint     Pain in left elbow     Pain in left hand          Physician: Brandie Monteiro, *    Visit Date: 11/26/2024    Physician Orders: OT eval/treat   Medical Diagnosis: G56.23 (ICD-10-CM) - Cubital tunnel syndrome of both upper extremities  Evaluation Date: 8/14/2024  Updated Certification Period: 1/13/2024 to 12/10/2024   Previous Plan of Care Certification Date: 11/6/24   Authorization Period:  8/7/2024 - 12/31/2024   Surgery Date and Procedure: Procedure(s) (LRB):  LEFT ELBOW DECOMPRESSION, NERVE, ULNAR (Left)  LEFT THUMB AND RING  TRIGGER FINGER RELEASE (Left)   Tenosynovectomy left thumb and ring finger flexor tendon 7/24/24   Date of Return to MD: none noted in chart at this time   Visit # / Visits authorized: 16 / 20 (plus initial evaluation)   FOTO Completion: 3/3  Initial=28 / Goal=33  /Updated=42 (11/26/2024)     Time In:0900  Time Out: 0950   Total Billable Time: 50 minutes (1P, 1MT, 1TE)     Precautions:  Standard    Subjective     Pt reports: She does continue to have some "achy" elbow pain in her elbow.  Barrera reports she is scheduled for surgery December 11 for right elbow and right ring trigger finger.  She would like to continue therapy until next surgery for strengthening to left elbow/wrist/hand.    she was compliant with home exercise program given last session.   Response to previous treatment: no soreness elbow following last session     Functional change: None reported at this time.      Pain: 6/10 at the elbow; 3-4/10 at hand    Location: left arm   Objective   Objective Measures updated, 11/12/2204. Please see updated plan of care.      Range of Motion:   Left        Elbow Left  8/14/2024 Left   10/17/2024 Left   11/12/2024 Right  8/14/2024   Flexion 130 130 140 (+10)   140   Extension 0 0 0 0       "   Left Hand Finger ROM INDEX   8/14/2024 INDEX  10/17/  2024 INDEX  11/12/2024 LONG   8/14/2024 LONG   10/17/  2024 LONG  11/12/2024  RING   8/14/2024 RING  10/17/  2024 RING  11/12/2024 SMALL   8/14/2024 SMALL   10/17/  2024 SMALL  11/12/2024   MP  60 85 NT  60 90 NT   60 90 NT  64 95 NT    PIP  90 105    NT  95 105 NT  95 100 NT  100 95 NT    DIP  75 65 Touch to DPC  80 60 Touch to DPC  80 80 Touch to DPC  85 80 Touch to DPC    *measures taken as composite fist on 11/12/2024.  Patient can compositely fist to DPC.         Right Hand Finger ROM INDEX   8/14/2024 LONG   8/14/2024 RING   8/14/2024 SMALL   8/14/2024   MP  90 85 90 90   PIP  110 105 100 100   DIP  80 80 80 90        Left Thumb range of motion  8/14/2024 Left Thumb range of motion  10/17/2024 Left Thumb range of motion  11/12/2024 Right Thumb range of motion  8/14/2024   MP 80 80 70  75   IP 85 60 75 75   Radial Abduction 70 45 65 70   Palmar Abduction 60 45 50 60   Opposition Middle of SF To MCP of SF  To SF MCP  DPC   *MP/IP measures taken as composite thumb flexion on 11/12/2024.  Patient can touch to 5th MCP with flexion/opposition.  She has improved thumb radail/palmar abduction.         Strength: (ZULEYKA Dynamometer in psi.)        8/14/2024 11/12/2024 8/14/2024     Left Left  Right   Rung II 15 44 (+29)   60         Pinch Strength (Measured in psi)       8/14/2024 11/12/2024 8/14/2024     Left Left  Right   Key Pinch 4  8 (+4)   10   3pt Pinch 6 6 (=)   11   2pt Pinch 4 7 (+3)    8         CMS Impairment/Limitation/Restriction for FOTO Hand/Wrist/Finger Survey     Therapist reviewed FOTO scores for Barrera Siddiqui on 8/14/2024.   FOTO documents entered into [employer] - see Media section.     Intake Score: 28%  Category: Self Care           Treatment       Barrera received the following direct contact modalities after being cleared for contraindications for 8 minutes:  - paraffin wax and moist hot pack to hand to increase extensibility   - moist hot pack to  elbow to increase extensibility (simultaneously with paraffin application)      Direct contact modalities after being cleared for contraindications:  Manual therapy techniques: Myofacial release, Soft tissue Mobilization, and scar massage were applied to the: left hand and left elbow for 21 minutes, including:  - use of hand techniques, cupping, IASTM tools, and scar pump to increase blood flow/circulation, improve soft tissue pliability and decrease pain.    -scar massage to elbow with use of hand techniques only to decrease tissue adhesions and pain, and increase tissue extensibility   -intrinsic stretches to digits #2-5 5 reps X 5 sec hold       Therapeutic exercises to develop strength, endurance, ROM, and flexibility for 21 minutes, including:    Bolded performed today.    -TGE's x 20   - thumb MP/IP flexion with blocking X 20 each   - ring finger PIP/DIP with blocking X 20 each   - thumb retropulsion X 10 reps   - finger lifts, isolated and composite x 10   - finger spreads with (2) tan RB x 20   -lateral/tripod pinches, red clothespins X 20 each   - elbow 3 ways with 3# weight 2/15   - wrist 3 ways with 3# weight 2/15   - manual scar desensitization to elbow and trigger finger incisions  - pom pom  with gripper on 1st rung  - isospheres x 3 minutes   - wrist maze x 3 minutes   - Wrist wheel 2 minutes in flex/ext & sup/pro   -green flex bar smiles/frowns/twists X 2/15 reps   -PHG setting #3, 2/25 reps    -digiflex, red, FA supinated/pronated/neutral FA 2/10   -ulnar nerve glide X 10     Therputty (yellow/green mix):   -molding 2 min  -gripping X 5 with 5 sec hold   -thumb flexion X 5 with 5 sec hold   -pancake and bloom X 5   -log rolls X 6   -lateral/tripod/tip to tip pinches 2 log seach   -finger scrapes, flexion/extension 2 pancakes each  -pinch/pull with digits #4 & #5 X 5          Home Exercises and Education Provided   Education provided:   -ulnar nerve glide 10/8/2024  -recommend towel wrap to  "keep elbow extended at night to decrease "achy" pain in elbow   -Issued/reviewed theraputty HEP; issued yellow/green theraputty mix     - Progress towards goals     Written Home Exercises Provided: Patient instructed to cont prior HEP.  Exercises were reviewed and Barrera was able to demonstrate them prior to the end of the session.  Barrera demonstrated good  understanding of the HEP provided.   .   See EMR under Patient Instructions for exercises provided prior visit.        Assessment     Pt would continue to benefit from skilled OT.   Pt continues to have more sensitivity in her elbow, in addition to neck pain.  She received second shot for neck pain recently.  Edema has decreased on ulnar side of carpal tunnel incision; tissue is minimally to moderately dense.  She was able to continue progressed therapeutic exercises today without issues, but did report fatigue requiring rest breaks between sets.  Pt is motivated and participates well.  Although she continues to report discomfort of elbow and hand, she has progressed well with range of motion and strengthening.  Patient is scheduled for one additional therapy session at this time prior to next scheduled surgery for right upper extremity.  Will issue discharge HEP as appropriate.      Barrera is progressing well towards her goals and there are no updates to goals at this time. Pt prognosis is Good.      Pt will continue to benefit from skilled outpatient occupational therapy to address the deficits listed in the problem list on initial evaluation provide pt/family education and to maximize pt's level of independence in the home and community environment.      Anticipated barriers to occupational therapy: None at this time      Pt's spiritual, cultural and educational needs considered and pt agreeable to plan of care and goals.       LTG's (12 weeks):  1)   Increase ROM to 140 degrees in left elbow flexion to increase functional hand use for ADLs, IADLs, and leisure " tasks .-Met 11/12/2024  2)   Increase ROM to 85 degrees in left hand MP joints to increase functional hand use for ADLs, IADLs, and leisure tasks.-Met 11/12/2024  3)   Increase ROM so that opposition in left thumb is to the DPC  to increase functional hand use for ADLs, IADLs, and leisure tasks.-Met 11/12/2024  4)   Increase  strength to 50 lbs. to grasp for ADLs, IADLs and leisure taskOngoing  5)   Increase key pinch to 8 psis to increase independence with button and FM coordination.-Met 11/12/2024  6)   Decrease complaints of pain to  1 out of 10 at worst to increase functional hand use for ADL/work/leisure activities.Ongoing  7)   Patient to score at 55% or more on FOTO to demonstrate improved perception of functional left UE use.Ongoing  8)   Pt will return to near to prior level of function for ADLs and household management reporting I or Mod I with ADLs (dressing, feeding, grooming, toileting). Ongoing     STG's (6 weeks)  1)   Patient to be IND with HEP and modalities for pain/edema managment.-Met 11/12/2024  2)   Increase ROM to 135 degrees in left elbow flexion to increase functional hand use for ADLs, IADLs, and leisure tasks.-Met 11/12/2024  3)   Increase ROM to 75 degrees in left hand MP joints to increase functional hand use for ADLs, IADLs, and leisure tasks.-Met 11/12/2024  4)   Increase ROM so that opposition in left thumb is to the base of her SF  to increase functional hand use for ADLs, IADLs, and leisure tasks-Met 11/12/2024  5)   Increase  strength to 25 lbs. to grasp for ADLs, IADLs and leisure task.-Met 11/12/2024  6)   Increase key pinch to 6 psis to increase independence with button and FM coordination.-Met 11/12/2024  7)   Patient to be IND wiht Orthotic use, wear and care precautions. .-Met 11/12/2024  8)   Decrease complaints of pain to  5 out of 10 at worst to increase functional hand use for ADL/work/leisure activities.Ongoing       Plan      Updated Certification Period:  1/13/2024 to 12/10/2024   Recommended Treatment Plan: 2 times per week for 4 weeks:  Manual Therapy, Moist Heat/ Ice, Paraffin, Therapeutic Activities, Therapeutic Exercise, and Ultrasound     Herrera Lovett, OT

## 2024-11-29 DIAGNOSIS — M47.812 CERVICAL SPONDYLOSIS: Primary | ICD-10-CM

## 2024-12-02 ENCOUNTER — TELEPHONE (OUTPATIENT)
Dept: PAIN MEDICINE | Facility: CLINIC | Age: 59
End: 2024-12-02
Payer: COMMERCIAL

## 2024-12-02 DIAGNOSIS — M47.812 CS (CERVICAL SPONDYLOSIS): Primary | ICD-10-CM

## 2024-12-02 NOTE — TELEPHONE ENCOUNTER
----- Message from Nelli Murphy sent at 2024  2:05 PM CST -----  Regarding: Order for OKSANA KELLY    Patient Name: OKSANA KELLY([id number])  Sex: Female  : 1965      PCP: GISSELLE GARCIA    Center: [facility]     Types of orders made on 2024: Procedure Request    Order Date:2024  Ordering User:NELLI MURPHY [[id number]]  Encounter Provider:Nelli Murphy DO [[id number]]  Authorizing Provider: Nelli Murphy DO [[id number]  Department:[facility]IN [facility]NAGEMENT[[id number]]    Common Order Information  Procedure -> Radiofrequency Ablation (Specify level and laterality) Cmt: Left             C3, C4, C5    Pre-op Diagnosis -> Cervical spondylosis     Order Specific Information  Order: Procedure Order to Pain Management [Custom: [id number]]  Order #:          [id number]: 1 FUTURE    Priority: Routine  Class: Clinic Performed    Future Order Information      Expires on:2025            Expected by:2024                   Comment:Please schedule an extra 10 mins for this procedure due to extended             time needed for patient positioning. Thanks!!    Associated Diagnoses      M47.812 Cervical spondylosis      Physician -> Jeffrey         Facility Name: -> [facility]           Priority: Routine  Class: Clinic Performed    Future Order Information      Expires on:2025            Expected by:2024                   Comment:Please schedule an extra 10 mins for this procedure due to extended             time needed for patient positioning. Thanks!!    Associated Diagnoses      M47.812 Cervical spondylosis      Procedure -> Radiofrequency Ablation (Specify level and laterality) Cmt:                 Left C3, C4, C5        Physician -> Jeffrey         Pre-op Diagnosis -> Cervical spondylosis         Facility Name: -> [facility]

## 2024-12-05 DIAGNOSIS — M54.2 NECK PAIN: ICD-10-CM

## 2024-12-05 RX ORDER — PREGABALIN 100 MG/1
100 CAPSULE ORAL 2 TIMES DAILY
Qty: 60 CAPSULE | Refills: 0 | Status: SHIPPED | OUTPATIENT
Start: 2024-12-05

## 2024-12-06 ENCOUNTER — PATIENT MESSAGE (OUTPATIENT)
Dept: CARDIOLOGY | Facility: CLINIC | Age: 59
End: 2024-12-06

## 2024-12-06 ENCOUNTER — HOSPITAL ENCOUNTER (OUTPATIENT)
Dept: PREADMISSION TESTING | Facility: OTHER | Age: 59
Discharge: HOME | End: 2024-12-06
Attending: ORTHOPAEDIC SURGERY
Payer: COMMERCIAL

## 2024-12-06 ENCOUNTER — OFFICE VISIT (OUTPATIENT)
Dept: CARDIOLOGY | Facility: CLINIC | Age: 59
End: 2024-12-06
Payer: COMMERCIAL

## 2024-12-06 ENCOUNTER — ANESTHESIA EVENT (OUTPATIENT)
Dept: SURGERY | Facility: OTHER | Age: 59
End: 2024-12-06
Payer: COMMERCIAL

## 2024-12-06 VITALS
WEIGHT: 269 LBS | SYSTOLIC BLOOD PRESSURE: 167 MMHG | TEMPERATURE: 98 F | BODY MASS INDEX: 49.5 KG/M2 | HEART RATE: 87 BPM | HEIGHT: 62 IN | DIASTOLIC BLOOD PRESSURE: 93 MMHG | OXYGEN SATURATION: 95 % | RESPIRATION RATE: 16 BRPM

## 2024-12-06 VITALS
HEART RATE: 105 BPM | OXYGEN SATURATION: 98 % | HEIGHT: 62 IN | DIASTOLIC BLOOD PRESSURE: 98 MMHG | SYSTOLIC BLOOD PRESSURE: 171 MMHG | BODY MASS INDEX: 51.77 KG/M2 | WEIGHT: 281.31 LBS

## 2024-12-06 DIAGNOSIS — R51.9 NONINTRACTABLE HEADACHE, UNSPECIFIED CHRONICITY PATTERN, UNSPECIFIED HEADACHE TYPE: ICD-10-CM

## 2024-12-06 DIAGNOSIS — E66.01 MORBID OBESITY WITH BMI OF 50.0-59.9, ADULT: ICD-10-CM

## 2024-12-06 DIAGNOSIS — R06.02 SOBOE (SHORTNESS OF BREATH ON EXERTION): ICD-10-CM

## 2024-12-06 DIAGNOSIS — R55 SYNCOPE, UNSPECIFIED SYNCOPE TYPE: ICD-10-CM

## 2024-12-06 DIAGNOSIS — I10 ESSENTIAL HYPERTENSION: Primary | ICD-10-CM

## 2024-12-06 DIAGNOSIS — Z01.818 PREOP TESTING: Primary | ICD-10-CM

## 2024-12-06 DIAGNOSIS — E78.2 MIXED HYPERLIPIDEMIA: ICD-10-CM

## 2024-12-06 DIAGNOSIS — G47.33 OBSTRUCTIVE SLEEP APNEA SYNDROME: ICD-10-CM

## 2024-12-06 DIAGNOSIS — E11.9 TYPE 2 DIABETES MELLITUS WITHOUT COMPLICATION, WITHOUT LONG-TERM CURRENT USE OF INSULIN: ICD-10-CM

## 2024-12-06 DIAGNOSIS — I50.32 CHRONIC DIASTOLIC HEART FAILURE: ICD-10-CM

## 2024-12-06 LAB
BASOPHILS # BLD AUTO: 0.04 K/UL (ref 0–0.2)
BASOPHILS NFR BLD: 0.7 % (ref 0–1.9)
DIFFERENTIAL METHOD BLD: ABNORMAL
EOSINOPHIL # BLD AUTO: 0.2 K/UL (ref 0–0.5)
EOSINOPHIL NFR BLD: 2.8 % (ref 0–8)
ERYTHROCYTE [DISTWIDTH] IN BLOOD BY AUTOMATED COUNT: 14.2 % (ref 11.5–14.5)
HCT VFR BLD AUTO: 37.7 % (ref 37–48.5)
HGB BLD-MCNC: 12.3 G/DL (ref 12–16)
IMM GRANULOCYTES # BLD AUTO: 0.03 K/UL (ref 0–0.04)
IMM GRANULOCYTES NFR BLD AUTO: 0.5 % (ref 0–0.5)
LYMPHOCYTES # BLD AUTO: 2.5 K/UL (ref 1–4.8)
LYMPHOCYTES NFR BLD: 40.5 % (ref 18–48)
MCH RBC QN AUTO: 26.8 PG (ref 27–31)
MCHC RBC AUTO-ENTMCNC: 32.6 G/DL (ref 32–36)
MCV RBC AUTO: 82 FL (ref 82–98)
MONOCYTES # BLD AUTO: 0.4 K/UL (ref 0.3–1)
MONOCYTES NFR BLD: 6.7 % (ref 4–15)
NEUTROPHILS # BLD AUTO: 3 K/UL (ref 1.8–7.7)
NEUTROPHILS NFR BLD: 48.8 % (ref 38–73)
NRBC BLD-RTO: 0 /100 WBC
PLATELET # BLD AUTO: 195 K/UL (ref 150–450)
PMV BLD AUTO: 12.6 FL (ref 9.2–12.9)
RBC # BLD AUTO: 4.59 M/UL (ref 4–5.4)
WBC # BLD AUTO: 6.13 K/UL (ref 3.9–12.7)

## 2024-12-06 PROCEDURE — 85025 COMPLETE CBC W/AUTO DIFF WBC: CPT | Performed by: ANESTHESIOLOGY

## 2024-12-06 PROCEDURE — 99999 PR PBB SHADOW E&M-EST. PATIENT-LVL IV: CPT | Mod: PBBFAC,,,

## 2024-12-06 RX ORDER — METHOCARBAMOL 500 MG/1
1 TABLET, FILM COATED ORAL 3 TIMES DAILY
COMMUNITY

## 2024-12-06 RX ORDER — BUTALBITAL, ACETAMINOPHEN AND CAFFEINE 50; 325; 40 MG/1; MG/1; MG/1
TABLET ORAL
COMMUNITY

## 2024-12-06 RX ORDER — LINACLOTIDE 145 UG/1
CAPSULE, GELATIN COATED ORAL
COMMUNITY
Start: 2024-08-23

## 2024-12-06 RX ORDER — FAMOTIDINE 40 MG/1
40 TABLET, FILM COATED ORAL 2 TIMES DAILY
COMMUNITY

## 2024-12-06 RX ORDER — SODIUM CHLORIDE, SODIUM LACTATE, POTASSIUM CHLORIDE, CALCIUM CHLORIDE 600; 310; 30; 20 MG/100ML; MG/100ML; MG/100ML; MG/100ML
INJECTION, SOLUTION INTRAVENOUS CONTINUOUS
Status: CANCELLED | OUTPATIENT
Start: 2024-12-06

## 2024-12-06 RX ORDER — SITAGLIPTIN 100 MG/1
1 TABLET, FILM COATED ORAL DAILY
COMMUNITY

## 2024-12-06 RX ORDER — ALBUTEROL SULFATE 90 UG/1
2 INHALANT RESPIRATORY (INHALATION)
COMMUNITY

## 2024-12-06 RX ORDER — PANTOPRAZOLE SODIUM 40 MG/1
TABLET, DELAYED RELEASE ORAL
COMMUNITY
Start: 2024-12-04

## 2024-12-06 RX ORDER — LOSARTAN POTASSIUM 100 MG/1
100 TABLET ORAL DAILY
Qty: 90 TABLET | Refills: 3 | Status: SHIPPED | OUTPATIENT
Start: 2024-12-06

## 2024-12-06 RX ORDER — GABAPENTIN 600 MG/1
1 TABLET ORAL 3 TIMES DAILY
COMMUNITY

## 2024-12-06 RX ORDER — AMLODIPINE BESYLATE 10 MG/1
10 TABLET ORAL DAILY
Qty: 90 TABLET | Refills: 3 | Status: SHIPPED | OUTPATIENT
Start: 2024-12-06

## 2024-12-06 RX ORDER — FUROSEMIDE 40 MG/1
40 TABLET ORAL 2 TIMES DAILY
Qty: 180 TABLET | Refills: 3 | Status: SHIPPED | OUTPATIENT
Start: 2024-12-06 | End: 2025-12-06

## 2024-12-06 NOTE — ASSESSMENT & PLAN NOTE
-Goal BP < 130/80  - improved- home -130s/80-90s  -in office   -continue medical therapy: losartan 100 mg, Amlodipine 10 mg, furosemide 40 mg   -check BP twice daily and maintain log, take AM BP 1-2 Hours after medication   -discussed lifestyle modifications- diet, exercise, weight loss

## 2024-12-06 NOTE — PROGRESS NOTES
Subjective:    Patient ID:  Barrera Siddiqui is a 59 y.o. female who presents for evaluation of No chief complaint on file.      PCP: Evelin Guevara FNP     Referring Provider: Rosemarie Brizuela MD    HPI: Patient is a 59 yo F w/PMH of HTN, HLD, TIA, DM-2, morbid obesity, EDGAR,  and depression who presents today for f/u appt. She was last seen on 2/1/24 and was continued on medical therapy and was scheduled for 3 month follow up. She was seen by Dr. Echeverria on 10/17 for pre-op evaluation for colonoscopy. She presents today for evaluation post syncopal episode on Thanksgiving. She is currently wearing an cardiac event monitor and has a tilt table test ordered. Repeat cardiac echo w normal LVEF 69%. She is followed by  Pulmonary medicine and Neurology post fall and syncopal episode. She completed a sleep study and was ordered a CPAP in April but has not obtained the machine.     Patient notes episode of Left sided CP last PM, stabbing, non radiating. EKG in office Sinus tachycardia, no ST elevation. She is currently wearing a cardiac event monitor. She continues to note right arm/hand pain and is scheduled for surgery.  She continue to report orthopnea. She is also positive for PND.  She continues to note SAWYER. She denies CP, palpitations, PND, pre-syncope, LOC, swelling, or claudication. She notes medication compliance without side effects. She monitor her home BP and ranges 120s-130s/80-90s. She does not exercise regularly.        2/1/24:Patient presents today for f/u for HTN management. She was last seen on 12/20/23  for f/u appt and was continued on medical therapy. Furosemide was increased to 40 mg BID. She is followed by  Pulmonary medicine and Neurology post fall and syncopal episode.  Patient reports neck and left hand pain, with cramping and spasms to 4th & 5th digit.  She continue to report orthopnea. She is also positive for PND.  She continues to note SAWYER. She denies CP, palpitations, PND, pre-syncope, LOC,  swelling, or claudication. She notes medication compliance without side effects. She monitor her home BP and ranges 120s-130s/80-90s. She does not exercise regularly.      12/20/23: Patient presents today for f/u for HTN management. She was last seen on 11/20/23 for f/u appt and was continued on medical therapy She was referred to Pulmonary medicine and has an upcoming appt w PFTs. She is also followed by Neurology post fall and syncopal episode. She noted 3 pillow orthopnea, increased from 2 pillows 2 nights ago. She is also positive for PND. She also notes a 4 pound weight gain and was started on furosemide. She noted a 3 pound weight loss and decreased coughing at night since starting furosemide. She continue to report orthopnea. She continues to note SAWYER. She denies CP, palpitations, PND, pre-syncope, LOC, swelling, or claudication. She notes medication compliance without side effects. She monitor her home BP and ranges 120s-130s/80-90s. She does not exercise regularly.      11/20/23: Patient presents today for f/u for HTN management. She was last seen on 10/9/23 for f/u appt and was continued on medical therapy and amlodipine was increased to 10 mg daily. He notes past 3-4 days increased SOB and off balance feeling. She is also followed by Neurology post fall and syncopal episode. She notes 3 pillow orthopnea, increased from 2 pillows 2 nights ago. She is also positive for PND. She also notes a 4 pound weight gain.  She denies CP, SOB, palpitations, PND, pre-syncope, LOC, swelling, or claudication. She notes medication compliance without side effects. She monitor her home BP and ranges 120s-140s/80-90s. She does not exercise regularly.        [facility]2023: [facility]. She was last seen on 8/28/23  for initial evaluation post admission 2/2 syncopal episode. She reported to ED on 8/15/23 post syncopal episode at work. Admitted 8/15/23-8/16/23 negative MRI  and CTA Head and neck. She denies  any syncopal episodes since discharge. She notes left sided headache since her syncopal episode.  She is also followed by Neurology, seen on 9/25/23  and was started on low dose amitriptyline for possible post concussive HA. She denies CP, SOB, palpitations, orthopnea, PND, pre-syncope, LOC, swelling, or claudication. She notes medication compliance without side effects. She monitor her home BP and ranges 130s-150s/80-90s. She does not exercise regularly, but is active at work and walks at lot.     Past Medical History:   Diagnosis Date    Depression     Diabetes mellitus     High cholesterol     Hypertension     Stroke     TIA (transient ischemic attack) 2017    left side weakness resolved after therapy     Past Surgical History:   Procedure Laterality Date    CARPAL TUNNEL RELEASE Bilateral     CHOLECYSTECTOMY      COLONOSCOPY N/A 10/18/2017    Procedure: COLONOSCOPY;  Surgeon: Donald Wright Jr., MD;  Location: [facility];  Service: Endoscopy;  Laterality: N/A;    DECOMPRESSION, NERVE, ULNAR Left 7/24/2024    Procedure: LEFT ELBOW DECOMPRESSION, NERVE, ULNAR;  Surgeon: Brandie Monteiro MD;  Location: [facility] OR;  Service: Orthopedics;  Laterality: Left;  REGIONAL AFTER    EPIDURAL STEROID INJECTION INTO CERVICAL SPINE N/A 05/08/2024    Procedure: C7-T1 GRACIE (AIM LEFT);  Surgeon: Tiera Murphy DO;  Location: [facility];  Service: Pain Management;  Laterality: N/A;  20 mins    INJECTION OF ANESTHETIC AGENT AROUND MEDIAL BRANCH NERVES INNERVATING CERVICAL FACET JOINT Left 10/28/2024    Procedure: MBB#1 LT C3,4,5;  Surgeon: Tiera Murphy DO;  Location: [facility];  Service: Pain Management;  Laterality: Left;  ASA 5d/Ozempic 7d    INJECTION OF ANESTHETIC AGENT AROUND MEDIAL BRANCH NERVES INNERVATING CERVICAL FACET JOINT Left 11/20/2024    Procedure: Left C3,C4, C5 MBB #2;  Surgeon: Tiera Murphy DO;  Location: [facility];  Service: Pain Management;  Laterality:  Left;  15 mins ASA 5 days and Ozempic 7 days    KNEE SURGERY      TRIGGER FINGER RELEASE Left 7/24/2024    Procedure: LEFT THUMB AND RING  TRIGGER FINGER RELEASE;  Surgeon: Brandie Monteiro MD;  Location: [facility];  Service: Orthopedics;  Laterality: Left;    TUBAL LIGATION       Social History     Socioeconomic History    Marital status: Single   Tobacco Use    Smoking status: Never     Passive exposure: Never    Smokeless tobacco: Never   Substance and Sexual Activity    Alcohol use: Yes     Comment: rare    Drug use: No    Sexual activity: Yes     Partners: Male     Social Drivers of Health     Financial Resource Strain: Medium Risk (11/24/2023)    Overall Financial Resource Strain (CARDIA)     Difficulty of Paying Living Expenses: Somewhat hard   Food Insecurity: Food Insecurity Present (11/24/2023)    Hunger Vital Sign     Worried About Running Out of Food in the Last Year: Sometimes true     Ran Out of Food in the Last Year: Sometimes true   Transportation Needs: No Transportation Needs (11/24/2023)    PRAPARE - Transportation     Lack of Transportation (Medical): No     Lack of Transportation (Non-Medical): No   Physical Activity: Unknown (11/24/2023)    Exercise Vital Sign     Days of Exercise per Week: 0 days   Recent Concern: Physical Activity - Inactive (11/24/2023)    Exercise Vital Sign     Days of Exercise per Week: 0 days     Minutes of Exercise per Session: 30 min   Stress: No Stress Concern Present (11/24/2023)    [employer] - Occupational Stress Questionnaire     Feeling of Stress : Only a little   Housing Stability: High Risk (11/24/2023)    Housing Stability Vital Sign     Unable to Pay for Housing in the Last Year: Yes     Number of Places Lived in the Last Year: 1     Unstable Housing in the Last Year: No     Family History   Problem Relation Name Age of Onset    Breast cancer Mother      Liver disease Maternal Grandmother         Review of patient's allergies  indicates:  No Known Allergies    Medication List with Changes/Refills   Current Medications    ALBUTEROL (PROVENTIL/VENTOLIN HFA) 90 MCG/ACTUATION INHALER    2 puffs every 4 to 6 hours as needed.    ASPIRIN (ECOTRIN) 81 MG EC TABLET    Take 1 tablet (81 mg total) by mouth once daily.    ATORVASTATIN (LIPITOR) 20 MG TABLET    Take 1 tablet (20 mg total) by mouth once daily.    BUTALBITAL-ACETAMINOPHEN-CAFFEINE -40 MG (FIORICET, ESGIC) -40 MG PER TABLET    TAKE 1 TABLET BY MOUTH TWICE DAILY AS NEEDED FOR HEADACHE    FAMOTIDINE (PEPCID) 40 MG TABLET    Take 40 mg by mouth 2 (two) times daily.    GABAPENTIN (NEURONTIN) 600 MG TABLET    Take 1 tablet by mouth 3 (three) times daily.    GLIPIZIDE (GLUCOTROL) 10 MG TABLET    Take 10 mg by mouth 2 (two) times daily.    JANUVIA 100 MG TAB    Take 1 tablet by mouth once daily.    LINZESS 145 MCG CAP CAPSULE    TAKE 1 CAPSULE BY MOUTH ONCE DAILY ON AN EMPTY STOMACH AT LEAST 30 MINUTES BEFORE 1ST MEAL OF THE DAY    METFORMIN (GLUCOPHAGE-XR) 500 MG ER 24HR TABLET    Take 500 mg by mouth 2 (two) times daily.    METHOCARBAMOL (ROBAXIN) 500 MG TAB    Take 1 tablet by mouth 3 (three) times daily.    MULTIVITAMIN (THERAGRAN) PER TABLET    Take 1 tablet by mouth once daily.    OZEMPIC 0.25 MG OR 0.5 MG (2 MG/3 ML) PEN INJECTOR    Inject into the skin.    PANTOPRAZOLE (PROTONIX) 40 MG TABLET    Take 1 tablet every day by oral route in the morning for 90 days.    PREGABALIN (LYRICA) 100 MG CAPSULE    Take 1 capsule by mouth twice daily    TRAMADOL (ULTRAM) 50 MG TABLET    Take 1 tablet (50 mg total) by mouth every 6 (six) hours as needed for Pain.   Changed and/or Refilled Medications    Modified Medication Previous Medication    AMLODIPINE (NORVASC) 10 MG TABLET amLODIPine (NORVASC) 10 MG tablet       Take 1 tablet (10 mg total) by mouth once daily.    Take 1 tablet by mouth once daily    FUROSEMIDE (LASIX) 40 MG TABLET furosemide (LASIX) 40 MG tablet       Take 1 tablet  "(40 mg total) by mouth 2 (two) times daily.    Take 1 tablet (40 mg total) by mouth 2 (two) times daily.    LOSARTAN (COZAAR) 100 MG TABLET losartan (COZAAR) 100 MG tablet       Take 1 tablet (100 mg total) by mouth once daily.    Take 1 tablet by mouth once daily       Review of Systems   Constitutional: Negative for diaphoresis and fever.   HENT:  Negative for congestion and hearing loss.    Eyes:  Negative for blurred vision and pain.   Cardiovascular:  Positive for orthopnea and paroxysmal nocturnal dyspnea. Negative for chest pain, claudication, dyspnea on exertion, leg swelling, near-syncope, palpitations and syncope.   Respiratory:  Positive for shortness of breath, sleep disturbances due to breathing and snoring.    Hematologic/Lymphatic: Negative for bleeding problem. Does not bruise/bleed easily.   Skin:  Negative for color change and poor wound healing.   Musculoskeletal:  Positive for joint pain and neck pain.   Gastrointestinal:  Negative for abdominal pain and nausea.   Genitourinary:  Negative for bladder incontinence and flank pain.   Neurological:  Positive for disturbances in coordination and headaches. Negative for focal weakness and light-headedness.        Objective:   BP (!) 171/98 (BP Location: Left arm, Patient Position: Sitting)   Pulse 105   Ht 5' 2" (1.575 m)   Wt 127.6 kg (281 lb 4.9 oz)   LMP 07/29/2018 (Exact Date)   SpO2 98%   BMI 51.45 kg/m²    Physical Exam  Constitutional:       Appearance: She is well-developed. She is not diaphoretic.   HENT:      Head: Normocephalic and atraumatic.   Eyes:      General: No scleral icterus.     Pupils: Pupils are equal, round, and reactive to light.   Neck:      Vascular: No JVD.   Cardiovascular:      Rate and Rhythm: Normal rate and regular rhythm.      Pulses: Intact distal pulses.           Radial pulses are 2+ on the right side and 2+ on the left side.        Dorsalis pedis pulses are 2+ on the right side and 2+ on the left side.        " Posterior tibial pulses are 2+ on the right side and 2+ on the left side.      Heart sounds: S1 normal and S2 normal. No murmur heard.     No friction rub. No gallop.   Pulmonary:      Effort: Pulmonary effort is normal. No respiratory distress.      Breath sounds: Normal breath sounds. No wheezing or rales.   Chest:      Chest wall: No tenderness.   Abdominal:      General: Bowel sounds are normal. There is no distension.      Palpations: Abdomen is soft. There is no mass.      Tenderness: There is no abdominal tenderness. There is no rebound.   Musculoskeletal:         General: No tenderness. Normal range of motion.      Cervical back: Normal range of motion and neck supple.   Skin:     General: Skin is warm and dry.      Coloration: Skin is not pale.   Neurological:      Mental Status: She is alert and oriented to person, place, and time.      Coordination: Coordination normal.      Deep Tendon Reflexes: Reflexes normal.   Psychiatric:         Behavior: Behavior normal.         Judgment: Judgment normal.           Cardiac echo 10/24/24  Summary    Left Ventricle: The left ventricle is normal in size. Mildly increased wall thickness. There is normal systolic function. Biplane (2D) method of discs ejection fraction is 69%. Diastolic function cannot be reliably determined in the presence of mitral annular calcification.    Right Ventricle: Normal right ventricular cavity size. Systolic function is normal.    Cardiac echo 11/27/23  Summary    Left Ventricle: The left ventricle is normal in size. Normal wall thickness. There is concentric remodeling. Normal wall motion. There is normal systolic function. Ejection fraction by visual approximation is 55%. There is normal diastolic function.    Right Ventricle: Normal right ventricular cavity size. Wall thickness is normal. Right ventricle wall motion  is normal. Systolic function is normal.    Mitral Valve: There is mild posterior mitral annular calcification present.     Pulmonary Artery: The estimated pulmonary artery systolic pressure is 17 mmHg.    IVC/SVC: Normal venous pressure at 3 mmHg.  Cardiac event monitor 8/29/23  Conclusion         Negative event monitor with no clinical arrhythmias.    Symptoms corresponding with normal sinus rhythm/tachycardia  bpm.       EKG  8/15/23 reviewed- NSR w nonspecific T wave abnormality     Cardiac echo 8/15/23  Summary         Left Ventricle: The left ventricle is normal in size. Normal wall thickness. There is normal systolic function. There is normal diastolic function.    Left Atrium: Left atrium is mildly dilated.    Right Ventricle: Normal right ventricular cavity size.    Aortic Valve: The aortic valve is a trileaflet valve.    Mitral Valve: Mild posterior mitral annular calcification.    IVC/SVC: Normal venous pressure at 3 mmHg.    MRI Brain 8/16/23  Impression:     No acute abnormality.     T2 hyperintensities noted at the gray-white junction bilaterally similar and number and distribution to the 2020 exam.  Findings may represent sequelae microvascular ischemic change.       CTA head and neck 8/15/23    Impression:     No acute abnormality. No high-grade stenosis or major vessel occlusion  Cardiac echo 4/9/2016  CONCLUSIONS     1 - Normal left ventricular systolic function (EF 60-65%).     2 - Left ventricular diastolic dysfunction.     3 - Normal right ventricular systolic function      Assessment:       1. Essential hypertension    2. Chronic diastolic heart failure    3. Mixed hyperlipidemia    4. SOBOE (shortness of breath on exertion)    5. Morbid obesity with BMI of 50.0-59.9, adult    6. Type 2 diabetes mellitus without complication, without long-term current use of insulin    7. Syncope, unspecified syncope type    8. Obstructive sleep apnea syndrome    9. Nonintractable headache, unspecified chronicity pattern, unspecified headache type         Plan:         Essential hypertension  -Goal BP < 130/80  - improved-  home -130s/80-90s  -in office   -continue medical therapy: losartan 100 mg, Amlodipine 10 mg, furosemide 40 mg   -check BP twice daily and maintain log, take AM BP 1-2 Hours after medication   -discussed lifestyle modifications- diet, exercise, weight loss     Diastolic heart failure  -Cardiac echo 11/27/23-  LVEF 55% , and normal  diastolic function  -cardiac echo 10/24/24 LVEF 69%  -continue medical therapy: losartan 100 mg, furosemide  to 40 mg BID  w KCL 10 meq ( last K+ 3.6)   -continue HTN management   -discussed lifestyle modifications  -weight self daily, notify office if > 3 pound weight gain in 1 day or 5 pounds in 1 week     Hyperlipidemia  -Last LDL 66.4 8/15/23  -controlled   -continue statin therapy- atorvastatin 20 mg   -discussed lifestyle modifications     SOBOE (shortness of breath on exertion)  Cardiac echo 10/24/24  Summary    Left Ventricle: The left ventricle is normal in size. Mildly increased wall thickness. There is normal systolic function. Biplane (2D) method of discs ejection fraction is 69%. Diastolic function cannot be reliably determined in the presence of mitral annular calcification.    Right Ventricle: Normal right ventricular cavity size. Systolic function is normal.    Morbid obesity with BMI of 50.0-59.9, adult  -discussed lifestyle modifications  -discussed health risk associated w obesity   Body mass index is 51.45 kg/m². Morbid obesity complicates all aspects of disease management from diagnostic modalities to treatment. Weight loss encouraged and health benefits explained to patient.      Type 2 diabetes mellitus  -Uncontrolled  -A1c 8.5 8/23/24  -managed by PCP  -continue medical therapy- glipizide and ozempic      Syncope  -Cardiac event monitor 8/29/23- negative event monitor w no clinical arrhythmias   -CTA chest: 1/5/24  Impression:     Assessment of pulmonary arteries is severely limited due to poor contrast bolus timing.  No obvious large central pulmonary  arterial filling defect.   -Tilt table test ordered   -cardiac event monitor in progress     Sleep apnea  -sleep study completed , CPAP ordered, patient has not followed up. Instructed to contact [employer] company regarding CPAP machine     Headache  -continues to report headache  -started on  low dose amitriptyline for possible post concussive HA, by Neurology  -she reports left neck pain and spasms to 4th & 5 th digits, she has had MRI cervical spine  12/11/23and  instructed to contact Neurology      -MRI cervical spine 12/11/23  Impression:     Multilevel degenerative changes of the cervical spine are most pronounced at C4-C5 with minor spinal canal stenosis and mild left-sided neural foraminal stenosis.     Mild left-sided C2-C3 and C3-C4 neural foraminal stenosis.        Total duration of face to face visit time 30 minutes.  Total time spent counseling greater than fifty percent of total visit time.  Counseling included discussion regarding imaging findings, diagnosis, possibilities, treatment options, risks and benefits.  The patient had many questions regarding the options and long-term effects      Stanley Bañuelos, [employer]  Cardiology

## 2024-12-06 NOTE — ASSESSMENT & PLAN NOTE
-discussed lifestyle modifications  -discussed health risk associated w obesity   Body mass index is 51.45 kg/m². Morbid obesity complicates all aspects of disease management from diagnostic modalities to treatment. Weight loss encouraged and health benefits explained to patient.

## 2024-12-06 NOTE — DISCHARGE INSTRUCTIONS
Information to Prepare you for your Surgery    PRE-ADMIT TESTING   [address]  [place] [address]  [address]   [phone]  - VANDANA    Your surgery has been scheduled at [facility]. We are pleased to have the opportunity to serve you. For Further Information please call [phone].    On the day of surgery please report to Registration on the 1st floor of the [facility].    CONTACT YOUR PHYSICIAN'S OFFICE THE DAY PRIOR TO YOUR SURGERY TO OBTAIN YOUR ARRIVAL TIME.     The evening before surgery do not eat anything after 9 p.m. ( this includes hard candy, chewing gum and mints).  You may only have WATER  from 9 p.m. until you leave your home.     DRINK AT LEAST 12 OUNCES THE MORNING OF SURGERY    DO NOT DRINK ANY LIQUIDS ON THE WAY TO THE HOSPITAL.        Outpatient Surgery- May allow 2 adults (18 and older)/ Support Persons (1 being the designated ) for all surgical/procedural patients. A breastfeeding mother will be allowed her infant and 2 adult Support Persons. No one under the age of 18 will be allowed in the building.    MEDICATION INSTRUCTIONS: TAKE medications checked off by the Anesthesiologist on your Medication List.      Angiogram Patients: Take medications as instructed by your physician, including aspirin.     Surgery Patients:  If you take ASPIRIN - Your PHYSICIAN/SURGEON will need to inform you IF/OR when you need to stop taking aspirin prior to your surgery.     Starting the week prior to surgery, do not take any medications containing IBUPROFEN or NSAIDS (Advil, Aleve, BC, Celebrex, Goody's, Ketorolac, Meloxicam, Mobic, Motrin, Naproxen, Toradol, etc).  If you are not sure if you should take a medicine please call your surgeon's office.  You may take Tylenol.    Do Not Wear any make-up (especially eye make-up) to surgery. Please remove any false eyelashes or eyelash extensions. If you arrive the day of surgery with  makeup/eyelashes on you will be required to remove prior to surgery. (There is a risk of corneal abrasions if eye makeup/eyelash extensions are not removed)    Leave all valuables at home.   Do Not wear any jewelry or watches, including any metal in body piercings. Jewelry must be removed prior to coming to the hospital.  There is a possibility that rings that are unable to be removed may be cut off if they are on the surgical extremity.    Please remove all hair extensions, wigs, clips and any other metal accessories/ ornaments from your hair.  These items may pose a flammable/fire risk in Surgery and must be removed.    Do not shave your surgical area at least 5 days prior to your surgery. The surgical prep will be performed at the hospital according to Infection Control regulations.    Contact Lens must be removed before surgery. Either do not wear the contact lens or bring a case and solution for storage.  Please bring a container for eyeglasses or dentures as required.  Bring any paperwork your physician has provided, such as consent forms,  history and physicals, doctor's orders, etc.   Bring comfortable clothes that are loose fitting to wear upon discharge. Take into consideration the type of surgery being performed.  Maintain your diet as advised per your physician the day prior to surgery.    Adequate rest the night before surgery is advised.   Park in the Parking lot behind the hospital or in the [facility] across the street from the parking lot. Parking is complimentary.  If you will be discharged the same day as your procedure, please arrange for a responsible adult to drive you home or to accompany you if traveling by taxi.   YOU WILL NOT BE PERMITTED TO DRIVE OR TO LEAVE THE HOSPITAL ALONE AFTER SURGERY.   If you are being discharged the same day, it is strongly recommended that you arrange for someone to remain with you for the first 24 hrs following your surgery.    The Surgeon will  speak to your family/visitor after your surgery regarding the outcome of your surgery and post op care.  The Surgeon may speak to you after your surgery, but there is a possibility you may not remember the details.  Please check with your family members regarding the conversation with the Surgeon.         Bathing Instructions with Hibiclens:    Shower the evening before and morning of your procedure with Chlorhexidine (Hibiclens)  do not use Chlorhexidine on your face or genitals. Do not get in your eyes.  Wash your face with water and your regular face wash/soap  Use your regular shampoo  Apply Chlorhexidine (Hibiclens) directly on your skin or on a wet washcloth and wash gently. When showering: Move away from the shower stream when applying Chlorhexidine (Hibiclens) to avoid rinsing off too soon.  Rinse thoroughly with warm water  Do not dilute Chlorhexidine (Hibiclens)   Dry off as usual, do not use any deodorant, powder, body lotions, perfume, after shave or cologne.     We strongly recommend whoever is bringing you home be present for discharge instructions.  This will ensure a thorough understanding for your post op home care.    If the patient has fever, cough, or signs/symptoms of Flu or Covid please do not come in for your surgery.  First, contact the pre op department at [phone] (unit opens at 5AM) and then contact your surgeon and your primary care physician for further instructions.      If applicable, please bring any blood pressure and/or diabetes medications with you the day of surgery.  If on home oxygen, even at night, please bring your portable oxygen tank with you the day of surgery in case it is needed for your discharge.      Thanks,  DANIEL Jorge

## 2024-12-06 NOTE — ASSESSMENT & PLAN NOTE
-Last LDL 66.4 8/15/23  -controlled   -continue statin therapy- atorvastatin 20 mg   -discussed lifestyle modifications    No

## 2024-12-06 NOTE — ASSESSMENT & PLAN NOTE
Cardiac echo 10/24/24  Summary    Left Ventricle: The left ventricle is normal in size. Mildly increased wall thickness. There is normal systolic function. Biplane (2D) method of discs ejection fraction is 69%. Diastolic function cannot be reliably determined in the presence of mitral annular calcification.    Right Ventricle: Normal right ventricular cavity size. Systolic function is normal.

## 2024-12-06 NOTE — ASSESSMENT & PLAN NOTE
-sleep study completed , CPAP ordered, patient has not followed up. Instructed to contact [employer] regarding CPAP machine

## 2024-12-06 NOTE — ASSESSMENT & PLAN NOTE
-Cardiac event monitor 8/29/23- negative event monitor w no clinical arrhythmias   -CTA chest: 1/5/24  Impression:     Assessment of pulmonary arteries is severely limited due to poor contrast bolus timing.  No obvious large central pulmonary arterial filling defect.   -Tilt table test ordered   -cardiac event monitor in progress

## 2024-12-06 NOTE — ANESTHESIA PREPROCEDURE EVALUATION
12/11/2024  Barrera Siddiqui is a 59 y.o., female.      Pre-op Assessment    I have reviewed the Patient Summary Reports.     I have reviewed the Nursing Notes. I have reviewed the NPO Status.   I have reviewed the Medications.     Review of Systems  Anesthesia Hx:  No problems with previous Anesthesia             Denies Family Hx of Anesthesia complications.    Denies Personal Hx of Anesthesia complications.                    Social:  Non-Smoker, Social Alcohol Use       Hematology/Oncology:  Hematology Normal   Oncology Normal                Hematology Comments: Hgb 11.5                    Cardiovascular:     Hypertension, poorly controlled       CHF    hyperlipidemia    11/2023 EF 55%  10/2024 EF nml      Neg stress 2018                           Pulmonary:      Shortness of breath  Sleep Apnea Pulmonary evaluation for SOB suspect from obesity and EDGAR               Renal/:  Renal/ Normal                 Hepatic/GI:      Denies GERD. Liver Disease,   Taking GLP-1 Agonists            Musculoskeletal:  Arthritis          Spine Disorders: cervical Degenerative disease           Neurological:  TIA, CVA, no residual symptoms   Headaches                                 Endocrine:  Diabetes   Ozempic - last dose 11/6/24      Morbid Obesity / BMI > 40  Psych:  Psychiatric History anxiety depression              Physical Exam  General: Well nourished and Cooperative    Airway:  Mallampati: III   Mouth Opening: Normal  TM Distance: Normal  Neck ROM: Normal ROM    Dental:  Intact      Anesthesia Plan  Type of Anesthesia, risks & benefits discussed:    Anesthesia Type: Gen ETT  Intra-op Monitoring Plan: Standard ASA Monitors  Post Op Pain Control Plan: multimodal analgesia and peripheral nerve block  Induction:  IV  Airway Plan: Video  Informed Consent: Informed consent signed with the Patient and all parties understand  the risks and agree with anesthesia plan.  All questions answered.   ASA Score: 3  Day of Surgery Review of History & Physical: H&P Update referred to the surgeon/provider.  Anesthesia Plan Notes:   Post op block  Ozempic - off since 11/6    Needs CBC    7/2024 - had same procedure under [facility] at [facility] on left arm; had postop supraclavicular block  10/2024 - cardiology note in epic clearing pt for colonoscopy - note reviewed    Ready For Surgery From Anesthesia Perspective.     .

## 2024-12-06 NOTE — ASSESSMENT & PLAN NOTE
-Uncontrolled  -A1c 8.5 8/23/24  -managed by PCP  -continue medical therapy- glipizide and ozempic

## 2024-12-06 NOTE — ASSESSMENT & PLAN NOTE
-Cardiac echo 11/27/23-  LVEF 55% , and normal  diastolic function  -cardiac echo 10/24/24 LVEF 69%  -continue medical therapy: losartan 100 mg, furosemide  to 40 mg BID  w KCL 10 meq ( last K+ 3.6)   -continue HTN management   -discussed lifestyle modifications  -weight self daily, notify office if > 3 pound weight gain in 1 day or 5 pounds in 1 week

## 2024-12-09 RX ORDER — TRAMADOL HYDROCHLORIDE 50 MG/1
50 TABLET ORAL EVERY 6 HOURS PRN
Qty: 10 TABLET | Refills: 0 | Status: SHIPPED | OUTPATIENT
Start: 2024-12-09

## 2024-12-10 ENCOUNTER — CLINICAL SUPPORT (OUTPATIENT)
Dept: REHABILITATION | Facility: HOSPITAL | Age: 59
End: 2024-12-10
Payer: COMMERCIAL

## 2024-12-10 ENCOUNTER — TELEPHONE (OUTPATIENT)
Dept: ORTHOPEDICS | Facility: CLINIC | Age: 59
End: 2024-12-10
Payer: COMMERCIAL

## 2024-12-10 DIAGNOSIS — M79.642 PAIN IN LEFT HAND: ICD-10-CM

## 2024-12-10 DIAGNOSIS — M62.81 MUSCLE WEAKNESS: Primary | ICD-10-CM

## 2024-12-10 DIAGNOSIS — M25.60 STIFFNESS IN JOINT: ICD-10-CM

## 2024-12-10 DIAGNOSIS — M25.522 PAIN IN LEFT ELBOW: ICD-10-CM

## 2024-12-10 PROCEDURE — 97018 PARAFFIN BATH THERAPY: CPT | Mod: PO

## 2024-12-10 PROCEDURE — 97140 MANUAL THERAPY 1/> REGIONS: CPT | Mod: PO

## 2024-12-10 PROCEDURE — 97110 THERAPEUTIC EXERCISES: CPT | Mod: PO

## 2024-12-10 NOTE — PROGRESS NOTES
Occupational Therapy Daily Treatment Note     Name: Barrera Siddiqui  [facility] Number: [id number]    Therapy Diagnosis:   Encounter Diagnoses   Name Primary?    Muscle weakness Yes    Stiffness in joint     Pain in left elbow     Pain in left hand      Physician: Brandie Monteiro, *    Visit Date: 12/10/2024    Physician Orders: OT eval/treat   Medical Diagnosis: G56.23 (ICD-10-CM) - Cubital tunnel syndrome of both upper extremities  Evaluation Date: 8/14/2024  Updated Certification Period: 1/13/2024 to 12/10/2024   Previous Plan of Care Certification Date: 11/6/24   Authorization Period:  8/7/2024 - 12/31/2024   Surgery Date and Procedure: Procedure(s) (LRB):  LEFT ELBOW DECOMPRESSION, NERVE, ULNAR (Left)  LEFT THUMB AND RING  TRIGGER FINGER RELEASE (Left)   Tenosynovectomy left thumb and ring finger flexor tendon 7/24/24   Date of Return to MD: none noted in chart at this time     Visit # / Visits authorized: 17 / 20 (plus initial evaluation)   FOTO Completion: 3/3  Initial=28 / Goal=33  /Updated=42 (11/26/2024)     Time In: 300  Time Out: 400   Total Billable Time: 60 minutes     Precautions:  Standard    Subjective     Pt reports: she is scheduled for surgery for her R elbow/hand on 12/11/24.     she was compliant with home exercise program given last session.   Response to previous treatment: no soreness elbow following last session     Functional change: None reported at this time.      Pain: 4/10 at the elbow; 3/10 at hand    Location: left arm     Objective     Objective Measures updated, 11/12/2204. Please see updated plan of care.      Range of Motion:    Elbow Left  8/14/2024 Left   10/17/2024 Left   11/12/2024 Right  8/14/2024   Flexion 130 130 140 (+10)   140   Extension 0 0 0 0         Left Hand Finger ROM INDEX   8/14/2024 INDEX  10/17/  2024 INDEX  11/12/2024 LONG   8/14/2024 LONG   10/17/  2024 LONG  11/12/2024  RING   8/14/2024 RING  10/17/  2024 RING  11/12/2024 SMALL   8/14/2024 SMALL   10/17/  2024  SMALL  11/12/2024   MP  60 85 NT  60 90 NT   60 90 NT  64 95 NT    PIP  90 105    NT  95 105 NT  95 100 NT  100 95 NT    DIP  75 65 Touch to DPC  80 60 Touch to DPC  80 80 Touch to DPC  85 80 Touch to DPC    *measures taken as composite fist on 11/12/2024.  Patient can compositely fist to DPC.         Right Hand Finger ROM INDEX   8/14/2024 LONG   8/14/2024 RING   8/14/2024 SMALL   8/14/2024   MP  90 85 90 90   PIP  110 105 100 100   DIP  80 80 80 90        Left Thumb range of motion  8/14/2024 Left Thumb range of motion  10/17/2024 Left Thumb range of motion  11/12/2024 Right Thumb range of motion  8/14/2024   MP 80 80 70  75   IP 85 60 75 75   Radial Abduction 70 45 65 70   Palmar Abduction 60 45 50 60   Opposition Middle of SF To MCP of SF  To SF MCP  DPC   *MP/IP measures taken as composite thumb flexion on 11/12/2024.  Patient can touch to 5th MCP with flexion/opposition.  She has improved thumb radail/palmar abduction.         Strength: (ZULEYKA Dynamometer in psi.)        8/14/2024 11/12/2024 8/14/2024     Left Left  Right   Rung II 15 44 (+29)   60         Pinch Strength (Measured in psi)       8/14/2024 11/12/2024 8/14/2024     Left Left  Right   Key Pinch 4  8 (+4)   10   3pt Pinch 6 6 (=)   11   2pt Pinch 4 7 (+3)    8         CMS Impairment/Limitation/Restriction for FOTO Hand/Wrist/Finger Survey     Therapist reviewed FOTO scores for Barrera Siddiqui on 8/14/2024.   FOTO documents entered into [employer] - see Media section.     Intake Score: 28%  Category: Self Care           Treatment       Barrera received the following direct contact modalities after being cleared for contraindications for 8 minutes:  - paraffin wax and moist hot pack to hand to increase extensibility   - moist hot pack to elbow to increase extensibility (simultaneously with paraffin application)      Direct contact modalities after being cleared for contraindications:  Manual therapy techniques: Myofacial release, Soft tissue Mobilization, and  "scar massage were applied to the: left hand and left elbow for 20 minutes, including:  - use of hand techniques, cupping, IASTM tools, and scar pump to increase blood flow/circulation, improve soft tissue pliability and decrease pain.    -scar massage to elbow with use of hand techniques only to decrease tissue adhesions and pain, and increase tissue extensibility   -intrinsic stretches to digits #2-5 5 reps X 5 sec hold       Therapeutic exercises to develop strength, endurance, ROM, and flexibility for 32 minutes, including:    - lateral/tripod pinches, red clothespins X 20 each   - elbow 3 ways with 3# weight 2/15   - wrist 3 ways with 3# weight 2/15   - wrist maze x 3 minutes   -green flex bar smiles/frowns/twists X 2/15 reps   -PHG setting #3, 2/25 reps    -ulnar nerve glide X 10     Therputty (yellow/green mix):   -molding 2 min  -gripping X 5 with 5 sec hold   -thumb flexion X 5 with 5 sec hold   -pancake and bloom X 5   -log rolls X 6   -lateral/tripod/tip to tip pinches 2 log seach   -finger scrapes, flexion/extension 2 pancakes each  -pinch/pull with digits #4 & #5 X 5          Home Exercises and Education Provided     Education provided:   -ulnar nerve glide 10/8/2024  -recommend towel wrap to keep elbow extended at night to decrease "achy" pain in elbow   -Issued/reviewed theraputty HEP; issued yellow/green theraputty mix     - Progress towards goals     Written Home Exercises Provided: Patient instructed to cont prior HEP.  Exercises were reviewed and Barrera was able to demonstrate them prior to the end of the session.  Barrera demonstrated good  understanding of the HEP provided.   .   See EMR under Patient Instructions for exercises provided prior visit.        Assessment     Patient's pain has decreased in LUE since last Occupational Therapy visit, however, has not completely resolved.  She is scheduled for R elbow/hand surgeries on 12/11/24.  Recommend to await further Occupational Therapy orders for " eval & treat to RUE and/or Updated Plan of Care at next visit for LUE to resume care for LUE, if indicated.     Barrera is progressing well towards her goals and there are no updates to goals at this time. Pt prognosis is Good.      Pt will continue to benefit from skilled outpatient occupational therapy to address the deficits listed in the problem list on initial evaluation provide pt/family education and to maximize pt's level of independence in the home and community environment.      Anticipated barriers to occupational therapy: None at this time      Pt's spiritual, cultural and educational needs considered and pt agreeable to plan of care and goals.       LTG's (12 weeks):  1)   Increase ROM to 140 degrees in left elbow flexion to increase functional hand use for ADLs, IADLs, and leisure tasks .-Met 11/12/2024  2)   Increase ROM to 85 degrees in left hand MP joints to increase functional hand use for ADLs, IADLs, and leisure tasks.-Met 11/12/2024  3)   Increase ROM so that opposition in left thumb is to the DPC  to increase functional hand use for ADLs, IADLs, and leisure tasks.-Met 11/12/2024  4)   Increase  strength to 50 lbs. to grasp for ADLs, IADLs and leisure taskOngoing  5)   Increase key pinch to 8 psis to increase independence with button and FM coordination.-Met 11/12/2024  6)   Decrease complaints of pain to  1 out of 10 at worst to increase functional hand use for ADL/work/leisure activities.Ongoing  7)   Patient to score at 55% or more on FOTO to demonstrate improved perception of functional left UE use.Ongoing  8)   Pt will return to near to prior level of function for ADLs and household management reporting I or Mod I with ADLs (dressing, feeding, grooming, toileting). Ongoing     STG's (6 weeks)  1)   Patient to be IND with HEP and modalities for pain/edema managment.-Met 11/12/2024  2)   Increase ROM to 135 degrees in left elbow flexion to increase functional hand use for ADLs, IADLs, and  leisure tasks.-Met 11/12/2024  3)   Increase ROM to 75 degrees in left hand MP joints to increase functional hand use for ADLs, IADLs, and leisure tasks.-Met 11/12/2024  4)   Increase ROM so that opposition in left thumb is to the base of her SF  to increase functional hand use for ADLs, IADLs, and leisure tasks-Met 11/12/2024  5)   Increase  strength to 25 lbs. to grasp for ADLs, IADLs and leisure task.-Met 11/12/2024  6)   Increase key pinch to 6 psis to increase independence with button and FM coordination.-Met 11/12/2024  7)   Patient to be IND wiht Orthotic use, wear and care precautions. .-Met 11/12/2024  8)   Decrease complaints of pain to  5 out of 10 at worst to increase functional hand use for ADL/work/leisure activities.Ongoing       Plan      Updated Certification Period: 1/13/2024 to 12/10/2024     Recommended Treatment Plan: 2 times per week for 4 weeks:  Manual Therapy, Moist Heat/ Ice, Paraffin, Therapeutic Activities, Therapeutic Exercise, and Ultrasound     Mani Fox, OT

## 2024-12-10 NOTE — TELEPHONE ENCOUNTER
Spoke c pt. Informed pt of 9:00 arrival time for  12/11/24 surgery at the [facility]. Reminded pt of NPO status. Pt expressed understanding & was thankful. Confirmed that there are no scratches, scrapes or open wounds on pts operative arm.

## 2024-12-11 ENCOUNTER — HOSPITAL ENCOUNTER (OUTPATIENT)
Facility: OTHER | Age: 59
Discharge: HOME | End: 2024-12-11
Attending: ORTHOPAEDIC SURGERY | Admitting: ORTHOPAEDIC SURGERY
Payer: COMMERCIAL

## 2024-12-11 ENCOUNTER — ANESTHESIA (OUTPATIENT)
Dept: SURGERY | Facility: OTHER | Age: 59
End: 2024-12-11
Payer: COMMERCIAL

## 2024-12-11 DIAGNOSIS — G56.23 CUBITAL TUNNEL SYNDROME OF BOTH UPPER EXTREMITIES: Primary | ICD-10-CM

## 2024-12-11 DIAGNOSIS — M65.341 TRIGGER RING FINGER OF RIGHT HAND: ICD-10-CM

## 2024-12-11 PROBLEM — M65.30 TRIGGER FINGER OF RIGHT HAND: Status: ACTIVE | Noted: 2024-12-11

## 2024-12-11 LAB
GLUCOSE SERPL-MCNC: 315 MG/DL (ref 70–110)
POCT GLUCOSE: 283 MG/DL (ref 70–110)
POCT GLUCOSE: 311 MG/DL (ref 70–110)
POCT GLUCOSE: 315 MG/DL (ref 70–110)
POCT GLUCOSE: 340 MG/DL (ref 70–110)

## 2024-12-11 PROCEDURE — 25000003 PHARM REV CODE 250: Performed by: NURSE ANESTHETIST, CERTIFIED REGISTERED

## 2024-12-11 PROCEDURE — 71000033 HC RECOVERY, INTIAL HOUR: Performed by: ORTHOPAEDIC SURGERY

## 2024-12-11 PROCEDURE — 25000003 PHARM REV CODE 250: Performed by: ORTHOPAEDIC SURGERY

## 2024-12-11 PROCEDURE — 63600175 PHARM REV CODE 636 W HCPCS: Performed by: ANESTHESIOLOGY

## 2024-12-11 PROCEDURE — 71000016 HC POSTOP RECOV ADDL HR: Performed by: ORTHOPAEDIC SURGERY

## 2024-12-11 PROCEDURE — 25000003 PHARM REV CODE 250: Performed by: STUDENT IN AN ORGANIZED HEALTH CARE EDUCATION/TRAINING PROGRAM

## 2024-12-11 PROCEDURE — 37000008 HC ANESTHESIA 1ST 15 MINUTES: Performed by: ORTHOPAEDIC SURGERY

## 2024-12-11 PROCEDURE — 25000003 PHARM REV CODE 250: Performed by: ANESTHESIOLOGY

## 2024-12-11 PROCEDURE — 63600175 PHARM REV CODE 636 W HCPCS: Performed by: STUDENT IN AN ORGANIZED HEALTH CARE EDUCATION/TRAINING PROGRAM

## 2024-12-11 PROCEDURE — 71000039 HC RECOVERY, EACH ADD'L HOUR: Performed by: ORTHOPAEDIC SURGERY

## 2024-12-11 PROCEDURE — 36000707: Performed by: ORTHOPAEDIC SURGERY

## 2024-12-11 PROCEDURE — 64415 NJX AA&/STRD BRCH PLXS IMG: CPT | Performed by: ANESTHESIOLOGY

## 2024-12-11 PROCEDURE — 82962 GLUCOSE BLOOD TEST: CPT | Performed by: ORTHOPAEDIC SURGERY

## 2024-12-11 PROCEDURE — 26055 INCISE FINGER TENDON SHEATH: CPT | Mod: 51,F8,, | Performed by: ORTHOPAEDIC SURGERY

## 2024-12-11 PROCEDURE — 36000706: Performed by: ORTHOPAEDIC SURGERY

## 2024-12-11 PROCEDURE — 64718 REVISE ULNAR NERVE AT ELBOW: CPT | Mod: RT,,, | Performed by: ORTHOPAEDIC SURGERY

## 2024-12-11 PROCEDURE — 71000015 HC POSTOP RECOV 1ST HR: Performed by: ORTHOPAEDIC SURGERY

## 2024-12-11 PROCEDURE — 37000009 HC ANESTHESIA EA ADD 15 MINS: Performed by: ORTHOPAEDIC SURGERY

## 2024-12-11 DEVICE — MEMBRANE CLARIX 1K 2.5CMX2.5CM: Type: IMPLANTABLE DEVICE | Site: ARM | Status: FUNCTIONAL

## 2024-12-11 RX ORDER — DEXAMETHASONE SODIUM PHOSPHATE 4 MG/ML
INJECTION, SOLUTION INTRA-ARTICULAR; INTRALESIONAL; INTRAMUSCULAR; INTRAVENOUS; SOFT TISSUE
Status: DISCONTINUED | OUTPATIENT
Start: 2024-12-11 | End: 2024-12-11

## 2024-12-11 RX ORDER — CEFAZOLIN 2 G/1
2 INJECTION, POWDER, FOR SOLUTION INTRAMUSCULAR; INTRAVENOUS
Status: COMPLETED | OUTPATIENT
Start: 2024-12-11 | End: 2024-12-11

## 2024-12-11 RX ORDER — INSULIN ASPART 100 [IU]/ML
10 INJECTION, SOLUTION INTRAVENOUS; SUBCUTANEOUS ONCE
Status: COMPLETED | OUTPATIENT
Start: 2024-12-11 | End: 2024-12-11

## 2024-12-11 RX ORDER — LIDOCAINE HYDROCHLORIDE 20 MG/ML
INJECTION INTRAVENOUS
Status: DISCONTINUED | OUTPATIENT
Start: 2024-12-11 | End: 2024-12-11

## 2024-12-11 RX ORDER — ONDANSETRON HYDROCHLORIDE 2 MG/ML
4 INJECTION, SOLUTION INTRAVENOUS DAILY PRN
Status: DISCONTINUED | OUTPATIENT
Start: 2024-12-11 | End: 2024-12-11 | Stop reason: HOSPADM

## 2024-12-11 RX ORDER — SODIUM CHLORIDE 0.9 % (FLUSH) 0.9 %
3 SYRINGE (ML) INJECTION
Status: DISCONTINUED | OUTPATIENT
Start: 2024-12-11 | End: 2024-12-11 | Stop reason: HOSPADM

## 2024-12-11 RX ORDER — FENTANYL CITRATE 50 UG/ML
INJECTION, SOLUTION INTRAMUSCULAR; INTRAVENOUS
Status: DISCONTINUED | OUTPATIENT
Start: 2024-12-11 | End: 2024-12-11

## 2024-12-11 RX ORDER — OXYCODONE HYDROCHLORIDE 5 MG/1
5 TABLET ORAL
Status: DISCONTINUED | OUTPATIENT
Start: 2024-12-11 | End: 2024-12-11 | Stop reason: HOSPADM

## 2024-12-11 RX ORDER — MEPERIDINE HYDROCHLORIDE 25 MG/ML
12.5 INJECTION INTRAMUSCULAR; INTRAVENOUS; SUBCUTANEOUS ONCE AS NEEDED
Status: DISCONTINUED | OUTPATIENT
Start: 2024-12-11 | End: 2024-12-11 | Stop reason: HOSPADM

## 2024-12-11 RX ORDER — HYDROMORPHONE HYDROCHLORIDE 2 MG/ML
0.4 INJECTION, SOLUTION INTRAMUSCULAR; INTRAVENOUS; SUBCUTANEOUS EVERY 5 MIN PRN
Status: DISCONTINUED | OUTPATIENT
Start: 2024-12-11 | End: 2024-12-11 | Stop reason: HOSPADM

## 2024-12-11 RX ORDER — INSULIN ASPART 100 [IU]/ML
0-10 INJECTION, SOLUTION INTRAVENOUS; SUBCUTANEOUS EVERY 6 HOURS PRN
Status: DISCONTINUED | OUTPATIENT
Start: 2024-12-11 | End: 2024-12-11 | Stop reason: HOSPADM

## 2024-12-11 RX ORDER — BACITRACIN ZINC 500 UNIT/G
OINTMENT (GRAM) TOPICAL
Status: DISCONTINUED | OUTPATIENT
Start: 2024-12-11 | End: 2024-12-11 | Stop reason: HOSPADM

## 2024-12-11 RX ORDER — SUCCINYLCHOLINE CHLORIDE 20 MG/ML
INJECTION INTRAMUSCULAR; INTRAVENOUS
Status: DISCONTINUED | OUTPATIENT
Start: 2024-12-11 | End: 2024-12-11

## 2024-12-11 RX ORDER — PROPOFOL 10 MG/ML
VIAL (ML) INTRAVENOUS
Status: DISCONTINUED | OUTPATIENT
Start: 2024-12-11 | End: 2024-12-11

## 2024-12-11 RX ORDER — CETIRIZINE HYDROCHLORIDE 10 MG/1
10 TABLET ORAL
COMMUNITY

## 2024-12-11 RX ORDER — SODIUM CHLORIDE, SODIUM LACTATE, POTASSIUM CHLORIDE, CALCIUM CHLORIDE 600; 310; 30; 20 MG/100ML; MG/100ML; MG/100ML; MG/100ML
INJECTION, SOLUTION INTRAVENOUS CONTINUOUS
Status: DISCONTINUED | OUTPATIENT
Start: 2024-12-11 | End: 2024-12-11 | Stop reason: HOSPADM

## 2024-12-11 RX ORDER — GLUCAGON 1 MG
1 KIT INJECTION
Status: DISCONTINUED | OUTPATIENT
Start: 2024-12-11 | End: 2024-12-11 | Stop reason: HOSPADM

## 2024-12-11 RX ORDER — ROCURONIUM BROMIDE 10 MG/ML
INJECTION, SOLUTION INTRAVENOUS
Status: DISCONTINUED | OUTPATIENT
Start: 2024-12-11 | End: 2024-12-11

## 2024-12-11 RX ORDER — ONDANSETRON HYDROCHLORIDE 2 MG/ML
INJECTION, SOLUTION INTRAVENOUS
Status: DISCONTINUED | OUTPATIENT
Start: 2024-12-11 | End: 2024-12-11

## 2024-12-11 RX ORDER — ROPIVACAINE HYDROCHLORIDE 5 MG/ML
INJECTION, SOLUTION EPIDURAL; INFILTRATION; PERINEURAL
Status: DISCONTINUED | OUTPATIENT
Start: 2024-12-11 | End: 2024-12-11

## 2024-12-11 RX ORDER — MUPIROCIN 20 MG/G
OINTMENT TOPICAL
Status: DISCONTINUED | OUTPATIENT
Start: 2024-12-11 | End: 2024-12-11 | Stop reason: HOSPADM

## 2024-12-11 RX ADMIN — ROPIVACAINE HYDROCHLORIDE 30 ML: 5 INJECTION, SOLUTION EPIDURAL; INFILTRATION; PERINEURAL at 12:12

## 2024-12-11 RX ADMIN — SODIUM CHLORIDE: 0.9 INJECTION, SOLUTION INTRAVENOUS at 10:12

## 2024-12-11 RX ADMIN — HYDROMORPHONE HYDROCHLORIDE 0.4 MG: 2 INJECTION INTRAMUSCULAR; INTRAVENOUS; SUBCUTANEOUS at 11:12

## 2024-12-11 RX ADMIN — MUPIROCIN: 20 OINTMENT TOPICAL at 09:12

## 2024-12-11 RX ADMIN — PROPOFOL 200 MG: 10 INJECTION, EMULSION INTRAVENOUS at 10:12

## 2024-12-11 RX ADMIN — PROPOFOL 30 MG: 10 INJECTION, EMULSION INTRAVENOUS at 11:12

## 2024-12-11 RX ADMIN — DEXAMETHASONE SODIUM PHOSPHATE 4 MG: 4 INJECTION, SOLUTION INTRAMUSCULAR; INTRAVENOUS at 10:12

## 2024-12-11 RX ADMIN — SUCCINYLCHOLINE CHLORIDE 120 MG: 20 INJECTION, SOLUTION INTRAMUSCULAR; INTRAVENOUS at 10:12

## 2024-12-11 RX ADMIN — ONDANSETRON HYDROCHLORIDE 4 MG: 2 INJECTION INTRAMUSCULAR; INTRAVENOUS at 10:12

## 2024-12-11 RX ADMIN — PROPOFOL 50 MG: 10 INJECTION, EMULSION INTRAVENOUS at 11:12

## 2024-12-11 RX ADMIN — FENTANYL CITRATE 100 MCG: 50 INJECTION, SOLUTION INTRAMUSCULAR; INTRAVENOUS at 10:12

## 2024-12-11 RX ADMIN — SUGAMMADEX 400 MG: 100 INJECTION, SOLUTION INTRAVENOUS at 11:12

## 2024-12-11 RX ADMIN — INSULIN ASPART 10 UNITS: 100 INJECTION, SOLUTION INTRAVENOUS; SUBCUTANEOUS at 01:12

## 2024-12-11 RX ADMIN — OXYCODONE 5 MG: 5 TABLET ORAL at 12:12

## 2024-12-11 RX ADMIN — CEFAZOLIN 3 G: 2 INJECTION, POWDER, FOR SOLUTION INTRAMUSCULAR; INTRAVENOUS at 10:12

## 2024-12-11 RX ADMIN — INSULIN ASPART 8 UNITS: 100 INJECTION, SOLUTION INTRAVENOUS; SUBCUTANEOUS at 09:12

## 2024-12-11 RX ADMIN — ROCURONIUM BROMIDE 20 MG: 10 SOLUTION INTRAVENOUS at 10:12

## 2024-12-11 RX ADMIN — ROCURONIUM BROMIDE 30 MG: 10 SOLUTION INTRAVENOUS at 10:12

## 2024-12-11 RX ADMIN — PROPOFOL 50 MG: 10 INJECTION, EMULSION INTRAVENOUS at 10:12

## 2024-12-11 RX ADMIN — LIDOCAINE HYDROCHLORIDE 100 MG: 20 INJECTION, SOLUTION INTRAVENOUS at 10:12

## 2024-12-11 NOTE — ANESTHESIA POSTPROCEDURE EVALUATION
Anesthesia Post Evaluation    Patient: Barrera Siddiqui    Procedure(s) Performed: Procedure(s) (LRB):  RIGHT DECOMPRESSION, NERVE, ULNAR (Right)  RIGHT RING RELEASE, TRIGGER FINGER (Right)    Final Anesthesia Type: general      Patient location during evaluation: PACU  Patient participation: Yes- Able to Participate  Level of consciousness: awake and alert  Post-procedure vital signs: reviewed and stable  Pain management: adequate  Airway patency: patent    PONV status at discharge: No PONV  Anesthetic complications: no      Cardiovascular status: blood pressure returned to baseline  Respiratory status: unassisted and spontaneous ventilation  Hydration status: euvolemic  Follow-up not needed.              Vitals Value Taken Time   /81 12/11/24 1350   Temp 36.7 °C (98 °F) 12/11/24 1250   Pulse 90 12/11/24 1350   Resp 18 12/11/24 1350   SpO2 95 % 12/11/24 1350         Event Time   Out of Recovery 12:40:42         Pain/Nola Score: Pain Rating Prior to Med Admin: 6 (12/11/2024 12:20 PM)  Pain Rating Post Med Admin: 5 (12/11/2024 12:30 PM)  Nola Score: 10 (12/11/2024  1:50 PM)

## 2024-12-11 NOTE — OR NURSING
Notified dr. Chandler that patients accu check was 311 in post op. After patient had at 0938 morning Insulin aspart pen 8 units . Dr. Chandler gave order for insulin aspart pen 10 units and to educate patient on high blood sugars . Educated the patient on importance of checking her blood sugar in 2 hours and then in 2 more hours and to make sure eats when she gets home . She needs to continue her diabetic medications when she gets home . Also educated her to get an appointment with her pcp to better control her high blood  sugars.

## 2024-12-11 NOTE — BRIEF OP NOTE
[facility] - Surgery ([place])  Brief Operative Note    Surgery Date: 12/11/2024     Surgeons and Role:     * Brandie Monteiro MD - Primary    Assisting Surgeon: Patricia Marquez MD    Pre-op Diagnosis:    Cubital tunnel syndrome right side  Trigger finger of right ring finger    Post-op Diagnosis:    Cubital tunnel syndrome right side  Trigger finger of right ring finger    Procedure(s) (LRB):  RIGHT DECOMPRESSION, NERVE, ULNAR (Right) at elbow  RIGHT RING RELEASE, TRIGGER FINGER (Right)    Anesthesia: General    Operative Findings: see op note    Estimated Blood Loss: minimal         Specimens:   Specimen (24h ago, onward)      None              Discharge Note    OUTCOME: Patient tolerated treatment/procedure well without complication and is now ready for discharge.    DISPOSITION: Home or Self Care    FINAL DIAGNOSIS: Cubital tunnel syndrome right side, Trigger finger of right ring finger    FOLLOWUP: In clinic 2 weeks    DISCHARGE INSTRUCTIONS:    Discharge Procedure Orders   Ambulatory referral/consult to Physical/Occupational Therapy   Standing Status: Future   Referral Priority: Routine Referral Type: Occupational Therapy   Referral Reason: Specialty Services Required   Requested Specialty: Occupational Therapy   Number of Visits Requested: 1     Diet general     Call MD for:  temperature >100.4     Call MD for:  persistent nausea and vomiting     Call MD for:  severe uncontrolled pain     Call MD for:  difficulty breathing, headache or visual disturbances     Call MD for:  redness, tenderness, or signs of infection (pain, swelling, redness, odor or green/yellow discharge around incision site)     Call MD for:  hives     Call MD for:  persistent dizziness or light-headedness     Call MD for:  extreme fatigue     No driving, operating heavy equipment or signing legal documents while taking pain medication     Leave dressing on - Keep it clean, dry, and intact until clinic visit     Non weight bearing

## 2024-12-11 NOTE — ANESTHESIA PROCEDURE NOTES
Intubation    Date/Time: 12/11/2024 10:18 AM    Performed by: Yaritza James  Authorized by: Vicente Resendiz MD    Intubation:     Induction:  Rapid sequence induction    Intubated:  Postinduction    Mask Ventilation:  Not attempted    Attempts:  1    Attempted By:  CRNA    Method of Intubation:  Video laryngoscopy    Blade:  Cerna 3    Laryngeal View Grade: Grade I - full view of cords      Difficult Airway Encountered?: No      Complications:  None    Airway Device:  Oral endotracheal tube    Airway Device Size:  7.5    Style/Cuff Inflation:  Cuffed (inflated to minimal occlusive pressure)    Placement Verified By:  Capnometry    Complicating Factors:  None    Findings Post-Intubation:  BS equal bilateral and atraumatic/condition of teeth unchanged

## 2024-12-11 NOTE — TRANSFER OF CARE
"Anesthesia Transfer of Care Note    Patient: Barrera Siddiqui    Procedure(s) Performed: Procedure(s) (LRB):  RIGHT DECOMPRESSION, NERVE, ULNAR (Right)  RIGHT RING RELEASE, TRIGGER FINGER (Right)    Patient location: PACU    Anesthesia Type: general    Transport from OR: Transported from OR on 6-10 L/min O2 by face mask with adequate spontaneous ventilation    Post pain: adequate analgesia    Post assessment: no apparent anesthetic complications and tolerated procedure well    Post vital signs: stable    Level of consciousness: awake    Nausea/Vomiting: no nausea/vomiting    Complications: none    Transfer of care protocol was followed      Last vitals: Visit Vitals  BP (!) 158/88 (BP Location: Left arm, Patient Position: Lying)   Pulse 91   Temp 36.2 °C (97.1 °F) (Skin)   Resp 18   Ht 5' 2" (1.575 m)   Wt 122 kg (269 lb)   LMP 07/29/2018 (Exact Date)   SpO2 (!) 94%   Breastfeeding No   BMI 49.20 kg/m²     "

## 2024-12-11 NOTE — ANESTHESIA PROCEDURE NOTES
Peripheral Block    Patient location during procedure: pre-op   Block not for primary anesthetic.  Reason for block: at surgeon's request and post-op pain management   Post-op Pain Location: R arm Pain    End time: 12/11/2024 12:13 PM    Staffing  Authorizing Provider: Vicente Resendiz MD  Performing Provider: Vicente Resendiz MD    Staffing  Performed by: Vicente Resendiz MD  Authorized by: Vicente Resendiz MD    Preanesthetic Checklist  Completed: patient identified, IV checked, site marked, risks and benefits discussed, surgical consent, monitors and equipment checked, pre-op evaluation and timeout performed  Peripheral Block  Patient position: supine  Prep: ChloraPrep  Patient monitoring: heart rate, cardiac monitor, continuous pulse ox, continuous capnometry and frequent blood pressure checks  Block type: supraclavicular  Laterality: right  Injection technique: single shot  Needle  Needle type: Stimuplex   Needle gauge: 22 G  Needle length: 2 in  Needle localization: anatomical landmarks and ultrasound guidance   -ultrasound image captured on disc.  Assessment  Injection assessment: negative aspiration, negative parasthesia and local visualized surrounding nerve  Paresthesia pain: none  Heart rate change: no  Slow fractionated injection: yes  Pain Tolerance: comfortable throughout block and no complaints  Medications:    Medications: ropivacaine (NAROPIN) injection 0.5% - Perineural   30 mL - 12/11/2024 12:13:00 PM    Additional Notes  VSS.  [facility] RN monitoring vitals throughout procedure.  Patient tolerated procedure well.

## 2024-12-11 NOTE — PLAN OF CARE
Pt's POC glucose was 315. Dr. Resendiz was notified and RN took phone order of moderate sliding scale insulin. 8 units administered before rolling to holding prior to procedure.

## 2024-12-12 VITALS
BODY MASS INDEX: 49.5 KG/M2 | TEMPERATURE: 98 F | HEIGHT: 62 IN | DIASTOLIC BLOOD PRESSURE: 81 MMHG | HEART RATE: 90 BPM | OXYGEN SATURATION: 95 % | SYSTOLIC BLOOD PRESSURE: 140 MMHG | WEIGHT: 269 LBS | RESPIRATION RATE: 18 BRPM

## 2024-12-13 NOTE — OP NOTE
[facility], [place] [facility]  Surgery Department  Operative Note    SUMMARY     Date of Procedure: 12/11/2024     Procedure: Procedure(s) (LRB):  TRANSPOSITION, NERVE, ULNAR (Right)  RIGHT RING RELEASE, TRIGGER FINGER (Right)     Surgeons and Role:     * Brandie Monteiro MD - Primary    Assisting Surgeon:  Patricia RAMOS    Pre-Operative Diagnosis: Cubital tunnel syndrome of both upper extremities [G56.23]  Trigger finger of right hand, unspecified finger [M65.30]  Pain [R52]    Post-Operative Diagnosis: Post-Op Diagnosis Codes:     * Cubital tunnel syndrome of both upper extremities [G56.23]     * Trigger finger of right hand, unspecified finger [M65.30]     * Pain [R52]    Anesthesia: General    Technical Procedures Used: surgery    Description of the Findings of the Procedure:  Indication for procedure Ms walsh is a 59-year-old female who had failed conservative treatment for ulnar nerve compression as well as trigger finger on the right side after much discussion with the patient elected for surgical intervention risks and benefits were explained to the patient in clinic consents were signed in clinic    Indication for procedure after the patient was marked in the patient's holding area was noted the patient had blood sugar of 315 I had a long discussion preoperatively about diet management patient does have a BMI of 49.20 she was received insulin preoperatively but we did discuss this could affect wound healing negatively that when you have a glucose this high patient understands wishes to proceed she states she would do better with diet management after the sites were marked patient was taken to the OR placed in supine position underwent general anesthesia right upper extremities prepped draped normal sterile fashion a time-out was conducted for the correct procedure to be indicated IV antibiotics were given to the patient preoperatively a sterile well-padded tourniquet was placed in the right upper extremity  incision was marked out just posterior to the medial epicondyle as well as over the A1 pulley of the ring finger the arm was exsanguinated with an Esmarch tourniquet was insufflated 250 mmHg attention was 1st turned to the ulnar nerve incision was made careful dissection down to the ulnar nerve of note patient had significant amount of adipose tissue that made the dissection quite difficult however the nerve was identified it was completely dissected out of note specifically at the fascia of the 2 heads of the FCU the nerve was in a tortuous type position in that it was kinked and not in a straight line after it was completely decompressed it was in a much better lined but of note patient did have a area that was kinked for some time and there was pressure that had changed the contour of the nerve the areas irrigated copious amounts normal saline elbow was placed through range of motion showed it did not sublux clear X was sutured into position Vicryl Monocryl Dermabond closed the skin our attention was then turned to the trigger finger incision was made careful dissection down to the A1 pulley all the while protecting the neurovascular structures the A1 pulley was sharply incised a portion of it was excised the tendon was retracted out the tendon sheath finger was placed range of motion showed it did not trigger the areas irrigated copious amounts normal saline nylon closed the skin sterile dressing was applied patient was placed in a well-padded long-arm posterior splint tolerated suture was brought to cover room stable condition     Postop plans patient keep the dressing clean dry and intact will see the patient back in 2 weeks in wound incisions to be checked nylon to be removed patient to be placed in therapy we do need to check her sugars to make sure that she is doing well with her diet and again this will help wound healing    Significant Surgical Tasks Conducted by the Assistant(s), if Applicable:  retraction    Complications: No    Estimated Blood Loss (EBL): * No values recorded between 12/11/2024 10:34 AM and 12/11/2024 11:25 AM *           Implants:   Implant Name Type Inv. Item Serial No.  Lot No. LRB No. Used Action   [id number]  MEMBRANE CLARIX 1K 2.5CMX2.5CM [id number] [employer] [id number] Right 6 Implanted       Specimens:   Specimen (24h ago, onward)      None                    Condition: Good    Disposition: PACU - hemodynamically stable.    Attestation: I performed the procedure.    Discharge Note    SUMMARY     Admit Date: 12/11/2024    Discharge Date and Time: 12/11/2024  2:35 PM    Hospital Course (synopsis of major diagnoses, care, treatment, and services provided during the course of the hospital stay): surgery     Final Diagnosis: Post-Op Diagnosis Codes:     * Cubital tunnel syndrome of both upper extremities [G56.23]     * Trigger finger of right hand, unspecified finger [M65.30]     * Pain [R52]    Disposition: Home or Self Care    Follow Up/Patient Instructions:     Medications:  Reconciled Home Medications:      Medication List        START taking these medications      traMADoL 50 mg tablet  Commonly known as: ULTRAM  Take 1 tablet (50 mg total) by mouth every 6 (six) hours as needed for Pain.            CONTINUE taking these medications      albuterol 90 mcg/actuation inhaler  Commonly known as: PROVENTIL/VENTOLIN HFA  2 puffs every 4 to 6 hours as needed.     amLODIPine 10 MG tablet  Commonly known as: NORVASC  Take 1 tablet (10 mg total) by mouth once daily.     aspirin 81 MG EC tablet  Commonly known as: ECOTRIN  Take 1 tablet (81 mg total) by mouth once daily.     atorvastatin 20 MG tablet  Commonly known as: LIPITOR  Take 1 tablet (20 mg total) by mouth once daily.     butalbital-acetaminophen-caffeine -40 mg -40 mg per tablet  Commonly known as: FIORICET, ESGIC  TAKE 1 TABLET BY MOUTH TWICE DAILY AS NEEDED FOR HEADACHE      cetirizine 10 MG tablet  Commonly known as: ZYRTEC  Take 10 mg by mouth.     famotidine 40 MG tablet  Commonly known as: PEPCID  Take 40 mg by mouth 2 (two) times daily.     furosemide 40 MG tablet  Commonly known as: LASIX  Take 1 tablet (40 mg total) by mouth 2 (two) times daily.     gabapentin 600 MG tablet  Commonly known as: NEURONTIN  Take 1 tablet by mouth 3 (three) times daily.     glipiZIDE 10 MG tablet  Commonly known as: GLUCOTROL  Take 10 mg by mouth 2 (two) times daily.     JANUVIA 100 mg Tab  Generic drug: SITagliptin phosphate  Take 1 tablet by mouth once daily.     LINZESS 145 mcg Cap capsule  Generic drug: linaCLOtide  TAKE 1 CAPSULE BY MOUTH ONCE DAILY ON AN EMPTY STOMACH AT LEAST 30 MINUTES BEFORE 1ST MEAL OF THE DAY     losartan 100 MG tablet  Commonly known as: COZAAR  Take 1 tablet (100 mg total) by mouth once daily.     metFORMIN 500 MG ER 24hr tablet  Commonly known as: GLUCOPHAGE-XR  Take 500 mg by mouth 2 (two) times daily.     methocarbamoL 500 MG Tab  Commonly known as: ROBAXIN  Take 1 tablet by mouth 3 (three) times daily.     multivitamin per tablet  Commonly known as: THERAGRAN  Take 1 tablet by mouth once daily.     OZEMPIC 0.25 mg or 0.5 mg (2 mg/3 mL) pen injector  Generic drug: semaglutide  Inject into the skin.     pantoprazole 40 MG tablet  Commonly known as: PROTONIX  Take 1 tablet every day by oral route in the morning for 90 days.     pregabalin 100 MG capsule  Commonly known as: LYRICA  Take 1 capsule by mouth twice daily            Discharge Procedure Orders   Ambulatory referral/consult to Physical/Occupational Therapy   Standing Status: Future   Referral Priority: Routine Referral Type: Occupational Therapy   Referral Reason: Specialty Services Required   Requested Specialty: Occupational Therapy   Number of Visits Requested: 1     Diet general     Call MD for:  temperature >100.4     Call MD for:  persistent nausea and vomiting     Call MD for:  severe uncontrolled pain      Call MD for:  difficulty breathing, headache or visual disturbances     Call MD for:  redness, tenderness, or signs of infection (pain, swelling, redness, odor or green/yellow discharge around incision site)     Call MD for:  hives     Call MD for:  persistent dizziness or light-headedness     Call MD for:  extreme fatigue     No driving, operating heavy equipment or signing legal documents while taking pain medication     Leave dressing on - Keep it clean, dry, and intact until clinic visit     Non weight bearing

## 2024-12-16 ENCOUNTER — TELEPHONE (OUTPATIENT)
Dept: CARDIOLOGY | Facility: HOSPITAL | Age: 59
End: 2024-12-16
Payer: COMMERCIAL

## 2024-12-16 NOTE — TELEPHONE ENCOUNTER
Patient wearing 30 day event monitor for diagnosis Chest pain, unspecified    Received patient-triggered alert notification, ecg c/w SR with PACs, 97 bpm on 12/14/2024 at 2:44 pm     Pt symptoms: Dizzy and passed out, rhythmstar confirmed Pt states she has passed out 6 times in an 8 month period and has a tilt test scheduled for 1/9/2024       Will cont to monitor until 12/26/2024    Bp 140/81 12/11/24    Strips placed under this encounter for review.

## 2024-12-23 ENCOUNTER — TELEPHONE (OUTPATIENT)
Dept: PAIN MEDICINE | Facility: CLINIC | Age: 59
End: 2024-12-23
Payer: COMMERCIAL

## 2024-12-26 ENCOUNTER — OFFICE VISIT (OUTPATIENT)
Dept: ORTHOPEDICS | Facility: CLINIC | Age: 59
End: 2024-12-26
Payer: COMMERCIAL

## 2024-12-26 DIAGNOSIS — G56.23 CUBITAL TUNNEL SYNDROME OF BOTH UPPER EXTREMITIES: ICD-10-CM

## 2024-12-26 DIAGNOSIS — Z98.890 POST-OPERATIVE STATE: Primary | ICD-10-CM

## 2024-12-26 DIAGNOSIS — M65.30 TRIGGER FINGER OF RIGHT HAND, UNSPECIFIED FINGER: ICD-10-CM

## 2024-12-26 PROCEDURE — 99999 PR PBB SHADOW E&M-EST. PATIENT-LVL III: CPT | Mod: PBBFAC,,, | Performed by: SPECIALIST/TECHNOLOGIST

## 2024-12-26 NOTE — PRE-PROCEDURE INSTRUCTIONS
Unable to reach pt via phone.  Left voicemail with arrival time also informing pt of need for responsible  accompaniment and instructing pt to follow pre-procedure instructions provided via [facility] portal.  The following message was sent to pt's portal.        Dear Barrera,     Please read over the following pre-procedure instructions in it's entirety as there is helpful information here to get you well prepared for your upcoming procedure.     You are scheduled for a procedure with Dr. Murphy on 12/30/2024.     [facility] at the corner of [facility] and [facility]. It is in the [place] [email or web address] [place] next to [employer]. The address is: [address] [place] [address]. Take the elevator to the 2nd floor.       Registration check in time: 11:30 am  Scheduled procedure time: 1:20 pm     If you are receiving sedation, you CANNOT drive yourself and must have a responsible friend or family member (no rideshare) to drive you home.        You should take any medications that you routinely take for blood pressure, heart medications, thyroid, cholesterol, etc.      The fasting restrictions are dependent on whether or not you are receiving sedation. Sedation is not available for all procedures.      Your fasting instructions/Sedation type are as follow:  IV sedation.   Nothing to eat after midnight the night prior to procedure.   Patients are encouraged to consume clear liquids up to 2 hours prior to scheduled arrival time.  -Clear liquids include Gatorade, water, soda, black coffee or tea (no milk or creamer), and clear juices. - Clear liquids do NOT include anything with pulp or food particles (chicken broth, ice cream, yogurt, Jello, etc.) You CANNOT drive yourself and must have a .              If you are on blood thinners, you need to follow the anticoagulation instructions that had been discussed previously. You should only stop the blood thinners if it was approved by your primary care physician  or your cardiologist. In the event that you are not able to stop your blood thinners, a blood thinner was not listed on your medication list, or we were not able to get clearance from your cardiologist, then the procedure may have to be postponed/canceled.      IF you were told to stop your blood thinners, this is how long you should generally hold some of the more common ones. Remember that stopping blood thinners is only necessary for certain procedures. If you are unsure of your instructions, please call us.   Aspirin - 5 days  Plavix/Clopidogrel - 7 days  Warfarin / Coumadin - 5 days  Eliquis - 3 days  Pradaxa/Dabigatran - 4 days  Xarelto/Rivaroxaban - 3 days        HOLD all non-insulin injections (shots) until after surgery (Ozempic, Mounjaro, Trulicity, Victoza, Byetta, Wegovy and Adlyxin) (Total of 7 days prior)        If you are a diabetic, do not take your medication if you will be fasting, but bring it with you. Please plan on being here for roughly 2-3 hours.     Please call us if you have been sick (running fever, having any flu-like symptoms) or have been taking ANTIBIOTICS in the past 2 weeks or had any outpatient procedures other than with us (colonoscopy, endoscopy, OBGYN, dental, etc.).      If you have been previously COVID positive, you will need to hold off on your procedure until you are symptom free for 10 days. If you did not have any symptoms, you can have your procedure 10 days from your positive test result.         On the morning of your procedure:  *HOLD ALL VITAMINS, MINERALS, HERBS (INCLUDING HERBAL TEAS) AND SUPPLEMENTS  *SHOWER WITH ANTIBACTERIAL SOAP (EX. DIAL) NIGHT BEFORE AND MORNING OF PROCEDURE  *DO NOT APPLY ANY LOTIONS, OILS, POWDERS, PERFUME/COLOGNE, OINTMENTS, GELS, CREAMS, MAKEUP OR DEODORANT TO YOUR SKIN MORNING OF PROCEDURE  *LEAVE JEWELRY AND ANY VALUABLES AT HOME  *WEAR LOOSE COMFORTABLE CLOTHING         Please reply to this portal message as receipt of delivery.      Thank you,  [facility]  [place], LPN [facility]  Pre-Admit

## 2024-12-26 NOTE — PROGRESS NOTES
Ms. Siddiqui is here today for a post-operative visit.  She is 15 days status post Right Trigger Finger Release of the ring finger and Right Ulnar Nerve Decompression by Dr. Monteiro on 12/11/24. She reports that she is minimal pain.  Therapy is beginning 12/31/24.  She denies fever, chills, and sweats since the time of the surgery.     Physical exam:    Vitals:    12/26/24 1410   PainSc:   4   PainLoc: Hand     Vital signs are stable, patient is afebrile.  Patient is well dressed and well groomed, no acute distress.  Alert and oriented to person, place, and time.  Post op dressing taken down.  Incision is clean, dry and intact.  There is no erythema or exudate.  There is no sign of any infection. She is NVI. Sutures removed without difficulty.      Assessment:  status post Right Trigger Finger Release of the ring finger and Right Ulnar Nerve Decompression        Plan:  Barrera was seen today for post-op evaluation, pain, post-op evaluation and pain.    Diagnoses and all orders for this visit:    Post-operative state    Cubital tunnel syndrome of both upper extremities    Trigger finger of right hand, unspecified finger        - PO instruction reviewed and provided to patient  - Educated patient on HEP ROM of the Elbow, Wrist and Hand  - Educated patient on scar massage  - Educated on Nerve Glides  - Patient will f/u in clinic 4 weeks if symptoms are not improving or are worsening.       Jr Moore PA-C, [facility]  [facility]  [facility] [place], [place]    Disclaimer: This note has been generated using voice-recognition software. There may be typographical errors that have been missed during proof-reading.

## 2024-12-30 ENCOUNTER — HOSPITAL ENCOUNTER (OUTPATIENT)
Facility: HOSPITAL | Age: 59
Discharge: HOME | End: 2024-12-30
Attending: STUDENT IN AN ORGANIZED HEALTH CARE EDUCATION/TRAINING PROGRAM | Admitting: STUDENT IN AN ORGANIZED HEALTH CARE EDUCATION/TRAINING PROGRAM
Payer: COMMERCIAL

## 2024-12-30 VITALS
RESPIRATION RATE: 18 BRPM | OXYGEN SATURATION: 95 % | SYSTOLIC BLOOD PRESSURE: 157 MMHG | DIASTOLIC BLOOD PRESSURE: 85 MMHG | HEART RATE: 91 BPM | TEMPERATURE: 99 F

## 2024-12-30 DIAGNOSIS — M47.812 CERVICAL SPONDYLOSIS: Primary | ICD-10-CM

## 2024-12-30 DIAGNOSIS — G89.29 CHRONIC PAIN: ICD-10-CM

## 2024-12-30 LAB — POCT GLUCOSE: 165 MG/DL (ref 70–110)

## 2024-12-30 PROCEDURE — 64634 DESTROY C/TH FACET JNT ADDL: CPT | Mod: LT,,, | Performed by: STUDENT IN AN ORGANIZED HEALTH CARE EDUCATION/TRAINING PROGRAM

## 2024-12-30 PROCEDURE — 63600175 PHARM REV CODE 636 W HCPCS: Performed by: STUDENT IN AN ORGANIZED HEALTH CARE EDUCATION/TRAINING PROGRAM

## 2024-12-30 PROCEDURE — 99152 MOD SED SAME PHYS/QHP 5/>YRS: CPT | Performed by: STUDENT IN AN ORGANIZED HEALTH CARE EDUCATION/TRAINING PROGRAM

## 2024-12-30 PROCEDURE — 99153 MOD SED SAME PHYS/QHP EA: CPT | Performed by: STUDENT IN AN ORGANIZED HEALTH CARE EDUCATION/TRAINING PROGRAM

## 2024-12-30 PROCEDURE — 64633 DESTROY CERV/THOR FACET JNT: CPT | Mod: LT | Performed by: STUDENT IN AN ORGANIZED HEALTH CARE EDUCATION/TRAINING PROGRAM

## 2024-12-30 PROCEDURE — 99152 MOD SED SAME PHYS/QHP 5/>YRS: CPT | Mod: ,,, | Performed by: STUDENT IN AN ORGANIZED HEALTH CARE EDUCATION/TRAINING PROGRAM

## 2024-12-30 PROCEDURE — 64634 DESTROY C/TH FACET JNT ADDL: CPT | Performed by: STUDENT IN AN ORGANIZED HEALTH CARE EDUCATION/TRAINING PROGRAM

## 2024-12-30 PROCEDURE — 82962 GLUCOSE BLOOD TEST: CPT | Performed by: STUDENT IN AN ORGANIZED HEALTH CARE EDUCATION/TRAINING PROGRAM

## 2024-12-30 PROCEDURE — 64633 DESTROY CERV/THOR FACET JNT: CPT | Mod: LT,,, | Performed by: STUDENT IN AN ORGANIZED HEALTH CARE EDUCATION/TRAINING PROGRAM

## 2024-12-30 RX ORDER — DEXAMETHASONE SODIUM PHOSPHATE 10 MG/ML
INJECTION INTRAMUSCULAR; INTRAVENOUS
Status: DISCONTINUED | OUTPATIENT
Start: 2024-12-30 | End: 2024-12-30 | Stop reason: HOSPADM

## 2024-12-30 RX ORDER — BUPIVACAINE HYDROCHLORIDE 2.5 MG/ML
INJECTION, SOLUTION EPIDURAL; INFILTRATION; INTRACAUDAL
Status: DISCONTINUED | OUTPATIENT
Start: 2024-12-30 | End: 2024-12-30 | Stop reason: HOSPADM

## 2024-12-30 RX ORDER — MIDAZOLAM HYDROCHLORIDE 1 MG/ML
INJECTION INTRAMUSCULAR; INTRAVENOUS
Status: DISCONTINUED | OUTPATIENT
Start: 2024-12-30 | End: 2024-12-30 | Stop reason: HOSPADM

## 2024-12-30 RX ORDER — LIDOCAINE HYDROCHLORIDE 20 MG/ML
INJECTION, SOLUTION EPIDURAL; INFILTRATION; INTRACAUDAL; PERINEURAL
Status: DISCONTINUED | OUTPATIENT
Start: 2024-12-30 | End: 2024-12-30 | Stop reason: HOSPADM

## 2024-12-30 RX ORDER — FENTANYL CITRATE 50 UG/ML
INJECTION, SOLUTION INTRAMUSCULAR; INTRAVENOUS
Status: DISCONTINUED | OUTPATIENT
Start: 2024-12-30 | End: 2024-12-30 | Stop reason: HOSPADM

## 2024-12-30 RX ORDER — SODIUM CHLORIDE 9 MG/ML
INJECTION, SOLUTION INTRAVENOUS CONTINUOUS
Status: DISCONTINUED | OUTPATIENT
Start: 2024-12-30 | End: 2024-12-30 | Stop reason: HOSPADM

## 2024-12-30 NOTE — OP NOTE
Therapeutic Cervical Medial Branch Radiofrequency Ablation Under Fluoroscopy     The procedure, risks, benefits, and options were discussed with the patient. There are no contraindications to the procedure. The patent expressed understanding and agreed to the procedure. Informed written consent was obtained prior to the start of the procedure and can be found in the patient's chart.        PATIENT NAME: Barrera Siddiqui   MRN: [id number]     DATE OF PROCEDURE: 12/30/2024       PROCEDURE: Therapeutic Left C3, C4, and C5 Cervical Radiofrequency Ablation under Fluoroscopy    PRE-OP DIAGNOSIS: CS (cervical spondylosis) [M47.812] Cervical Spondylosis [M47.812]    POST-OP DIAGNOSIS: Same    PHYSICIAN: Tiera Murphy DO    ASSISTANTS: None     MEDICATIONS INJECTED:  Preservative-free Decadron 10mg with 9cc of Bupivicaine 0.25%    LOCAL ANESTHETIC INJECTED:   Xylocaine 2%    SEDATION: Versed 2mg and Fentanyl 100mcg                                                                                                                                                                                     Conscious sedation ordered by M.D. Patient re-evaluation prior to administration of conscious sedation. No changes noted in patient's status from initial evaluation. The patient's vital signs were monitored by RN and patient remained hemodynamically stable throughout the procedure.    Event Time In   Sedation Start 1057   Sedation End 1125       ESTIMATED BLOOD LOSS:  None    COMPLICATIONS:  None.     INTERVAL HISTORY: Patient has clinical and imaging findings suggestive of facet mediated pain. Patients has completed 2 previous diagnostic medial branch blocks at specified levels with at least 80% relief for the expected duration of the local anesthetic utilized.    TECHNIQUE: Time-out was performed to identify the patient and procedure to be performed. With the patient laying in a LATERAL position, the surgical area was prepped and draped in  the usual sterile fashion using ChloraPrep and fenestrated drape. The levels were determined under fluoroscopic guidance. Skin anesthesia was achieved by injecting Lidocaine 2% over the injection sites. A 20 gauge 10mm curved active tip needle was introduced to the anatomic local of the medial branch at each of the above levels using AP, lateral and/or contralateral oblique fluoroscopic imaging. Then the sensory and motor testing was performed to confirm that the needle tips were in the correct location. After negative aspiration for blood or CSF was confirmed, 1 mL of the lidocaine 2% listed above was injected slowly at each site. This was followed by thermal lesioning at 80 degrees celsius for 90 seconds. That was followed by slowly injecting 1 mL of the medication mixture listed above at each site. The needles were removed and bleeding was nil. A sterile dressing was applied. No specimens collected. The patient tolerated the procedure well and did not have any procedure related motor deficit at the conclusion of the procedure.      The patient was monitored after the procedure in the recovery area. They were given post-procedure and discharge instructions to follow at home. The patient was discharged in a stable condition.     Tiera Murphy DO

## 2024-12-30 NOTE — DISCHARGE SUMMARY
Discharge Note  Short Stay      SUMMARY     Admit Date: 12/30/2024    Attending Physician: Tiera Murphy      Discharge Physician: Tiera Murphy      Discharge Date: 12/30/2024 11:29 AM    Procedure(s) (LRB):  RFA LT C3,C4,C5 (Left)    Final Diagnosis: CS (cervical spondylosis) [M47.812]    Disposition: Home or self care    Patient Instructions:   Current Discharge Medication List        CONTINUE these medications which have NOT CHANGED    Details   amLODIPine (NORVASC) 10 MG tablet Take 1 tablet (10 mg total) by mouth once daily.  Qty: 90 tablet, Refills: 3    Comments: .  Associated Diagnoses: Essential hypertension      atorvastatin (LIPITOR) 20 MG tablet Take 1 tablet (20 mg total) by mouth once daily.  Qty: 90 tablet, Refills: 3    Associated Diagnoses: Mixed hyperlipidemia      butalbital-acetaminophen-caffeine -40 mg (FIORICET, ESGIC) -40 mg per tablet TAKE 1 TABLET BY MOUTH TWICE DAILY AS NEEDED FOR HEADACHE      furosemide (LASIX) 40 MG tablet Take 1 tablet (40 mg total) by mouth 2 (two) times daily.  Qty: 180 tablet, Refills: 3    Associated Diagnoses: Chronic diastolic heart failure      glipiZIDE (GLUCOTROL) 10 MG tablet Take 10 mg by mouth 2 (two) times daily.      JANUVIA 100 mg Tab Take 1 tablet by mouth once daily.      LINZESS 145 mcg Cap capsule TAKE 1 CAPSULE BY MOUTH ONCE DAILY ON AN EMPTY STOMACH AT LEAST 30 MINUTES BEFORE 1ST MEAL OF THE DAY      losartan (COZAAR) 100 MG tablet Take 1 tablet (100 mg total) by mouth once daily.  Qty: 90 tablet, Refills: 3    Comments: .  Associated Diagnoses: Essential hypertension      metFORMIN (GLUCOPHAGE-XR) 500 MG ER 24hr tablet Take 500 mg by mouth 2 (two) times daily.      methocarbamoL (ROBAXIN) 500 MG Tab Take 1 tablet by mouth 3 (three) times daily.      pregabalin (LYRICA) 100 MG capsule Take 1 capsule by mouth twice daily  Qty: 60 capsule, Refills: 0    Associated Diagnoses: Neck pain      albuterol (PROVENTIL/VENTOLIN HFA) 90  mcg/actuation inhaler 2 puffs every 4 to 6 hours as needed.      aspirin (ECOTRIN) 81 MG EC tablet Take 1 tablet (81 mg total) by mouth once daily.  Qty: 90 tablet, Refills: 3    Associated Diagnoses: Chronic diastolic heart failure      cetirizine (ZYRTEC) 10 MG tablet Take 10 mg by mouth.      famotidine (PEPCID) 40 MG tablet Take 40 mg by mouth 2 (two) times daily.      multivitamin (THERAGRAN) per tablet Take 1 tablet by mouth once daily.      OZEMPIC 0.25 mg or 0.5 mg (2 mg/3 mL) pen injector Inject into the skin.      pantoprazole (PROTONIX) 40 MG tablet Take 1 tablet every day by oral route in the morning for 90 days.      traMADoL (ULTRAM) 50 mg tablet Take 1 tablet (50 mg total) by mouth every 6 (six) hours as needed for Pain.  Qty: 10 tablet, Refills: 0    Comments: Quantity prescribed more than 7 day supply? No. Bedside [place]  Associated Diagnoses: Cubital tunnel syndrome of both upper extremities                 Discharge Diagnosis: CS (cervical spondylosis) [M47.812]  Condition on Discharge: Stable with no complications to procedure   Diet on Discharge: Same as before.  Activity: as per instruction sheet.  Discharge to: Home with a responsible adult.  Follow up: 2-4 weeks       Please call my office or pager at [phone] if experienced any weakness or loss of sensation, fever > 101.5, pain uncontrolled with oral medications, persistent nausea/vomiting/or diarrhea, redness or drainage from the incisions, or any other worrisome concerns. If physician on call was not reached or could not communicate with our office for any reason please go to the nearest emergency department

## 2024-12-30 NOTE — PLAN OF CARE
Pt in preop bay 25, VSS, meds given and IV inserted. Pt denies any open wounds on body or the use of any immunizations or antibiotics in the past 2 weeks. Pt ready to roll.

## 2024-12-30 NOTE — DISCHARGE INSTRUCTIONS
[facility] [place]  Dr. Tiera Murphy  Messaging service # [phone]    RADIOFREQUENCY ABLATION POST-PROCEDURE INSTRUCTIONS:    Today you had a procedure called a radiofrequency ablation.  This is a procedure to ablate (or burn) the nerve that send pain signals from the small joints in your back or neck.  The ablation procedure can take 4-6 weeks for the nerve to die off.  Do not be surprised if your normal pain returns after the procedure and then should slowly improve over the course of the next few weeks.  Typically people do not get as much relief from the ablation as they do with the block, but the ablation should last much longer  The nerves will grow back!  The procedure can be repeated (without repeating the blocks) as long as you get 50% or more pain relief for at least 6 months.  So try to pay attention to when/if the pain returns.  The older we are, the slower the nerves regenerate.  I have seen patients get up to 5 years of pain improvement from this procedure, but more common in around 1 year.  It is important to combine this procedure with an increase in appropriate physical activity.  Strengthening the surrounding muscles will help this therapy last longer and provide better pain relief.  If you need a referral to physical therapy please let the office know.  Try to use this as an opportunity to decrease your pain medications if you are on any.  If you do not have a follow up appointment scheduled, please contact our office to get a post-procedure follow up scheduled 4-6 weeks after the procedure.  This can be done as a virtual visit if that is more convenient for you.    The procedure you had also included a steroid medication.  The steroid was mixed with a local anesthetic when it was injected to help prevent post-ablation pain.   If the procedure was in the neck, you may feel some pressure, numbness, or slight weakness in the arm after the procedure for a short period of time  (this is a normal response), if this persists for longer than 1 day please contact our office or go to the emergency room.  If the procedure was in the low back, you may feel some pressure, numbness, or slight weakness in the leg after the procedure for a short period of time (this is a normal response), if this persists for longer than 1 day please contact our office or go to the emergency room.  You may get side effects from the steroid.  This is not uncommon.  Symptoms include: elevated blood sugar, elevated blood pressure, headache, flushing, nausea, insomnia.  These symptoms are transient and will resolve within 1-3 days.  If symptoms last longer than this please contact our office or head to the emergency room.  Steroid medications can take anywhere from 3-14 days to take effect (rarely longer).  You may notice that your pain worsens for a short period of time after the injection, this would not be unusual due to the pressure and trauma from the needle.    *If you had the ablation in your neck it is not uncommon to get increased sensitivity and pain for 4-6 weeks after the procedure.  This is called neuritis and it will improve, but can take a month or two for it to fully resolve*    What you need to do:    Keep a record of your response to the injection you had today.    How much relief did you get?   When did the relief start and how long did it last?  Were you able to decrease the use of any of your pain medications?  Were you able to increase your level of activity?  How long did the relief last?    What to watch out for:    If you experience any of the following symptoms after your procedure, please notify the messaging service immediately (see above for contact information):   fever (increased oral temperature)   bleeding or swelling at the injection site,    drainage, rash or redness at the injection site    possible signs of infection    increased pain at the injection site   worsening of your usual  pain   severe headache   new or worsening numbness    new arm and/or leg weakness, or    changes in bowel and/or bladder function: urinating or defecating on yourself and not knowing that you did it.    PLEASE FOLLOW ALL INSTRUCTIONS CAREFULLY     Do not engage in strenuous activity (e.g., lifting or pushing heavy objects or repeated bending) for 24-48 hours.     Do not take a bath, swim or use Jacuzzi for 24 hours after procedure. (A shower is fine).   Remove any Band-Aids when you get home.    Use cold/ice, as needed for comfort.  We recommend the use of cold therapy alternating on for 20 minutes, off for 20 minutes.    Do not apply direct heat (heating pad or heat packs) to the injection site for 24 hours.     Resume your usual medications, unless instructed otherwise by your Pain Physician.     If you are on warfarin (Coumadin) or other blood thinner, resume this medication as instructed by your prescribing Physician.    IF AT ANY POINT YOU ARE VERY CONCERNED ABOUT YOUR SYMPTOMS, PLEASE GO TO THE EMERGENCY ROOM.    If you develop worsening pain, weakness, numbness, lose bowel or bladder control (i.e., having an accident where you did not even know you had to go to the bathroom and suddenly noticed you soiled yourself), saddle anesthesia (a loss of sensation restricted to the area of the buttocks, anus and between the legs -- i.e., those parts of your body that would touch a saddle if you were sitting on one) you need to go immediately to the emergency department for evaluation and treatment.    ----------------------------------------------------------------------------------------------------------------------------------------------------------------  If you received Sedation please read the following instructions:  POST SEDATION INSTRUCTIONS    Today you received intravenous medication (also known as sedation) that was used to help you relax and/or decrease discomfort during your procedure. This medication  will be acting in your body for the next 24 hours, so you might feel a little tired or sleepy. This feeling will slowly wear off.   Common side effects associated with these medications include: drowsiness, dizziness, sleepiness, confusion, feeling excited, difficulty remembering things, lack of steadiness with walking or balance, loss of fine muscle control, slowed reflexes, difficulty focusing, and blurred vision.  Some over-the-counter and prescription medications (e.g., muscle relaxants, opioids, mood-altering medications, sedatives/hypnotics, antihistamines) can interact with the intravenous medication you received and cause an increased risk of the side effects listed above in addition to other potentially life threatening side effects. Use extreme caution if you are taking such medications, and consult with your Pain Physician or prescribing physician if you have any questions.  For the next 12-24 hours:    DO NOT--Drive a car, operate machinery or power tools   DO NOT--Drink any alcoholic beverages (not even beer), they may dangerously increase the risk of side effects.    DO NOT--Make any important legal or business decisions or sign important documents.  We advise you to have someone to assist you at home. Move slowly and carefully. Do not make sudden changes in position. Be aware of dizziness or light-headedness and move accordingly.   If you seek medical treatment within 24 hours, let the nurse or doctor caring for you know that you have received the above medications. If you have any questions or concerns related to your sedation or treatment today please contact us.

## 2024-12-30 NOTE — H&P
HPI  Patient presenting for Procedure(s) (LRB):  RFA LT C3,C4,C5 (Left)     Patient on Anti-coagulation No    No health changes since previous encounter    Past Medical History:   Diagnosis Date    Depression     Diabetes mellitus     High cholesterol     Hypertension     Stroke     TIA (transient ischemic attack) 2017    left side weakness resolved after therapy     Past Surgical History:   Procedure Laterality Date    CARPAL TUNNEL RELEASE Bilateral     CHOLECYSTECTOMY      COLONOSCOPY N/A 10/18/2017    Procedure: COLONOSCOPY;  Surgeon: Donald Wright Jr., MD;  Location: [facility];  Service: Endoscopy;  Laterality: N/A;    DECOMPRESSION, NERVE, ULNAR Left 7/24/2024    Procedure: LEFT ELBOW DECOMPRESSION, NERVE, ULNAR;  Surgeon: Brandie Monteiro MD;  Location: [facility] [place] OR;  Service: Orthopedics;  Laterality: Left;  REGIONAL AFTER    EPIDURAL STEROID INJECTION INTO CERVICAL SPINE N/A 05/08/2024    Procedure: C7-T1 GRACIE (AIM LEFT);  Surgeon: Tiera Murphy DO;  Location: [facility];  Service: Pain Management;  Laterality: N/A;  20 mins    INJECTION OF ANESTHETIC AGENT AROUND MEDIAL BRANCH NERVES INNERVATING CERVICAL FACET JOINT Left 10/28/2024    Procedure: MBB#1 LT C3,4,5;  Surgeon: Tiera Murphy DO;  Location: [facility];  Service: Pain Management;  Laterality: Left;  ASA 5d/Ozempic 7d    INJECTION OF ANESTHETIC AGENT AROUND MEDIAL BRANCH NERVES INNERVATING CERVICAL FACET JOINT Left 11/20/2024    Procedure: Left C3,C4, C5 MBB #2;  Surgeon: Tiera Murphy DO;  Location: [facility];  Service: Pain Management;  Laterality: Left;  15 mins ASA 5 days and Ozempic 7 days    KNEE SURGERY      TRIGGER FINGER RELEASE Left 7/24/2024    Procedure: LEFT THUMB AND RING  TRIGGER FINGER RELEASE;  Surgeon: Brandie Monteiro MD;  Location: [facility] [place] OR;  Service: Orthopedics;  Laterality: Left;    TRIGGER FINGER RELEASE Right 12/11/2024    Procedure: RIGHT RING RELEASE, TRIGGER  FINGER;  Surgeon: Brandie Monteiro MD;  Location: [facility], [place], operated [facility] OR;  Service: Orthopedics;  Laterality: Right;    TUBAL LIGATION      ULNAR NERVE TRANSPOSITION Right 12/11/2024    Procedure: TRANSPOSITION, NERVE, ULNAR;  Surgeon: Brandie Monteiro MD;  Location: [facility], [place], operated [facility] OR;  Service: Orthopedics;  Laterality: Right;  POST SURGERY BLOCK     Review of patient's allergies indicates:  No Known Allergies   No current facility-administered medications for this encounter.       PMHx, PSHx, Allergies, Medications reviewed in epic    ROS negative except pain complaints in HPI    OBJECTIVE:    LMP 07/29/2018 (Exact Date)     PHYSICAL EXAMINATION:    GENERAL: Well appearing, in no acute distress, alert and oriented x3.  PSYCH:  Mood and affect appropriate.  SKIN: Skin color, texture, turgor normal, no rashes or lesions which will impact the procedure.  CV: RRR with palpation of the radial artery.  PULM: No evidence of respiratory difficulty, symmetric chest rise. Clear to auscultation.  NEURO: Cranial nerves grossly intact.    Plan:    Proceed with procedure as planned Procedure(s) (LRB):  RFA LT C3,C4,C5 (Left)    Tiera Murphy  12/30/2024

## 2024-12-31 ENCOUNTER — LAB VISIT (OUTPATIENT)
Dept: LAB | Facility: HOSPITAL | Age: 59
End: 2024-12-31
Attending: INTERNAL MEDICINE
Payer: COMMERCIAL

## 2024-12-31 DIAGNOSIS — R94.5 ABNORMAL RESULTS OF LIVER FUNCTION STUDIES: ICD-10-CM

## 2024-12-31 DIAGNOSIS — R10.13 EPIGASTRIC PAIN: ICD-10-CM

## 2024-12-31 DIAGNOSIS — K76.0 FATTY (CHANGE OF) LIVER, NOT ELSEWHERE CLASSIFIED: Primary | ICD-10-CM

## 2024-12-31 PROBLEM — M25.522 PAIN IN LEFT ELBOW: Status: RESOLVED | Noted: 2024-08-14 | Resolved: 2024-12-31

## 2024-12-31 PROBLEM — Z74.09 IMPAIRED MOBILITY AND ACTIVITIES OF DAILY LIVING: Status: ACTIVE | Noted: 2024-12-31

## 2024-12-31 PROBLEM — Z78.9 IMPAIRED MOBILITY AND ACTIVITIES OF DAILY LIVING: Status: ACTIVE | Noted: 2024-12-31

## 2024-12-31 PROBLEM — R52 PAIN AGGRAVATED BY ACTIVITIES OF DAILY LIVING: Status: ACTIVE | Noted: 2024-12-31

## 2024-12-31 PROBLEM — M25.60 STIFFNESS IN JOINT: Status: RESOLVED | Noted: 2024-08-14 | Resolved: 2024-12-31

## 2024-12-31 PROBLEM — M62.81 MUSCLE WEAKNESS: Status: RESOLVED | Noted: 2024-08-14 | Resolved: 2024-12-31

## 2024-12-31 PROBLEM — M79.642 PAIN IN LEFT HAND: Status: RESOLVED | Noted: 2024-08-14 | Resolved: 2024-12-31

## 2024-12-31 LAB
ALBUMIN SERPL BCP-MCNC: 4.7 G/DL (ref 3.5–5.2)
ALP SERPL-CCNC: 170 U/L (ref 38–126)
ALT SERPL W/O P-5'-P-CCNC: 80 U/L (ref 10–44)
AMYLASE SERPL-CCNC: 60 U/L (ref 30–110)
ANION GAP SERPL CALC-SCNC: 14 MMOL/L (ref 8–16)
AST SERPL-CCNC: 98 U/L (ref 15–46)
BASOPHILS # BLD AUTO: 0.02 K/UL (ref 0–0.2)
BASOPHILS NFR BLD: 0.2 % (ref 0–1.9)
BILIRUB SERPL-MCNC: 0.6 MG/DL (ref 0.1–1)
CALCIUM SERPL-MCNC: 9.7 MG/DL (ref 8.7–10.5)
CHLORIDE SERPL-SCNC: 101 MMOL/L (ref 95–110)
CO2 SERPL-SCNC: 25 MMOL/L (ref 23–29)
CREAT SERPL-MCNC: 0.67 MG/DL (ref 0.5–1.4)
DIFFERENTIAL METHOD BLD: ABNORMAL
EOSINOPHIL # BLD AUTO: 0 K/UL (ref 0–0.5)
EOSINOPHIL NFR BLD: 0.1 % (ref 0–8)
ERYTHROCYTE [DISTWIDTH] IN BLOOD BY AUTOMATED COUNT: 13.8 % (ref 11.5–14.5)
EST. GFR  (NO RACE VARIABLE): >60 ML/MIN/1.73 M^2
FERRITIN SERPL-MCNC: 92 NG/ML (ref 20–300)
GLUCOSE SERPL-MCNC: 281 MG/DL (ref 70–110)
HAV IGG SER QL IA: NORMAL
HBV CORE IGM SERPL QL IA: NORMAL
HBV SURFACE AG SERPL QL IA: NORMAL
HCT VFR BLD AUTO: 39.8 % (ref 37–48.5)
HCV AB SERPL QL IA: NORMAL
HGB BLD-MCNC: 12.5 G/DL (ref 12–16)
IMM GRANULOCYTES # BLD AUTO: 0.06 K/UL (ref 0–0.04)
IMM GRANULOCYTES NFR BLD AUTO: 0.6 % (ref 0–0.5)
IRON SERPL-MCNC: 83 UG/DL (ref 30–160)
LIPASE SERPL-CCNC: 108 U/L (ref 23–300)
LYMPHOCYTES # BLD AUTO: 2.5 K/UL (ref 1–4.8)
LYMPHOCYTES NFR BLD: 24.5 % (ref 18–48)
MCH RBC QN AUTO: 26 PG (ref 27–31)
MCHC RBC AUTO-ENTMCNC: 31.4 G/DL (ref 32–36)
MCV RBC AUTO: 83 FL (ref 82–98)
MISCELLANEOUS TEST NAME: NORMAL
MISCELLANEOUS TEST NAME: NORMAL
MONOCYTES # BLD AUTO: 0.6 K/UL (ref 0.3–1)
MONOCYTES NFR BLD: 5.8 % (ref 4–15)
NEUTROPHILS # BLD AUTO: 7.1 K/UL (ref 1.8–7.7)
NEUTROPHILS NFR BLD: 68.8 % (ref 38–73)
NRBC BLD-RTO: 0 /100 WBC
PLATELET # BLD AUTO: 227 K/UL (ref 150–450)
PMV BLD AUTO: 12.7 FL (ref 9.2–12.9)
POTASSIUM SERPL-SCNC: 3.9 MMOL/L (ref 3.5–5.1)
PROT SERPL-MCNC: 9.4 G/DL (ref 6–8.4)
RBC # BLD AUTO: 4.8 M/UL (ref 4–5.4)
SATURATED IRON: 20 % (ref 20–50)
SODIUM SERPL-SCNC: 140 MMOL/L (ref 136–145)
TOTAL IRON BINDING CAPACITY: 417 UG/DL (ref 250–450)
TRANSFERRIN SERPL-MCNC: 282 MG/DL (ref 200–375)
UUN UR-MCNC: 14 MG/DL (ref 7–17)
WBC # BLD AUTO: 10.26 K/UL (ref 3.9–12.7)

## 2024-12-31 PROCEDURE — 82150 ASSAY OF AMYLASE: CPT | Mod: PN | Performed by: INTERNAL MEDICINE

## 2024-12-31 PROCEDURE — 84466 ASSAY OF TRANSFERRIN: CPT | Mod: PN | Performed by: INTERNAL MEDICINE

## 2024-12-31 PROCEDURE — 80053 COMPREHEN METABOLIC PANEL: CPT | Mod: PN | Performed by: INTERNAL MEDICINE

## 2024-12-31 PROCEDURE — 86790 VIRUS ANTIBODY NOS: CPT | Mod: PN | Performed by: INTERNAL MEDICINE

## 2024-12-31 PROCEDURE — 86381 MITOCHONDRIAL ANTIBODY EACH: CPT | Mod: PN | Performed by: INTERNAL MEDICINE

## 2024-12-31 PROCEDURE — 85025 COMPLETE CBC W/AUTO DIFF WBC: CPT | Mod: PN | Performed by: INTERNAL MEDICINE

## 2024-12-31 PROCEDURE — 86038 ANTINUCLEAR ANTIBODIES: CPT | Mod: PN | Performed by: INTERNAL MEDICINE

## 2024-12-31 PROCEDURE — 83690 ASSAY OF LIPASE: CPT | Mod: PN | Performed by: INTERNAL MEDICINE

## 2024-12-31 PROCEDURE — 86803 HEPATITIS C AB TEST: CPT | Mod: PN | Performed by: INTERNAL MEDICINE

## 2024-12-31 PROCEDURE — 36415 COLL VENOUS BLD VENIPUNCTURE: CPT | Mod: PN | Performed by: INTERNAL MEDICINE

## 2024-12-31 PROCEDURE — 86709 HEPATITIS A IGM ANTIBODY: CPT | Mod: PN | Performed by: INTERNAL MEDICINE

## 2024-12-31 PROCEDURE — 87340 HEPATITIS B SURFACE AG IA: CPT | Mod: PN | Performed by: INTERNAL MEDICINE

## 2024-12-31 PROCEDURE — 86015 ACTIN ANTIBODY EACH: CPT | Mod: PN | Performed by: INTERNAL MEDICINE

## 2024-12-31 PROCEDURE — 86705 HEP B CORE ANTIBODY IGM: CPT | Mod: PN | Performed by: INTERNAL MEDICINE

## 2024-12-31 PROCEDURE — 82728 ASSAY OF FERRITIN: CPT | Performed by: INTERNAL MEDICINE

## 2025-01-02 LAB
ANA SER QL IF: NORMAL
HAV IGM SERPL QL IA: NORMAL
MITOCHONDRIA AB TITR SER IF: NORMAL {TITER}

## 2025-01-06 DIAGNOSIS — M54.2 NECK PAIN: ICD-10-CM

## 2025-01-07 ENCOUNTER — CLINICAL SUPPORT (OUTPATIENT)
Dept: REHABILITATION | Facility: HOSPITAL | Age: 60
End: 2025-01-07
Payer: COMMERCIAL

## 2025-01-07 DIAGNOSIS — Z74.09 IMPAIRED MOBILITY AND ACTIVITIES OF DAILY LIVING: ICD-10-CM

## 2025-01-07 DIAGNOSIS — Z78.9 IMPAIRED MOBILITY AND ACTIVITIES OF DAILY LIVING: ICD-10-CM

## 2025-01-07 DIAGNOSIS — R52 PAIN AGGRAVATED BY ACTIVITIES OF DAILY LIVING: Primary | ICD-10-CM

## 2025-01-07 LAB — SMOOTH MUSCLE AB TITR SER IF: ABNORMAL {TITER}

## 2025-01-07 PROCEDURE — 97035 APP MDLTY 1+ULTRASOUND EA 15: CPT | Mod: PO

## 2025-01-07 PROCEDURE — 97112 NEUROMUSCULAR REEDUCATION: CPT | Mod: PO

## 2025-01-07 PROCEDURE — 97140 MANUAL THERAPY 1/> REGIONS: CPT | Mod: PO

## 2025-01-07 PROCEDURE — 97110 THERAPEUTIC EXERCISES: CPT | Mod: PO

## 2025-01-07 RX ORDER — PREGABALIN 100 MG/1
100 CAPSULE ORAL 2 TIMES DAILY
Qty: 60 CAPSULE | Refills: 0 | Status: SHIPPED | OUTPATIENT
Start: 2025-01-07

## 2025-01-07 NOTE — PROGRESS NOTES
[facility]  Occupational Therapy Treatment Note     Date: 1/7/2025  Name: Barrera Siddiqui  Clinic Number: [id number]    Therapy Diagnosis:   Encounter Diagnoses   Name Primary?    Pain aggravated by activities of daily living Yes    Impaired mobility and activities of daily living      Physician: Brandie Monteiro, *    Physician Orders:  Evaluate and Treat  Medical Diagnosis: Cubital tunnel syndrome of both upper extremities [G56.23];Trigger ring finger of right hand [M65.341]  Surgical Procedure and Date: TRANSPOSITION, NERVE, ULNAR (Right) & RIGHT RING RELEASE, TRIGGER FINGER (Right) on 12/11/24   Evaluation Date: 12/31/2024  Insurance Authorization Period Expiration: 12/11/25  Plan of Care Certification Period: 3/25/2025  Date of Return to MD: 1/24/24  Visit # / Visits authorized: 1 / 40 12/31/2024 FOTO intake score :  13%     Precautions:  Standard and Diabetes     Time In:11:00   Time Out: 12:00  Total Appointment Time (timed & untimed codes): 60 minute  (billing reflects skilled 1:1 time)      Subjective     Patient reports: no problems & good compliance w/ initial HEP for R elbow/hand, however, she is having an increase in pain of her L elbow  post op site since yesterday.  She reports she has been using her LUE more in resistive ADL/IADL's since she is recently post op R elbow/hand.    She was compliant with home exercise program given last session.   Response to previous treatment:No adverse reactions noted or reported   Functional change: None reported at first treatment after initial evaluation     Pain: R elbow=6/10; R hand=5/10;  7-8/10=L elbow  Location: right elbow & R  Hand     Objective     Objective Measures updated at progress report unless specified.    Treatment     Barrera received the treatments listed below:      Direct contact modalities after being cleared for contraindications: 1.0 MHz, .8 W/cm2, 50% pulsed, 8 min to R elbow surgical site and 5 min to R RF  surgical site , to decrease pain & edema, increase circulation and tissue extensibility     Manual therapy techniques: Myofacial release and Soft tissue Mobilization were applied to the: B upper arm/elbow/forearm/hand for 25 minutes, including:   - use of hand techniques, cupping and IASTM tools to increase blood flow/circulation, improve soft tissue pliability and decrease pain.   - edema & pain management with TubiGrip compression sleeve B elbow/forearms, size D & E; patient instructed on purpose, wear schedule and precautions to monitor  - scar management:  Patient was provided with a supply of silicone gel sheet for scar management due to raised, hypertrophic scarring on R elbow.  Patient verbalized understanding of instructions provided on wear schedule, care and precautions to monitor.     Therapeutic exercises to develop ROM and flexibility for 11 minutes, including:  AROM    Elbow 3 ways 5 reps each, 0# wt   Forearm Stretches-3 ways 3 reps, 15 sec hold, elbow 90   Forearm Stretches-3 ways 3 reps, 15 sec hold, elbow 0   TGE's (Wave, hook & fist) 5 reps each   Finger Extension lifts 5 reps each finger   Prayer Stretch 3 reps, 10 sec hold   Forearm sup/pron 10 reps, 0# wt          Neuromuscular re-education activities to improve Posture and Coordination for 11 minutes. The following activities were included:  Dexterciser 3 min each arm   Isospheres  3 min, R only min   9 Peg  1 trial   Ulnar Nerve Glides 3 reps           Patient Education and Home Exercises     Education provided:   - Reviewed HEP  - Progress towards goals     Written Home Exercises Provided: Patient instructed to cont prior HEP.  Exercises were reviewed and Barrera was able to demonstrate them prior to the end of the session.  Barrera demonstrated good  understanding of the home exercise program provided. See electronic medical record under Patient Instructions for exercises provided during therapy sessions.       Assessment     Pain in R elbow/hand  is moderate, but L elbow is significant, possibly due to over use in daily activities and compensating for non-use of RUE (post op restrictions).  Patient encouraged to limit resistive and repetitive activities of BUE's at this time.  Edema is moderate in R elbow/hand and minimal in L elbow.  Patient was able to perform all above listed therapeutic exercises without increased pain or difficulty today.     Barrera is progressing well towards her goals and there are no updates to goals at this time. Pt prognosis is Good.     Patient will continue to benefit from skilled outpatient occupational therapy to address the deficits listed in the problem list on initial evaluation provide patient/family education and to maximize patient's level of independence in the home and community environment.     Patient's spiritual, cultural and educational needs considered and patient agreeable to plan of care and goals.    Anticipated barriers to occupational therapy: comorbid diagnosis, compliance with HEP and attendance to therapy as recommended     Goals:  Short Term Goals (3 weeks) Status Met Date   1. Patient to have decreased complaints of pain to 4/10 with light to moderate activities of daily living (ADLs) and instrumental activities of daily living (IADLs) per subjective report. [] Met  [] Progressing     2. Patient to be independent with home exercise program as issued by Occupational Therapist, noted through return demonstration to increase clinical carry over. [] Met  [] Progressing     3. Patient to have improved fine motor coordination as noted by improved score on 9 hole peg assessment on right hand to 32 seconds to improve ability to use fasteners such as buttons and zippers [] Met  [] Progressing     4. Patient to have increased right elbow extension to -15 degrees to allow for improved ability to reach with right upper extremity. [] Met  [] Progressing     5. Patient to have increased right elbow flexion to 140 degrees  to allow for improved ability to perform grooming tasks. [] Met  [] Progressing           Long Term Goals (6 weeks) Status Met Date   1. Patient to have decreased complaints of pain to 0-2/10 with light to moderate activities of daily living (ADLs) and instrumental activities of daily living (IADLs) per subjective report. [] Met  [] Progressing     2. Patient will improve status score on FOTO to greater than or equal to 49% to demonstrate an increase in self-perceived functional performance. [] Met  [] Progressing     3. Patient to be able to form full functional fist on right hand. [] Met  [] Progressing     4. Patient to have improved fine motor coordination as noted by improved score on 9 hole peg assessment by 28 seconds on right hand to improve ability to  small items. [] Met  [] Progressing     5. Patient to have increased right elbow extension to -5 degrees to allow for improved ability perform daily ADLs.  [] Met  [] Progressing          Plan     Certification Period/Plan of care expiration: 12/31/2024 to 3/25/2025.     Outpatient Occupational Therapy 2 times weekly for 6 weeks to include the following interventions: Paraffin, Manual therapy/joint mobilizations, Modalities for pain management, Therapeutic exercises/activities., Strengthening, Edema Control, and Scar Management and any other interventions deemed necessary for patient's progress.  Updates/Grading for next session: progress ROM, as tolerated    Mani Fox OT   1/7/2025

## 2025-01-09 ENCOUNTER — HOSPITAL ENCOUNTER (OUTPATIENT)
Dept: CARDIOLOGY | Facility: HOSPITAL | Age: 60
Discharge: HOME | End: 2025-01-09
Attending: INTERNAL MEDICINE
Payer: COMMERCIAL

## 2025-01-09 ENCOUNTER — CLINICAL SUPPORT (OUTPATIENT)
Dept: REHABILITATION | Facility: HOSPITAL | Age: 60
End: 2025-01-09
Payer: COMMERCIAL

## 2025-01-09 VITALS — BODY MASS INDEX: 49.5 KG/M2 | HEIGHT: 62 IN | WEIGHT: 269 LBS

## 2025-01-09 DIAGNOSIS — Z78.9 IMPAIRED MOBILITY AND ACTIVITIES OF DAILY LIVING: ICD-10-CM

## 2025-01-09 DIAGNOSIS — R52 PAIN AGGRAVATED BY ACTIVITIES OF DAILY LIVING: Primary | ICD-10-CM

## 2025-01-09 DIAGNOSIS — Z74.09 IMPAIRED MOBILITY AND ACTIVITIES OF DAILY LIVING: ICD-10-CM

## 2025-01-09 DIAGNOSIS — R55 SYNCOPE AND COLLAPSE: ICD-10-CM

## 2025-01-09 PROCEDURE — 97110 THERAPEUTIC EXERCISES: CPT | Mod: PO

## 2025-01-09 PROCEDURE — 97140 MANUAL THERAPY 1/> REGIONS: CPT | Mod: PO

## 2025-01-09 PROCEDURE — 97018 PARAFFIN BATH THERAPY: CPT | Mod: PO

## 2025-01-09 PROCEDURE — 97112 NEUROMUSCULAR REEDUCATION: CPT | Mod: PO

## 2025-01-09 PROCEDURE — 93660 TILT TABLE EVALUATION: CPT

## 2025-01-09 NOTE — PROGRESS NOTES
[facility]  Occupational Therapy Treatment Note     Date: 1/9/2025  Name: Barrera Siddiqui  Clinic Number: [id number]    Therapy Diagnosis:   Encounter Diagnoses   Name Primary?    Pain aggravated by activities of daily living Yes    Impaired mobility and activities of daily living      Physician: Brandie Monteiro, *    Physician Orders:  Evaluate and Treat  Medical Diagnosis: Cubital tunnel syndrome of both upper extremities [G56.23];Trigger ring finger of right hand [M65.341]  Surgical Procedure and Date: TRANSPOSITION, NERVE, ULNAR (Right) & RIGHT RING RELEASE, TRIGGER FINGER (Right) on 12/11/24   Evaluation Date: 12/31/2024  Insurance Authorization Period Expiration: 12/11/25  Plan of Care Certification Period: 3/25/2025  Date of Return to MD: 1/24/24    Visit # / Visits authorized: 1 / 40   FOTO intake score :  Initial=13%/ Goal=49% / Current=50%     Precautions:  Standard and Diabetes     Time In:9:05   Time Out: 10:00  Total Appointment Time (timed & untimed codes): 55 minute  (billing reflects skilled 1:1 time)    Subjective     Patient reports: she continues to have more pain of her L elbow compared to her hand.  Pain level remains moderate to significant  She was compliant with home exercise program given last session.   Response to previous treatment:less edema and increased AROM of R elbow/wrist/hand  Functional change: able to use her RUE in light ADL/IADL's with less pain  and difficulty    Pain: R elbow=6/10; R hand=4/10; 6/10=L elbow  Location: right elbow & R  Hand     Objective     Objective Measures updated at progress report unless specified.    Treatment     Barrera received the treatments listed below:      Supervised modalities after being cleared for contradictions :  - Paraffin w/ MHP to R hand for 8 min, pre-tx to decrease pain & increase tissue extensibility   - MHP to R elbow during paraffin treatment to the R hand, pre-tx to decrease pain, increase circulation and  tissue extensibility       Manual therapy techniques: Myofacial release and Soft tissue Mobilization were applied to the: R elbow/forearm/hand for 18 minutes, including:   - use of hand techniques, cupping, percussion gun and IASTM tools to increase blood flow/circulation, improve soft tissue pliability and decrease pain.     Therapeutic exercises to develop ROM and flexibility for 19 minutes, including:  AROM    Elbow 3 ways 5 reps each, 0# wt   Forearm Stretches-3 ways 3 reps, 15 sec hold, elbow 90   Forearm Stretches-3 ways 3 reps, 15 sec hold, elbow 0   TGE's (Wave, hook & fist) 5 reps each   Finger Extension lifts 5 reps each finger   Prayer Stretch 3 reps, 10 sec hold   Forearm sup/pron 10 reps, 0# wt          Neuromuscular re-education activities to improve Posture and Coordination for 10 minutes. The following activities were included:  Dexterciser 3 min   Isospheres  3 min   9 Peg  1 trial   Ulnar Nerve Glides 3 reps           Patient Education and Home Exercises     Education provided:   - Reviewed HEP  - Reiterated activity restrictions/limitations appropriate for stage of healing in post op condition  - Progress towards goals     Written Home Exercises Provided: Patient instructed to cont prior HEP.  Exercises were reviewed and Barrera was able to demonstrate them prior to the end of the session.  Barrera demonstrated good  understanding of the home exercise program provided. See electronic medical record under Patient Instructions for exercises provided during therapy sessions.       Assessment     Pain in R elbow/hand remains moderate in L elbow.  Edema is minimal to moderate in R elbow/hand.  Scar tissue is moderate to minimally dense at the R elbow with a raised scar along the surgical incision, but no adhesions noted to underlying soft tissue. Scar tissue density in the R hand//RF is minimal without adhesions.  Patient was able to perform all above listed therapeutic exercises without increased pain or  difficulty today.     Barrera is progressing well towards her goals and there are no updates to goals at this time. Pt prognosis is Good.     Patient will continue to benefit from skilled outpatient occupational therapy to address the deficits listed in the problem list on initial evaluation provide patient/family education and to maximize patient's level of independence in the home and community environment.     Patient's spiritual, cultural and educational needs considered and patient agreeable to plan of care and goals.    Anticipated barriers to occupational therapy: comorbid diagnosis, compliance with HEP and attendance to therapy as recommended     Goals:  Short Term Goals (3 weeks) Status Met Date   1. Patient to have decreased complaints of pain to 4/10 with light to moderate activities of daily living (ADLs) and instrumental activities of daily living (IADLs) per subjective report. [] Met  [] Progressing     2. Patient to be independent with home exercise program as issued by Occupational Therapist, noted through return demonstration to increase clinical carry over. [] Met  [] Progressing     3. Patient to have improved fine motor coordination as noted by improved score on 9 hole peg assessment on right hand to 32 seconds to improve ability to use fasteners such as buttons and zippers [] Met  [] Progressing     4. Patient to have increased right elbow extension to -15 degrees to allow for improved ability to reach with right upper extremity. [] Met  [] Progressing     5. Patient to have increased right elbow flexion to 140 degrees to allow for improved ability to perform grooming tasks. [] Met  [] Progressing           Long Term Goals (6 weeks) Status Met Date   1. Patient to have decreased complaints of pain to 0-2/10 with light to moderate activities of daily living (ADLs) and instrumental activities of daily living (IADLs) per subjective report. [] Met  [] Progressing     2. Patient will improve status  score on FOTO to greater than or equal to 49% to demonstrate an increase in self-perceived functional performance. [] Met  [] Progressing     3. Patient to be able to form full functional fist on right hand. [] Met  [] Progressing     4. Patient to have improved fine motor coordination as noted by improved score on 9 hole peg assessment by 28 seconds on right hand to improve ability to  small items. [] Met  [] Progressing     5. Patient to have increased right elbow extension to -5 degrees to allow for improved ability perform daily ADLs.  [] Met  [] Progressing          Plan     Certification Period/Plan of care expiration: 12/31/2024 to 3/25/2025.     Outpatient Occupational Therapy 2 times weekly for 6 weeks to include the following interventions: Paraffin, Manual therapy/joint mobilizations, Modalities for pain management, Therapeutic exercises/activities., Strengthening, Edema Control, and Scar Management and any other interventions deemed necessary for patient's progress.  Updates/Grading for next session: progress ROM, as tolerated    Mani Fox OT   1/9/2025

## 2025-01-14 ENCOUNTER — CLINICAL SUPPORT (OUTPATIENT)
Dept: REHABILITATION | Facility: HOSPITAL | Age: 60
End: 2025-01-14
Payer: COMMERCIAL

## 2025-01-14 DIAGNOSIS — Z78.9 IMPAIRED MOBILITY AND ACTIVITIES OF DAILY LIVING: ICD-10-CM

## 2025-01-14 DIAGNOSIS — R52 PAIN AGGRAVATED BY ACTIVITIES OF DAILY LIVING: Primary | ICD-10-CM

## 2025-01-14 DIAGNOSIS — Z74.09 IMPAIRED MOBILITY AND ACTIVITIES OF DAILY LIVING: ICD-10-CM

## 2025-01-14 PROCEDURE — 97018 PARAFFIN BATH THERAPY: CPT | Mod: PO

## 2025-01-14 PROCEDURE — 97110 THERAPEUTIC EXERCISES: CPT | Mod: PO

## 2025-01-14 PROCEDURE — 97112 NEUROMUSCULAR REEDUCATION: CPT | Mod: PO

## 2025-01-14 PROCEDURE — 97140 MANUAL THERAPY 1/> REGIONS: CPT | Mod: PO

## 2025-01-14 NOTE — PROGRESS NOTES
[facility]  Occupational Therapy Treatment Note     Date: 1/14/2025  Name: Barrera Siddiqui  Clinic Number: [id number]    Therapy Diagnosis:   Encounter Diagnoses   Name Primary?    Pain aggravated by activities of daily living Yes    Impaired mobility and activities of daily living      Physician: Brandie Monteiro, *    Physician Orders:  Evaluate and Treat  Medical Diagnosis: Cubital tunnel syndrome of both upper extremities [G56.23];Trigger ring finger of right hand [M65.341]  Surgical Procedure and Date: TRANSPOSITION, NERVE, ULNAR (Right) & RIGHT RING RELEASE, TRIGGER FINGER (Right) on 12/11/24   Evaluation Date: 12/31/2024  Insurance Authorization Period Expiration: 12/11/25  Plan of Care Certification Period: 3/25/2025  Date of Return to MD: 1/24/24    Visit # / Visits authorized: 3 / 40   FOTO intake score :  Initial=13%/ Goal=49% / Current=50%     Precautions:  Standard and Diabetes     Time In:11:05   Time Out: 12:00  Total Appointment Time (timed & untimed codes): 55 minute  (billing reflects skilled 1:1 time)    Subjective     Patient reports: she continues to have moderate to significant pain in the R elbow compared to her R hand.    She was compliant with home exercise program given last session.   Response to previous treatment:less edema and increased AROM of R elbow/wrist/hand  Functional change: able to use her RUE in light ADL/IADL's with less pain  and difficulty    Pain: R elbow=7/10; R hand=3-4/10;   Location: right elbow & R  Hand     Objective     Objective Measures updated at progress report unless specified.    Treatment     Barrera received the treatments listed below:      Supervised modalities after being cleared for contradictions :  - Paraffin w/ MHP to R hand 10 minutes, pre-tx to decrease pain & increase tissue extensibility   -   Manual therapy techniques: Myofacial release and Soft tissue Mobilization were applied to the: R elbow/forearm/hand for 27 minutes,  including:   - use of hand techniques, cupping, percussion gun and IASTM tools to increase blood flow/circulation, improve soft tissue pliability and decrease pain.   - Kinesiotaping: Ulnar Nerve taping pattern applied to RUE for pain, edema and soft tissue management. Patient instructed on purpose, wear, care, precautions to monitor and removal of KT. Patient verbalized understanding of all instructions provided.    - reiterated scar management with scar gel sheet, nightly; patient verbalizes understanding    Therapeutic exercises to develop ROM and flexibility for 10 minutes, including:  AROM    Elbow 3 ways 5 reps each, 0# wt   Forearm Stretches-3 ways 3 reps, 15 sec hold, elbow 90   Forearm Stretches-3 ways 3 reps, 15 sec hold, elbow 0          Neuromuscular re-education activities to improve Posture and Coordination for 8 minutes. The following activities were included:  Dexterciser 3 min   Isospheres  3 min   Ulnar Nerve Glides 20 reps           Patient Education and Home Exercises     Education provided:   - Reviewed HEP, including nerve glides, edema and scar management  - Reiterated activity restrictions/limitations appropriate for stage of healing in post op condition  - Progress towards goals     Written Home Exercises Provided: Patient instructed to cont prior HEP.  Exercises were reviewed and Barrera was able to demonstrate them prior to the end of the session.  Barrera demonstrated good  understanding of the home exercise program provided. See electronic medical record under Patient Instructions for exercises provided during therapy sessions.       Assessment     Pain in R elbow/hand continues to be moderate to significant in R elbow.  Edema is moderate in R elbow and minimal in the hand.  Scar tissue is moderately dense at the R elbow with a raised scar along the surgical incision, but no adhesions noted to underlying soft tissue. Scar tissue density in the R hand//RF is minimal without adhesions.  Patient  was able to perform all above listed therapeutic exercises without increased pain or difficulty today.     Barrera is progressing well towards her goals and there are no updates to goals at this time. Pt prognosis is Good.     Patient will continue to benefit from skilled outpatient occupational therapy to address the deficits listed in the problem list on initial evaluation provide patient/family education and to maximize patient's level of independence in the home and community environment.     Patient's spiritual, cultural and educational needs considered and patient agreeable to plan of care and goals.    Anticipated barriers to occupational therapy: comorbid diagnosis, compliance with HEP and attendance to therapy as recommended     Goals:  Short Term Goals (3 weeks) Status Met Date   1. Patient to have decreased complaints of pain to 4/10 with light to moderate activities of daily living (ADLs) and instrumental activities of daily living (IADLs) per subjective report. [] Met  [] Progressing     2. Patient to be independent with home exercise program as issued by Occupational Therapist, noted through return demonstration to increase clinical carry over. [] Met  [] Progressing     3. Patient to have improved fine motor coordination as noted by improved score on 9 hole peg assessment on right hand to 32 seconds to improve ability to use fasteners such as buttons and zippers [] Met  [] Progressing     4. Patient to have increased right elbow extension to -15 degrees to allow for improved ability to reach with right upper extremity. [] Met  [] Progressing     5. Patient to have increased right elbow flexion to 140 degrees to allow for improved ability to perform grooming tasks. [] Met  [] Progressing           Long Term Goals (6 weeks) Status Met Date   1. Patient to have decreased complaints of pain to 0-2/10 with light to moderate activities of daily living (ADLs) and instrumental activities of daily living (IADLs)  per subjective report. [] Met  [] Progressing     2. Patient will improve status score on FOTO to greater than or equal to 49% to demonstrate an increase in self-perceived functional performance. [] Met  [] Progressing     3. Patient to be able to form full functional fist on right hand. [] Met  [] Progressing     4. Patient to have improved fine motor coordination as noted by improved score on 9 hole peg assessment by 28 seconds on right hand to improve ability to  small items. [] Met  [] Progressing     5. Patient to have increased right elbow extension to -5 degrees to allow for improved ability perform daily ADLs.  [] Met  [] Progressing          Plan     Certification Period/Plan of care expiration: 12/31/2024 to 3/25/2025.     Outpatient Occupational Therapy 2 times weekly for 6 weeks to include the following interventions: Paraffin, Manual therapy/joint mobilizations, Modalities for pain management, Therapeutic exercises/activities., Strengthening, Edema Control, and Scar Management and any other interventions deemed necessary for patient's progress.  Updates/Grading for next session: progress ROM, as tolerated    Mani Fox OT   1/14/2025

## 2025-01-16 ENCOUNTER — CLINICAL SUPPORT (OUTPATIENT)
Dept: REHABILITATION | Facility: HOSPITAL | Age: 60
End: 2025-01-16
Payer: COMMERCIAL

## 2025-01-16 DIAGNOSIS — R52 PAIN AGGRAVATED BY ACTIVITIES OF DAILY LIVING: Primary | ICD-10-CM

## 2025-01-16 DIAGNOSIS — Z74.09 IMPAIRED MOBILITY AND ACTIVITIES OF DAILY LIVING: ICD-10-CM

## 2025-01-16 DIAGNOSIS — Z78.9 IMPAIRED MOBILITY AND ACTIVITIES OF DAILY LIVING: ICD-10-CM

## 2025-01-16 PROCEDURE — 97035 APP MDLTY 1+ULTRASOUND EA 15: CPT | Mod: PO

## 2025-01-16 PROCEDURE — 97140 MANUAL THERAPY 1/> REGIONS: CPT | Mod: PO

## 2025-01-16 PROCEDURE — 97018 PARAFFIN BATH THERAPY: CPT | Mod: PO

## 2025-01-16 PROCEDURE — 97110 THERAPEUTIC EXERCISES: CPT | Mod: PO

## 2025-01-16 PROCEDURE — 97112 NEUROMUSCULAR REEDUCATION: CPT | Mod: PO

## 2025-01-16 NOTE — PROGRESS NOTES
[facility]  Occupational Therapy Treatment Note     Date: 1/16/2025  Name: Barrera Siddiqui  Clinic Number: [id number]    Therapy Diagnosis:   Encounter Diagnoses   Name Primary?    Pain aggravated by activities of daily living Yes    Impaired mobility and activities of daily living      Physician: Brandie Monteiro, *    Physician Orders:  Evaluate and Treat  Medical Diagnosis: Cubital tunnel syndrome of both upper extremities [G56.23];Trigger ring finger of right hand [M65.341]  Surgical Procedure and Date: TRANSPOSITION, NERVE, ULNAR (Right) & RIGHT RING RELEASE, TRIGGER FINGER (Right) on 12/11/24   Evaluation Date: 12/31/2024  Insurance Authorization Period Expiration: 12/11/25  Plan of Care Certification Period: 3/25/2025  Date of Return to MD: 1/24/24    Visit # / Visits authorized: 4 / 40   FOTO intake score :  Initial=13%/ Goal=49% / Current=50%     Precautions:  Standard and Diabetes     Time In:11:05   Time Out: 12:00  Total Appointment Time (timed & untimed codes): 55 minute  (billing reflects skilled 1:1 time)    Subjective     Patient reports: she feels condition is gradually improving and the KT helped with the pain.     She was compliant with home exercise program given last session.   Response to previous treatment:less edema and increased AROM of R elbow/wrist/hand  Functional change: able to use her RUE in light ADL/IADL's with less pain  and difficulty    Pain: R elbow=5-6/10; R hand=3/10;   Location: right elbow & R  Hand     Objective     Objective Measures updated at progress report unless specified.    Treatment     Barrera received the treatments listed below:      Direct contact modalities after being cleared for contraindications: 1.0 MHz, 1.0 W/cm2, 50% pulsed, 10 min to R elbow surgical site, to decrease pain & edema, increase circulation and tissue extensibility     Supervised modalities after being cleared for contradictions :  - Paraffin w/ MHP to R hand during  ultrasound, pre-tx to decrease pain & increase tissue extensibility   Manual therapy techniques: Myofacial release and Soft tissue Mobilization were applied to the: R elbow/forearm/hand for 25 minutes, including:   - use of hand techniques, cupping/scar pump, percussion gun and IASTM tools to increase blood flow/circulation, improve soft tissue pliability and decrease pain.   - Kinesiotaping: Ulnar Nerve taping pattern applied to RUE for pain, edema and soft tissue management.  - reiterated scar management with scar gel sheet, nightly; patient verbalizes understanding    Therapeutic exercises to develop ROM and flexibility for 10 minutes, including:  AROM    Elbow 3 ways 5 reps each, 0# wt   Forearm Stretches-3 ways 3 reps, 15 sec hold, elbow 90   Forearm Stretches-3 ways 3 reps, 15 sec hold, elbow 0   Gentle foam squeezes 2/10 reps, yellow       Add Next visit:    Biceps 3 ways 10 reps, 1# wt   Forearm pimentel/pronation bar 10 reps, 1/2# wt   Wrist 3 ways over wedge 10 reps, 1# wt   Resistive clothespin: tripod & key pinch 10 reps each, yellow pin      Neuromuscular re-education activities to improve Posture and Coordination for 10 minutes. The following activities were included:  Dexterciser 3 min   Isospheres  3 min   9 Peg: in-hand manipulation 1 trial   Ulnar Nerve Glides 10 reps           Patient Education and Home Exercises     Education provided:   - Reviewed HEP, including nerve glides, edema and scar management  - Reiterated activity restrictions/limitations appropriate for stage of healing in post op condition  - Progress towards goals     Written Home Exercises Provided: Patient instructed to cont prior HEP.  Exercises were reviewed and Barrera was able to demonstrate them prior to the end of the session.  Barrera demonstrated good  understanding of the home exercise program provided. See electronic medical record under Patient Instructions for exercises provided during therapy sessions.       Assessment     Pain  and edema in R elbow/hand has decreased since last Occupational Therapy visit,  but has not completely resolved.  Scar tissue is moderate to minimally dense at the R elbow with a flatter scar along the surgical incision. Minimal to trace adhesions are noted to underlying soft tissue at the R elbow and R RF surgical sites, but improves after manual therapy. Scar tissue density at the R RF surgical site is minimal but did present with mild adhesion to underlying soft tissue today, but also improved after manual therapy.   Patient was able to perform all above listed therapeutic exercises without increased pain or difficulty today.  Recommend to begin light strengthening at net visit, as tolerated.     Barrera is progressing well towards her goals and there are no updates to goals at this time. Pt prognosis is Good.     Patient will continue to benefit from skilled outpatient occupational therapy to address the deficits listed in the problem list on initial evaluation provide patient/family education and to maximize patient's level of independence in the home and community environment.     Patient's spiritual, cultural and educational needs considered and patient agreeable to plan of care and goals.    Anticipated barriers to occupational therapy: comorbid diagnosis, compliance with HEP and attendance to therapy as recommended     Goals:  Short Term Goals (3 weeks) Status Met Date   1. Patient to have decreased complaints of pain to 4/10 with light to moderate activities of daily living (ADLs) and instrumental activities of daily living (IADLs) per subjective report. [] Met  [x] Progressing     2. Patient to be independent with home exercise program as issued by Occupational Therapist, noted through return demonstration to increase clinical carry over. [x] Met  [] Progressing     3. Patient to have improved fine motor coordination as noted by improved score on 9 hole peg assessment on right hand to 32 seconds to improve  ability to use fasteners such as buttons and zippers [] Met  [x] Progressing     4. Patient to have increased right elbow extension to -15 degrees to allow for improved ability to reach with right upper extremity. [] Met  [x] Progressing     5. Patient to have increased right elbow flexion to 140 degrees to allow for improved ability to perform grooming tasks. [] Met  [x] Progressing           Long Term Goals (6 weeks) Status Met Date   1. Patient to have decreased complaints of pain to 0-2/10 with light to moderate activities of daily living (ADLs) and instrumental activities of daily living (IADLs) per subjective report. [] Met  [] Progressing     2. Patient will improve status score on FOTO to greater than or equal to 49% to demonstrate an increase in self-perceived functional performance. [] Met  [] Progressing     3. Patient to be able to form full functional fist on right hand. [] Met  [] Progressing     4. Patient to have improved fine motor coordination as noted by improved score on 9 hole peg assessment by 28 seconds on right hand to improve ability to  small items. [] Met  [] Progressing     5. Patient to have increased right elbow extension to -5 degrees to allow for improved ability perform daily ADLs.  [] Met  [] Progressing          Plan     Certification Period/Plan of care expiration: 12/31/2024 to 3/25/2025.     Outpatient Occupational Therapy 2 times weekly for 6 weeks to include the following interventions: Paraffin, Manual therapy/joint mobilizations, Modalities for pain management, Therapeutic exercises/activities., Strengthening, Edema Control, and Scar Management and any other interventions deemed necessary for patient's progress.  Updates/Grading for next session: progress ROM, as tolerated    Mani Fox OT   1/16/2025

## 2025-01-21 ENCOUNTER — TELEPHONE (OUTPATIENT)
Dept: ORTHOPEDICS | Facility: CLINIC | Age: 60
End: 2025-01-21
Payer: COMMERCIAL

## 2025-01-21 NOTE — TELEPHONE ENCOUNTER
Offered sooner Virtual [facility] pt accepted 1/22/25 11:30. Patient verbalized understanding and was thankful.

## 2025-01-22 ENCOUNTER — OFFICE VISIT (OUTPATIENT)
Dept: ORTHOPEDICS | Facility: CLINIC | Age: 60
End: 2025-01-22
Payer: COMMERCIAL

## 2025-01-22 DIAGNOSIS — M65.30 TRIGGER FINGER OF RIGHT HAND, UNSPECIFIED FINGER: ICD-10-CM

## 2025-01-22 DIAGNOSIS — Z98.890 POST-OPERATIVE STATE: Primary | ICD-10-CM

## 2025-01-22 DIAGNOSIS — G56.23 CUBITAL TUNNEL SYNDROME OF BOTH UPPER EXTREMITIES: ICD-10-CM

## 2025-01-22 PROCEDURE — 99024 POSTOP FOLLOW-UP VISIT: CPT | Mod: 95,,, | Performed by: SPECIALIST/TECHNOLOGIST

## 2025-01-22 NOTE — PROGRESS NOTES
The patient location is: [place], [place]  The chief complaint leading to consultation is: PO Trigger finger and Ulnar Nerve Decompression    Visit type: audiovisual    Face to Face time with patient: 3 min  10 minutes of total time spent on the encounter, which includes face to face time and non-face to face time preparing to see the patient (eg, review of tests), Obtaining and/or reviewing separately obtained history, Documenting clinical information in the electronic or other health record, Independently interpreting results (not separately reported) and communicating results to the patient/family/caregiver, or Care coordination (not separately reported).         Each patient to whom he or she provides medical services by telemedicine is:  (1) informed of the relationship between the physician and patient and the respective role of any other health care provider with respect to management of the patient; and (2) notified that he or she may decline to receive medical services by telemedicine and may withdraw from such care at any time.    Notes:   1/22/25  Patient reports 6 weeks status post right trigger finger release of the ring finger and right ulnar nerve decompression of the elbow.  She has been attending therapy regularly.  She denies any pain today on her virtual visit.  She does note that she does have hypersensitivity noted at the ulnar nerve decompression scar.  She states she has been doing scar massage for 5 times a day for her trigger finger and 3 times a day for her ulnar nerve decompression surgery.    12/26/24  Ms. Siddiqui is here today for a post-operative visit.  She is 15 days status post Right Trigger Finger Release of the ring finger and Right Ulnar Nerve Decompression by Dr. Monteiro on 12/11/24. She reports that she is minimal pain.  Therapy is beginning 12/31/24.  She denies fever, chills, and sweats since the time of the surgery.     Physical exam:    Physical exam was limited due to a virtual  visit   Patient is able to make a full composite fist.    Assessment:  status post Right Trigger Finger Release of the ring finger and Right Ulnar Nerve Decompression        Plan:  Diagnoses and all orders for this visit:    Post-operative state    Cubital tunnel syndrome of both upper extremities    Trigger finger of right hand, unspecified finger        Patient doing well postoperatively   Continue with aggressive scar massage   Continue to progress strengthening as well as range-of-motion   Advised patient she is cleared to return to full duty with no restrictions at this time   Patient we will follow up in clinic p.r.luis alfredo.      Jr Moore PA-C, ATC  [facility]  [facility] [place], [place]    Disclaimer: This note has been generated using voice-recognition software. There may be typographical errors that have been missed during proof-reading.

## 2025-01-23 ENCOUNTER — OFFICE VISIT (OUTPATIENT)
Dept: CARDIOLOGY | Facility: CLINIC | Age: 60
End: 2025-01-23
Payer: COMMERCIAL

## 2025-01-23 DIAGNOSIS — R51.9 NONINTRACTABLE HEADACHE, UNSPECIFIED CHRONICITY PATTERN, UNSPECIFIED HEADACHE TYPE: ICD-10-CM

## 2025-01-23 DIAGNOSIS — R00.2 PALPITATION: ICD-10-CM

## 2025-01-23 DIAGNOSIS — E11.9 TYPE 2 DIABETES MELLITUS WITHOUT COMPLICATION, WITHOUT LONG-TERM CURRENT USE OF INSULIN: ICD-10-CM

## 2025-01-23 DIAGNOSIS — R55 SYNCOPE AND COLLAPSE: ICD-10-CM

## 2025-01-23 DIAGNOSIS — I10 ESSENTIAL HYPERTENSION: Primary | ICD-10-CM

## 2025-01-23 RX ORDER — METOPROLOL TARTRATE 25 MG/1
25 TABLET, FILM COATED ORAL 2 TIMES DAILY
Qty: 180 TABLET | Refills: 3 | Status: SHIPPED | OUTPATIENT
Start: 2025-01-23 | End: 2026-01-23

## 2025-01-23 NOTE — PROGRESS NOTES
Subjective:   @Patient ID:  Barrera Siddiqui is a 59 y.o. female who presents for follow-up of No chief complaint on file.      HPI:      Patient is a 59-year-old female with past medical history of hypertension on losartan and amlodipine , hyperlipidemia on Lipitor 20 mg daily, on aspirin therapy, history of possible TIA, negative neurological workup in the past, obesity, depression, diabetes on glipizide, takes Lasix 40 mg twice a day for bilateral swelling of the feet, has had previous issues with syncope for which she is  seeing a neurologist.  Has had extensive cardiac workup done in the past, event monitor did not show any significant tachy or bradyarrhythmias.  Echocardiogram in November 2023 showed normal diastolic parameters and preserved left ventricular ejection fraction, LDL of 66 A1c of 8.5.  Now in Cardiology Clinic for preoperative risk assessment prior to upcoming colonoscopy.       Now here for a virtual visit.  Previously saw me for perioperative risk assessment prior to trigger finger surgery.  Also has received left radiofrequency ablation for cervical spondylosis.  Still having leg pain.  Blood pressure has been in the 130-150 systolic range at home.      Now here to discuss results.  Echocardiogram shows normal left and right ventricular function.  Mitral annular calcification.  Event monitor showed symptoms associated with sinus rhythm/PACs/PVCs.  Findings concerning for postural orthostatic tachycardia syndrome during tilt-table testing.      Results for orders placed during the hospital encounter of 10/24/24    Echo    Interpretation Summary    Left Ventricle: The left ventricle is normal in size. Mildly increased wall thickness. There is normal systolic function. Biplane (2D) method of discs ejection fraction is 69%. Diastolic function cannot be reliably determined in the presence of mitral annular calcification.    Right Ventricle: Normal right ventricular cavity size. Systolic function is  normal.      No results found for this or any previous visit.      No results found for this or any previous visit.      Please document below the medical necessity for continuous telemetry monitoring or discontinue the current order if appropriate.    Current rhythm from flowsheet:               Patient Active Problem List    Diagnosis Date Noted    Pain aggravated by activities of daily living 12/31/2024    Impaired mobility and activities of daily living 12/31/2024    Trigger finger of right ring finger 12/11/2024    Hemoptysis 09/13/2024    Trigger ring finger of left hand 07/23/2024    Cubital tunnel syndrome of both upper extremities 07/23/2024    Sleep apnea 01/03/2024     Very high suspicion for EDGAR given her symptoms of waking up gasping for air.  Sleep medicine referral placed      SOBOE (shortness of breath on exertion) 01/03/2024     CTA negative and lung parenchyma with no evidence of ILD or other issue  I am concerned that her SOB is primarily due to her obesity and EDGAR.   She is established with Dr. Sands now and has a home sleep study scheduled on April second.   She can follow up as needed with me.       Normocytic anemia 08/16/2023    Syncope 08/15/2023    Right ear pain 01/01/2023    Acute cystitis 12/30/2022    Anxiety disorder 01/08/2022    Insomnia related to another mental disorder 01/08/2022    Constipation 01/08/2022    Moderate recurrent major depression 01/08/2022    Vitamin D deficiency 01/08/2022    Decreased range of motion of neck 03/12/2020    Weakness of left upper extremity 03/12/2020    Poor posture 03/12/2020    Hyperglycemia 10/29/2018    Screening for colorectal cancer 10/18/2017    Depression 12/24/2016    History of smoking 12/24/2016    Chest pain 09/27/2016    Right calf pain 05/11/2016    Palpitation 05/11/2016    Snoring 05/11/2016    Daytime somnolence 05/11/2016     Also concern for waking up at night gasping for air  Sleep medicine referral placed      Headache 05/11/2016     Diastolic heart failure 04/29/2016    Morbid obesity with BMI of 50.0-59.9, adult 04/11/2016     This explains the mild restriction noted on her PFTs.  And definitely affects her health in a negative way      Idiopathic hypotension 04/11/2016    Type 2 diabetes mellitus 04/11/2016    Hyperlipidemia 04/11/2016    Essential hypertension 04/09/2016                    LAST HbA1c  Lab Results   Component Value Date    HGBA1C 8.5 (H) 08/23/2024       Lipid panel  Lab Results   Component Value Date    CHOL 144 08/15/2023    CHOL 123 11/21/2020    CHOL 109 (L) 02/08/2020     Lab Results   Component Value Date    HDL 43 08/15/2023    HDL 43 11/21/2020    HDL 34 (L) 02/08/2020     Lab Results   Component Value Date    LDLCALC 66.4 08/15/2023    LDLCALC 42.0 (L) 11/21/2020    LDLCALC 34.0 (L) 02/08/2020     Lab Results   Component Value Date    TRIG 173 (H) 08/15/2023    TRIG 190 (H) 11/21/2020    TRIG 205 (H) 02/08/2020     Lab Results   Component Value Date    CHOLHDL 29.9 08/15/2023    CHOLHDL 35.0 11/21/2020    CHOLHDL 31.2 02/08/2020            Review of Systems   Constitutional: Negative for chills and fever.   HENT:  Negative for hearing loss and nosebleeds.    Eyes:  Negative for blurred vision.   Cardiovascular:         As in HPI    Respiratory:  Negative for cough, hemoptysis and shortness of breath.    Endocrine: Negative for cold intolerance and polyuria.   Hematologic/Lymphatic: Negative for bleeding problem.   Skin:  Negative for itching.   Musculoskeletal:  Negative for falls.   Gastrointestinal:  Negative for abdominal pain and hematochezia.   Genitourinary:  Negative for hematuria.   Neurological:  Negative for dizziness and loss of balance.        Headache.  Episode of nose bleeding yesterday.   Psychiatric/Behavioral:  Negative for altered mental status and depression.        Objective:   Physical Exam  Vitals reviewed: Virtual visit.         Assessment:     1. Essential hypertension    2. Syncope and  collapse    3. Type 2 diabetes mellitus without complication, without long-term current use of insulin    4. Nonintractable headache, unspecified chronicity pattern, unspecified headache type    5. Palpitation        Plan:     - LDL 66, continue atorvastatin in the presence of diabetes with A1c of 8.5.  - tilt-table tests showing findings consistent with POTS syndrome.  Also has baseline high blood pressure.  No significant bradyarrhythmias.  Recommend starting metoprolol 25 mg twice a day it escalating up to 50 mg twice a day in the next 3 days.  - take Lasix only as needed  - call my office if blood pressure still elevated or continues to have palpitations.  My next strategy would be switching metoprolol to carvedilol and possibly switching amlodipine to diltiazem/verapamil  - follow up within 3 months if symptoms improve        Pertinent cardiac images and EKG reviewed independently.    Continue with current medical plan and lifestyle changes.  Return sooner for concerns or questions. If symptoms persist go to the ED  I have reviewed all pertinent data including patient's medical history in detail and updated the computerized patient record.     No orders of the defined types were placed in this encounter.      Follow up as scheduled.     She expressed verbal understanding and agreed with the plan    Patient's Medications   New Prescriptions    No medications on file   Previous Medications    ALBUTEROL (PROVENTIL/VENTOLIN HFA) 90 MCG/ACTUATION INHALER    2 puffs every 4 to 6 hours as needed.    AMLODIPINE (NORVASC) 10 MG TABLET    Take 1 tablet (10 mg total) by mouth once daily.    ASPIRIN (ECOTRIN) 81 MG EC TABLET    Take 1 tablet (81 mg total) by mouth once daily.    ATORVASTATIN (LIPITOR) 20 MG TABLET    Take 1 tablet (20 mg total) by mouth once daily.    BUTALBITAL-ACETAMINOPHEN-CAFFEINE -40 MG (FIORICET, ESGIC) -40 MG PER TABLET    TAKE 1 TABLET BY MOUTH TWICE DAILY AS NEEDED FOR HEADACHE     CETIRIZINE (ZYRTEC) 10 MG TABLET    Take 10 mg by mouth.    FAMOTIDINE (PEPCID) 40 MG TABLET    Take 40 mg by mouth 2 (two) times daily.    FUROSEMIDE (LASIX) 40 MG TABLET    Take 1 tablet (40 mg total) by mouth 2 (two) times daily.    GLIPIZIDE (GLUCOTROL) 10 MG TABLET    Take 10 mg by mouth 2 (two) times daily.    JANUVIA 100 MG TAB    Take 1 tablet by mouth once daily.    LINZESS 145 MCG CAP CAPSULE    TAKE 1 CAPSULE BY MOUTH ONCE DAILY ON AN EMPTY STOMACH AT LEAST 30 MINUTES BEFORE 1ST MEAL OF THE DAY    LOSARTAN (COZAAR) 100 MG TABLET    Take 1 tablet (100 mg total) by mouth once daily.    METFORMIN (GLUCOPHAGE-XR) 500 MG ER 24HR TABLET    Take 500 mg by mouth 2 (two) times daily.    METHOCARBAMOL (ROBAXIN) 500 MG TAB    Take 1 tablet by mouth 3 (three) times daily.    MULTIVITAMIN (THERAGRAN) PER TABLET    Take 1 tablet by mouth once daily.    OZEMPIC 0.25 MG OR 0.5 MG (2 MG/3 ML) PEN INJECTOR    Inject into the skin.    PANTOPRAZOLE (PROTONIX) 40 MG TABLET    Take 1 tablet every day by oral route in the morning for 90 days.    PREGABALIN (LYRICA) 100 MG CAPSULE    Take 1 capsule by mouth twice daily    TRAMADOL (ULTRAM) 50 MG TABLET    Take 1 tablet (50 mg total) by mouth every 6 (six) hours as needed for Pain.   Modified Medications    No medications on file   Discontinued Medications    No medications on file        Negro Rosen M.D

## 2025-01-27 ENCOUNTER — OFFICE VISIT (OUTPATIENT)
Dept: PAIN MEDICINE | Facility: CLINIC | Age: 60
End: 2025-01-27
Payer: COMMERCIAL

## 2025-01-27 ENCOUNTER — CLINICAL SUPPORT (OUTPATIENT)
Dept: REHABILITATION | Facility: HOSPITAL | Age: 60
End: 2025-01-27
Payer: COMMERCIAL

## 2025-01-27 DIAGNOSIS — Z74.09 IMPAIRED MOBILITY AND ACTIVITIES OF DAILY LIVING: ICD-10-CM

## 2025-01-27 DIAGNOSIS — M47.812 CERVICAL SPONDYLOSIS: ICD-10-CM

## 2025-01-27 DIAGNOSIS — G89.4 CHRONIC PAIN SYNDROME: ICD-10-CM

## 2025-01-27 DIAGNOSIS — M54.2 NECK PAIN: ICD-10-CM

## 2025-01-27 DIAGNOSIS — Z78.9 IMPAIRED MOBILITY AND ACTIVITIES OF DAILY LIVING: ICD-10-CM

## 2025-01-27 DIAGNOSIS — M50.30 DDD (DEGENERATIVE DISC DISEASE), CERVICAL: ICD-10-CM

## 2025-01-27 DIAGNOSIS — M47.812 ARTHRITIS OF FACET JOINT OF CERVICAL SPINE: Primary | ICD-10-CM

## 2025-01-27 DIAGNOSIS — R52 PAIN AGGRAVATED BY ACTIVITIES OF DAILY LIVING: Primary | ICD-10-CM

## 2025-01-27 PROCEDURE — 97110 THERAPEUTIC EXERCISES: CPT | Mod: PO

## 2025-01-27 PROCEDURE — 97018 PARAFFIN BATH THERAPY: CPT | Mod: PO

## 2025-01-27 PROCEDURE — 97140 MANUAL THERAPY 1/> REGIONS: CPT | Mod: PO

## 2025-01-27 RX ORDER — PREGABALIN 100 MG/1
100 CAPSULE ORAL 3 TIMES DAILY
Qty: 90 CAPSULE | Refills: 0 | Status: SHIPPED | OUTPATIENT
Start: 2025-01-27 | End: 2025-02-26

## 2025-01-27 NOTE — TELEPHONE ENCOUNTER
----- Message from HARJINDER Sultana sent at 1/27/2025  9:41 AM CST -----  Regarding: Follow up in 4 weeks to discuss increasing Lyrica.  Could you also send a new prescription of Lyrica 100 mg t.i.d. for 30 days.

## 2025-01-27 NOTE — PROGRESS NOTES
[facility] - Established  Patient Evaluation    Referred by: Evelin Guevara   Reason for referral: * No diagnoses found *     CC:   Chief Complaint   Patient presents with    Neck Pain     Left          9/19/2024    11:10 AM 4/15/2024     1:32 PM   Last 3 PDI Scores   Pain Disability Index (PDI) 48 70       Interval History 1/27/205:  59-year-old female that presents virtually for a follow-up appointment she is status post a left cervical RFA targeting C3, C4 and C5 patient is reporting 0% relief of her chronic left neck pain that radiates into her left shoulder.  She reports pain continues pain disrupts her performing her ADLs and work duties.  She denies any profound weakness left or right arm denies any recent incident or trauma denies any bowel or bladder dysfunction at this time.      Interval history 09/19/2024:  59 year female that returns to clinic it has been 5 months since she has been seen.  She is complaining of returning left-sided neck pain that radiates into her left shoulder.  Her MRI of the cervical spine did show degenerative changes of the spine most pronounced at C4-5 also complaining of left sided headaches which could involve the 3rd occipital nerve, C2-C3, C3 and C4.  She was provided a cervical GRACIE targeting C7-T1 on 05/08/2024 she does report relief with this injection she also reports having recently had trigger finger surgery as well as elbow surgery which she feels as though where the cause of her left arm radiculopathy.  Her pain primarily begins in the left side of her neck the base of her head radiating into her shoulder with left unilateral headaches.  She denies any recent falls denies profound weakness denies bowel or bladder dysfunction at this time denies dropping things denies inability to write with a pen.    Subjective 04/15/2024:   Barrera Siddiqui is a 59 y.o. female who presents complaining of neck pain with Left sided radicular symptoms. Pain started  about 8 months ago after a fall. MRI Cervical showed multilevel degenerative changes of the cervical spine are most pronounced at C4-C5 with minor spinal canal stenosis and mild left-sided neural foraminal stenosis, as well as mild left-sided C2-C3 and C3-C4 neural foraminal stenosis. She was seen by Neurosurgery and a Cervical epidural has been ordered. She was started on Lyrica last week.      Initial Pain Assessment:        Patient denies night fever/night sweats, urinary incontinence, bowel incontinence, significant weight loss, and significant motor weakness.    Previous Interventions:  -12/30/2024 Therapeutic Left C3, C4, and C5 CRFA 0% relief   - 11/20/2024  Diagnostic Left C3, C4, and C5 Cervical Medial Branch Block   -10/28/2024 \ Diagnostic Left C3, C4, and C5 Cervical Medial Branch Block   - 05/08/2024 Cervical Interlaminar Epidural Steroid Injection C7/T1     Previous Therapies:  PT/OT: no   Chiropractor:   HEP:   Relevant Surgery: no   Previous Medications:   - NSAIDS:   - Muscle Relaxants: Robaxin    - TCAs:   - SNRIs:   - Topicals:   - Anticonvulsants: Lyrica    - Opioids:   - Adjuvants:     Current Pain Medications:  Lyrica 100 mg BID  Robaxin 500 mg QID prn     Review of Systems:  Review of Systems   Musculoskeletal:  Positive for neck pain.       GENERAL:  No weight loss, malaise or fevers.  HEENT:   No recent changes in vision or hearing  NECK:  No difficulty with swallowing. No stridor.   RESPIRATORY:  Negative for cough, wheezing or shortness of breath, patient denies any recent URI.  CARDIOVASCULAR:  Negative for chest pain, leg swelling or palpitations.  GI:  Negative for abdominal discomfort, blood in stools or black stools or change in bowel habits.  MUSCULOSKELETAL:  See HPI.  SKIN:  Negative for lesions, rash, and itching.  PSYCH:  No mood disorder or recent psychosocial stressors.    HEMATOLOGY/LYMPHOLOGY:  Negative for prolonged bleeding, bruising easily or swollen nodes.  Patient is not  currently taking any anti-coagulants  NEURO:   No history of headaches, syncope, paralysis, seizures or tremors.  All other reviewed and negative other than HPI.    History:  Current medications, allergies, medical history, surgical history,   family history, and social history were reviewed in the chart as marked.    Full Medication List:    Current Outpatient Medications:     albuterol (PROVENTIL/VENTOLIN HFA) 90 mcg/actuation inhaler, 2 puffs every 4 to 6 hours as needed., Disp: , Rfl:     amLODIPine (NORVASC) 10 MG tablet, Take 1 tablet (10 mg total) by mouth once daily., Disp: 90 tablet, Rfl: 3    aspirin (ECOTRIN) 81 MG EC tablet, Take 1 tablet (81 mg total) by mouth once daily., Disp: 90 tablet, Rfl: 3    atorvastatin (LIPITOR) 20 MG tablet, Take 1 tablet (20 mg total) by mouth once daily., Disp: 90 tablet, Rfl: 3    butalbital-acetaminophen-caffeine -40 mg (FIORICET, ESGIC) -40 mg per tablet, TAKE 1 TABLET BY MOUTH TWICE DAILY AS NEEDED FOR HEADACHE, Disp: , Rfl:     cetirizine (ZYRTEC) 10 MG tablet, Take 10 mg by mouth., Disp: , Rfl:     famotidine (PEPCID) 40 MG tablet, Take 40 mg by mouth 2 (two) times daily., Disp: , Rfl:     furosemide (LASIX) 40 MG tablet, Take 1 tablet (40 mg total) by mouth 2 (two) times daily., Disp: 180 tablet, Rfl: 3    glipiZIDE (GLUCOTROL) 10 MG tablet, Take 10 mg by mouth 2 (two) times daily., Disp: , Rfl:     JANUVIA 100 mg Tab, Take 1 tablet by mouth once daily., Disp: , Rfl:     LINZESS 145 mcg Cap capsule, TAKE 1 CAPSULE BY MOUTH ONCE DAILY ON AN EMPTY STOMACH AT LEAST 30 MINUTES BEFORE 1ST MEAL OF THE DAY, Disp: , Rfl:     losartan (COZAAR) 100 MG tablet, Take 1 tablet (100 mg total) by mouth once daily., Disp: 90 tablet, Rfl: 3    metFORMIN (GLUCOPHAGE-XR) 500 MG ER 24hr tablet, Take 500 mg by mouth 2 (two) times daily., Disp: , Rfl:     methocarbamoL (ROBAXIN) 500 MG Tab, Take 1 tablet by mouth 3 (three) times daily., Disp: , Rfl:     metoprolol tartrate  (LOPRESSOR) 25 MG tablet, Take 1 tablet (25 mg total) by mouth 2 (two) times daily., Disp: 180 tablet, Rfl: 3    multivitamin (THERAGRAN) per tablet, Take 1 tablet by mouth once daily., Disp: , Rfl:     OZEMPIC 0.25 mg or 0.5 mg (2 mg/3 mL) pen injector, Inject into the skin., Disp: , Rfl:     pantoprazole (PROTONIX) 40 MG tablet, Take 1 tablet every day by oral route in the morning for 90 days., Disp: , Rfl:     pregabalin (LYRICA) 100 MG capsule, Take 1 capsule by mouth twice daily, Disp: 60 capsule, Rfl: 0    traMADoL (ULTRAM) 50 mg tablet, Take 1 tablet (50 mg total) by mouth every 6 (six) hours as needed for Pain., Disp: 10 tablet, Rfl: 0     Allergies:  Patient has no known allergies.     Medical History:   has a past medical history of Depression, Diabetes mellitus, High cholesterol, Hypertension, Stroke, and TIA (transient ischemic attack) (2017).    Surgical History:   has a past surgical history that includes Tubal ligation; Knee surgery; Cholecystectomy; Colonoscopy (N/A, 10/18/2017); Epidural steroid injection into cervical spine (N/A, 05/08/2024); Carpal tunnel release (Bilateral); decompression, nerve, ulnar (Left, 7/24/2024); Trigger finger release (Left, 7/24/2024); Injection of anesthetic agent around medial branch nerves innervating cervical facet joint (Left, 10/28/2024); Injection of anesthetic agent around medial branch nerves innervating cervical facet joint (Left, 11/20/2024); Ulnar nerve transposition (Right, 12/11/2024); Trigger finger release (Right, 12/11/2024); and radiofrequency ablation, facet joint, cervicothoracic (Left, 12/30/2024).    Family History:  family history includes Breast cancer in her mother; Liver disease in her maternal grandmother.    Social History:   reports that she has never smoked. She has never been exposed to tobacco smoke. She has never used smokeless tobacco. She reports current alcohol use. She reports that she does not use drugs.    Physical Exam:  There  were no vitals filed for this visit.  GEN: No acute distress. Calm, comfortable  HENT: Normocephalic, atraumatic, moist mucous membranes  EYE: Anicteric sclera, non-injected.   CV: Non-diaphoretic.   RESP: Breathing comfortably. Chest expansion symmetric.  PSYCH: Pleasant mood and appropriate affect. Recent and remote memory intact.         Previous clinical exam.  GENERAL: Well appearing, in no acute distress, alert and oriented x3.  PSYCH:  Mood and affect appropriate.  SKIN: Skin color, texture, turgor normal, no rashes or lesions.  HEAD/FACE:  Normocephalic, atraumatic. Cranial nerves grossly intact.  NECK: Decreased ROM 2/2 pain. +pain to palpation over the cervical paraspinous muscles and b/l trapezius mm. Spurling positive on the left.  CV: RRR with palpation of the radial artery.  PULM: No evidence of respiratory difficulty, symmetric chest rise.  GI:  Soft and non-distended.  MSK: No obvious deformities, edema, or skin discoloration.  No atrophy or tone abnormalities are noted.   NEURO: Bilateral upper and lower extremity coordination and strength is symmetric. Subjective numbness over the left thumb and 4th digit.  Fitzpatrick Negative.   MENTAL STATUS: A x O x 3, good concentration, speech is fluent and goal directed  MOTOR: 5/5 in all muscle groups  GAIT: Normal. Ambulates unassisted.    Imaging:  XR CERVICAL SPINE AP LAT WITH FLEX EXTEN     CLINICAL HISTORY:  Other cervical disc degeneration, unspecified cervical region     TECHNIQUE:  Three views of the cervical spine plus flexion and extension views were performed.     COMPARISON:  CTA head and neck and MR cervical spine 2023     FINDINGS:  Mild DJD anterior C1-C2, airway neck soft tissues normal.  Mild degenerative disc spondylosis C3-visualize C5, C 6 T1 obscured on lateral view by overlying shoulder structures without significant abnormality noted on CTA neck 2023. straightening usual lordotic curve, neck muscle spasm.     Impression:     No fracture  or subluxation.  Additional findings above.      Electronically signed by:Andrew Abdullahi MD  Date:                                            04/11/2024      MRI CERVICAL SPINE WITHOUT CONTRAST     CLINICAL HISTORY:  Spinal stenosis, cervical; Spinal stenosis, cervical region     TECHNIQUE:  Multiplanar, multisequence MR images of the cervical spine were performed without the administration of contrast.     COMPARISON:  None.     FINDINGS:  Alignment: Straightening of the normal cervical lordosis.     Vertebrae: Cervical vertebral body heights are maintained.  No abnormal osseous edema.  No evidence of acute fracture.  Marrow signal is within normal limits.     Discs: Disc heights appear maintained.     Cord: No cord signal abnormality.     Skull base and craniocervical junction: Within normal limits.     Degenerative findings:     C2-C3: Asymmetric left disc osteophyte complex.  No ventral cord contact or spinal canal stenosis.  Mild left-sided neural foraminal stenosis.     C3-C4: Mild broad-based posterior disc osteophyte complex contacts and slightly flattens the ventral cord without significant spinal canal stenosis.  Left greater than right uncovertebral joint spurring contributing to mild left-sided neural foraminal stenosis.     C4-C5: Tiny central disc osteophyte complex contacts and deforms the ventral cord contributing to minor spinal canal stenosis.  Left-sided uncovertebral joint spurring contributes to mild left-sided neural foraminal stenosis.     C5-C6: No significant posterior disc osteophyte complex, spinal canal stenosis or neural foraminal stenosis.     C6-C7: No significant posterior disc osteophyte complex, spinal canal stenosis or neural foraminal stenosis.     C7-T1: No significant posterior disc osteophyte complex, spinal canal stenosis or neural foraminal stenosis.     T1-T2: No significant posterior disc osteophyte complex or spinal canal stenosis.  Right greater than left facet  arthropathy with mild right-sided neural foraminal stenosis.     Paraspinal muscles & soft tissues: Within normal limits.     Impression:     Multilevel degenerative changes of the cervical spine are most pronounced at C4-C5 with minor spinal canal stenosis and mild left-sided neural foraminal stenosis.     Mild left-sided C2-C3 and C3-C4 neural foraminal stenosis.        Electronically signed by:Arcenio Clark  Date:                                            12/11/2023    Labs:  BMP  Lab Results   Component Value Date     12/31/2024    K 3.9 12/31/2024     12/31/2024    CO2 25 12/31/2024    BUN 14 12/31/2024    CREATININE 0.67 12/31/2024    CALCIUM 9.7 12/31/2024    ANIONGAP 14 12/31/2024    EGFRNORACEVR >60.0 12/31/2024     Lab Results   Component Value Date    ALT 80 (H) 12/31/2024    AST 98 (H) 12/31/2024    ALKPHOS 170 (H) 12/31/2024    BILITOT 0.6 12/31/2024     Lab Results   Component Value Date    WBC 10.26 12/31/2024    HGB 12.5 12/31/2024    HCT 39.8 12/31/2024    MCV 83 12/31/2024     12/31/2024       Assessment:  Problem List Items Addressed This Visit    None      04/15/2024: Barrera Siddiqui is a 59 y.o. female who  has a past medical history of Depression, Diabetes mellitus, High cholesterol, Hypertension, Stroke, and TIA (transient ischemic attack) (2017).  By history and examination this patient has chronic neck pain with with left radiculopathy.  The underlying cause is facet arthritis, degenerative disc disease, foraminal stenosis, and central canal stenosis.  Pathology is confirmed by imaging.  MRI Cervical showed multilevel degenerative changes of the cervical spine are most pronounced at C4-C5 with minor spinal canal stenosis and mild left-sided neural foraminal stenosis, as well as mild left-sided C2-C3 and C3-C4 neural foraminal stenosis.  We discussed the underlying diagnoses and multiple treatment options including non-opioid medications, interventional procedures, physical  therapy, and home exercise.  Cervical GRACIE has been ordered by Neurosurgery.  The risks and benefits of each treatment option were discussed and all questions were answered.      09/19/[id number]-yxfk-old pleasant female that presents with chronic left-sided neck pain that radiates into her left shoulder.  MRI is significant for multilevel degenerative changes most pronounced at C4-5 where she has minor spinal canal stenosis and mild left neural foraminal narrowing as well as mild left-sided C2-3 and C3-4 foraminal narrowing.  Based on history and exam the patient left-sided neck pain is likely related to cervical facet arthritis and spurring in the above-mentioned levels ultimately affecting the 3rd occipital nerve causing unilateral left-sided headaches.  Today I recommended a cervical MBB +TON left only x2 and RFA if appropriate.  I have explained the procedure in detail with the patient she verbalized understanding and would like to move forward.    1/27/2025- 59-year-old female with a history of chronic left-sided neck pain that radiates into her left shoulder.  I did review her MRI again her pathology seems to be most pronounced at C4-5 where she has moderate spinal canal stenosis and mild left neural foraminal narrowing as well as mild left sided C2/3 and C3/4 foraminal narrowing.  Based on our conversation and previous history I believe optimizing her Lyrica medication will be the recommended at this time.  Also recommended that she attend physical therapy for her neck as she has not done this in the past.  Upon review of her cervical MRI her pain may be more musculoskeletal this will be out focus moving forward.    Treatment Plan:   Procedures:  None at this time.  PT/OT/HEP: I have stressed the importance of physical activity and a home exercise plan to help with pain and improve health. Referral placed to PT.   Medications:    - Lyrica per Neurosurgery 100 mg BID increase to t.i.d. today   -  Reviewed and  consistent with medication use as prescribed.  Imaging: MRI reviewed and discussed with the patient  Follow Up:  4 weeks to discuss increase in Lyrica and longitudinal care.    Jovan Emery, BERTA-C  Interventional Pain Management    Disclaimer: This note was partly generated using dictation software which may occasionally result in transcription errors.

## 2025-01-27 NOTE — PROGRESS NOTES
[facility]  Occupational Therapy Treatment Note     Date: 1/27/2025  Name: Barrera Siddiqui  Clinic Number: [id number]    Therapy Diagnosis:   Encounter Diagnoses   Name Primary?    Pain aggravated by activities of daily living Yes    Impaired mobility and activities of daily living      Physician: Brandie Monteiro, *    Physician Orders:  Evaluate and Treat  Medical Diagnosis: Cubital tunnel syndrome of both upper extremities [G56.23];Trigger ring finger of right hand [M65.341]  Surgical Procedure and Date: TRANSPOSITION, NERVE, ULNAR (Right) & RIGHT RING RELEASE, TRIGGER FINGER (Right) on 12/11/24   Evaluation Date: 12/31/2024  Insurance Authorization Period Expiration: 12/11/25  Plan of Care Certification Period: 3/25/2025  Date of Return to MD: 1/24/24    Visit # / Visits authorized: 5 / 40   FOTO intake score :  Initial=13%/ Goal=49% / Current=50%     Precautions:  Standard and Diabetes     Time In:1135   Time Out: 1230  Total Appointment Time (timed & untimed codes): 55 minute  (billing reflects skilled 1:1 time)    Subjective     Patient reports: pain in the R elbow is the same. She completed a follow up with with referring surgeon's office with continued therapy recommended.     She was compliant with home exercise program given last session.   Response to previous treatment: less edema and increased AROM of R elbow/wrist/hand  Functional change: able to use her RUE in light ADL/IADL's with less pain  and difficulty    Pain: R elbow=5-6/10; R hand=2-3/10;   Location: right elbow & R  Hand     Objective     Objective Measures updated at progress report unless specified.    Treatment     Barrera received the treatments listed below:      Supervised modalities after being cleared for contradictions :  - Paraffin w/ MHP to R hand for 8 minutes, pre-tx to decrease pain & increase tissue extensibility   - MHP to R elbow during paraffin treatment, pre-tx to decrease pain, increase circulation  and tissue extensibility     Manual therapy techniques: Myofacial release and Soft tissue Mobilization were applied to the: R elbow/forearm/hand for 18 minutes, including:   - use of hand techniques, cupping/scar pump, percussion gun and IASTM tools to increase blood flow/circulation, improve soft tissue pliability and decrease pain.   - Kinesiotaping: Ulnar Nerve taping pattern applied to RUE for pain, edema and soft tissue management.    Therapeutic exercises to develop ROM and flexibility for 24 minutes, including:  AROM    Overhead Triceps stretch 2 reps, 15 sec hold   Table top biceps stretch 2 reps, 15 sec hold   Scap/Post capsular stretch 2 reps, 15 sec hold   Forearm Stretches-3 ways 1 rep, 15 sec hold, elbow 90   Forearm Stretches-3 ways 1 rep, 15 sec hold, elbow 0   Finger Extension stretch: RF 5 reps, 10 sec hold   Prayer Stretch 3 reps, 10 sec hold    2/10 reps, yellow   Strengthening:    PHG 2/10 reps, setting 1 (10#)   Biceps 2 ways 10 reps, 2# wt   Forearm pimentel/pronation bar 10 reps, 1/2# wt, held mid way   Wrist 3 ways over wedge 10 reps, 1# wt   Resistive clothespin: tripod & key pinch 10 reps each, yellow pin      Neuromuscular re-education activities to improve Posture and Coordination for 5 minutes. The following activities were included:  Dexterciser 3 min   Ulnar Nerve Glides 10 reps           Patient Education and Home Exercises     Education provided:   - Reviewed HEP, including nerve glides, edema and scar management  - Reiterated activity restrictions/limitations appropriate for stage of healing in post op condition  - Progress towards goals     Written Home Exercises Provided: Patient instructed to cont prior HEP.  Exercises were reviewed and Barrera was able to demonstrate them prior to the end of the session.  Barrera demonstrated good  understanding of the home exercise program provided. See electronic medical record under Patient Instructions for exercises provided during therapy sessions.        Assessment     Pain remains about the same in R elbow/hand since last Occupational Therapy visit.  Scar tissue is minimally dense at the R elbow surgical incision with minimal to trace adhesions.  Scar tissue density at the R RF surgical site remains minimal without adhesion to underlying soft tissue.  Light strengthening initiated today.   Patient was able to perform all above listed therapeutic exercises without increased pain or difficulty today.      Barrera is progressing well towards her goals and there are no updates to goals at this time. Pt prognosis is Good.     Patient will continue to benefit from skilled outpatient occupational therapy to address the deficits listed in the problem list on initial evaluation provide patient/family education and to maximize patient's level of independence in the home and community environment.     Patient's spiritual, cultural and educational needs considered and patient agreeable to plan of care and goals.    Anticipated barriers to occupational therapy: comorbid diagnosis, compliance with HEP and attendance to therapy as recommended     Goals:  Short Term Goals (3 weeks) Status Met Date   1. Patient to have decreased complaints of pain to 4/10 with light to moderate activities of daily living (ADLs) and instrumental activities of daily living (IADLs) per subjective report. [] Met  [x] Progressing     2. Patient to be independent with home exercise program as issued by Occupational Therapist, noted through return demonstration to increase clinical carry over. [x] Met  [] Progressing     3. Patient to have improved fine motor coordination as noted by improved score on 9 hole peg assessment on right hand to 32 seconds to improve ability to use fasteners such as buttons and zippers [] Met  [x] Progressing     4. Patient to have increased right elbow extension to -15 degrees to allow for improved ability to reach with right upper extremity. [] Met  [x] Progressing     5.  Patient to have increased right elbow flexion to 140 degrees to allow for improved ability to perform grooming tasks. [] Met  [x] Progressing           Long Term Goals (6 weeks) Status Met Date   1. Patient to have decreased complaints of pain to 0-2/10 with light to moderate activities of daily living (ADLs) and instrumental activities of daily living (IADLs) per subjective report. [] Met  [] Progressing     2. Patient will improve status score on FOTO to greater than or equal to 49% to demonstrate an increase in self-perceived functional performance. [] Met  [] Progressing     3. Patient to be able to form full functional fist on right hand. [] Met  [] Progressing     4. Patient to have improved fine motor coordination as noted by improved score on 9 hole peg assessment by 28 seconds on right hand to improve ability to  small items. [] Met  [] Progressing     5. Patient to have increased right elbow extension to -5 degrees to allow for improved ability perform daily ADLs.  [] Met  [] Progressing          Plan     Certification Period/Plan of care expiration: 12/31/2024 to 3/25/2025.     Outpatient Occupational Therapy 2 times weekly for 6 weeks to include the following interventions: Paraffin, Manual therapy/joint mobilizations, Modalities for pain management, Therapeutic exercises/activities., Strengthening, Edema Control, and Scar Management and any other interventions deemed necessary for patient's progress.  Updates/Grading for next session: progress ROM, as tolerated    Mani Fox OT   1/27/2025

## 2025-01-28 DIAGNOSIS — R59.1 GENERALIZED ENLARGED LYMPH NODES: Primary | ICD-10-CM

## 2025-01-29 ENCOUNTER — CLINICAL SUPPORT (OUTPATIENT)
Dept: REHABILITATION | Facility: HOSPITAL | Age: 60
End: 2025-01-29
Payer: COMMERCIAL

## 2025-01-29 ENCOUNTER — HOSPITAL ENCOUNTER (OUTPATIENT)
Dept: RADIOLOGY | Facility: HOSPITAL | Age: 60
Discharge: HOME | End: 2025-01-29
Attending: NURSE PRACTITIONER
Payer: COMMERCIAL

## 2025-01-29 DIAGNOSIS — R59.1 GENERALIZED ENLARGED LYMPH NODES: ICD-10-CM

## 2025-01-29 DIAGNOSIS — Z74.09 IMPAIRED MOBILITY AND ACTIVITIES OF DAILY LIVING: ICD-10-CM

## 2025-01-29 DIAGNOSIS — Z78.9 IMPAIRED MOBILITY AND ACTIVITIES OF DAILY LIVING: ICD-10-CM

## 2025-01-29 DIAGNOSIS — R52 PAIN AGGRAVATED BY ACTIVITIES OF DAILY LIVING: Primary | ICD-10-CM

## 2025-01-29 PROCEDURE — 76536 US EXAM OF HEAD AND NECK: CPT | Mod: 26,,, | Performed by: RADIOLOGY

## 2025-01-29 PROCEDURE — 97140 MANUAL THERAPY 1/> REGIONS: CPT | Mod: PO

## 2025-01-29 PROCEDURE — 76536 US EXAM OF HEAD AND NECK: CPT | Mod: TC,PN

## 2025-01-29 PROCEDURE — 97110 THERAPEUTIC EXERCISES: CPT | Mod: PO

## 2025-01-29 NOTE — PROGRESS NOTES
"[facility]  Occupational Therapy Treatment Note     Date: 1/29/2025  Name: Barrera Siddiqui  Clinic Number: [id number]    Therapy Diagnosis:   Encounter Diagnoses   Name Primary?    Pain aggravated by activities of daily living Yes    Impaired mobility and activities of daily living        Physician: Brandie Monteiro, *    Physician Orders:  Evaluate and Treat  Medical Diagnosis: Cubital tunnel syndrome of both upper extremities [G56.23];Trigger ring finger of right hand [M65.341]  Surgical Procedure and Date: TRANSPOSITION, NERVE, ULNAR (Right) & RIGHT RING RELEASE, TRIGGER FINGER (Right) on 12/11/24   Evaluation Date: 12/31/2024  Insurance Authorization Period Expiration: 12/11/25  Plan of Care Certification Period: 3/25/2025  Date of Return to MD: 1/24/24    Visit # / Visits authorized: 6 / 40   FOTO intake score :  Initial=13%/ Goal=49% / Current=50%     Precautions:  Standard and Diabetes     Time In: 11:30    Time Out: 12:15  Total Appointment Time (timed & untimed codes): 45 minute  (billing reflects skilled 1:1 time)    Subjective     Patient reports: "It's the same. It's about a 7 today"   She was compliant with home exercise program given last session.   Response to previous treatment: less edema and increased AROM of R elbow/wrist/hand  Functional change: able to use her RUE in light ADL/IADL's with less pain  and difficulty    Pain: R elbow=7-8/10; R hand=2-3/10  Location: right elbow & R  Hand     Objective     Objective Measures updated at progress report unless specified.    Treatment     Barrera received the treatments listed below:      Supervised modalities after being cleared for contradictions :  - Paraffin w/ MHP to R hand for 8 minutes, pre-tx to decrease pain & increase tissue extensibility   - MHP to R elbow during paraffin treatment, pre-tx to decrease pain, increase circulation and tissue extensibility     Manual therapy techniques: Myofacial release and Soft tissue " Mobilization were applied to the: R elbow/forearm/hand for 10 minutes, including:   - use of hand techniques,  to increase blood flow/circulation, improve soft tissue pliability and decrease pain.     Therapeutic exercises to develop ROM and flexibility for 27 minutes, including:  AROM    Overhead Triceps stretch 2 reps, 15 sec hold   Forearm Stretches-3 ways 1 rep, 15 sec hold, elbow 90   Forearm Stretches-3 ways 1 rep, 15 sec hold, elbow 0   Finger Extension stretch: RF 5 reps, 10 sec hold   Prayer Stretch 3 reps, 10 sec hold       Strengthening:    Biceps 3 ways 10 reps, 2# wt   Wrist 3 ways over wedge 15 reps, 1# wt   Resistive clothespin: tripod & key pinch Picking up pompoms, red pin   Composite flexion and isolated flexion 1' each   Dowel  Yellow putty x 2'    Composite and isolated flexion 1' each, red digiflex      Neuromuscular re-education activities to improve Posture and Coordination for 0 minutes. The following activities were included:         Patient Education and Home Exercises     Education provided:   - Reviewed HEP, including nerve glides, edema and scar management  - Reiterated activity restrictions/limitations appropriate for stage of healing in post op condition  - Progress towards goals     Written Home Exercises Provided: Patient instructed to cont prior HEP.  Exercises were reviewed and Barrera was able to demonstrate them prior to the end of the session.  Barrera demonstrated good  understanding of the home exercise program provided. See electronic medical record under Patient Instructions for exercises provided during therapy sessions.       Assessment     Pain is 7-8/10 in R elbow/hand this morning.  Scar massage and continued with pt continuing to report some tenderness at right elbow. Light strengthening continued today.  Patient was able to perform all above listed therapeutic exercises without increased pain or difficulty today.     Barrera is progressing well towards her goals and there are  no updates to goals at this time. Pt prognosis is Good.     Patient will continue to benefit from skilled outpatient occupational therapy to address the deficits listed in the problem list on initial evaluation provide patient/family education and to maximize patient's level of independence in the home and community environment.     Patient's spiritual, cultural and educational needs considered and patient agreeable to plan of care and goals.    Anticipated barriers to occupational therapy: comorbid diagnosis, compliance with HEP and attendance to therapy as recommended     Goals:  Short Term Goals (3 weeks) Status Met Date   1. Patient to have decreased complaints of pain to 4/10 with light to moderate activities of daily living (ADLs) and instrumental activities of daily living (IADLs) per subjective report. [] Met  [x] Progressing     2. Patient to be independent with home exercise program as issued by Occupational Therapist, noted through return demonstration to increase clinical carry over. [x] Met  [] Progressing     3. Patient to have improved fine motor coordination as noted by improved score on 9 hole peg assessment on right hand to 32 seconds to improve ability to use fasteners such as buttons and zippers [] Met  [x] Progressing     4. Patient to have increased right elbow extension to -15 degrees to allow for improved ability to reach with right upper extremity. [] Met  [x] Progressing     5. Patient to have increased right elbow flexion to 140 degrees to allow for improved ability to perform grooming tasks. [] Met  [x] Progressing           Long Term Goals (6 weeks) Status Met Date   1. Patient to have decreased complaints of pain to 0-2/10 with light to moderate activities of daily living (ADLs) and instrumental activities of daily living (IADLs) per subjective report. [] Met  [] Progressing     2. Patient will improve status score on FOTO to greater than or equal to 49% to demonstrate an increase in  self-perceived functional performance. [] Met  [] Progressing     3. Patient to be able to form full functional fist on right hand. [] Met  [] Progressing     4. Patient to have improved fine motor coordination as noted by improved score on 9 hole peg assessment by 28 seconds on right hand to improve ability to  small items. [] Met  [] Progressing     5. Patient to have increased right elbow extension to -5 degrees to allow for improved ability perform daily ADLs.  [] Met  [] Progressing          Plan     Certification Period/Plan of care expiration: 12/31/2024 to 3/25/2025.     Outpatient Occupational Therapy 2 times weekly for 6 weeks to include the following interventions: Paraffin, Manual therapy/joint mobilizations, Modalities for pain management, Therapeutic exercises/activities., Strengthening, Edema Control, and Scar Management and any other interventions deemed necessary for patient's progress.  Updates/Grading for next session: progress ROM, as tolerated    Yessy Castañeda OT   1/29/2025

## 2025-02-04 ENCOUNTER — LAB VISIT (OUTPATIENT)
Dept: LAB | Facility: HOSPITAL | Age: 60
End: 2025-02-04
Attending: INTERNAL MEDICINE
Payer: COMMERCIAL

## 2025-02-04 ENCOUNTER — CLINICAL SUPPORT (OUTPATIENT)
Dept: REHABILITATION | Facility: HOSPITAL | Age: 60
End: 2025-02-04
Payer: COMMERCIAL

## 2025-02-04 DIAGNOSIS — Z74.09 IMPAIRED MOBILITY AND ACTIVITIES OF DAILY LIVING: ICD-10-CM

## 2025-02-04 DIAGNOSIS — E78.2 MIXED HYPERLIPIDEMIA: ICD-10-CM

## 2025-02-04 DIAGNOSIS — K76.0 FATTY (CHANGE OF) LIVER, NOT ELSEWHERE CLASSIFIED: Primary | ICD-10-CM

## 2025-02-04 DIAGNOSIS — R52 PAIN AGGRAVATED BY ACTIVITIES OF DAILY LIVING: Primary | ICD-10-CM

## 2025-02-04 DIAGNOSIS — R94.5 ABNORMAL RESULTS OF LIVER FUNCTION STUDIES: ICD-10-CM

## 2025-02-04 DIAGNOSIS — Z78.9 IMPAIRED MOBILITY AND ACTIVITIES OF DAILY LIVING: ICD-10-CM

## 2025-02-04 LAB
ALBUMIN SERPL BCP-MCNC: 4.5 G/DL (ref 3.5–5.2)
ALP SERPL-CCNC: 168 U/L (ref 38–126)
ALT SERPL W/O P-5'-P-CCNC: 91 U/L (ref 10–44)
ANION GAP SERPL CALC-SCNC: 11 MMOL/L (ref 8–16)
AST SERPL-CCNC: 125 U/L (ref 15–46)
BASOPHILS # BLD AUTO: 0.05 K/UL (ref 0–0.2)
BASOPHILS NFR BLD: 0.5 % (ref 0–1.9)
BILIRUB SERPL-MCNC: 0.7 MG/DL (ref 0.1–1)
CALCIUM SERPL-MCNC: 9.6 MG/DL (ref 8.7–10.5)
CHLORIDE SERPL-SCNC: 102 MMOL/L (ref 95–110)
CO2 SERPL-SCNC: 28 MMOL/L (ref 23–29)
CREAT SERPL-MCNC: 0.63 MG/DL (ref 0.5–1.4)
DIFFERENTIAL METHOD BLD: ABNORMAL
EOSINOPHIL # BLD AUTO: 0.2 K/UL (ref 0–0.5)
EOSINOPHIL NFR BLD: 2.5 % (ref 0–8)
ERYTHROCYTE [DISTWIDTH] IN BLOOD BY AUTOMATED COUNT: 14.7 % (ref 11.5–14.5)
EST. GFR  (NO RACE VARIABLE): >60 ML/MIN/1.73 M^2
GLUCOSE SERPL-MCNC: 157 MG/DL (ref 70–110)
HCT VFR BLD AUTO: 40.7 % (ref 37–48.5)
HGB BLD-MCNC: 12.5 G/DL (ref 12–16)
IMM GRANULOCYTES # BLD AUTO: 0.02 K/UL (ref 0–0.04)
IMM GRANULOCYTES NFR BLD AUTO: 0.2 % (ref 0–0.5)
INR PPP: 1.1 (ref 0.8–1.2)
LYMPHOCYTES # BLD AUTO: 4.3 K/UL (ref 1–4.8)
LYMPHOCYTES NFR BLD: 45.7 % (ref 18–48)
MCH RBC QN AUTO: 26.1 PG (ref 27–31)
MCHC RBC AUTO-ENTMCNC: 30.7 G/DL (ref 32–36)
MCV RBC AUTO: 85 FL (ref 82–98)
MONOCYTES # BLD AUTO: 0.6 K/UL (ref 0.3–1)
MONOCYTES NFR BLD: 6.2 % (ref 4–15)
NEUTROPHILS # BLD AUTO: 4.2 K/UL (ref 1.8–7.7)
NEUTROPHILS NFR BLD: 44.9 % (ref 38–73)
NRBC BLD-RTO: 0 /100 WBC
PLATELET # BLD AUTO: 222 K/UL (ref 150–450)
PMV BLD AUTO: 13 FL (ref 9.2–12.9)
POTASSIUM SERPL-SCNC: 3.7 MMOL/L (ref 3.5–5.1)
PROT SERPL-MCNC: 9.4 G/DL (ref 6–8.4)
PROTHROMBIN TIME: 11.9 SEC (ref 9–12.5)
RBC # BLD AUTO: 4.79 M/UL (ref 4–5.4)
SODIUM SERPL-SCNC: 141 MMOL/L (ref 136–145)
UUN UR-MCNC: 10 MG/DL (ref 7–17)
WBC # BLD AUTO: 9.37 K/UL (ref 3.9–12.7)

## 2025-02-04 PROCEDURE — 36415 COLL VENOUS BLD VENIPUNCTURE: CPT | Mod: PN | Performed by: INTERNAL MEDICINE

## 2025-02-04 PROCEDURE — 97112 NEUROMUSCULAR REEDUCATION: CPT | Mod: PO

## 2025-02-04 PROCEDURE — 97018 PARAFFIN BATH THERAPY: CPT | Mod: PO

## 2025-02-04 PROCEDURE — 97110 THERAPEUTIC EXERCISES: CPT | Mod: PO

## 2025-02-04 PROCEDURE — 97140 MANUAL THERAPY 1/> REGIONS: CPT | Mod: PO

## 2025-02-04 PROCEDURE — 80053 COMPREHEN METABOLIC PANEL: CPT | Mod: PN | Performed by: INTERNAL MEDICINE

## 2025-02-04 PROCEDURE — 85610 PROTHROMBIN TIME: CPT | Mod: PN | Performed by: INTERNAL MEDICINE

## 2025-02-04 PROCEDURE — 85025 COMPLETE CBC W/AUTO DIFF WBC: CPT | Mod: PN | Performed by: INTERNAL MEDICINE

## 2025-02-04 NOTE — PROGRESS NOTES
[facility]  Occupational Therapy Treatment Note     Date: 2/4/2025  Name: Barrera Siddiqui  Clinic Number: [id number]    Therapy Diagnosis:   Encounter Diagnoses   Name Primary?    Pain aggravated by activities of daily living Yes    Impaired mobility and activities of daily living        Physician: Brandie Monteiro, *    Physician Orders:  Evaluate and Treat  Medical Diagnosis: Cubital tunnel syndrome of both upper extremities [G56.23];Trigger ring finger of right hand [M65.341]  Surgical Procedure and Date: TRANSPOSITION, NERVE, ULNAR (Right) & RIGHT RING RELEASE, TRIGGER FINGER (Right) on 12/11/24   Evaluation Date: 12/31/2024  Insurance Authorization Period Expiration: 12/11/25  Plan of Care Certification Period: 3/25/2025  Date of Return to MD: 1/24/24    Visit # / Visits authorized: 7 / 40   FOTO intake score :  Initial=13%/ Goal=49% / Current=50%     Precautions:  Standard and Diabetes     Time In: 11:00    Time Out: 12:00  Total Appointment Time (timed & untimed codes): 60 minute  (billing reflects skilled 1:1 time)    Subjective     Patient reports: she had to miss her last appointment due to having a scheduling conflict with another appointment at the hospital.  She continues to report pain mostly in her R elbow that has decreased since last OT visit, but not completely resolved.   She was compliant with home exercise program given last session.   Response to previous treatment: less edema and increased AROM of R elbow/wrist/hand  Functional change: able to use her RUE in light ADL/IADL's with less pain  and difficulty    Pain: R elbow=5-6/10; R hand=2-3/10  Location: right elbow & R  Hand     Objective     Objective Measures updated at progress report unless specified.    Treatment     Barrera received the treatments listed below:      Supervised modalities after being cleared for contradictions :  - Paraffin w/ MHP to R hand for 8 minutes, pre-tx to decrease pain & increase tissue  extensibility   - Manual therapy techniques: Myofacial release and Soft tissue Mobilization were applied to the: R elbow/forearm/hand for 15 minutes, including:   - use of hand techniques, cupping/scar pump, percussion gun and IASTM tools to increase blood flow/circulation, improve soft tissue pliability and decrease pain.     Therapeutic exercises to develop ROM and flexibility for 29 minutes, including:  AROM    Overhead Triceps stretch 2 reps, 15 sec hold   Scap/Post capsular stretch 2 reps, 15 sec hold       Strengthening:    PHG 2/10 reps, setting 2 (20#)   Biceps 3 ways 15 reps, 2# wt   Forearm pimentel/pronation bar 10 reps, 1/2# wt, held mid way   Wrist 3 ways over wedge 15 reps, 2# wt   Resistive clothespin: tripod & key pinch 15 reps each pinch red pin   Composite flexion and isolated flexion 15 reps, 2# wt   Dowel  Yellow putty x 2'       Neuromuscular re-education activities to improve Posture and Coordination for 8 minutes. The following activities were included:  Dexterciser 3 min   Isospheres  3 min   Ulnar Nerve Glides 5 reps           Patient Education and Home Exercises     Education provided:   - Reviewed HEP, including t-putty, nerve glides, edema and scar management  - Progress towards goals     Written Home Exercises Provided: Patient instructed to cont prior HEP.  Exercises were reviewed and Barrera was able to demonstrate them prior to the end of the session.  Barrera demonstrated good  understanding of the home exercise program provided. See electronic medical record under Patient Instructions for exercises provided during therapy sessions.       Assessment     Pain in the R elbow has decreased since last Occupational Therapy session, but remains unresolved.  Scar tissue density is minimal at both surgical sites of the R elbow/hand.  Strengthening progressed today.   Patient was able to perform all above listed therapeutic exercises without increased pain or difficulty today.     Barrera is progressing  well towards her goals and there are no updates to goals at this time. Pt prognosis is Good.     Patient will continue to benefit from skilled outpatient occupational therapy to address the deficits listed in the problem list on initial evaluation provide patient/family education and to maximize patient's level of independence in the home and community environment.     Patient's spiritual, cultural and educational needs considered and patient agreeable to plan of care and goals.    Anticipated barriers to occupational therapy: comorbid diagnosis, compliance with HEP and attendance to therapy as recommended     Goals:  Short Term Goals (3 weeks) Status Met Date   1. Patient to have decreased complaints of pain to 4/10 with light to moderate activities of daily living (ADLs) and instrumental activities of daily living (IADLs) per subjective report. [] Met  [x] Progressing     2. Patient to be independent with home exercise program as issued by Occupational Therapist, noted through return demonstration to increase clinical carry over. [x] Met  [] Progressing     3. Patient to have improved fine motor coordination as noted by improved score on 9 hole peg assessment on right hand to 32 seconds to improve ability to use fasteners such as buttons and zippers [] Met  [x] Progressing     4. Patient to have increased right elbow extension to -15 degrees to allow for improved ability to reach with right upper extremity. [] Met  [x] Progressing     5. Patient to have increased right elbow flexion to 140 degrees to allow for improved ability to perform grooming tasks. [] Met  [x] Progressing           Long Term Goals (6 weeks) Status Met Date   1. Patient to have decreased complaints of pain to 0-2/10 with light to moderate activities of daily living (ADLs) and instrumental activities of daily living (IADLs) per subjective report. [] Met  [] Progressing     2. Patient will improve status score on FOTO to greater than or equal to  49% to demonstrate an increase in self-perceived functional performance. [] Met  [] Progressing     3. Patient to be able to form full functional fist on right hand. [] Met  [] Progressing     4. Patient to have improved fine motor coordination as noted by improved score on 9 hole peg assessment by 28 seconds on right hand to improve ability to  small items. [] Met  [] Progressing     5. Patient to have increased right elbow extension to -5 degrees to allow for improved ability perform daily ADLs.  [] Met  [] Progressing          Plan     Certification Period/Plan of care expiration: 12/31/2024 to 3/25/2025.     Outpatient Occupational Therapy 2 times weekly for 6 weeks to include the following interventions: Paraffin, Manual therapy/joint mobilizations, Modalities for pain management, Therapeutic exercises/activities., Strengthening, Edema Control, and Scar Management and any other interventions deemed necessary for patient's progress.  Updates/Grading for next session: progress ROM, as tolerated    Mani Fox OT   2/4/2025

## 2025-02-06 RX ORDER — ATORVASTATIN CALCIUM 20 MG/1
20 TABLET, FILM COATED ORAL
Qty: 90 TABLET | Refills: 0 | Status: SHIPPED | OUTPATIENT
Start: 2025-02-06

## 2025-02-08 DIAGNOSIS — M54.2 NECK PAIN: ICD-10-CM

## 2025-02-10 RX ORDER — PREGABALIN 100 MG/1
100 CAPSULE ORAL 2 TIMES DAILY
Qty: 60 CAPSULE | Refills: 0 | Status: SHIPPED | OUTPATIENT
Start: 2025-02-10

## 2025-02-11 ENCOUNTER — CLINICAL SUPPORT (OUTPATIENT)
Dept: REHABILITATION | Facility: HOSPITAL | Age: 60
End: 2025-02-11
Payer: COMMERCIAL

## 2025-02-11 DIAGNOSIS — M47.812 ARTHRITIS OF FACET JOINT OF CERVICAL SPINE: ICD-10-CM

## 2025-02-11 DIAGNOSIS — M50.30 DDD (DEGENERATIVE DISC DISEASE), CERVICAL: ICD-10-CM

## 2025-02-11 DIAGNOSIS — R29.3 ABNORMAL POSTURE: ICD-10-CM

## 2025-02-11 DIAGNOSIS — M47.812 CERVICAL SPONDYLOSIS: ICD-10-CM

## 2025-02-11 DIAGNOSIS — M43.6 NECK STIFFNESS: Primary | ICD-10-CM

## 2025-02-11 LAB — LIVER FIBR SCORE SERPL CALC.FIBROSURE: 11.34

## 2025-02-11 PROCEDURE — 97140 MANUAL THERAPY 1/> REGIONS: CPT | Mod: PO

## 2025-02-11 PROCEDURE — 97161 PT EVAL LOW COMPLEX 20 MIN: CPT | Mod: PO

## 2025-02-11 PROCEDURE — 97530 THERAPEUTIC ACTIVITIES: CPT | Mod: PO
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
## 2025-02-17 ENCOUNTER — CLINICAL SUPPORT (OUTPATIENT)
Dept: REHABILITATION | Facility: HOSPITAL | Age: 60
End: 2025-02-17
Payer: COMMERCIAL

## 2025-02-17 DIAGNOSIS — M43.6 NECK STIFFNESS: Primary | ICD-10-CM

## 2025-02-17 DIAGNOSIS — R29.3 ABNORMAL POSTURE: ICD-10-CM

## 2025-02-17 PROCEDURE — 97112 NEUROMUSCULAR REEDUCATION: CPT | Mod: PO

## 2025-02-17 PROCEDURE — 97010 HOT OR COLD PACKS THERAPY: CPT | Mod: PO

## 2025-02-17 PROCEDURE — 97140 MANUAL THERAPY 1/> REGIONS: CPT | Mod: PO

## 2025-02-17 PROCEDURE — 97110 THERAPEUTIC EXERCISES: CPT | Mod: PO

## 2025-02-17 NOTE — PROGRESS NOTES
"  Outpatient Rehab    Physical Therapy Visit    Patient Name: Barrera Siddiqui  MRN: [id number]  YOB: 1965  Today's Date: 2/17/2025    Therapy Diagnosis:   Encounter Diagnoses   Name Primary?    Neck stiffness Yes    Abnormal posture      Physician: Jovan Emery FNP    Physician Orders: Eval and Treat  Medical Diagnosis: Arthritis of facet joint of cervical spine [M47.812], Cervical spondylosis [M47.812], DDD (degenerative disc disease), cervical [M50.30]      Visit # / Visits Authorized:  1 / 1   Date of Evaluation:  2/11/2025   Insurance Authorization Period:  1/27/2025 - 1/27/2026   Plan of Care Certification:  2/11/2025 to 4/22/2025      Time In: 0100   Time Out: 0200  Total Time: 60   Total Billable Time: 60 minutes    FOTO:  Intake Score:  %  Survey Score 1:  %  Survey Score 2:  %         Subjective   She has 5/10 pain today..  Pain reported as 5/10.      Objective            Treatment:  Therapeutic Exercise  Therapeutic Exercise Activity 1: pulleys: 2' each, flexion and abduction  Therapeutic Exercise Activity 2: cervical rotation in supine: 2 x 10  Therapeutic Exercise Activity 3: Wall slides: 2 x 10    Manual Therapy  Manual Therapy Activity 1: Cervical side glides: Grade II  Manual Therapy Activity 2: STM to upper trap    Balance/Neuromuscular Re-Education  Balance/Neuromuscular Re-Education Activity 1: UBE with foam roller behind back: Retro 10' Lv2  Balance/Neuromuscular Re-Education Activity 2: no money scap squeeze: red band 3 x 10  Balance/Neuromuscular Re-Education Activity 3: chin tucks in supine: 5" hold 2 x 10         Assessment & Plan   Assessment: Patient reports for follow up treatment with decrease in complaint of symptoms. Patient tolerated today's session with minimal complaints. Today's treatment included strengthening, manual, posture, and range of motion interventions. They remain limited in posture, shoulder weakness and neck pain. Pateint is progressing well towards his goals. " Patient will continue to benefit from skilled outpatient physical therapy to address the deficits listed in the problem list box on initial evaluation, provide patient/family education and to maximize patient's level of independence in the home and community environment.  Evaluation/Treatment Tolerance: Patient tolerated treatment well    Patient will continue to benefit from skilled outpatient physical therapy to address the deficits listed in the problem list box on initial evaluation, provide pt/family education and to maximize pt's level of independence in the home and community environment.     Patient's spiritual, cultural, and educational needs considered and patient agreeable to plan of care and goals.           Plan: Continue to progress plan of care as tolerated.    Goals:   Active       Long Term Goals       Patient will demonstrate a FOTO score of greater than 60 in order to show improved neck function.  (Progressing)       Start:  02/11/25    Expected End:  04/22/25            Patient will demonstrate shoulder strength of 5/5 in order to show improved stability of upper extremities with functional activity.  (Progressing)       Start:  02/11/25    Expected End:  04/22/25            Patient will demonstrate 0/10 pain at rest in order to demonstrate decreased pain levels.  (Progressing)       Start:  02/11/25    Expected End:  04/22/25               Short Term Goals       Patient will be independent with HEP in order to improve overall prognosis. (Progressing)       Start:  02/11/25    Expected End:  03/18/25            Patient will report a 3/10 pain level at rest in order to demonstrate decreased pain and improved quality of life.  (Progressing)       Start:  02/11/25    Expected End:  03/18/25            Patient will demonstrate a 10 degree of improvement of cervical rotation bilaterally for environmental scanning.  (Progressing)       Start:  02/11/25    Expected End:  03/18/25                Carlyle  [place], PT

## 2025-02-24 DIAGNOSIS — R80.9 PROTEINURIA, UNSPECIFIED: Primary | ICD-10-CM

## 2025-02-25 ENCOUNTER — CLINICAL SUPPORT (OUTPATIENT)
Dept: REHABILITATION | Facility: HOSPITAL | Age: 60
End: 2025-02-25
Payer: COMMERCIAL

## 2025-02-25 ENCOUNTER — HOSPITAL ENCOUNTER (OUTPATIENT)
Dept: RADIOLOGY | Facility: HOSPITAL | Age: 60
Discharge: HOME | End: 2025-02-25
Payer: COMMERCIAL

## 2025-02-25 DIAGNOSIS — R80.9 PROTEINURIA, UNSPECIFIED: ICD-10-CM

## 2025-02-25 DIAGNOSIS — R29.3 ABNORMAL POSTURE: ICD-10-CM

## 2025-02-25 DIAGNOSIS — M43.6 NECK STIFFNESS: Primary | ICD-10-CM

## 2025-02-25 PROCEDURE — 97110 THERAPEUTIC EXERCISES: CPT | Mod: PO,CQ

## 2025-02-25 PROCEDURE — 97112 NEUROMUSCULAR REEDUCATION: CPT | Mod: PO,CQ

## 2025-02-25 PROCEDURE — 76770 US EXAM ABDO BACK WALL COMP: CPT | Mod: TC,PN

## 2025-02-25 PROCEDURE — 76770 US EXAM ABDO BACK WALL COMP: CPT | Mod: 26,,, | Performed by: RADIOLOGY

## 2025-02-25 PROCEDURE — 97140 MANUAL THERAPY 1/> REGIONS: CPT | Mod: PO,CQ

## 2025-02-25 NOTE — PROGRESS NOTES
"  Outpatient Rehab    Physical Therapy Visit    Patient Name: Barrera Siddiqui  MRN: [id number]  YOB: 1965  Encounter Date: 2/25/2025    Therapy Diagnosis:   Encounter Diagnoses   Name Primary?    Neck stiffness Yes    Abnormal posture      Physician: Jovan Emery FNP    Physician Orders: Eval and Treat  Medical Diagnosis: Arthritis of facet joint of cervical spine [M47.812], Cervical spondylosis [M47.812], DDD (degenerative disc disease), cervical [M50.30]      Visit # / Visits Authorized:  1 / 1 ; 2/20  Date of Evaluation:  2/11/2025   Insurance Authorization Period:  1/27/2025 - 1/27/2026   Plan of Care Certification:  2/11/2025 to 4/22/2025      Time In: 1100   Time Out: 1153  Total Time: 53   Total Billable Time: 53    FOTO:  Intake Score:  %  Survey Score 1:  %  Survey Score 2:  %         Subjective   Pt states that she has a little bit of pain today, however also states that it's nothing she can't handle..         Objective            Treatment:  Therapeutic Exercise  Therapeutic Exercise Activity 1: pulleys: 2' each, flexion and abduction  Therapeutic Exercise Activity 2: cervical rotation in supine: 2 x 10  Therapeutic Exercise Activity 3: Wall slides: 2 x 10    Manual Therapy  Manual Therapy Activity 1: Cervical side glides: Grade II  Manual Therapy Activity 2: STM to upper trap    Balance/Neuromuscular Re-Education  Balance/Neuromuscular Re-Education Activity 1: UBE with foam roller behind back: Retro 10' Lv2  Balance/Neuromuscular Re-Education Activity 2: no money scap squeeze: red band 3 x 10  Balance/Neuromuscular Re-Education Activity 3: chin tucks in supine: 5" hold 2 x 10  Balance/Neuromuscular Re-Education Activity 4: RTB ROWS 2-3x10         Assessment & Plan   Assessment: Pt tolerated therapy session well and completed exercise program w/ goals to improve cervical ROM, scapular stability, and functional mobility. Performed STM to LUT w/ pt veralizing a some pain upon palpation, however " after a few minutes muscle tension began to decrease and aftwards pt stated that she felt some relief. Added TB Rows to improve scapular strength and stability w/ exercise completed w/o issue. Mod verbal and manual cues needed throughout therapy session to promote proper exercise form. All exercises were tolerated w/ mod fatigue and min discomfort. Exercises challenged appropriately. Will continue to progress as tolerated.  Evaluation/Treatment Tolerance: Patient tolerated treatment well    Patient will continue to benefit from skilled outpatient physical therapy to address the deficits listed in the problem list box on initial evaluation, provide pt/family education and to maximize pt's level of independence in the home and community environment.     Patient's spiritual, cultural, and educational needs considered and patient agreeable to plan of care and goals.           Plan: Continue to progress plan of care as tolerated.    Goals:   Active       Long Term Goals       Patient will demonstrate a FOTO score of greater than 60 in order to show improved neck function.  (Progressing)       Start:  02/11/25    Expected End:  04/22/25            Patient will demonstrate shoulder strength of 5/5 in order to show improved stability of upper extremities with functional activity.  (Progressing)       Start:  02/11/25    Expected End:  04/22/25            Patient will demonstrate 0/10 pain at rest in order to demonstrate decreased pain levels.  (Progressing)       Start:  02/11/25    Expected End:  04/22/25               Short Term Goals       Patient will be independent with HEP in order to improve overall prognosis. (Progressing)       Start:  02/11/25    Expected End:  03/18/25            Patient will report a 3/10 pain level at rest in order to demonstrate decreased pain and improved quality of life.  (Progressing)       Start:  02/11/25    Expected End:  03/18/25            Patient will demonstrate a 10 degree of  improvement of cervical rotation bilaterally for environmental scanning.  (Progressing)       Start:  02/11/25    Expected End:  03/18/25                Carlyle Lizarraga, ZIGGY

## 2025-02-27 ENCOUNTER — CLINICAL SUPPORT (OUTPATIENT)
Dept: REHABILITATION | Facility: HOSPITAL | Age: 60
End: 2025-02-27
Payer: COMMERCIAL

## 2025-02-27 DIAGNOSIS — R29.3 ABNORMAL POSTURE: ICD-10-CM

## 2025-02-27 DIAGNOSIS — M43.6 NECK STIFFNESS: Primary | ICD-10-CM

## 2025-02-27 PROCEDURE — 97110 THERAPEUTIC EXERCISES: CPT | Mod: PO,CQ

## 2025-02-27 PROCEDURE — 97112 NEUROMUSCULAR REEDUCATION: CPT | Mod: PO,CQ

## 2025-02-27 PROCEDURE — 97140 MANUAL THERAPY 1/> REGIONS: CPT | Mod: PO,CQ

## 2025-02-27 NOTE — PROGRESS NOTES
"  Outpatient Rehab    Physical Therapy Visit    Patient Name: Barrera Siddiqui  MRN: [id number]  YOB: 1965  Encounter Date: 2/27/2025    Therapy Diagnosis:   Encounter Diagnoses   Name Primary?    Neck stiffness Yes    Abnormal posture      Physician: Jovan Emery FNP    Physician Orders: Eval and Treat  Medical Diagnosis: Arthritis of facet joint of cervical spine [M47.812], Cervical spondylosis [M47.812], DDD (degenerative disc disease), cervical [M50.30]      Visit # / Visits Authorized:  1 / 1 ; 3/20  Date of Evaluation:  2/11/2025   Insurance Authorization Period:  1/27/2025 - 1/27/2026   Plan of Care Certification:  2/11/2025 to 4/22/2025      Time In: 1100   Time Out: 1150  Total Time: 50   Total Billable Time: 50    FOTO:  Intake Score:  %  Survey Score 1:  %  Survey Score 2:  %         Subjective   Pt states that she was a little sore the night of last visit, however soreness quickly subsided over the next day..         Objective            Treatment:  Therapeutic Exercise  Therapeutic Exercise Activity 1: pulleys: 2' each, flexion and abduction  Therapeutic Exercise Activity 2: cervical rotation in supine: 2 x 10  Therapeutic Exercise Activity 3: Wall slides: 2 x 10    Manual Therapy  Manual Therapy Activity 1: Cervical side glides: Grade II- NP  Manual Therapy Activity 2: STM to upper trap    Balance/Neuromuscular Re-Education  Balance/Neuromuscular Re-Education Activity 1: UBE with foam roller behind back: Retro 10' Lv2  Balance/Neuromuscular Re-Education Activity 2: no money scap squeeze: red band 3 x 10  Balance/Neuromuscular Re-Education Activity 3: chin tucks in supine: 5" hold 2 x 10  Balance/Neuromuscular Re-Education Activity 4: RTB ROWS 3x10  Balance/Neuromuscular Re-Education Activity 5: RTB TB EXT 3x10         Assessment & Plan   Assessment: Pt tolerated therapy session well and completed exercise program w/ goals to improve ROM, UE Strength, and scapular stability. Pt tolerated " progressions w/o pain, however required multiple rest breaks throughout session to reduce fatigue levels. Educated pt that she will likely be a little sore tonight due to progressions. Mod verbal and manual cues needed throughout therapy session to promote proper exercise form. All exercises were tolerated w/ mod fatigue and min discomfort. Exercises challenged appropriately. Will continue to progress as tolerated.  Evaluation/Treatment Tolerance: Patient tolerated treatment well    Patient will continue to benefit from skilled outpatient physical therapy to address the deficits listed in the problem list box on initial evaluation, provide pt/family education and to maximize pt's level of independence in the home and community environment.     Patient's spiritual, cultural, and educational needs considered and patient agreeable to plan of care and goals.           Plan: Continue to progress plan of care as tolerated.    Goals:   Active       Long Term Goals       Patient will demonstrate a FOTO score of greater than 60 in order to show improved neck function.  (Progressing)       Start:  02/11/25    Expected End:  04/22/25            Patient will demonstrate shoulder strength of 5/5 in order to show improved stability of upper extremities with functional activity.  (Progressing)       Start:  02/11/25    Expected End:  04/22/25            Patient will demonstrate 0/10 pain at rest in order to demonstrate decreased pain levels.  (Progressing)       Start:  02/11/25    Expected End:  04/22/25               Short Term Goals       Patient will be independent with HEP in order to improve overall prognosis. (Progressing)       Start:  02/11/25    Expected End:  03/18/25            Patient will report a 3/10 pain level at rest in order to demonstrate decreased pain and improved quality of life.  (Progressing)       Start:  02/11/25    Expected End:  03/18/25            Patient will demonstrate a 10 degree of improvement of  cervical rotation bilaterally for environmental scanning.  (Progressing)       Start:  02/11/25    Expected End:  03/18/25                Carlyle Lizarraga PTA

## 2025-03-03 ENCOUNTER — CLINICAL SUPPORT (OUTPATIENT)
Dept: REHABILITATION | Facility: HOSPITAL | Age: 60
End: 2025-03-03
Payer: COMMERCIAL

## 2025-03-03 DIAGNOSIS — R29.3 ABNORMAL POSTURE: ICD-10-CM

## 2025-03-03 DIAGNOSIS — M43.6 NECK STIFFNESS: Primary | ICD-10-CM

## 2025-03-03 PROCEDURE — 97140 MANUAL THERAPY 1/> REGIONS: CPT | Mod: PO

## 2025-03-03 PROCEDURE — 97112 NEUROMUSCULAR REEDUCATION: CPT | Mod: PO

## 2025-03-03 PROCEDURE — 97110 THERAPEUTIC EXERCISES: CPT | Mod: PO

## 2025-03-03 NOTE — PROGRESS NOTES
"  Outpatient Rehab    Physical Therapy Visit    Patient Name: Barrera Siddiqui  MRN: [id number]  YOB: 1965  Encounter Date: 3/3/2025    Therapy Diagnosis:   Encounter Diagnoses   Name Primary?    Neck stiffness Yes    Abnormal posture      Physician: Jovan Emery FNP    Physician Orders: Eval and Treat  Medical Diagnosis: Arthritis of facet joint of cervical spine [M47.812], Cervical spondylosis [M47.812], DDD (degenerative disc disease), cervical [M50.30]      Visit # / Visits Authorized:  1 / 1 ; 4/20  Date of Evaluation:  2/11/2025   Insurance Authorization Period:  1/27/2025 - 1/27/2026   Plan of Care Certification:  2/11/2025 to 4/22/2025      Time In: 1100   Time Out: 1200  Total Time: 60   Total Billable Time: 60 minutes    FOTO:  Intake Score:  %  Survey Score 1:  %  Survey Score 2:  %         Subjective   She feels like she is getting better. Her pain has become more tolerable..  Pain reported as 5/10.      Objective            Treatment:  Therapeutic Exercise  Therapeutic Exercise Activity 1: pulleys: 2' each, flexion and abduction weighted pulleys  Therapeutic Exercise Activity 2: cervical rotation in supine: 2 x 10  Therapeutic Exercise Activity 3: Wall slides: 2 x 10  Therapeutic Exercise Activity 4: Cervical extesnion snags: 2 x 10 with towel    Manual Therapy  Manual Therapy Activity 1: Cervical side glides: Grade III  Manual Therapy Activity 2: STM to upper trap  Manual Therapy Activity 3: cervical distraction: 10" holds    Balance/Neuromuscular Re-Education  Balance/Neuromuscular Re-Education Activity 1: UBE with foam roller behind back: Retro 10' Lv2  Balance/Neuromuscular Re-Education Activity 2: no money scap squeeze: red band 3 x 10  Balance/Neuromuscular Re-Education Activity 3: chin tucks in supine: 5" hold 2 x 10  Balance/Neuromuscular Re-Education Activity 4: RTB ROWS 3x10  Balance/Neuromuscular Re-Education Activity 5: RTB TB EXT 3x10  Balance/Neuromuscular Re-Education Activity 6: " Thoracic back bends: 2 x 10    Assessment & Plan   Assessment: Patient reports for follow up treatment with decrease in complaint of symptoms. Patient tolerated today's session with decreased complaints and was able to tolerate her session well. Today's treatment included range of motion, strengthening, posture, and manual therapy interventions. Patient tolerated progressions well and included extension snags and thoracic back bends.  They remain limited in range of motion and strength of her neck and periscapular musclature. Pateint is progressing well towards their goals.  Evaluation/Treatment Tolerance: Patient tolerated treatment well    Patient will continue to benefit from skilled outpatient physical therapy to address the deficits listed in the problem list box on initial evaluation, provide pt/family education and to maximize pt's level of independence in the home and community environment.     Patient's spiritual, cultural, and educational needs considered and patient agreeable to plan of care and goals.           Plan: Continue to progress plan of care as tolerated.    Goals:   Active       Long Term Goals       Patient will demonstrate a FOTO score of greater than 60 in order to show improved neck function.  (Progressing)       Start:  02/11/25    Expected End:  04/22/25            Patient will demonstrate shoulder strength of 5/5 in order to show improved stability of upper extremities with functional activity.  (Progressing)       Start:  02/11/25    Expected End:  04/22/25            Patient will demonstrate 0/10 pain at rest in order to demonstrate decreased pain levels.  (Progressing)       Start:  02/11/25    Expected End:  04/22/25               Short Term Goals       Patient will be independent with HEP in order to improve overall prognosis. (Progressing)       Start:  02/11/25    Expected End:  03/18/25            Patient will report a 3/10 pain level at rest in order to demonstrate decreased  pain and improved quality of life.  (Progressing)       Start:  02/11/25    Expected End:  03/18/25            Patient will demonstrate a 10 degree of improvement of cervical rotation bilaterally for environmental scanning.  (Progressing)       Start:  02/11/25    Expected End:  03/18/25                Carlyle Goff PT

## 2025-03-06 ENCOUNTER — CLINICAL SUPPORT (OUTPATIENT)
Dept: REHABILITATION | Facility: HOSPITAL | Age: 60
End: 2025-03-06
Payer: COMMERCIAL

## 2025-03-06 DIAGNOSIS — M43.6 NECK STIFFNESS: Primary | ICD-10-CM

## 2025-03-06 DIAGNOSIS — R29.3 ABNORMAL POSTURE: ICD-10-CM

## 2025-03-06 PROCEDURE — 97140 MANUAL THERAPY 1/> REGIONS: CPT | Mod: PO

## 2025-03-06 PROCEDURE — 97112 NEUROMUSCULAR REEDUCATION: CPT | Mod: PO

## 2025-03-06 PROCEDURE — 97110 THERAPEUTIC EXERCISES: CPT | Mod: PO

## 2025-03-06 NOTE — PROGRESS NOTES
"  Outpatient Rehab    Physical Therapy Visit    Patient Name: Barrera Siddiqui  MRN: [id number]  YOB: 1965  Encounter Date: 3/6/2025    Therapy Diagnosis:   Encounter Diagnoses   Name Primary?    Neck stiffness Yes    Abnormal posture      Physician: Jovan Emery FNP    Physician Orders: Eval and Treat  Medical Diagnosis: Arthritis of facet joint of cervical spine [M47.812], Cervical spondylosis [M47.812], DDD (degenerative disc disease), cervical [M50.30]      Visit # / Visits Authorized:  1 / 1 ; 4/20  Date of Evaluation:  2/11/2025   Insurance Authorization Period:  1/27/2025 - 1/27/2026   Plan of Care Certification:  2/11/2025 to 4/22/2025     Time In: 1100   Time Out: 1200  Total Time: 60   Total Billable Time: 60 minutes    FOTO:  Intake Score:  %  Survey Score 1:  %  Survey Score 2:  %         Subjective   She is okay with going down to 1/week due to high copay..  Pain reported as 2/10.      Objective            Treatment:  Therapeutic Exercise  Therapeutic Exercise Activity 1: pulleys: 2' each, flexion and abduction weighted pulleys  Therapeutic Exercise Activity 2: cervical rotation in supine: 2 x 10  Therapeutic Exercise Activity 3: Wall slides: 2 x 10  Therapeutic Exercise Activity 4: Cervical extesnion snags: 2 x 10 with towel    Manual Therapy  Manual Therapy Activity 1: Cervical side glides: Grade III  Manual Therapy Activity 2: STM to upper trap  Manual Therapy Activity 3: cervical distraction: 10" holds    Balance/Neuromuscular Re-Education  Balance/Neuromuscular Re-Education Activity 1: UBE with foam roller behind back: Retro 10' Lv2  Balance/Neuromuscular Re-Education Activity 2: no money scap squeeze: red band 3 x 10  Balance/Neuromuscular Re-Education Activity 3: chin tucks in supine: 5" hold 2 x 10  Balance/Neuromuscular Re-Education Activity 4: GTB ROWS 3x10  Balance/Neuromuscular Re-Education Activity 5: GTB TB EXT 3x10                   Assessment & Plan   Assessment: Patient reports " for follow up treatment with decreae in complaint of symptoms. Patient tolerated today's session with no complaints. Today's treatment included strenghtening, posture, and neuromusclar control interventions. Patient tolerated progressions well and included increased reisstance for periscapular exercises.  They remain limited in upper trap weakness. Pateint is progressing well towards their goals.  Evaluation/Treatment Tolerance: Patient tolerated treatment well    Patient will continue to benefit from skilled outpatient physical therapy to address the deficits listed in the problem list box on initial evaluation, provide pt/family education and to maximize pt's level of independence in the home and community environment.     Patient's spiritual, cultural, and educational needs considered and patient agreeable to plan of care and goals.           Plan: Continue to progress plan of care as tolerated.    Goals:   Active       Long Term Goals       Patient will demonstrate a FOTO score of greater than 60 in order to show improved neck function.  (Progressing)       Start:  02/11/25    Expected End:  04/22/25            Patient will demonstrate shoulder strength of 5/5 in order to show improved stability of upper extremities with functional activity.  (Progressing)       Start:  02/11/25    Expected End:  04/22/25            Patient will demonstrate 0/10 pain at rest in order to demonstrate decreased pain levels.  (Progressing)       Start:  02/11/25    Expected End:  04/22/25               Short Term Goals       Patient will be independent with HEP in order to improve overall prognosis. (Progressing)       Start:  02/11/25    Expected End:  03/18/25            Patient will report a 3/10 pain level at rest in order to demonstrate decreased pain and improved quality of life.  (Progressing)       Start:  02/11/25    Expected End:  03/18/25            Patient will demonstrate a 10 degree of improvement of cervical rotation  bilaterally for environmental scanning.  (Progressing)       Start:  02/11/25    Expected End:  03/18/25                Carlyle Goff PT

## 2025-03-13 ENCOUNTER — CLINICAL SUPPORT (OUTPATIENT)
Dept: REHABILITATION | Facility: HOSPITAL | Age: 60
End: 2025-03-13
Payer: COMMERCIAL

## 2025-03-13 DIAGNOSIS — M54.2 NECK PAIN: ICD-10-CM

## 2025-03-13 DIAGNOSIS — R29.3 ABNORMAL POSTURE: ICD-10-CM

## 2025-03-13 DIAGNOSIS — M43.6 NECK STIFFNESS: Primary | ICD-10-CM

## 2025-03-13 PROCEDURE — 97112 NEUROMUSCULAR REEDUCATION: CPT | Mod: PO,CQ

## 2025-03-13 PROCEDURE — 97140 MANUAL THERAPY 1/> REGIONS: CPT | Mod: PO,CQ

## 2025-03-13 PROCEDURE — 97110 THERAPEUTIC EXERCISES: CPT | Mod: PO,CQ

## 2025-03-13 RX ORDER — PREGABALIN 100 MG/1
100 CAPSULE ORAL 2 TIMES DAILY
Qty: 60 CAPSULE | Refills: 4 | Status: SHIPPED | OUTPATIENT
Start: 2025-03-13

## 2025-03-13 NOTE — PROGRESS NOTES
"  Outpatient Rehab    Physical Therapy Visit    Patient Name: Barrera Siddiqui  MRN: [id number]  YOB: 1965  Encounter Date: 3/13/2025    Therapy Diagnosis:   Encounter Diagnoses   Name Primary?    Neck stiffness Yes    Abnormal posture      Physician: Jovan Emery FNP    Physician Orders: Eval and Treat  Medical Diagnosis: Arthritis of facet joint of cervical spine [M47.812], Cervical spondylosis [M47.812], DDD (degenerative disc disease), cervical [M50.30]      Visit # / Visits Authorized:  1 / 1 ; 6/20  Date of Evaluation:  2/11/2025   Insurance Authorization Period:  1/27/2025 - 1/27/2026   Plan of Care Certification:  2/11/2025 to 4/22/2025      Time In: 1100   Time Out: 1156  Total Time: 56   Total Billable Time: 56    FOTO:  Intake Score:  %  Survey Score 1:  %  Survey Score 2:  %         Subjective   Pt states L arm is sore today because she was combing her hair for a long time yesterday.         Objective            Treatment:  Therapeutic Exercise  Therapeutic Exercise Activity 1: pulleys: 2' each, flexion and abduction weighted pulleys  Therapeutic Exercise Activity 2: cervical rotation in supine: 2 x 10  Therapeutic Exercise Activity 3: Wall slides: 2 x 10  Therapeutic Exercise Activity 4: Cervical extesnion snags: 2 x 10 with towel    Manual Therapy  Manual Therapy Activity 1: Cervical side glides: Grade III  Manual Therapy Activity 2: STM to upper trap  Manual Therapy Activity 3: cervical distraction: 10" holds    Balance/Neuromuscular Re-Education  Balance/Neuromuscular Re-Education Activity 1: UBE with foam roller behind back: Retro 10' Lv2  Balance/Neuromuscular Re-Education Activity 2: no money scap squeeze: red band 3 x 10  Balance/Neuromuscular Re-Education Activity 3: chin tucks in supine: 5" hold 2 x 10  Balance/Neuromuscular Re-Education Activity 4: GTB ROWS 3x10  Balance/Neuromuscular Re-Education Activity 5: GTB TB EXT 3x10         Assessment & Plan   Assessment: Pt enters therapy " session w/ increased soreness in L UT due to combing her hair for long periods of time yestday and keeping her arm elevated. Palpated area w/ knots noted throughout muscle, so performed STM and deep pressure release to try and alleviate muscle tension. Interventions were handled w/o issue and pt verbalized relief during and afterwards. Exercises challenged appropriately. Will continue to progress as tolerated.  Evaluation/Treatment Tolerance: Patient tolerated treatment well    Patient will continue to benefit from skilled outpatient physical therapy to address the deficits listed in the problem list box on initial evaluation, provide pt/family education and to maximize pt's level of independence in the home and community environment.     Patient's spiritual, cultural, and educational needs considered and patient agreeable to plan of care and goals.           Plan: Continue to progress plan of care as tolerated.    Goals:   Active       Long Term Goals       Patient will demonstrate a FOTO score of greater than 60 in order to show improved neck function.  (Progressing)       Start:  02/11/25    Expected End:  04/22/25            Patient will demonstrate shoulder strength of 5/5 in order to show improved stability of upper extremities with functional activity.  (Progressing)       Start:  02/11/25    Expected End:  04/22/25            Patient will demonstrate 0/10 pain at rest in order to demonstrate decreased pain levels.  (Progressing)       Start:  02/11/25    Expected End:  04/22/25               Short Term Goals       Patient will be independent with HEP in order to improve overall prognosis. (Progressing)       Start:  02/11/25    Expected End:  03/18/25            Patient will report a 3/10 pain level at rest in order to demonstrate decreased pain and improved quality of life.  (Progressing)       Start:  02/11/25    Expected End:  03/18/25            Patient will demonstrate a 10 degree of improvement of  cervical rotation bilaterally for environmental scanning.  (Progressing)       Start:  02/11/25    Expected End:  03/18/25                Carlyle Lizarraga PTA

## 2025-03-18 ENCOUNTER — HOSPITAL ENCOUNTER (EMERGENCY)
Facility: HOSPITAL | Age: 60
Discharge: HOME | End: 2025-03-19
Attending: STUDENT IN AN ORGANIZED HEALTH CARE EDUCATION/TRAINING PROGRAM
Payer: COMMERCIAL

## 2025-03-18 ENCOUNTER — CLINICAL SUPPORT (OUTPATIENT)
Dept: REHABILITATION | Facility: HOSPITAL | Age: 60
End: 2025-03-18
Payer: COMMERCIAL

## 2025-03-18 DIAGNOSIS — R29.3 ABNORMAL POSTURE: ICD-10-CM

## 2025-03-18 DIAGNOSIS — M43.6 NECK STIFFNESS: Primary | ICD-10-CM

## 2025-03-18 DIAGNOSIS — R91.1 PULMONARY NODULE: Primary | ICD-10-CM

## 2025-03-18 DIAGNOSIS — R06.02 SHORTNESS OF BREATH: ICD-10-CM

## 2025-03-18 LAB
ALBUMIN SERPL BCP-MCNC: 3.5 G/DL (ref 3.5–5.2)
ALP SERPL-CCNC: 157 U/L (ref 40–150)
ALT SERPL W/O P-5'-P-CCNC: 51 U/L (ref 10–44)
ANION GAP SERPL CALC-SCNC: 16 MMOL/L (ref 8–16)
AST SERPL-CCNC: 67 U/L (ref 10–40)
BASOPHILS # BLD AUTO: 0.03 K/UL (ref 0–0.2)
BASOPHILS NFR BLD: 0.4 % (ref 0–1.9)
BILIRUB SERPL-MCNC: 0.2 MG/DL (ref 0.1–1)
BNP SERPL-MCNC: <10 PG/ML (ref 0–99)
BUN SERPL-MCNC: 15 MG/DL (ref 6–20)
CALCIUM SERPL-MCNC: 8.7 MG/DL (ref 8.7–10.5)
CHLORIDE SERPL-SCNC: 105 MMOL/L (ref 95–110)
CO2 SERPL-SCNC: 14 MMOL/L (ref 23–29)
CREAT SERPL-MCNC: 0.8 MG/DL (ref 0.5–1.4)
DIFFERENTIAL METHOD BLD: ABNORMAL
EOSINOPHIL # BLD AUTO: 0.2 K/UL (ref 0–0.5)
EOSINOPHIL NFR BLD: 2.4 % (ref 0–8)
ERYTHROCYTE [DISTWIDTH] IN BLOOD BY AUTOMATED COUNT: 14.6 % (ref 11.5–14.5)
EST. GFR  (NO RACE VARIABLE): >60 ML/MIN/1.73 M^2
GLUCOSE SERPL-MCNC: 262 MG/DL (ref 70–110)
HCT VFR BLD AUTO: 36.2 % (ref 37–48.5)
HGB BLD-MCNC: 11.9 G/DL (ref 12–16)
IMM GRANULOCYTES # BLD AUTO: 0.02 K/UL (ref 0–0.04)
IMM GRANULOCYTES NFR BLD AUTO: 0.3 % (ref 0–0.5)
LYMPHOCYTES # BLD AUTO: 3.5 K/UL (ref 1–4.8)
LYMPHOCYTES NFR BLD: 45.1 % (ref 18–48)
MCH RBC QN AUTO: 26.6 PG (ref 27–31)
MCHC RBC AUTO-ENTMCNC: 32.9 G/DL (ref 32–36)
MCV RBC AUTO: 81 FL (ref 82–98)
MONOCYTES # BLD AUTO: 0.5 K/UL (ref 0.3–1)
MONOCYTES NFR BLD: 6.4 % (ref 4–15)
NEUTROPHILS # BLD AUTO: 3.6 K/UL (ref 1.8–7.7)
NEUTROPHILS NFR BLD: 45.4 % (ref 38–73)
NRBC BLD-RTO: 0 /100 WBC
PLATELET # BLD AUTO: 172 K/UL (ref 150–450)
PMV BLD AUTO: 12 FL (ref 9.2–12.9)
POCT GLUCOSE: 258 MG/DL (ref 70–110)
POTASSIUM SERPL-SCNC: 4 MMOL/L (ref 3.5–5.1)
PROT SERPL-MCNC: 9.1 G/DL (ref 6–8.4)
RBC # BLD AUTO: 4.48 M/UL (ref 4–5.4)
SODIUM SERPL-SCNC: 135 MMOL/L (ref 136–145)
TROPONIN I SERPL DL<=0.01 NG/ML-MCNC: <0.006 NG/ML (ref 0–0.03)
WBC # BLD AUTO: 7.83 K/UL (ref 3.9–12.7)

## 2025-03-18 PROCEDURE — 97112 NEUROMUSCULAR REEDUCATION: CPT | Mod: PO

## 2025-03-18 PROCEDURE — 97110 THERAPEUTIC EXERCISES: CPT | Mod: PO

## 2025-03-18 PROCEDURE — 80053 COMPREHEN METABOLIC PANEL: CPT | Performed by: PHYSICIAN ASSISTANT

## 2025-03-18 PROCEDURE — 85025 COMPLETE CBC W/AUTO DIFF WBC: CPT | Performed by: PHYSICIAN ASSISTANT

## 2025-03-18 PROCEDURE — 97140 MANUAL THERAPY 1/> REGIONS: CPT | Mod: PO

## 2025-03-18 PROCEDURE — 99284 EMERGENCY DEPT VISIT MOD MDM: CPT | Mod: 25

## 2025-03-18 PROCEDURE — 83880 ASSAY OF NATRIURETIC PEPTIDE: CPT | Performed by: PHYSICIAN ASSISTANT

## 2025-03-18 PROCEDURE — 82962 GLUCOSE BLOOD TEST: CPT

## 2025-03-18 PROCEDURE — 84484 ASSAY OF TROPONIN QUANT: CPT | Performed by: PHYSICIAN ASSISTANT

## 2025-03-18 PROCEDURE — 25500020 PHARM REV CODE 255: Performed by: STUDENT IN AN ORGANIZED HEALTH CARE EDUCATION/TRAINING PROGRAM

## 2025-03-18 RX ORDER — ALBUTEROL SULFATE 90 UG/1
2 INHALANT RESPIRATORY (INHALATION)
Qty: 18 G | Refills: 0 | Status: SHIPPED | OUTPATIENT
Start: 2025-03-18

## 2025-03-18 RX ADMIN — IOHEXOL 100 ML: 350 INJECTION, SOLUTION INTRAVENOUS at 11:03

## 2025-03-18 NOTE — PROGRESS NOTES
Outpatient Rehab    Physical Therapy Progress Note    Patient Name: Barrera Siddiqui  MRN: [id number]  YOB: 1965  Encounter Date: 3/18/2025    Therapy Diagnosis:   Encounter Diagnoses   Name Primary?    Neck stiffness Yes    Abnormal posture      Physician: Jovan Emery FNP    Physician Orders: Eval and Treat  Medical Diagnosis: Arthritis of facet joint of cervical spine  Cervical spondylosis  DDD (degenerative disc disease), cervical    Visit # / Visits Authorized:  7 / 20  Date of Evaluation:  2/11/2025   Insurance Authorization Period:  1/27/2025 - 1/27/2026   Plan of Care Certification:  2/11/2025 to 4/22/2025      PT/PTA:     Number of PTA visits since last PT visit:   Time In: 1100   Time Out: 1200  Total Time: 60   Total Billable Time: 60 minutes    FOTO:  Intake Score:  %  Survey Score 1:  %  Survey Score 2:  %         Subjective   She is having upper trap pain still..  Pain reported as 3/10.      Objective      Subcranial Range of Motion   Active Restricted? Passive Restricted? Pain   Flexion         Protraction         Retraction           Cervical Range of Motion   Active (deg) Passive (deg) Pain   Flexion 40       Extension 50       Right Lateral Flexion 35       Right Rotation 60       Left Lateral Flexion 40       Left Rotation 63              Shoulder Range of Motion  Left Shoulder   Active (deg) Passive (deg) Pain   Flexion 132       Extension         Scaption         ABduction 110       ADduction         Horizontal ABduction         Horizontal ADduction         External Rotation (Shoulder ABducted 0 degrees)         External Rotation (Shoulder ABducted 45 degrees)         External Rotation (Shoulder ABducted 90 degrees)         Internal Rotation (Shoulder ABducted 0 degrees)         Internal Rotation (Shoulder ABducted 45 degrees)         Internal Rotation (Shoulder ABducted 90 degrees)                         Treatment:  Therapeutic Exercise  TE 1: pulleys: 2' each, flexion and abduction  "weighted pulleys  TE 2: cervical rotation in sitting: 2 x 10  TE 3: Wall slides: 2 x 10 flexion and abduction  TE 4: Cervical extesnion snags: 2 x 10 with towel  TE 5: Reassessment  Manual Therapy  MT 2: STM to upper trap  Balance/Neuromuscular Re-Education  NMR 2: no money scap squeeze: Green band 3 x 10  NMR 3: chin tucks in sittin" hold 2 x 10  NMR 4: GTB ROWS 3x10  NMR 5: GTB TB EXT 3x10  NMR 6: Thoracic back bends: 2 x 10    Time Entry(in minutes):  Manual Therapy Time Entry: 10  Neuromuscular Re-Education Time Entry: 25  Therapeutic Exercise Time Entry: 25    Assessment & Plan   Assessment: Patient reports for follow up treatment with slight increase in complaint of symptoms. Patient tolerated today's session with minimal complaints. Her cervical range of motion has improved in every direction but her left shoulder remains limited. Spoke with patient today about adding visits to current plan of care that space out over a great amount of time. Today's treatment included posture and strength interventions. Patient tolerated progressions well and included shoulder abduction. They remain limited in shoulder range of motion and upper trap tightness Pateint is progressing well towards their goals.  Evaluation/Treatment Tolerance: Patient tolerated treatment well    Patient will continue to benefit from skilled outpatient physical therapy to address the deficits listed in the problem list box on initial evaluation, provide pt/family education and to maximize pt's level of independence in the home and community environment.     Patient's spiritual, cultural, and educational needs considered and patient agreeable to plan of care and goals.           Plan: Continue to progress plan of care as tolerated.    Goals:   Active       Long Term Goals       Patient will demonstrate a FOTO score of greater than 60 in order to show improved neck function.  (Progressing)       Start:  25    Expected End:  25       "      Patient will demonstrate shoulder strength of 5/5 in order to show improved stability of upper extremities with functional activity.  (Progressing)       Start:  02/11/25    Expected End:  04/22/25            Patient will demonstrate 0/10 pain at rest in order to demonstrate decreased pain levels.  (Progressing)       Start:  02/11/25    Expected End:  04/22/25               Short Term Goals       Patient will be independent with HEP in order to improve overall prognosis. (Progressing)       Start:  02/11/25    Expected End:  03/18/25            Patient will report a 3/10 pain level at rest in order to demonstrate decreased pain and improved quality of life.  (Progressing)       Start:  02/11/25    Expected End:  03/18/25            Patient will demonstrate a 10 degree of improvement of cervical rotation bilaterally for environmental scanning.  (Progressing)       Start:  02/11/25    Expected End:  03/18/25                Carlyle Goff, PT

## 2025-03-19 ENCOUNTER — TELEPHONE (OUTPATIENT)
Dept: PULMONOLOGY | Facility: CLINIC | Age: 60
End: 2025-03-19
Payer: COMMERCIAL

## 2025-03-19 VITALS
HEIGHT: 62 IN | SYSTOLIC BLOOD PRESSURE: 130 MMHG | DIASTOLIC BLOOD PRESSURE: 70 MMHG | BODY MASS INDEX: 49.87 KG/M2 | HEART RATE: 87 BPM | RESPIRATION RATE: 20 BRPM | OXYGEN SATURATION: 97 % | WEIGHT: 271 LBS | TEMPERATURE: 98 F

## 2025-03-19 LAB
OHS QRS DURATION: 82 MS
OHS QRS DURATION: 90 MS
OHS QTC CALCULATION: 459 MS
OHS QTC CALCULATION: 469 MS

## 2025-03-19 NOTE — ED PROVIDER NOTES
ED Provider Note - 3/18/2025    History     Chief Complaint   Patient presents with    Shortness of Breath     Patient reports feeling short of breath since 5pm after her therapy appt. Denies other acute symptoms pain. Patient tachypneic in triage. Non labored breathing.        HDOA Siddiqui is a 59 y.o. year old female with past medical and surgical history as seen below, presenting with chief complaint of shortness of breath. Persistent since PT session earlier this evening. No CP, fever, chills, AP, N/V.        Past Medical History:   Diagnosis Date    Depression     Diabetes mellitus     High cholesterol     Hypertension     Stroke     TIA (transient ischemic attack) 2017    left side weakness resolved after therapy     Past Surgical History:   Procedure Laterality Date    CARPAL TUNNEL RELEASE Bilateral     CHOLECYSTECTOMY      COLONOSCOPY N/A 10/18/2017    Procedure: COLONOSCOPY;  Surgeon: Donald Wright Jr., MD;  Location: [facility];  Service: Endoscopy;  Laterality: N/A;    DECOMPRESSION, NERVE, ULNAR Left 7/24/2024    Procedure: LEFT ELBOW DECOMPRESSION, NERVE, ULNAR;  Surgeon: Brandie Monteiro MD;  Location: [facility] OR;  Service: Orthopedics;  Laterality: Left;  REGIONAL AFTER    EPIDURAL STEROID INJECTION INTO CERVICAL SPINE N/A 05/08/2024    Procedure: C7-T1 GRACIE (AIM LEFT);  Surgeon: Tiera Murphy DO;  Location: [facility];  Service: Pain Management;  Laterality: N/A;  20 mins    INJECTION OF ANESTHETIC AGENT AROUND MEDIAL BRANCH NERVES INNERVATING CERVICAL FACET JOINT Left 10/28/2024    Procedure: MBB#1 LT C3,4,5;  Surgeon: Tiera Murphy DO;  Location: [facility];  Service: Pain Management;  Laterality: Left;  ASA 5d/Ozempic 7d    INJECTION OF ANESTHETIC AGENT AROUND MEDIAL BRANCH NERVES INNERVATING CERVICAL FACET JOINT Left 11/20/2024    Procedure: Left C3,C4, C5 MBB #2;  Surgeon: Tiera Murphy DO;  Location: [facility];  Service: Pain  Management;  Laterality: Left;  15 mins ASA 5 days and Ozempic 7 days    KNEE SURGERY      RADIOFREQUENCY ABLATION, FACET JOINT, CERVICOTHORACIC Left 12/30/2024    Procedure: RFA LT C3,C4,C5;  Surgeon: Tiera Murphy DO;  [facility];  Service: Pain Management;  Laterality: Left;  Ozempic 7d/ASA 5d    TRIGGER FINGER RELEASE Left 7/24/2024    Procedure: LEFT THUMB AND RING  TRIGGER FINGER RELEASE;  Surgeon: Brandie Monteiro MD;  Location: [facility] OR;  Service: Orthopedics;  Laterality: Left;    TRIGGER FINGER RELEASE Right 12/11/2024    Procedure: RIGHT RING RELEASE, TRIGGER FINGER;  Surgeon: Brandie Monteiro MD;  Location: [facility] OR;  Service: Orthopedics;  Laterality: Right;    TUBAL LIGATION      ULNAR NERVE TRANSPOSITION Right 12/11/2024    Procedure: TRANSPOSITION, NERVE, ULNAR;  Surgeon: Brandie Monteiro MD;  Location: [facility] OR;  Service: Orthopedics;  Laterality: Right;  POST SURGERY BLOCK         Family History   Problem Relation Name Age of Onset    Breast cancer Mother      Liver disease Maternal Grandmother       Social History[1]  Social Drivers of Health with Concerns     Financial Resource Strain: Medium Risk (1/22/2025)    Overall Financial Resource Strain (CARDIA)     Difficulty of Paying Living Expenses: Somewhat hard   Physical Activity: Inactive (1/22/2025)    Exercise Vital Sign     Days of Exercise per Week: 0 days     Minutes of Exercise per Session: 0 min   Housing Stability: Unknown (1/22/2025)    Housing Stability Vital Sign     Unable to Pay for Housing in the Last Year: No     Number of Times Moved in the Last Year: Not on file     Homeless in the Last Year: Not on file   Social Isolation: Not on file      Review of patient's allergies indicates:  No Known Allergies    Review of Systems     A full Review of Systems (ROS) was performed and was negative unless otherwise stated in the HPI.      Physical Exam     Vitals:    03/18/25 2033 03/18/25 2224  "03/18/25 2234 03/19/25 0025   BP: (!) 140/79 119/73  130/70   BP Location: Left arm   Right arm   Patient Position:    Lying   Pulse: 97 84 84 87   Resp: (!) 26 (!) 22 20 20   Temp: 97.6 °F (36.4 °C)   97.7 °F (36.5 °C)   TempSrc: Oral   Oral   SpO2: 100% 100% 98% 97%   Weight: 122.9 kg (271 lb)      Height: 5' 2" (1.575 m)           Physical Exam    Nursing note and vitals reviewed.  Constitutional: She appears well-developed and well-nourished. She is Obese . No distress.   HENT:   Head: Normocephalic and atraumatic.   Right Ear: External ear normal.   Left Ear: External ear normal.   Nose: Nose normal. Mouth/Throat: Oropharynx is clear and moist.   Eyes: Conjunctivae and EOM are normal. Pupils are equal, round, and reactive to light.   Neck: Neck supple.   Normal range of motion.  Cardiovascular:  Normal rate, regular rhythm, normal heart sounds and intact distal pulses.           Pulmonary/Chest: Breath sounds normal. No accessory muscle usage or stridor. Tachypnea noted. No respiratory distress. She has no wheezes. She has no rhonchi. She has no rales.   Abdominal: Abdomen is soft. Bowel sounds are normal. There is no abdominal tenderness.   Musculoskeletal:         General: No tenderness or edema. Normal range of motion.      Cervical back: Normal range of motion and neck supple.     Neurological: She is alert and oriented to person, place, and time. She has normal strength. No cranial nerve deficit or sensory deficit.   Skin: Skin is warm and dry. No rash noted.   Psychiatric: She has a normal mood and affect. Thought content normal.         Lab Results- Independently reviewed by myself      Labs Reviewed   CBC W/ AUTO DIFFERENTIAL - Abnormal       Result Value    WBC 7.83      RBC 4.48      Hemoglobin 11.9 (*)     Hematocrit 36.2 (*)     MCV 81 (*)     MCH 26.6 (*)     MCHC 32.9      RDW 14.6 (*)     Platelets 172      MPV 12.0      Immature Granulocytes 0.3      Gran # (ANC) 3.6      Immature Grans (Abs) " 0.02      Lymph # 3.5      Mono # 0.5      Eos # 0.2      Baso # 0.03      nRBC 0      Gran % 45.4      Lymph % 45.1      Mono % 6.4      Eosinophil % 2.4      Basophil % 0.4      Differential Method Automated     COMPREHENSIVE METABOLIC PANEL - Abnormal    Sodium 135 (*)     Potassium 4.0      Chloride 105      CO2 14 (*)     Glucose 262 (*)     BUN 15      Creatinine 0.8      Calcium 8.7      Total Protein 9.1 (*)     Albumin 3.5      Total Bilirubin 0.2      Alkaline Phosphatase 157 (*)     AST 67 (*)     ALT 51 (*)     eGFR >60      Anion Gap 16     POCT GLUCOSE - Abnormal    POCT Glucose 258 (*)    B-TYPE NATRIURETIC PEPTIDE    BNP <10     TROPONIN I    Troponin I <0.006             Imaging     Imaging Results              CTA Chest Non-Coronary (PE Studies) (Final result)  Result time 03/18/25 23:41:08      Final result by Juan Antonio Panchal DO (03/18/25 23:41:08)                   Impression:      1. No large central pulmonary embolism.  2. Left lower lobe pulmonary nodule measuring 6 mm, stable from prior.  Recommend 1 year of additional follow-up.      Electronically signed by: Juan Antonio Panchal  Date:    03/18/2025  Time:    23:41               Narrative:    EXAMINATION:  CTA CHEST NON CORONARY (PE STUDIES)    CLINICAL HISTORY:  Pulmonary embolism (PE) suspected, high prob;    TECHNIQUE:  Low dose axial images, sagittal and coronal reformations were obtained from the thoracic inlet to the lung bases following the IV administration of 100 mL of Omnipaque 350.  Contrast timing was optimized to evaluate the pulmonary arteries.  Maximum intensity projection images were provided for review.    COMPARISON:  CTA chest from 01/05/2024.    FINDINGS:  Pulmonary vasculature: Limited examination due to suboptimal contrast bolus timing.  There is no large central pulmonary embolism seen.  Segmental and subsegmental pulmonary arteries are not well evaluated.    Aorta: Left-sided aortic arch.  No aneurysm and no significant  atherosclerosis.    Base of Neck: No significant abnormality.    Thoracic soft tissues: Normal.    Heart: Normal size. No effusion.    Lety/Mediastinum: No pathologic schuyler enlargement.    Airways: The large airways are patent. No foci of endobronchial filling.    Lungs/Pleura: There is a 6 mm left lower lobe pulmonary nodule (series 2, image 344), stable in size from prior.  The lungs are otherwise clear.  No pleural effusion or thickening.    Esophagus: Normal.    Upper Abdomen: The gallbladder is surgically absent.    Bones: No acute fracture. No suspicious lytic or sclerotic lesions.                                       X-Ray Chest PA And Lateral (Final result)  Result time 03/18/25 22:30:24      Final result by Juan Antonio Panchal DO (03/18/25 22:30:24)                   Impression:      No acute abnormality.      Electronically signed by: Juan Antonio Panchal  Date:    03/18/2025  Time:    22:30               Narrative:    EXAMINATION:  XR CHEST PA AND LATERAL    CLINICAL HISTORY:  Shortness of breath    TECHNIQUE:  PA and lateral views of the chest were performed.    COMPARISON:  09/13/2024.    FINDINGS:  The lungs are well expanded and clear. No focal opacities are seen. The pleural spaces are clear. The cardiac silhouette is unremarkable. The visualized osseous structures are unremarkable.                                    X-Rays:   Independently Interpreted Readings:   Chest X-Ray: No infiltrates.         Independent Interpretation of EKG:  Rhythm: Normal Sinus  Rate: 86  Axis: Normal  QTC: 459  No STEMI        ED Course         Procedures         Orders Placed This Encounter    X-Ray Chest PA And Lateral    CTA Chest Non-Coronary (PE Studies)    CBC auto differential    Comprehensive metabolic panel    Brain natriuretic peptide    Troponin I    Saline lock IV    POCT glucose    iohexoL (OMNIPAQUE 350) injection 100 mL    albuterol (PROVENTIL/VENTOLIN HFA) 90 mcg/actuation inhaler                      Medical  Decision Making       The patient's list of active medical problems, social history, medications, and allergies as documented per RN staff has been reviewed.           Medical Decision Making  This is a 59 y.o. female presenting for shortness of breath.  Physical exam with clear lung sounds and without signs of increased work of breathing.  Satting between 98 and 100% on room air.    Differential discussion:  Negative troponin and unremarkable EKG and patient without chest pain making ACS unlikely.  CTA obtained without evidence of PE or pulmonary infection.  Patient without wheezing or significant smoking history making asthma and COPD less likely.  Chest x-ray/CT without evidence of pneumothorax, pneumonia, pulmonary edema, or pleural effusion.  Chest x-ray/CT also without signs of fibrotic change or interstitial lung disease.  No indication of acidosis or other metabolic cause of SOB.      At this point, patient appears safe for discharge and outpatient follow up.  Advised patient to f/u with her pulmonologist due to ongoing symptoms.  Return precautions given for worsening or changing symptoms.      Amount and/or Complexity of Data Reviewed  External Data Reviewed: labs, radiology, ECG and notes.  Labs: ordered.     Details: CBC: No significant anemia, platelet disorder, or leukocytosis.  BNP: negative, no indication of CHF  Trop: negative, no indication of cardiac damage.  Radiology: ordered and independent interpretation performed.  ECG/medicine tests: ordered and independent interpretation performed.    Risk  Prescription drug management.                    ED Prescriptions       Medication Sig Dispense Start Date End Date Auth. Provider    albuterol (PROVENTIL/VENTOLIN HFA) 90 mcg/actuation inhaler Inhale 2 puffs into the lungs every 4 to 6 hours as needed for Shortness of Breath. 18 g 3/18/2025 -- Boogie Peters MD              Clinical Impression       Follow-up Information       Follow up With  Specialties Details Why Contact Info    Addie Sotomayor MD Pulmonary Disease Schedule an appointment as soon as possible for a visit in 5 days For follow-up on today's visit. [address]  [facility] [address]  [phone]      [facility] Emergency Dept Emergency Medicine Go to  As needed, If symptoms worsen [address]  [facility] [address] [phone]            Referrals:  No orders of the defined types were placed in this encounter.      Disposition   ED Disposition Condition    Discharge Stable              Final diagnoses:  [R06.02] Shortness of breath  [R91.1] Pulmonary nodule (Primary)        Boogie Peters MD        03/19/2025          DISCLAIMER: This note was prepared with [employer] voice recognition transcription software. Garbled syntax, mangled pronouns, and other bizarre constructions may be attributed to that software system.         [1]   Social History  Tobacco Use    Smoking status: Never     Passive exposure: Never    Smokeless tobacco: Never   Substance Use Topics    Alcohol use: Yes     Comment: rare    Drug use: No        Boogie Peters MD  03/19/25 [id number]

## 2025-03-19 NOTE — FIRST PROVIDER EVALUATION
Emergency Department TeleTriage Encounter Note      CHIEF COMPLAINT    Chief Complaint   Patient presents with    Shortness of Breath     Patient reports feeling short of breath since 5pm after her therapy appt. Denies other acute symptoms pain. Patient tachypneic in triage. Non labored breathing.        VITAL SIGNS   Initial Vitals [03/18/25 2033]   BP Pulse Resp Temp SpO2   (!) 140/79 97 (!) 26 97.6 °F (36.4 °C) 100 %      MAP       --            ALLERGIES    Review of patient's allergies indicates:  No Known Allergies    PROVIDER TRIAGE NOTE  This is a teletriage evaluation of a 59 y.o. female presenting to the ED complaining of shortness of breath since this evening.     Initial orders will be placed and care will be transferred to an alternate provider when patient is roomed for a full evaluation. Any additional orders and the final disposition will be determined by that provider.         ORDERS  Labs Reviewed   CBC W/ AUTO DIFFERENTIAL   COMPREHENSIVE METABOLIC PANEL   B-TYPE NATRIURETIC PEPTIDE   TROPONIN I       ED Orders (720h ago, onward)      Start Ordered     Status Ordering Provider    03/18/25 2120 03/18/25 2119  Cardiac Monitoring - Adult  Continuous        Comments: Notify Physician If:    Ordered IVONNE SYED    03/18/25 2120 03/18/25 2119  Pulse Oximetry Continuous  Continuous         Ordered IVONNE SYED    03/18/25 2119 03/18/25 2119  Saline lock IV  Once         Ordered IVONNE SYED    03/18/25 2119 03/18/25 2119  CBC auto differential  STAT         Ordered IVONNE SYED.    03/18/25 2119 03/18/25 2119  Comprehensive metabolic panel  STAT         Ordered IVONNE SYED.    03/18/25 2119 03/18/25 2119  Brain natriuretic peptide  STAT         Ordered IVONNE SYED    03/18/25 2119 03/18/25 2119  Troponin I  STAT         Ordered IVONNE SYED    03/18/25 2119 03/18/25 2119  X-Ray Chest PA And  Lateral  1 time imaging         Ordered IVONNE SYED              Virtual Visit Note: The provider triage portion of this emergency department evaluation and documentation was performed via [employer], a HIPAA-compliant telemedicine application, in concert with a tele-presenter in the room. A face to face patient evaluation with one of my colleagues will occur once the patient is placed in an emergency department room.      DISCLAIMER: This note was prepared with [employer] voice recognition transcription software. Garbled syntax, mangled pronouns, and other bizarre constructions may be attributed to that software system.

## 2025-03-19 NOTE — TELEPHONE ENCOUNTER
----- Message from Maddi sent at 3/19/2025 10:44 AM CDT -----  Regarding: Appointment  Contact: [phone]  Type:  Needs Medical AdviceWho Called: JeanSymptoms (please be specific): SOB, nodule How long has patient had these symptoms:  since last nightPharmacy name and phone #:  n/aWould the patient rather a call back or a response via [facility]? callBe Call Back Number: [phone] Information: Calling to schedule an appointment per ED follow up as soon as possible. Please call patient to schedule today.

## 2025-03-19 NOTE — ED NOTES
"Patient admitted with c/o chest pain and SOB started since 5 pm today associated with dizziness, nausea and " vision feeling funny" . Patient is conscious, coherent, oriented x4, alert, awake and responding to verbal commands appropriately. Denies vomiting, headache, diarrhea, dysuria, weakness of limbs, loss of balance, blurry vision and abdominal pain. Connected to continuous cardiac monitor, EKG done and seen by Dr. Peters. Changed into hospital gown and made comfortable. Family at bedside. Bed kept in lowest position, breaks on, side rails up and call bell in reach.   "

## 2025-03-19 NOTE — TELEPHONE ENCOUNTER
Patient scheduled for hospital follow up on 4/23/25 at 3pm with Dr Sotomayor and added to wait list. Appointment mailed.

## 2025-03-24 ENCOUNTER — LAB VISIT (OUTPATIENT)
Dept: LAB | Facility: HOSPITAL | Age: 60
End: 2025-03-24
Attending: INTERNAL MEDICINE
Payer: COMMERCIAL

## 2025-03-24 DIAGNOSIS — N18.9 CHRONIC KIDNEY DISEASE, UNSPECIFIED: Primary | ICD-10-CM

## 2025-03-24 LAB
ABSOLUTE EOSINOPHIL (OHS): 0.19 K/UL
ABSOLUTE MONOCYTE (OHS): 0.42 K/UL (ref 0.3–1)
ABSOLUTE NEUTROPHIL COUNT (OHS): 3.41 K/UL (ref 1.8–7.7)
ALBUMIN SERPL BCP-MCNC: 4.3 G/DL (ref 3.5–5.2)
ALP SERPL-CCNC: 169 UNIT/L (ref 38–126)
ALT SERPL W/O P-5'-P-CCNC: 75 UNIT/L (ref 10–44)
ANION GAP (OHS): 13 MMOL/L (ref 8–16)
AST SERPL-CCNC: 85 UNIT/L (ref 15–46)
BASOPHILS # BLD AUTO: 0.04 K/UL
BASOPHILS NFR BLD AUTO: 0.6 %
BILIRUB SERPL-MCNC: 0.5 MG/DL (ref 0.1–1)
BUN SERPL-MCNC: 8 MG/DL (ref 7–17)
CALCIUM SERPL-MCNC: 9.3 MG/DL (ref 8.7–10.5)
CHLORIDE SERPL-SCNC: 103 MMOL/L (ref 95–110)
CO2 SERPL-SCNC: 25 MMOL/L (ref 23–29)
CREAT SERPL-MCNC: 0.6 MG/DL (ref 0.5–1.4)
ERYTHROCYTE [DISTWIDTH] IN BLOOD BY AUTOMATED COUNT: 14.8 % (ref 11.5–14.5)
GFR SERPLBLD CREATININE-BSD FMLA CKD-EPI: >60 ML/MIN/1.73/M2
GLUCOSE SERPL-MCNC: 212 MG/DL (ref 70–110)
HCT VFR BLD AUTO: 38.3 % (ref 37–48.5)
HGB BLD-MCNC: 11.8 GM/DL (ref 12–16)
IMM GRANULOCYTES # BLD AUTO: 0.02 K/UL (ref 0–0.04)
IMM GRANULOCYTES NFR BLD AUTO: 0.3 % (ref 0–0.5)
LYMPHOCYTES # BLD AUTO: 3.03 K/UL (ref 1–4.8)
MCH RBC QN AUTO: 26 PG (ref 27–50)
MCHC RBC AUTO-ENTMCNC: 30.8 G/DL (ref 32–36)
MCV RBC AUTO: 84 FL (ref 82–98)
NUCLEATED RBC (/100WBC) (OHS): 0 /100 WBC
PLATELET # BLD AUTO: 186 K/UL (ref 150–450)
PMV BLD AUTO: 12.5 FL (ref 9.2–12.9)
POTASSIUM SERPL-SCNC: 4 MMOL/L (ref 3.5–5.1)
PROT SERPL-MCNC: 8.6 GM/DL (ref 6–8.4)
RBC # BLD AUTO: 4.54 M/UL (ref 4–5.4)
RELATIVE EOSINOPHIL (OHS): 2.7 %
RELATIVE LYMPHOCYTE (OHS): 42.6 % (ref 18–48)
RELATIVE MONOCYTE (OHS): 5.9 % (ref 4–15)
RELATIVE NEUTROPHIL (OHS): 47.9 % (ref 38–73)
SODIUM SERPL-SCNC: 141 MMOL/L (ref 136–145)
WBC # BLD AUTO: 7.11 K/UL (ref 3.9–12.7)

## 2025-03-24 PROCEDURE — 85025 COMPLETE CBC W/AUTO DIFF WBC: CPT | Mod: PN

## 2025-03-24 PROCEDURE — 36415 COLL VENOUS BLD VENIPUNCTURE: CPT | Mod: PN

## 2025-03-24 PROCEDURE — 82040 ASSAY OF SERUM ALBUMIN: CPT | Mod: PN

## 2025-03-27 ENCOUNTER — CLINICAL SUPPORT (OUTPATIENT)
Dept: REHABILITATION | Facility: HOSPITAL | Age: 60
End: 2025-03-27
Payer: COMMERCIAL

## 2025-03-27 DIAGNOSIS — R29.3 ABNORMAL POSTURE: ICD-10-CM

## 2025-03-27 DIAGNOSIS — M43.6 NECK STIFFNESS: Primary | ICD-10-CM

## 2025-03-27 PROCEDURE — 97140 MANUAL THERAPY 1/> REGIONS: CPT | Mod: PO

## 2025-03-27 PROCEDURE — 97112 NEUROMUSCULAR REEDUCATION: CPT | Mod: PO

## 2025-03-27 PROCEDURE — 97530 THERAPEUTIC ACTIVITIES: CPT | Mod: PO

## 2025-03-27 PROCEDURE — 97110 THERAPEUTIC EXERCISES: CPT | Mod: PO

## 2025-03-27 NOTE — PROGRESS NOTES
Outpatient Rehab    Physical Therapy Discharge    Patient Name: Barrera Siddiqui  MRN: [id number]  YOB: 1965  Encounter Date: 3/27/2025    Therapy Diagnosis:   Encounter Diagnoses   Name Primary?    Neck stiffness Yes    Abnormal posture      Physician: Jovan Emery FNP    Physician Orders: Eval and Treat  Medical Diagnosis: Arthritis of facet joint of cervical spine  Cervical spondylosis  DDD (degenerative disc disease), cervical    Visit # / Visits Authorized:  8 / 20  Insurance Authorization Period: 2/11/2025 to 12/31/2025  Plan of Care Certification:  2/11/2025 to 4/22/2025      PT/PTA: PT   Number of PTA visits since last PT visit:0  Time In: 1100   Time Out: 1150  Total Time: 50   Total Billable Time: 50    FOTO:  Intake Score: 28%  Survey Score 1: 34%  Survey Score 2: 49%         Subjective   She has noticed some imporvement and has had success with ibrpoufen. She will work on her home exercise program..  Pain reported as 2/10.      Objective      Subcranial Range of Motion   Active Restricted? Passive Restricted? Pain   Flexion         Protraction         Retraction           Cervical Range of Motion   Active (deg) Passive (deg) Pain   Flexion 40       Extension 50       Right Lateral Flexion 35       Right Rotation 60       Left Lateral Flexion 40       Left Rotation 63              Shoulder Range of Motion  Right Shoulder   Active (deg) Passive (deg) Pain   Flexion 160       Extension         Scaption         ABduction 160       ADduction         Horizontal ABduction         Horizontal ADduction         External Rotation (Shoulder ABducted 0 degrees)         External Rotation (Shoulder ABducted 45 degrees)         External Rotation (Shoulder ABducted 90 degrees)         Internal Rotation (Shoulder ABducted 0 degrees)         Internal Rotation (Shoulder ABducted 45 degrees)         Internal Rotation (Shoulder ABducted 90 degrees)           Left Shoulder   Active (deg) Passive (deg) Pain   Flexion  "130       Extension         Scaption         ABduction         ADduction         Horizontal ABduction         Horizontal ADduction         External Rotation (Shoulder ABducted 0 degrees)         External Rotation (Shoulder ABducted 45 degrees)         External Rotation (Shoulder ABducted 90 degrees)         Internal Rotation (Shoulder ABducted 0 degrees)         Internal Rotation (Shoulder ABducted 45 degrees)         Internal Rotation (Shoulder ABducted 90 degrees)                          Treatment:  Therapeutic Exercise  TE 1: pulleys: 2' each, flexion and abduction weighted pulleys  TE 2: cervical rotation in sitting: 2 x 10  TE 3: Wall slides: 2 x 10 flexion and abduction  TE 4: Cervical extesnion snags: 2 x 10 with towel  TE 5: Reassessment  Manual Therapy  MT 1: Cervical side glides: Grade III  MT 2: STM to upper trap  MT 3: cervical distraction: 10" holds  Balance/Neuromuscular Re-Education  NMR 2: no money scap squeeze: Green band 3 x 10  NMR 3: chin tucks in sittin" hold 2 x 10  NMR 5: GTB TB EXT 3x10  Therapeutic Activity  TA 1: Reassessment + HEP education    Time Entry(in minutes):  Manual Therapy Time Entry: 10  Neuromuscular Re-Education Time Entry: 10  Therapeutic Activity Time Entry: 13  Therapeutic Exercise Time Entry: 17    Assessment & Plan   Assessment: Patient reports for discharge and follow up treatment. Patient reports some improvement in symptoms since start of care and has met 3/3 STG and 0/3 LTG. Patient demonstrates improvement in cervical range of motion since the beginning of their treatment. Today's treatment included strengthening interventions with Discharge planning and HEP education. Patient will no longer continue to benefit from skilled physical therapy services at this time and will be discharged to independent home exercise program.  Evaluation/Treatment Tolerance: Patient tolerated treatment well    Patient's spiritual, cultural, and educational needs considered and " patient agreeable to plan of care and goals.     Education  Education was done with Patient. The patient's learning style includes Demonstration, Listening, and Pictures/video. The patient Demonstrates understanding and Verbalizes understanding.         HEP       Plan: Discharge to Home Exercise Program (HEP) found under pateint instructions.    Goals:   Active       Long Term Goals       Patient will demonstrate a FOTO score of greater than 60 in order to show improved neck function.  (Unable to Meet)       Start:  02/11/25    Expected End:  04/22/25            Patient will demonstrate shoulder strength of 5/5 in order to show improved stability of upper extremities with functional activity.  (Unable to Meet)       Start:  02/11/25    Expected End:  04/22/25            Patient will demonstrate 0/10 pain at rest in order to demonstrate decreased pain levels.  (Unable to Meet)       Start:  02/11/25    Expected End:  04/22/25              Resolved       Short Term Goals       Patient will be independent with HEP in order to improve overall prognosis. (Met)       Start:  02/11/25    Expected End:  03/18/25    Resolved:  03/27/25         Patient will report a 3/10 pain level at rest in order to demonstrate decreased pain and improved quality of life.  (Met)       Start:  02/11/25    Expected End:  03/18/25    Resolved:  03/27/25         Patient will demonstrate a 10 degree of improvement of cervical rotation bilaterally for environmental scanning.  (Met)       Start:  02/11/25    Expected End:  03/18/25    Resolved:  03/27/25             Carlyle Goff PT

## 2025-03-27 NOTE — PATIENT INSTRUCTIONS
Access Code: [email or web address]  URL: [email or web address]  Date: 03/27/2025  Prepared by: Carlyle Goff    Exercises  - Seated Cervical Rotation AROM  - 1 x daily - 4 x weekly - 2 sets - 10 reps  - Supine Cervical Retraction with Towel  - 1 x daily - 4 x weekly - 2 sets - 10 reps - 3 s hold  - Cervical Extension AROM with Strap  - 1 x daily - 4 x weekly - 2 sets - 10 reps  - Seated Levator Scapulae Stretch  - 1 x daily - 4 x weekly - 3 sets - 20 s hold  - Seated Upper Trapezius Stretch  - 1 x daily - 4 x weekly - 3 sets - 20 s hold  - Seated Shoulder Shrugs  - 1 x daily - 4 x weekly - 3 sets - 10 reps  - Shoulder External Rotation and Scapular Retraction with Resistance  - 1 x daily - 4 x weekly - 3 sets - 10 reps  - Shoulder extension with resistance - Neutral  - 1 x daily - 4 x weekly - 3 sets - 10 reps

## 2025-04-01 ENCOUNTER — LAB VISIT (OUTPATIENT)
Dept: LAB | Facility: HOSPITAL | Age: 60
End: 2025-04-01
Payer: COMMERCIAL

## 2025-04-01 ENCOUNTER — OFFICE VISIT (OUTPATIENT)
Dept: CARDIOLOGY | Facility: CLINIC | Age: 60
End: 2025-04-01
Payer: COMMERCIAL

## 2025-04-01 VITALS
HEIGHT: 62 IN | WEIGHT: 272.25 LBS | SYSTOLIC BLOOD PRESSURE: 134 MMHG | OXYGEN SATURATION: 97 % | BODY MASS INDEX: 50.1 KG/M2 | HEART RATE: 97 BPM | DIASTOLIC BLOOD PRESSURE: 98 MMHG

## 2025-04-01 DIAGNOSIS — R00.2 PALPITATION: ICD-10-CM

## 2025-04-01 DIAGNOSIS — I10 ESSENTIAL HYPERTENSION: Primary | ICD-10-CM

## 2025-04-01 DIAGNOSIS — G90.A POTS (POSTURAL ORTHOSTATIC TACHYCARDIA SYNDROME): ICD-10-CM

## 2025-04-01 DIAGNOSIS — E66.01 MORBID OBESITY WITH BMI OF 50.0-59.9, ADULT: ICD-10-CM

## 2025-04-01 DIAGNOSIS — E78.2 MIXED HYPERLIPIDEMIA: ICD-10-CM

## 2025-04-01 DIAGNOSIS — E11.9 TYPE 2 DIABETES MELLITUS WITHOUT COMPLICATION, WITHOUT LONG-TERM CURRENT USE OF INSULIN: ICD-10-CM

## 2025-04-01 DIAGNOSIS — R55 SYNCOPE, UNSPECIFIED SYNCOPE TYPE: ICD-10-CM

## 2025-04-01 DIAGNOSIS — I50.32 CHRONIC DIASTOLIC HEART FAILURE: ICD-10-CM

## 2025-04-01 DIAGNOSIS — R06.02 SOBOE (SHORTNESS OF BREATH ON EXERTION): ICD-10-CM

## 2025-04-01 LAB
CHOLEST SERPL-MCNC: 140 MG/DL (ref 120–199)
CHOLEST/HDLC SERPL: 3.5 {RATIO} (ref 2–5)
HDLC SERPL-MCNC: 40 MG/DL (ref 40–75)
HDLC SERPL: 28.6 % (ref 20–50)
LDLC SERPL CALC-MCNC: 70.8 MG/DL (ref 63–159)
NONHDLC SERPL-MCNC: 100 MG/DL
TRIGL SERPL-MCNC: 146 MG/DL (ref 30–150)

## 2025-04-01 PROCEDURE — 99999 PR PBB SHADOW E&M-EST. PATIENT-LVL IV: CPT | Mod: PBBFAC,,,

## 2025-04-01 PROCEDURE — 36415 COLL VENOUS BLD VENIPUNCTURE: CPT | Mod: PN

## 2025-04-01 PROCEDURE — 80061 LIPID PANEL: CPT | Mod: PN

## 2025-04-01 RX ORDER — ATORVASTATIN CALCIUM 20 MG/1
20 TABLET, FILM COATED ORAL DAILY
Qty: 90 TABLET | Refills: 3 | Status: SHIPPED | OUTPATIENT
Start: 2025-04-01

## 2025-04-01 RX ORDER — METOPROLOL TARTRATE 25 MG/1
50 TABLET, FILM COATED ORAL 2 TIMES DAILY
Qty: 360 TABLET | Refills: 3 | Status: SHIPPED | OUTPATIENT
Start: 2025-04-01 | End: 2026-04-01

## 2025-04-01 RX ORDER — SUCRALFATE 1 G/1
TABLET ORAL
COMMUNITY
Start: 2025-01-15

## 2025-04-01 RX ORDER — ASPIRIN 81 MG/1
81 TABLET ORAL DAILY
Qty: 90 TABLET | Refills: 3 | Status: SHIPPED | OUTPATIENT
Start: 2025-04-01 | End: 2026-04-01

## 2025-04-01 NOTE — ASSESSMENT & PLAN NOTE
-Last LDL 66.4 8/15/23  -controlled   -continue statin therapy- atorvastatin 20 mg   -discussed lifestyle modifications   -repeat Lipid panel

## 2025-04-01 NOTE — ASSESSMENT & PLAN NOTE
Cardiac echo 10/24/24  Summary    Left Ventricle: The left ventricle is normal in size. Mildly increased wall thickness. There is normal systolic function. Biplane (2D) method of discs ejection fraction is 69%. Diastolic function cannot be reliably determined in the presence of mitral annular calcification.    Right Ventricle: Normal right ventricular cavity size. Systolic function is normal.    -Referred to Pulmonary- appt scheduled for 4/23/25

## 2025-04-01 NOTE — ASSESSMENT & PLAN NOTE
-Goal BP < 130/80  - improved- home /70-80s  -in office 134/98  -continue medical therapy: losartan 100 mg, Amlodipine 10 mg, furosemide 40 mg   -metoprolol tartrate 50 mg BID ( added for HR management)  -check BP twice daily and maintain log, take AM BP 1-2 Hours after medication   -discussed lifestyle modifications- diet, exercise, weight loss

## 2025-04-01 NOTE — ASSESSMENT & PLAN NOTE
-discussed lifestyle modifications  -discussed health risk associated w obesity   Body mass index is 49.8 kg/m². Morbid obesity complicates all aspects of disease management from diagnostic modalities to treatment. Weight loss encouraged and health benefits explained to patient.

## 2025-04-01 NOTE — ASSESSMENT & PLAN NOTE
Tilt table test 1/9/2025  Interpretation Summary    Increase in heart rate  from 94 to 125 beats per minute without any drop in blood pressure.  Test is positive for postural orthostatic tachycardia.    No significant hypotension or bradycardia episodes noted    Patient became hypertensive with tilt-table testing with peak blood pressure of 168/103.    Nonspecific symptoms of lightheadedness, shortness of breath, leg weakness throughout the test reported    -continue metoprolol tartrate 50 mg BID

## 2025-04-01 NOTE — ASSESSMENT & PLAN NOTE
Cardiac event monitor 8/29/23  Conclusion    Negative event monitor with no clinical arrhythmias.    Symptoms corresponding with normal sinus rhythm/tachycardia  bpm.

## 2025-04-01 NOTE — PROGRESS NOTES
Subjective:    Patient ID:  Barrera Siddiqui is a 59 y.o. female who presents for evaluation of No chief complaint on file.      PCP: Evelin Guevara FNP     Referring Provider: Rosemarie Brizuela MD    HPI: Patient is a 57 yo F w/PMH of HTN, HLD, TIA, DM-2, morbid obesity, EDGAR, POTS,  and depression who present today for f/u appt, HTN management. She was last seen on 16/6/24 and was continued on medica  therapy.   She has also followed with Dr. Rosen and was last seen on 1/23/2025 and  per note Recommend starting metoprolol 25 mg twice a day it escalating up to 50 mg twice a day in the next 3 days , post tilt table test:Increase in heart rate  from 94 to 125 beats per minute without any drop in blood pressure.  Test is positive for postural orthostatic tachycardia.    Patient reports taking 50 mg BID ( 2 tablets BID)   ED visit on 3/18/25  w c/o SOB after therapy, CTA chest negative for PE. Patient discharge home with follow up with Pulmonary medicine, appt scheduled for 3/23/25. Patient is also following w Hepatology at [facility] and was last seen on 3/5/2025.     She denies CP, palpitations, PND, pre-syncope, LOC, swelling, or claudication. She notes medication compliance without side effects. She monitor her home BP and ranges 130s/70-80s. She does not exercise regularly. Patient is enrolled in physical therapy for neck and back pain.     12/6/24: Patient presents today for f/u appt. She was last seen on 2/1/24 and was continued on medical therapy and was scheduled for 3 month follow up. She was seen by Dr. Echeverria on 10/17 for pre-op evaluation for colonoscopy. She presents today for evaluation post syncopal episode on Thanksgiving. She is currently wearing an cardiac event monitor and has a tilt table test ordered. Repeat cardiac echo w normal LVEF 69%. She is followed by  Pulmonary medicine and Neurology post fall and syncopal episode. She completed a sleep study and was ordered a CPAP in April but has  not obtained the machine.     Patient notes episode of Left sided CP last PM, stabbing, non radiating. EKG in office Sinus tachycardia, no ST elevation. She is currently wearing a cardiac event monitor. She continues to note right arm/hand pain and is scheduled for surgery.  She continue to report orthopnea. She is also positive for PND.  She continues to note SAWYER. She denies CP, palpitations, PND, pre-syncope, LOC, swelling, or claudication. She notes medication compliance without side effects, but has been out of metoprolol since February. She monitor her home BP and ranges 120s-130s/80-90s. She does not exercise regularly.        2/1/24:Patient presents today for f/u for HTN management. She was last seen on 12/20/23  for f/u appt and was continued on medical therapy. Furosemide was increased to 40 mg BID. She is followed by  Pulmonary medicine and Neurology post fall and syncopal episode.  Patient reports neck and left hand pain, with cramping and spasms to 4th & 5th digit.  She continue to report orthopnea. She is also positive for PND.  She continues to note SAWYER. She denies CP, palpitations, PND, pre-syncope, LOC, swelling, or claudication. She notes medication compliance without side effects. She monitor her home BP and ranges 120s-130s/80-90s. She does not exercise regularly.      12/20/23: Patient presents today for f/u for HTN management. She was last seen on 11/20/23 for f/u appt and was continued on medical therapy She was referred to Pulmonary medicine and has an upcoming appt w PFTs. She is also followed by Neurology post fall and syncopal episode. She noted 3 pillow orthopnea, increased from 2 pillows 2 nights ago. She is also positive for PND. She also notes a 4 pound weight gain and was started on furosemide. She noted a 3 pound weight loss and decreased coughing at night since starting furosemide. She continue to report orthopnea. She continues to note SAWYER. She denies CP, palpitations, PND,  pre-syncope, LOC, swelling, or claudication. She notes medication compliance without side effects. She monitor her home BP and ranges 120s-130s/80-90s. She does not exercise regularly.      11/20/23: Patient presents today for f/u for HTN management. She was last seen on 10/9/23 for f/u appt and was continued on medical therapy and amlodipine was increased to 10 mg daily. He notes past 3-4 days increased SOB and off balance feeling. She is also followed by Neurology post fall and syncopal episode. She notes 3 pillow orthopnea, increased from 2 pillows 2 nights ago. She is also positive for PND. She also notes a 4 pound weight gain.  She denies CP, SOB, palpitations, PND, pre-syncope, LOC, swelling, or claudication. She notes medication compliance without side effects. She monitor her home BP and ranges 120s-140s/80-90s. She does not exercise regularly.        8/28/2023: Patient presents [facility] for f/u appt. She was last seen on 8/28/23  for initial evaluation post admission 2/2 syncopal episode. She reported to ED on 8/15/23 post syncopal episode at work. Admitted 8/15/23-8/16/23 negative MRI  and CTA Head and neck. She denies any syncopal episodes since discharge. She notes left sided headache since her syncopal episode.  She is also followed by Neurology, seen on 9/25/23  and was started on low dose amitriptyline for possible post concussive HA. She denies CP, SOB, palpitations, orthopnea, PND, pre-syncope, LOC, swelling, or claudication. She notes medication compliance without side effects. She monitor her home BP and ranges 130s-150s/80-90s. She does not exercise regularly, but is active at work and walks at lot.     Past Medical History:   Diagnosis Date    Depression     Diabetes mellitus     High cholesterol     Hypertension     Stroke     TIA (transient ischemic attack) 2017    left side weakness resolved after therapy     Past Surgical History:   Procedure Laterality Date    CARPAL TUNNEL RELEASE Bilateral      CHOLECYSTECTOMY      COLONOSCOPY N/A 10/18/2017    Procedure: COLONOSCOPY;  Surgeon: Donald Wright Jr., MD;  Location: [facility] ENDO;  Service: Endoscopy;  Laterality: N/A;    DECOMPRESSION, NERVE, ULNAR Left 7/24/2024    Procedure: LEFT ELBOW DECOMPRESSION, NERVE, ULNAR;  Surgeon: Brandie Mnoteiro MD;  Location: [facility] OR;  Service: Orthopedics;  Laterality: Left;  REGIONAL AFTER    EPIDURAL STEROID INJECTION INTO CERVICAL SPINE N/A 05/08/2024    Procedure: C7-T1 GRACIE (AIM LEFT);  Surgeon: Tiera Murphy DO;  Location: [facility];  Service: Pain Management;  Laterality: N/A;  20 mins    INJECTION OF ANESTHETIC AGENT AROUND MEDIAL BRANCH NERVES INNERVATING CERVICAL FACET JOINT Left 10/28/2024    Procedure: MBB#1 LT C3,4,5;  Surgeon: Tiera Murphy DO;  Location: [facility];  Service: Pain Management;  Laterality: Left;  ASA 5d/Ozempic 7d    INJECTION OF ANESTHETIC AGENT AROUND MEDIAL BRANCH NERVES INNERVATING CERVICAL FACET JOINT Left 11/20/2024    Procedure: Left C3,C4, C5 MBB #2;  Surgeon: Tiera Murphy DO;  Location: [facility];  Service: Pain Management;  Laterality: Left;  15 mins ASA 5 days and Ozempic 7 days    KNEE SURGERY      RADIOFREQUENCY ABLATION, FACET JOINT, CERVICOTHORACIC Left 12/30/2024    Procedure: RFA LT C3,C4,C5;  Surgeon: Tiera Murphy DO;  Location: [facility];  Service: Pain Management;  Laterality: Left;  Ozempic 7d/ASA 5d    TRIGGER FINGER RELEASE Left 7/24/2024    Procedure: LEFT THUMB AND RING  TRIGGER FINGER RELEASE;  Surgeon: Brandie Monteiro MD;  Location: [facility] OR;  Service: Orthopedics;  Laterality: Left;    TRIGGER FINGER RELEASE Right 12/11/2024    Procedure: RIGHT RING RELEASE, TRIGGER FINGER;  Surgeon: Brandie Monteiro MD;  Location: [facility] OR;  Service: Orthopedics;  Laterality: Right;    TUBAL LIGATION      ULNAR NERVE TRANSPOSITION Right 12/11/2024    Procedure: TRANSPOSITION, NERVE, ULNAR;   Surgeon: Brandie Monteiro MD;  Location: [facility];  Service: Orthopedics;  Laterality: Right;  POST SURGERY BLOCK     Social History     Socioeconomic History    Marital status: Single   Tobacco Use    Smoking status: Never     Passive exposure: Never    Smokeless tobacco: Never   Substance and Sexual Activity    Alcohol use: Yes     Comment: rare    Drug use: No    Sexual activity: Yes     Partners: Male     Social Drivers of Health     Financial Resource Strain: Medium Risk (1/22/2025)    Overall Financial Resource Strain (CARDIA)     Difficulty of Paying Living Expenses: Somewhat hard   Food Insecurity: No Food Insecurity (1/22/2025)    Hunger Vital Sign     Worried About Running Out of Food in the Last Year: Never true     Ran Out of Food in the Last Year: Never true   Transportation Needs: No Transportation Needs (11/24/2023)    PRAPARE - Transportation     Lack of Transportation (Medical): No     Lack of Transportation (Non-Medical): No   Physical Activity: Inactive (1/22/2025)    Exercise Vital Sign     Days of Exercise per Week: 0 days     Minutes of Exercise per Session: 0 min   Stress: No Stress Concern Present (1/22/2025)    [employer] - Occupational Stress Questionnaire     Feeling of Stress : Only a little   Housing Stability: Unknown (1/22/2025)    Housing Stability Vital Sign     Unable to Pay for Housing in the Last Year: No     Family History   Problem Relation Name Age of Onset    Breast cancer Mother      Liver disease Maternal Grandmother         Review of patient's allergies indicates:  No Known Allergies    Medication List with Changes/Refills   Current Medications    ALBUTEROL (PROVENTIL/VENTOLIN HFA) 90 MCG/ACTUATION INHALER    Inhale 2 puffs into the lungs every 4 to 6 hours as needed for Shortness of Breath.    AMLODIPINE (NORVASC) 10 MG TABLET    Take 1 tablet (10 mg total) by mouth once daily.    BUTALBITAL-ACETAMINOPHEN-CAFFEINE -40 MG (FIORICET,  ESGIC) -40 MG PER TABLET    TAKE 1 TABLET BY MOUTH TWICE DAILY AS NEEDED FOR HEADACHE    CETIRIZINE (ZYRTEC) 10 MG TABLET    Take 10 mg by mouth.    FAMOTIDINE (PEPCID) 40 MG TABLET    Take 40 mg by mouth 2 (two) times daily.    FUROSEMIDE (LASIX) 40 MG TABLET    Take 1 tablet (40 mg total) by mouth 2 (two) times daily.    GLIPIZIDE (GLUCOTROL) 10 MG TABLET    Take 10 mg by mouth 2 (two) times daily.    JANUVIA 100 MG TAB    Take 1 tablet by mouth once daily.    LINZESS 145 MCG CAP CAPSULE    TAKE 1 CAPSULE BY MOUTH ONCE DAILY ON AN EMPTY STOMACH AT LEAST 30 MINUTES BEFORE 1ST MEAL OF THE DAY    LOSARTAN (COZAAR) 100 MG TABLET    Take 1 tablet (100 mg total) by mouth once daily.    METFORMIN (GLUCOPHAGE-XR) 500 MG ER 24HR TABLET    Take 500 mg by mouth 2 (two) times daily.    METHOCARBAMOL (ROBAXIN) 500 MG TAB    Take 1 tablet by mouth 3 (three) times daily.    MULTIVITAMIN (THERAGRAN) PER TABLET    Take 1 tablet by mouth once daily.    OZEMPIC 0.25 MG OR 0.5 MG (2 MG/3 ML) PEN INJECTOR    Inject into the skin.    PANTOPRAZOLE (PROTONIX) 40 MG TABLET    Take 1 tablet every day by oral route in the morning for 90 days.    PREGABALIN (LYRICA) 100 MG CAPSULE    Take 1 capsule by mouth twice daily    SUCRALFATE (CARAFATE) 1 GRAM TABLET    Take 1 tablet 4 times a day by oral route before meal(s) for 30 days.    TRAMADOL (ULTRAM) 50 MG TABLET    Take 1 tablet (50 mg total) by mouth every 6 (six) hours as needed for Pain.   Changed and/or Refilled Medications    Modified Medication Previous Medication    ASPIRIN (ECOTRIN) 81 MG EC TABLET aspirin (ECOTRIN) 81 MG EC tablet       Take 1 tablet (81 mg total) by mouth once daily.    Take 1 tablet (81 mg total) by mouth once daily.    ATORVASTATIN (LIPITOR) 20 MG TABLET atorvastatin (LIPITOR) 20 MG tablet       Take 1 tablet (20 mg total) by mouth once daily.    Take 1 tablet by mouth once daily    METOPROLOL TARTRATE (LOPRESSOR) 25 MG TABLET metoprolol tartrate  "(LOPRESSOR) 25 MG tablet       Take 2 tablets (50 mg total) by mouth 2 (two) times daily.    Take 1 tablet (25 mg total) by mouth 2 (two) times daily.       Review of Systems   Constitutional: Negative for diaphoresis and fever.   HENT:  Negative for congestion and hearing loss.    Eyes:  Negative for blurred vision and pain.   Cardiovascular:  Positive for orthopnea and paroxysmal nocturnal dyspnea. Negative for chest pain, claudication, dyspnea on exertion, leg swelling, near-syncope, palpitations and syncope.   Respiratory:  Positive for shortness of breath, sleep disturbances due to breathing and snoring.    Hematologic/Lymphatic: Negative for bleeding problem. Does not bruise/bleed easily.   Skin:  Negative for color change and poor wound healing.   Musculoskeletal:  Positive for joint pain and neck pain.   Gastrointestinal:  Negative for abdominal pain and nausea.   Genitourinary:  Negative for bladder incontinence and flank pain.   Neurological:  Positive for disturbances in coordination and headaches. Negative for focal weakness and light-headedness.        Objective:   BP (!) 134/98 (BP Location: Left arm, Patient Position: Sitting)   Pulse 97   Ht 5' 2" (1.575 m)   Wt 123.5 kg (272 lb 4.3 oz)   LMP 07/29/2018 (Exact Date)   SpO2 97%   BMI 49.80 kg/m²    Physical Exam  Constitutional:       Appearance: She is well-developed. She is not diaphoretic.   HENT:      Head: Normocephalic and atraumatic.   Eyes:      General: No scleral icterus.     Pupils: Pupils are equal, round, and reactive to light.   Neck:      Vascular: No JVD.   Cardiovascular:      Rate and Rhythm: Normal rate and regular rhythm.      Pulses: Intact distal pulses.           Radial pulses are 2+ on the right side and 2+ on the left side.        Dorsalis pedis pulses are 2+ on the right side and 2+ on the left side.        Posterior tibial pulses are 2+ on the right side and 2+ on the left side.      Heart sounds: S1 normal and S2 " normal. No murmur heard.     No friction rub. No gallop.   Pulmonary:      Effort: Pulmonary effort is normal. No respiratory distress.      Breath sounds: Normal breath sounds. No wheezing or rales.   Chest:      Chest wall: No tenderness.   Abdominal:      General: Bowel sounds are normal. There is no distension.      Palpations: Abdomen is soft. There is no mass.      Tenderness: There is no abdominal tenderness. There is no rebound.   Musculoskeletal:         General: No tenderness. Normal range of motion.      Cervical back: Normal range of motion and neck supple.   Skin:     General: Skin is warm and dry.      Coloration: Skin is not pale.   Neurological:      Mental Status: She is alert and oriented to person, place, and time.      Coordination: Coordination normal.      Deep Tendon Reflexes: Reflexes normal.   Psychiatric:         Behavior: Behavior normal.         Judgment: Judgment normal.             CTA chest 3/18/2025  FINDINGS:  Pulmonary vasculature: Limited examination due to suboptimal contrast bolus timing.  There is no large central pulmonary embolism seen.  Segmental and subsegmental pulmonary arteries are not well evaluated.     Aorta: Left-sided aortic arch.  No aneurysm and no significant atherosclerosis.     Base of Neck: No significant abnormality.     Thoracic soft tissues: Normal.     Heart: Normal size. No effusion.     Lety/Mediastinum: No pathologic schuyler enlargement.     Airways: The large airways are patent. No foci of endobronchial filling.     Lungs/Pleura: There is a 6 mm left lower lobe pulmonary nodule (series 2, image 344), stable in size from prior.  The lungs are otherwise clear.  No pleural effusion or thickening.     Esophagus: Normal.     Upper Abdomen: The gallbladder is surgically absent.     Bones: No acute fracture. No suspicious lytic or sclerotic lesions.     Impression:     1. No large central pulmonary embolism.  2. Left lower lobe pulmonary nodule measuring 6 mm,  stable from prior.  Recommend 1 year of additional follow-up.        Electronically signed by:Juan Antonio Panchal  Date:                                            03/18/2025  Time:                                           23:41      Tilt table test 1/9/2025  Interpretation Summary    Increase in heart rate  from 94 to 125 beats per minute without any drop in blood pressure.  Test is positive for postural orthostatic tachycardia.    No significant hypotension or bradycardia episodes noted    Patient became hypertensive with tilt-table testing with peak blood pressure of 168/103.    Nonspecific symptoms of lightheadedness, shortness of breath, leg weakness throughout the test reported      Cardiac echo 10/24/24  Summary    Left Ventricle: The left ventricle is normal in size. Mildly increased wall thickness. There is normal systolic function. Biplane (2D) method of discs ejection fraction is 69%. Diastolic function cannot be reliably determined in the presence of mitral annular calcification.    Right Ventricle: Normal right ventricular cavity size. Systolic function is normal.    Cardiac echo 11/27/23  Summary    Left Ventricle: The left ventricle is normal in size. Normal wall thickness. There is concentric remodeling. Normal wall motion. There is normal systolic function. Ejection fraction by visual approximation is 55%. There is normal diastolic function.    Right Ventricle: Normal right ventricular cavity size. Wall thickness is normal. Right ventricle wall motion  is normal. Systolic function is normal.    Mitral Valve: There is mild posterior mitral annular calcification present.    Pulmonary Artery: The estimated pulmonary artery systolic pressure is 17 mmHg.    IVC/SVC: Normal venous pressure at 3 mmHg.  Cardiac event monitor 8/29/23  Conclusion    Negative event monitor with no clinical arrhythmias.    Symptoms corresponding with normal sinus rhythm/tachycardia  bpm.       EKG  8/15/23 reviewed- ANGELA wilkerson  nonspecific T wave abnormality     Cardiac echo 8/15/23  Summary    Left Ventricle: The left ventricle is normal in size. Normal wall thickness. There is normal systolic function. There is normal diastolic function.    Left Atrium: Left atrium is mildly dilated.    Right Ventricle: Normal right ventricular cavity size.    Aortic Valve: The aortic valve is a trileaflet valve.    Mitral Valve: Mild posterior mitral annular calcification.    IVC/SVC: Normal venous pressure at 3 mmHg.    MRI Brain 8/16/23  Impression:     No acute abnormality.     T2 hyperintensities noted at the gray-white junction bilaterally similar and number and distribution to the 2020 exam.  Findings may represent sequelae microvascular ischemic change.       CTA head and neck 8/15/23    Impression:     No acute abnormality. No high-grade stenosis or major vessel occlusion  Cardiac echo 4/9/2016  CONCLUSIONS     1 - Normal left ventricular systolic function (EF 60-65%).     2 - Left ventricular diastolic dysfunction.     3 - Normal right ventricular systolic function      Assessment:       1. Essential hypertension    2. Chronic diastolic heart failure    3. Mixed hyperlipidemia    4. Palpitation    5. SOBOE (shortness of breath on exertion)    6. POTS (postural orthostatic tachycardia syndrome)    7. Type 2 diabetes mellitus without complication, without long-term current use of insulin    8. Morbid obesity with BMI of 50.0-59.9, adult    9. Syncope, unspecified syncope type           Plan:         Essential hypertension  -Goal BP < 130/80  - improved- home /70-80s  -in office 134/98  -continue medical therapy: losartan 100 mg, Amlodipine 10 mg, furosemide 40 mg   -metoprolol tartrate 50 mg BID ( added for HR management)  -check BP twice daily and maintain log, take AM BP 1-2 Hours after medication   -discussed lifestyle modifications- diet, exercise, weight loss     Diastolic heart failure  -Cardiac echo 11/27/23-  LVEF 55% , and normal   diastolic function  -cardiac echo 10/24/24 LVEF 69%  -continue medical therapy: losartan 100 mg, furosemide  to 40 mg BID  w KCL 10 meq ( last K+ 3.6)   -continue HTN management   -discussed lifestyle modifications  -weight self daily, notify office if > 3 pound weight gain in 1 day or 5 pounds in 1 week     Hyperlipidemia  -Last LDL 66.4 8/15/23  -controlled   -continue statin therapy- atorvastatin 20 mg   -discussed lifestyle modifications   -repeat Lipid panel     Palpitation  Cardiac event monitor 8/29/23  Conclusion    Negative event monitor with no clinical arrhythmias.    Symptoms corresponding with normal sinus rhythm/tachycardia  bpm.        SOBOE (shortness of breath on exertion)  Cardiac echo 10/24/24  Summary    Left Ventricle: The left ventricle is normal in size. Mildly increased wall thickness. There is normal systolic function. Biplane (2D) method of discs ejection fraction is 69%. Diastolic function cannot be reliably determined in the presence of mitral annular calcification.    Right Ventricle: Normal right ventricular cavity size. Systolic function is normal.    -Referred to Pulmonary- appt scheduled for 4/23/25     POTS (postural orthostatic tachycardia syndrome)  Tilt table test 1/9/2025  Interpretation Summary    Increase in heart rate  from 94 to 125 beats per minute without any drop in blood pressure.  Test is positive for postural orthostatic tachycardia.    No significant hypotension or bradycardia episodes noted    Patient became hypertensive with tilt-table testing with peak blood pressure of 168/103.    Nonspecific symptoms of lightheadedness, shortness of breath, leg weakness throughout the test reported    -continue metoprolol tartrate 50 mg BID     Type 2 diabetes mellitus  -Uncontrolled  -A1c 8.5 8/23/24  -managed by PCP  -continue medical therapy- glipizide and ozempic      Morbid obesity with BMI of 50.0-59.9, adult  -discussed lifestyle modifications  -discussed health risk  associated w obesity   Body mass index is 49.8 kg/m². Morbid obesity complicates all aspects of disease management from diagnostic modalities to treatment. Weight loss encouraged and health benefits explained to patient.      Syncope  Cardiac event monitor 8/29/23  Conclusion    Negative event monitor with no clinical arrhythmias.    Symptoms corresponding with normal sinus rhythm/tachycardia  bpm.  Tilt table test 1/9/2025  Interpretation Summary    Increase in heart rate  from 94 to 125 beats per minute without any drop in blood pressure.  Test is positive for postural orthostatic tachycardia.    No significant hypotension or bradycardia episodes noted    Patient became hypertensive with tilt-table testing with peak blood pressure of 168/103.    Nonspecific symptoms of lightheadedness, shortness of breath, leg weakness throughout the test reported      Total duration of face to face visit time 30 minutes.  Total time spent counseling greater than fifty percent of total visit time.  Counseling included discussion regarding imaging findings, diagnosis, possibilities, treatment options, risks and benefits.  The patient had many questions regarding the options and long-term effects      Stanley Bañuelos, [employer]  Cardiology

## 2025-04-01 NOTE — ASSESSMENT & PLAN NOTE
Cardiac event monitor 8/29/23  Conclusion    Negative event monitor with no clinical arrhythmias.    Symptoms corresponding with normal sinus rhythm/tachycardia  bpm.  Tilt table test 1/9/2025  Interpretation Summary    Increase in heart rate  from 94 to 125 beats per minute without any drop in blood pressure.  Test is positive for postural orthostatic tachycardia.    No significant hypotension or bradycardia episodes noted    Patient became hypertensive with tilt-table testing with peak blood pressure of 168/103.    Nonspecific symptoms of lightheadedness, shortness of breath, leg weakness throughout the test reported

## 2025-04-03 ENCOUNTER — LAB VISIT (OUTPATIENT)
Dept: LAB | Facility: HOSPITAL | Age: 60
End: 2025-04-03
Attending: INTERNAL MEDICINE
Payer: COMMERCIAL

## 2025-04-03 ENCOUNTER — RESULTS FOLLOW-UP (OUTPATIENT)
Dept: CARDIOLOGY | Facility: CLINIC | Age: 60
End: 2025-04-03

## 2025-04-03 DIAGNOSIS — K74.60 HEPATIC CIRRHOSIS, UNSPECIFIED HEPATIC CIRRHOSIS TYPE, UNSPECIFIED WHETHER ASCITES PRESENT: Primary | ICD-10-CM

## 2025-04-03 LAB
ABSOLUTE EOSINOPHIL (OHS): 0.25 K/UL
ABSOLUTE MONOCYTE (OHS): 0.39 K/UL (ref 0.3–1)
ABSOLUTE NEUTROPHIL COUNT (OHS): 3.01 K/UL (ref 1.8–7.7)
AFP SERPL-MCNC: 3.4 NG/ML
ALBUMIN SERPL BCP-MCNC: 4.3 G/DL (ref 3.5–5.2)
ALP SERPL-CCNC: 161 UNIT/L (ref 38–126)
ALT SERPL W/O P-5'-P-CCNC: 45 UNIT/L (ref 10–44)
ANION GAP (OHS): 13 MMOL/L (ref 8–16)
AST SERPL-CCNC: 52 UNIT/L (ref 15–46)
BASOPHILS # BLD AUTO: 0.03 K/UL
BASOPHILS NFR BLD AUTO: 0.4 %
BILIRUB SERPL-MCNC: 0.4 MG/DL (ref 0.1–1)
BUN SERPL-MCNC: 9 MG/DL (ref 7–17)
CALCIUM SERPL-MCNC: 9.4 MG/DL (ref 8.7–10.5)
CHLORIDE SERPL-SCNC: 106 MMOL/L (ref 95–110)
CO2 SERPL-SCNC: 25 MMOL/L (ref 23–29)
CREAT SERPL-MCNC: 0.6 MG/DL (ref 0.5–1.4)
ERYTHROCYTE [DISTWIDTH] IN BLOOD BY AUTOMATED COUNT: 14.7 % (ref 11.5–14.5)
GFR SERPLBLD CREATININE-BSD FMLA CKD-EPI: >60 ML/MIN/1.73/M2
GLUCOSE SERPL-MCNC: 165 MG/DL (ref 70–110)
HCT VFR BLD AUTO: 36.9 % (ref 37–48.5)
HGB BLD-MCNC: 11.6 GM/DL (ref 12–16)
IMM GRANULOCYTES # BLD AUTO: 0.03 K/UL (ref 0–0.04)
IMM GRANULOCYTES NFR BLD AUTO: 0.4 % (ref 0–0.5)
INR PPP: 1.1 (ref 0.8–1.2)
LYMPHOCYTES # BLD AUTO: 2.99 K/UL (ref 1–4.8)
MCH RBC QN AUTO: 26.2 PG (ref 27–31)
MCHC RBC AUTO-ENTMCNC: 31.4 G/DL (ref 32–36)
MCV RBC AUTO: 84 FL (ref 82–98)
NUCLEATED RBC (/100WBC) (OHS): 0 /100 WBC
PLATELET # BLD AUTO: 180 K/UL (ref 150–450)
PMV BLD AUTO: 12.2 FL (ref 9.2–12.9)
POTASSIUM SERPL-SCNC: 4 MMOL/L (ref 3.5–5.1)
PROT SERPL-MCNC: 8.4 GM/DL (ref 6–8.4)
PROTHROMBIN TIME: 11.9 SECONDS (ref 9–12.5)
RBC # BLD AUTO: 4.42 M/UL (ref 4–5.4)
RELATIVE EOSINOPHIL (OHS): 3.7 %
RELATIVE LYMPHOCYTE (OHS): 44.6 % (ref 18–48)
RELATIVE MONOCYTE (OHS): 5.8 % (ref 4–15)
RELATIVE NEUTROPHIL (OHS): 45.1 % (ref 38–73)
SODIUM SERPL-SCNC: 144 MMOL/L (ref 136–145)
WBC # BLD AUTO: 6.7 K/UL (ref 3.9–12.7)

## 2025-04-03 PROCEDURE — 36415 COLL VENOUS BLD VENIPUNCTURE: CPT | Mod: PN

## 2025-04-03 PROCEDURE — 82105 ALPHA-FETOPROTEIN SERUM: CPT | Mod: PN

## 2025-04-03 PROCEDURE — 85610 PROTHROMBIN TIME: CPT | Mod: PN

## 2025-04-03 PROCEDURE — 85025 COMPLETE CBC W/AUTO DIFF WBC: CPT | Mod: PN

## 2025-04-03 PROCEDURE — 80053 COMPREHEN METABOLIC PANEL: CPT | Mod: PN

## 2025-04-22 ENCOUNTER — TELEPHONE (OUTPATIENT)
Dept: PULMONOLOGY | Facility: CLINIC | Age: 60
End: 2025-04-22
Payer: COMMERCIAL

## 2025-04-22 NOTE — TELEPHONE ENCOUNTER
Patient advised that her insurance is not accepted in the general pulmonary clinic, pt verbalized understanding.

## 2025-05-07 ENCOUNTER — TELEPHONE (OUTPATIENT)
Dept: CARDIOLOGY | Facility: CLINIC | Age: 60
End: 2025-05-07
Payer: MEDICAID

## 2025-05-07 NOTE — TELEPHONE ENCOUNTER
RICHARD  Thank you,    Tonja Larry  Medical Assistant   [facility]   [phone]-Phone  [phone]-Fax      ----- Message from Marian sent at 5/7/2025  2:59 PM CDT -----  Type:  Needs Medical AdviceWho Called: OKSANA Noguera [[id number]][facility] the [facility] rather a call back or a response via [facility]? Call backBest Call Back Number:  [phone] fax [phone] Information: [facility] request a call back in regards to if clinic has received disability claims form request sent 04/25 reference number [id number]

## 2025-05-14 ENCOUNTER — LAB VISIT (OUTPATIENT)
Dept: LAB | Facility: HOSPITAL | Age: 60
End: 2025-05-14
Payer: MEDICAID

## 2025-05-14 DIAGNOSIS — E11.65 TYPE 2 DIABETES MELLITUS WITH HYPERGLYCEMIA: Primary | ICD-10-CM

## 2025-05-14 LAB
ABSOLUTE EOSINOPHIL (OHS): 0.24 K/UL
ABSOLUTE MONOCYTE (OHS): 0.43 K/UL (ref 0.3–1)
ABSOLUTE NEUTROPHIL COUNT (OHS): 4.37 K/UL (ref 1.8–7.7)
ALBUMIN SERPL BCP-MCNC: 4.4 G/DL (ref 3.5–5.2)
ALBUMIN/CREAT UR: 85.2 UG/MG
ALP SERPL-CCNC: 133 UNIT/L (ref 38–126)
ALT SERPL W/O P-5'-P-CCNC: 55 UNIT/L (ref 10–44)
ANION GAP (OHS): 15 MMOL/L (ref 8–16)
AST SERPL-CCNC: 65 UNIT/L (ref 15–46)
BASOPHILS # BLD AUTO: 0.05 K/UL
BASOPHILS NFR BLD AUTO: 0.6 %
BILIRUB SERPL-MCNC: 0.5 MG/DL (ref 0.1–1)
BUN SERPL-MCNC: 9 MG/DL (ref 7–17)
CALCIUM SERPL-MCNC: 9.5 MG/DL (ref 8.7–10.5)
CHLORIDE SERPL-SCNC: 100 MMOL/L (ref 95–110)
CHOLEST SERPL-MCNC: 151 MG/DL (ref 120–199)
CHOLEST/HDLC SERPL: 3.6 {RATIO} (ref 2–5)
CO2 SERPL-SCNC: 25 MMOL/L (ref 23–29)
CREAT SERPL-MCNC: 0.6 MG/DL (ref 0.5–1.4)
CREAT UR-MCNC: 128 MG/DL (ref 15–325)
EAG (OHS): 220 MG/DL (ref 68–131)
ERYTHROCYTE [DISTWIDTH] IN BLOOD BY AUTOMATED COUNT: 14.4 % (ref 11.5–14.5)
GFR SERPLBLD CREATININE-BSD FMLA CKD-EPI: >60 ML/MIN/1.73/M2
GLUCOSE SERPL-MCNC: 257 MG/DL (ref 70–110)
HBA1C MFR BLD: 9.3 % (ref 4–5.6)
HCT VFR BLD AUTO: 38 % (ref 37–48.5)
HDLC SERPL-MCNC: 42 MG/DL (ref 40–75)
HDLC SERPL: 27.8 % (ref 20–50)
HGB BLD-MCNC: 12 GM/DL (ref 12–16)
IMM GRANULOCYTES # BLD AUTO: 0.03 K/UL (ref 0–0.04)
IMM GRANULOCYTES NFR BLD AUTO: 0.4 % (ref 0–0.5)
LDLC SERPL CALC-MCNC: 59.2 MG/DL (ref 63–159)
LYMPHOCYTES # BLD AUTO: 2.74 K/UL (ref 1–4.8)
MCH RBC QN AUTO: 26.1 PG (ref 27–31)
MCHC RBC AUTO-ENTMCNC: 31.6 G/DL (ref 32–36)
MCV RBC AUTO: 83 FL (ref 82–98)
MICROALBUMIN UR-MCNC: 109 UG/ML (ref ?–5000)
NONHDLC SERPL-MCNC: 109 MG/DL
NUCLEATED RBC (/100WBC) (OHS): 0 /100 WBC
PLATELET # BLD AUTO: 197 K/UL (ref 150–450)
PMV BLD AUTO: 12.3 FL (ref 9.2–12.9)
POTASSIUM SERPL-SCNC: 4.5 MMOL/L (ref 3.5–5.1)
PROT SERPL-MCNC: 8.8 GM/DL (ref 6–8.4)
RBC # BLD AUTO: 4.59 M/UL (ref 4–5.4)
RELATIVE EOSINOPHIL (OHS): 3.1 %
RELATIVE LYMPHOCYTE (OHS): 34.9 % (ref 18–48)
RELATIVE MONOCYTE (OHS): 5.5 % (ref 4–15)
RELATIVE NEUTROPHIL (OHS): 55.5 % (ref 38–73)
SODIUM SERPL-SCNC: 140 MMOL/L (ref 136–145)
TRIGL SERPL-MCNC: 249 MG/DL (ref 30–150)
TSH SERPL-ACNC: 3.26 UIU/ML (ref 0.4–4)
WBC # BLD AUTO: 7.86 K/UL (ref 3.9–12.7)

## 2025-05-14 PROCEDURE — 82570 ASSAY OF URINE CREATININE: CPT | Mod: PN

## 2025-05-14 PROCEDURE — 85025 COMPLETE CBC W/AUTO DIFF WBC: CPT | Mod: PN

## 2025-05-14 PROCEDURE — 83718 ASSAY OF LIPOPROTEIN: CPT | Mod: PN

## 2025-05-14 PROCEDURE — 80053 COMPREHEN METABOLIC PANEL: CPT | Mod: PN

## 2025-05-14 PROCEDURE — 36415 COLL VENOUS BLD VENIPUNCTURE: CPT | Mod: PN

## 2025-05-14 PROCEDURE — 84443 ASSAY THYROID STIM HORMONE: CPT | Mod: PN

## 2025-05-14 PROCEDURE — 83036 HEMOGLOBIN GLYCOSYLATED A1C: CPT | Mod: PN

## 2025-05-20 ENCOUNTER — TELEPHONE (OUTPATIENT)
Dept: CARDIOLOGY | Facility: CLINIC | Age: 60
End: 2025-05-20
Payer: MEDICAID

## 2025-05-20 NOTE — TELEPHONE ENCOUNTER
Reached out to Mrs. Siddiqui and advised her to see her PCP.  Thank you,    Tonja Larry  Medical Assistant   [facility]   [phone]-Phone  [phone]-Fax    ----- Message from Stanley Bañuelos NP sent at 5/20/2025 12:00 PM CDT -----  Please inform, disability forms are completed by PCP.Thank you,Stanley Bañuelos DNP  ----- Message -----  From: Tonja Larry MA  Sent: 5/16/2025   2:18 PM CDT  To: Stanley Bañuelos NP    Please advise- do we do disability forms?  ----- Message -----  From: Yajaira Jacskon  Sent: 5/16/2025   1:58 PM CDT  To: Sarmad Angel Staff    Type:  Needs Medical AdviceWho Called: Faustina Prabhakar [facility] (please be specific): Disability formWould the patient rather a call back or a response via [facility]? CallBest Call Back Number: [phone] Additional Information: Representative is calling to speak with nurse regarding pt's disability form Please include the following reference number: RPID [id number]

## 2025-05-22 ENCOUNTER — DOCUMENTATION ONLY (OUTPATIENT)
Dept: ORTHOPEDICS | Facility: CLINIC | Age: 60
End: 2025-05-22
Payer: MEDICAID

## 2025-05-22 NOTE — PROGRESS NOTES
Left message with GILBERT with [employer] advising that our office does not offer LTD. Advised that the patient would need to reach out to her PCP for long tern disability. Requested a call back to the [facility] at [phone] with any questions or concerns. Pt has been notified that we do not submit LTD.

## 2025-08-14 ENCOUNTER — TELEPHONE (OUTPATIENT)
Dept: CARDIOLOGY | Facility: CLINIC | Age: 60
End: 2025-08-14
Payer: MEDICAID

## 2025-08-18 ENCOUNTER — OFFICE VISIT (OUTPATIENT)
Dept: CARDIOLOGY | Facility: CLINIC | Age: 60
End: 2025-08-18
Payer: MEDICAID

## 2025-08-18 VITALS
WEIGHT: 270.06 LBS | HEART RATE: 86 BPM | OXYGEN SATURATION: 96 % | SYSTOLIC BLOOD PRESSURE: 117 MMHG | RESPIRATION RATE: 18 BRPM | BODY MASS INDEX: 47.85 KG/M2 | HEIGHT: 63 IN | DIASTOLIC BLOOD PRESSURE: 76 MMHG

## 2025-08-18 DIAGNOSIS — Z01.818 PREOPERATIVE CLEARANCE: Primary | ICD-10-CM

## 2025-08-18 DIAGNOSIS — E78.2 MIXED HYPERLIPIDEMIA: ICD-10-CM

## 2025-08-18 DIAGNOSIS — I10 ESSENTIAL HYPERTENSION: ICD-10-CM

## 2025-08-18 PROCEDURE — 3060F POS MICROALBUMINURIA REV: CPT | Mod: CPTII,,, | Performed by: INTERNAL MEDICINE

## 2025-08-18 PROCEDURE — 99214 OFFICE O/P EST MOD 30 MIN: CPT | Mod: PBBFAC,PN | Performed by: INTERNAL MEDICINE

## 2025-08-18 PROCEDURE — 3008F BODY MASS INDEX DOCD: CPT | Mod: CPTII,,, | Performed by: INTERNAL MEDICINE

## 2025-08-18 PROCEDURE — 99999 PR PBB SHADOW E&M-EST. PATIENT-LVL IV: CPT | Mod: PBBFAC,,, | Performed by: INTERNAL MEDICINE

## 2025-08-18 PROCEDURE — 1159F MED LIST DOCD IN RCRD: CPT | Mod: CPTII,,, | Performed by: INTERNAL MEDICINE

## 2025-08-18 PROCEDURE — 3046F HEMOGLOBIN A1C LEVEL >9.0%: CPT | Mod: CPTII,,, | Performed by: INTERNAL MEDICINE

## 2025-08-18 PROCEDURE — 3066F NEPHROPATHY DOC TX: CPT | Mod: CPTII,,, | Performed by: INTERNAL MEDICINE

## 2025-08-18 PROCEDURE — 4010F ACE/ARB THERAPY RXD/TAKEN: CPT | Mod: CPTII,,, | Performed by: INTERNAL MEDICINE

## 2025-08-18 PROCEDURE — 3078F DIAST BP <80 MM HG: CPT | Mod: CPTII,,, | Performed by: INTERNAL MEDICINE

## 2025-08-18 PROCEDURE — 99212 OFFICE O/P EST SF 10 MIN: CPT | Mod: S$PBB,,, | Performed by: INTERNAL MEDICINE

## 2025-08-18 PROCEDURE — 3074F SYST BP LT 130 MM HG: CPT | Mod: CPTII,,, | Performed by: INTERNAL MEDICINE

## 2025-08-23 DIAGNOSIS — M54.2 NECK PAIN: ICD-10-CM

## 2025-08-25 RX ORDER — PREGABALIN 100 MG/1
100 CAPSULE ORAL 3 TIMES DAILY
Qty: 90 CAPSULE | Refills: 2 | Status: SHIPPED | OUTPATIENT
Start: 2025-08-25

## (undated) DEVICE — SPONGE COTTON TRAY 4X4IN

## (undated) DEVICE — SUT MONOCRYL 4-0 PS-2

## (undated) DEVICE — SUT 4/0 18IN ETHILON BL P3

## (undated) DEVICE — BLADE SURG STAINLESS STEEL #15

## (undated) DEVICE — DRAPE STERI-DRAPE 1000 17X11IN

## (undated) DEVICE — SOCKINETTE DOUBLE PLY 4X48IN

## (undated) DEVICE — SOL POVIDONE SCRUB IODINE 4 OZ

## (undated) DEVICE — UNDERPAD ULTRASORB 300LB 30X36

## (undated) DEVICE — APPLICATOR CHLORAPREP ORN 26ML

## (undated) DEVICE — SYR B-D DISP CONTROL 10CC100/C

## (undated) DEVICE — BUCKET PLASTER DISPOSABLE

## (undated) DEVICE — SOL IRR SOD CHL .9% POUR

## (undated) DEVICE — GLOVE BIOGEL PI MICRO INDIC 7

## (undated) DEVICE — SLING ARM LARGE FOAM STRAP

## (undated) DEVICE — FORCEP STRAIGHT DISP

## (undated) DEVICE — PAD CAST SPECIALIST STRL 4

## (undated) DEVICE — SPLINT PLASTER FAST SET 5X30IN

## (undated) DEVICE — BANDAGE MATRIX HK LOOP 4IN 5YD

## (undated) DEVICE — HOSE DUAL W/CPC CONNECTORS

## (undated) DEVICE — BANDAGE MATRIX HK LOOP 2IN 5YD

## (undated) DEVICE — CUFF TOURNIQUET DL PRT

## (undated) DEVICE — BANDAGE BULKEE LITE 3INX4.1YD

## (undated) DEVICE — GLOVE BIOGEL ECLIPSE SZ 7

## (undated) DEVICE — CORD BIPOLAR 12 FOOT

## (undated) DEVICE — DRESSING N ADH OIL EMUL 3X3

## (undated) DEVICE — NDL HYPO STD REG BVL 22GX1.5IN

## (undated) DEVICE — PACK UPPER EXTREMITY BAPTIST

## (undated) DEVICE — ELECTRODE REM PLYHSV RETURN 9

## (undated) DEVICE — SUT 4-0 VICRYL / P-3